# Patient Record
Sex: FEMALE | Race: WHITE | NOT HISPANIC OR LATINO | Employment: OTHER | URBAN - METROPOLITAN AREA
[De-identification: names, ages, dates, MRNs, and addresses within clinical notes are randomized per-mention and may not be internally consistent; named-entity substitution may affect disease eponyms.]

---

## 2015-09-15 LAB — HM PAP SMEAR: NEGATIVE

## 2017-03-13 ENCOUNTER — TRANSCRIBE ORDERS (OUTPATIENT)
Dept: ADMINISTRATIVE | Facility: HOSPITAL | Age: 77
End: 2017-03-13

## 2017-03-13 DIAGNOSIS — Z12.31 VISIT FOR SCREENING MAMMOGRAM: Primary | ICD-10-CM

## 2017-03-17 ENCOUNTER — OFFICE VISIT (OUTPATIENT)
Dept: FAMILY MEDICINE CLINIC | Facility: CLINIC | Age: 77
End: 2017-03-17

## 2017-03-17 VITALS
DIASTOLIC BLOOD PRESSURE: 72 MMHG | WEIGHT: 177 LBS | SYSTOLIC BLOOD PRESSURE: 136 MMHG | HEART RATE: 51 BPM | HEIGHT: 65 IN | OXYGEN SATURATION: 99 % | BODY MASS INDEX: 29.49 KG/M2 | TEMPERATURE: 97.9 F

## 2017-03-17 DIAGNOSIS — E11.9 TYPE 2 DIABETES MELLITUS WITHOUT COMPLICATION, WITHOUT LONG-TERM CURRENT USE OF INSULIN (HCC): ICD-10-CM

## 2017-03-17 DIAGNOSIS — E78.2 MIXED HYPERLIPIDEMIA: ICD-10-CM

## 2017-03-17 DIAGNOSIS — I10 ESSENTIAL HYPERTENSION: Primary | ICD-10-CM

## 2017-03-17 LAB
ALBUMIN SERPL-MCNC: 3.8 G/DL (ref 3.5–5.2)
ALBUMIN UR-MCNC: 0 MG/L (ref 0–20)
ALBUMIN/GLOB SERPL: 1.4 G/DL
ALP SERPL-CCNC: 66 U/L (ref 39–117)
ALT SERPL W P-5'-P-CCNC: 18 U/L (ref 1–33)
ANION GAP SERPL CALCULATED.3IONS-SCNC: 10.9 MMOL/L
AST SERPL-CCNC: 24 U/L (ref 1–32)
BILIRUB SERPL-MCNC: 0.6 MG/DL (ref 0.1–1.2)
BUN BLD-MCNC: 22 MG/DL (ref 8–23)
BUN/CREAT SERPL: 20.4 (ref 7–25)
CALCIUM SPEC-SCNC: 10.1 MG/DL (ref 8.6–10.5)
CHLORIDE SERPL-SCNC: 102 MMOL/L (ref 98–107)
CHOLEST SERPL-MCNC: 123 MG/DL (ref 0–200)
CO2 SERPL-SCNC: 29.1 MMOL/L (ref 22–29)
CREAT BLD-MCNC: 1.08 MG/DL (ref 0.57–1)
GFR SERPL CREATININE-BSD FRML MDRD: 49 ML/MIN/1.73
GLOBULIN UR ELPH-MCNC: 2.8 GM/DL
GLUCOSE BLD-MCNC: 103 MG/DL (ref 65–99)
HBA1C MFR BLD: 6.2 % (ref 4.8–5.6)
HDLC SERPL-MCNC: 62 MG/DL (ref 40–60)
LDLC SERPL CALC-MCNC: 39 MG/DL (ref 0–100)
LDLC/HDLC SERPL: 0.64 {RATIO}
POTASSIUM BLD-SCNC: 4.1 MMOL/L (ref 3.5–5.2)
PROT SERPL-MCNC: 6.6 G/DL (ref 6–8.5)
SODIUM BLD-SCNC: 142 MMOL/L (ref 136–145)
TRIGL SERPL-MCNC: 108 MG/DL (ref 0–150)
VLDLC SERPL-MCNC: 21.6 MG/DL (ref 5–40)

## 2017-03-17 PROCEDURE — 80053 COMPREHEN METABOLIC PANEL: CPT | Performed by: FAMILY MEDICINE

## 2017-03-17 PROCEDURE — 80061 LIPID PANEL: CPT | Performed by: FAMILY MEDICINE

## 2017-03-17 PROCEDURE — 82043 UR ALBUMIN QUANTITATIVE: CPT | Performed by: FAMILY MEDICINE

## 2017-03-17 PROCEDURE — 99213 OFFICE O/P EST LOW 20 MIN: CPT | Performed by: FAMILY MEDICINE

## 2017-03-17 PROCEDURE — 83036 HEMOGLOBIN GLYCOSYLATED A1C: CPT | Performed by: FAMILY MEDICINE

## 2017-03-17 RX ORDER — RAMIPRIL 10 MG/1
10 CAPSULE ORAL DAILY
Qty: 90 CAPSULE | Refills: 3 | Status: SHIPPED | OUTPATIENT
Start: 2017-03-17 | End: 2017-07-10 | Stop reason: SDUPTHER

## 2017-03-17 NOTE — PROGRESS NOTES
SUBJECTIVE:  The patient is an 87-year-old white female who comes in today for follow-up on her diabetes.  She's doing well.  She exercises at the gym.  Her blood sugars are well controlled.  She is due labs.  Her GYN exams are up-to-date.  Her eye exams are up-to-date.     PAST MEDICAL HISTORY:  Reviewed.    REVIEW OF SYSTEMS:  Please see above; 14 point ROS otherwise negative.      OBJECTIVE: Vitals signs are reviewed and are stable.    HEENT: PERRLA.    Neck:  Supple.    Lungs:  Clear.    Heart:  Regular rate and rhythm.    Abdomen:   Soft, nontender.    Extremities:  No cyanosis, clubbing or edema.      ASSESSMENT:     Type 2 diabetes  Hyperlipidemia  Hypertension    PLAN:   CMP fasting lipid hemoglobin A1c urine for microalbuminuria are ordered.  She will follow up on her labs.  She will continue her healthy lifestyle.  She does not want to have a colonoscopy.    Much of this encounter note is an electronic transcription/translation of spoken language to printed text.  The electronic translation of spoken language may permit erroneous, or at times, nonsensical words or phrases to be inadvertently transcribed.  Although I have reviewed the note for such errors, some may still exist.

## 2017-03-18 RX ORDER — RAMIPRIL 10 MG/1
CAPSULE ORAL
Qty: 45 CAPSULE | Refills: 3 | Status: SHIPPED | OUTPATIENT
Start: 2017-03-18 | End: 2017-04-06 | Stop reason: SDUPTHER

## 2017-04-04 ENCOUNTER — APPOINTMENT (OUTPATIENT)
Dept: GENERAL RADIOLOGY | Facility: HOSPITAL | Age: 77
End: 2017-04-04

## 2017-04-04 ENCOUNTER — HOSPITAL ENCOUNTER (EMERGENCY)
Facility: HOSPITAL | Age: 77
Discharge: HOME | End: 2017-04-04
Attending: EMERGENCY MEDICINE | Admitting: EMERGENCY MEDICINE

## 2017-04-04 VITALS
WEIGHT: 177 LBS | OXYGEN SATURATION: 98 % | HEART RATE: 64 BPM | SYSTOLIC BLOOD PRESSURE: 146 MMHG | BODY MASS INDEX: 29.49 KG/M2 | RESPIRATION RATE: 18 BRPM | TEMPERATURE: 99.7 F | DIASTOLIC BLOOD PRESSURE: 94 MMHG | HEIGHT: 65 IN

## 2017-04-04 DIAGNOSIS — I48.0 PAROXYSMAL ATRIAL FIBRILLATION (HCC): Primary | ICD-10-CM

## 2017-04-04 LAB
ALBUMIN SERPL-MCNC: 3.9 G/DL (ref 3.5–5.2)
ALBUMIN/GLOB SERPL: 1.4 G/DL
ALP SERPL-CCNC: 74 U/L (ref 39–117)
ALT SERPL W P-5'-P-CCNC: 15 U/L (ref 1–33)
ANION GAP SERPL CALCULATED.3IONS-SCNC: 12.3 MMOL/L
AST SERPL-CCNC: 23 U/L (ref 1–32)
BASOPHILS # BLD AUTO: 0.03 10*3/MM3 (ref 0–0.2)
BASOPHILS NFR BLD AUTO: 0.4 % (ref 0–1.5)
BILIRUB SERPL-MCNC: 0.3 MG/DL (ref 0.1–1.2)
BUN BLD-MCNC: 22 MG/DL (ref 8–23)
BUN/CREAT SERPL: 20.2 (ref 7–25)
CALCIUM SPEC-SCNC: 10.2 MG/DL (ref 8.6–10.5)
CHLORIDE SERPL-SCNC: 101 MMOL/L (ref 98–107)
CO2 SERPL-SCNC: 28.7 MMOL/L (ref 22–29)
CREAT BLD-MCNC: 1.09 MG/DL (ref 0.57–1)
DEPRECATED RDW RBC AUTO: 42.6 FL (ref 37–54)
EOSINOPHIL # BLD AUTO: 0.01 10*3/MM3 (ref 0–0.7)
EOSINOPHIL NFR BLD AUTO: 0.1 % (ref 0.3–6.2)
ERYTHROCYTE [DISTWIDTH] IN BLOOD BY AUTOMATED COUNT: 12.5 % (ref 11.7–13)
GFR SERPL CREATININE-BSD FRML MDRD: 49 ML/MIN/1.73
GLOBULIN UR ELPH-MCNC: 2.8 GM/DL
GLUCOSE BLD-MCNC: 137 MG/DL (ref 65–99)
HCT VFR BLD AUTO: 41.4 % (ref 35.6–45.5)
HGB BLD-MCNC: 14.1 G/DL (ref 11.9–15.5)
IMM GRANULOCYTES # BLD: 0.02 10*3/MM3 (ref 0–0.03)
IMM GRANULOCYTES NFR BLD: 0.3 % (ref 0–0.5)
INR PPP: 1 (ref 0.9–1.1)
LYMPHOCYTES # BLD AUTO: 1.25 10*3/MM3 (ref 0.9–4.8)
LYMPHOCYTES NFR BLD AUTO: 17.4 % (ref 19.6–45.3)
MCH RBC QN AUTO: 32.2 PG (ref 26.9–32)
MCHC RBC AUTO-ENTMCNC: 34.1 G/DL (ref 32.4–36.3)
MCV RBC AUTO: 94.5 FL (ref 80.5–98.2)
MONOCYTES # BLD AUTO: 0.45 10*3/MM3 (ref 0.2–1.2)
MONOCYTES NFR BLD AUTO: 6.3 % (ref 5–12)
NEUTROPHILS # BLD AUTO: 5.43 10*3/MM3 (ref 1.9–8.1)
NEUTROPHILS NFR BLD AUTO: 75.5 % (ref 42.7–76)
PLATELET # BLD AUTO: 216 10*3/MM3 (ref 140–500)
PMV BLD AUTO: 10.7 FL (ref 6–12)
POTASSIUM BLD-SCNC: 4.2 MMOL/L (ref 3.5–5.2)
PROT SERPL-MCNC: 6.7 G/DL (ref 6–8.5)
PROTHROMBIN TIME: 12.8 SECONDS (ref 11.7–14.2)
RBC # BLD AUTO: 4.38 10*6/MM3 (ref 3.9–5.2)
SODIUM BLD-SCNC: 142 MMOL/L (ref 136–145)
T4 FREE SERPL-MCNC: 0.87 NG/DL (ref 0.93–1.7)
TSH SERPL DL<=0.05 MIU/L-ACNC: 2.85 MIU/ML (ref 0.27–4.2)
WBC NRBC COR # BLD: 7.19 10*3/MM3 (ref 4.5–10.7)

## 2017-04-04 PROCEDURE — 96361 HYDRATE IV INFUSION ADD-ON: CPT

## 2017-04-04 PROCEDURE — 85610 PROTHROMBIN TIME: CPT | Performed by: EMERGENCY MEDICINE

## 2017-04-04 PROCEDURE — 71020 HC CHEST PA AND LATERAL: CPT

## 2017-04-04 PROCEDURE — 93010 ELECTROCARDIOGRAM REPORT: CPT | Performed by: INTERNAL MEDICINE

## 2017-04-04 PROCEDURE — 96360 HYDRATION IV INFUSION INIT: CPT

## 2017-04-04 PROCEDURE — 36415 COLL VENOUS BLD VENIPUNCTURE: CPT

## 2017-04-04 PROCEDURE — 84443 ASSAY THYROID STIM HORMONE: CPT | Performed by: EMERGENCY MEDICINE

## 2017-04-04 PROCEDURE — 99284 EMERGENCY DEPT VISIT MOD MDM: CPT

## 2017-04-04 PROCEDURE — 84439 ASSAY OF FREE THYROXINE: CPT | Performed by: EMERGENCY MEDICINE

## 2017-04-04 PROCEDURE — 85025 COMPLETE CBC W/AUTO DIFF WBC: CPT | Performed by: EMERGENCY MEDICINE

## 2017-04-04 PROCEDURE — 93005 ELECTROCARDIOGRAM TRACING: CPT | Performed by: EMERGENCY MEDICINE

## 2017-04-04 PROCEDURE — 80053 COMPREHEN METABOLIC PANEL: CPT | Performed by: EMERGENCY MEDICINE

## 2017-04-04 RX ORDER — SODIUM CHLORIDE 0.9 % (FLUSH) 0.9 %
10 SYRINGE (ML) INJECTION AS NEEDED
Status: DISCONTINUED | OUTPATIENT
Start: 2017-04-04 | End: 2017-04-04 | Stop reason: HOSPADM

## 2017-04-04 RX ADMIN — SODIUM CHLORIDE 500 ML: 9 INJECTION, SOLUTION INTRAVENOUS at 16:00

## 2017-04-04 NOTE — ED NOTES
Pt worked out this morning per usual, however, pt's HR remained in the 140's-150's 5-6 hours after her workout. Proceeded to urgent care who then called EMS. EMS reports that residential through their ride to the hospital, pt converted to NSR. EKGs of all rhythms were provided by EMS        Isadora Álvarez RN  04/04/17 5373

## 2017-04-04 NOTE — ED PROVIDER NOTES
EMERGENCY DEPARTMENT ENCOUNTER    CHIEF COMPLAINT  Chief Complaint: rapid heart rate  History given by: pt  History limited by: Pt is a poor historian.  Room Number: 17/17  PMD: Chandu Erickson MD      HPI:  Pt is a 77 y.o. female who presents complaining of a rapid heart rate after exercising this morning. Pt states her HR remained in the 140's-150's 5-6 hours after her workout and she went to [facility]. Pt was then sent from [facility] to the ED for further evaluation. Pt states her rapid heart rate has now resolved. Pt denies dizziness, lightheadedness, & CP. Pt has a hx of htn, hyperlipidemia, & diabetes.    Duration:  1 day  Onset: sudden  Timing: constant for 5-6 hours  Intensity/Severity: moderate  Progression: rresolved  Associated Symptoms: none reported  Aggravating Factors: none reported  Alleviating Factors: none reported  Previous Episodes: none reported  Treatment before arrival: Pt was then sent from [facility] to the ED for further evaluation.    PAST MEDICAL HISTORY  Active Ambulatory Problems     Diagnosis Date Noted   • IFG (impaired fasting glucose)    • Hypertension    • Hyperlipidemia    • Diabetes mellitus      Resolved Ambulatory Problems     Diagnosis Date Noted   • No Resolved Ambulatory Problems     Past Medical History:   Diagnosis Date   • Diabetes mellitus    • Hyperlipidemia    • Hypertension    • IFG (impaired fasting glucose)        PAST SURGICAL HISTORY  Past Surgical History:   Procedure Laterality Date   • ANAL FISTULA REPAIR     • BACK SURGERY      l spine ruptured disk   • CHOLECYSTECTOMY     • JOINT REPLACEMENT      bilateral knees       FAMILY HISTORY  Family History   Problem Relation Age of Onset   • Liver disease Brother        SOCIAL HISTORY  Social History     Social History   • Marital status:      Spouse name: N/A   • Number of children: N/A   • Years of education: N/A     Occupational History   • Not on file.     Social History Main Topics   • Smoking status: Never Smoker   •  Smokeless tobacco: Not on file   • Alcohol use No   • Drug use: No   • Sexual activity: No     Other Topics Concern   • Not on file     Social History Narrative       ALLERGIES  Penicillins; Percocet [oxycodone-acetaminophen]; and Sulfa antibiotics    REVIEW OF SYSTEMS  Review of Systems   Constitutional: Negative for chills and fever.   HENT: Negative for sore throat and trouble swallowing.    Eyes: Negative for visual disturbance.   Respiratory: Negative for cough and shortness of breath.    Cardiovascular: Negative for chest pain, palpitations and leg swelling.        Rapid heart rate   Gastrointestinal: Negative for abdominal pain, diarrhea and vomiting.   Endocrine: Negative.    Genitourinary: Negative for decreased urine volume, dysuria and frequency.   Musculoskeletal: Negative for neck pain.   Skin: Negative for rash.   Allergic/Immunologic: Negative.    Neurological: Negative for syncope, weakness, numbness and headaches.   Hematological: Negative.    Psychiatric/Behavioral: Negative.    All other systems reviewed and are negative.      PHYSICAL EXAM  ED Triage Vitals   Temp Heart Rate Resp BP SpO2   04/04/17 1449 04/04/17 1449 04/04/17 1449 04/04/17 1449 04/04/17 1449   99.7 °F (37.6 °C) 76 16 140/88 97 %      Temp src Heart Rate Source Patient Position BP Location FiO2 (%)   04/04/17 1449 04/04/17 1449 -- -- --   Tympanic Monitor          Physical Exam   Constitutional: She is oriented to person, place, and time and well-developed, well-nourished, and in no distress. No distress.   HENT:   Head: Normocephalic.   Eyes: EOM are normal. Pupils are equal, round, and reactive to light.   Neck: Normal range of motion.   Cardiovascular: Normal rate and regular rhythm.    No murmur heard.  Pulmonary/Chest: Effort normal and breath sounds normal. No respiratory distress.   Abdominal: Soft. There is no tenderness. There is no rebound and no guarding.   Musculoskeletal: Normal range of motion. She exhibits no edema.    Neurological: She is alert and oriented to person, place, and time.   Skin: Skin is warm and dry. No rash noted.   Psychiatric: Mood and affect normal.   Nursing note and vitals reviewed.      LAB RESULTS  Lab Results (last 24 hours)     Procedure Component Value Units Date/Time    CBC & Differential [[id number]] Collected:  04/04/17 1537    Specimen:  Blood Updated:  04/04/17 1557    Narrative:       The following orders were created for panel order CBC & Differential.  Procedure                               Abnormality         Status                     ---------                               -----------         ------                     CBC Auto Differential[[id number]]         Abnormal            Final result                 Please view results for these tests on the individual orders.    Comprehensive Metabolic Panel [[id number]]  (Abnormal) Collected:  04/04/17 1537    Specimen:  Blood Updated:  04/04/17 1623     Glucose 137 (H) mg/dL      BUN 22 mg/dL      Creatinine 1.09 (H) mg/dL      Sodium 142 mmol/L      Potassium 4.2 mmol/L      Chloride 101 mmol/L      CO2 28.7 mmol/L      Calcium 10.2 mg/dL      Total Protein 6.7 g/dL      Albumin 3.90 g/dL      ALT (SGPT) 15 U/L      AST (SGOT) 23 U/L      Alkaline Phosphatase 74 U/L      Total Bilirubin 0.3 mg/dL      eGFR Non African Amer 49 (L) mL/min/1.73      Globulin 2.8 gm/dL      A/G Ratio 1.4 g/dL      BUN/Creatinine Ratio 20.2     Anion Gap 12.3 mmol/L     Narrative:       The MDRD GFR formula is only valid for adults with stable renal function between ages 18 and 70.    Protime-INR [[id number]]  (Normal) Collected:  04/04/17 1537    Specimen:  Blood Updated:  04/04/17 1608     Protime 12.8 Seconds      INR 1.00    TSH [[id number]]  (Normal) Collected:  04/04/17 1537    Specimen:  Blood Updated:  04/04/17 1636     TSH 2.850 mIU/mL     T4, Free [[id number]]  (Abnormal) Collected:  04/04/17 1537    Specimen:  Blood Updated:  04/04/17 1636     Free T4 0.87 (L) ng/dL      CBC Auto Differential [[id number]]  (Abnormal) Collected:  04/04/17 1537    Specimen:  Blood Updated:  04/04/17 1557     WBC 7.19 10*3/mm3      RBC 4.38 10*6/mm3      Hemoglobin 14.1 g/dL      Hematocrit 41.4 %      MCV 94.5 fL      MCH 32.2 (H) pg      MCHC 34.1 g/dL      RDW 12.5 %      RDW-SD 42.6 fl      MPV 10.7 fL      Platelets 216 10*3/mm3      Neutrophil % 75.5 %      Lymphocyte % 17.4 (L) %      Monocyte % 6.3 %      Eosinophil % 0.1 (L) %      Basophil % 0.4 %      Immature Grans % 0.3 %      Neutrophils, Absolute 5.43 10*3/mm3      Lymphocytes, Absolute 1.25 10*3/mm3      Monocytes, Absolute 0.45 10*3/mm3      Eosinophils, Absolute 0.01 10*3/mm3      Basophils, Absolute 0.03 10*3/mm3      Immature Grans, Absolute 0.02 10*3/mm3           I ordered the above labs and reviewed the results    RADIOLOGY  XR Chest 2 View   Final Result   XR Chest 2 View (Final result) Result time: 04/04/17 15:53:34     Final result by Twin Mayfield MD (04/04/17 15:53:34)     Narrative:     EMERGENCY TWO-VIEW CHEST     HISTORY: 77-year-old female with tachycardia hypertension diabetes and  palpitations     COMPARISON: 08/27/2009     FINDINGS:  1. Stable negative acute chest.     This report was finalized on 4/4/2017 3:53 PM by Dr. Twin Mayfield MD.             I ordered the above noted radiological studies. Interpreted by radiologist. Reviewed by me in PACS.       PROCEDURES  Procedures    EKG           EKG time: 1348  Rhythm/Rate: a-fib  QRS, axis: normal axis, QT interval is 426 ms  ST and T waves: nonspecific ST changes    Interpreted Contemporaneously by me, independently viewed  Pt's subsequent EKG shows she converted back to sinus rhythm.    EKG           EKG time: 1537  Rhythm/Rate: SR, 67  P waves and SD: PACs  QRS, axis: LAD, QT interval is 397 ms   ST and T waves: nonspecific      Interpreted Contemporaneously by me, independently viewed  No sig change compared to prior 7/28/09.    PROGRESS AND CONSULTS  ED Course      1516 Ordered CBC, CMP, PT-INR, TSH, & T4 for further evaluation. Ordered CXR & EKG for further evaluation. Ordered IV fluids.    1540 Pt's CHADS score is a 3.     1653 Discussed pt's case w/ Dr. Martínez (cardio) who states pt can be discharged w/ Eliquis & follow-up in the office.    1738 Pt rechecked & is resting comfortably. Discussed that I consulted w/ Dr. Martínez & plan to discharge pt w/ a ZIO patch for monitoring. Advised pt to follow-up w/ [facility]. Pt agrees w/ plan & has no further questions at this time.    MEDICAL DECISION MAKING  Results were reviewed/discussed with the patient and they were also made aware of online access. Pt also made aware that some labs, such as cultures, will not be resulted during ER visit and follow up with PMD is necessary.     MDM  Number of Diagnoses or Management Options  Paroxysmal atrial fibrillation:      Amount and/or Complexity of Data Reviewed  Clinical lab tests: ordered and reviewed  Tests in the radiology section of CPT®: ordered and reviewed (CXR was negative acute.)  Tests in the medicine section of CPT®: ordered and reviewed (See EKG in procedure note.)  Decide to obtain previous medical records or to obtain history from someone other than the patient: yes  Review and summarize past medical records: yes  Discuss the patient with other providers: yes (Dr. Walls (cardio))  Independent visualization of images, tracings, or specimens: yes    Patient Progress  Patient progress: stable         DIAGNOSIS  Final diagnoses:   Paroxysmal atrial fibrillation       DISPOSITION    DISCHARGE    Patient discharged in stable condition.    Reviewed implications of results, diagnosis, meds, responsibility to follow up, warning signs and symptoms of possible worsening, potential complications and reasons to return to ER.    Patient/Family voiced understanding of above instructions.    Discussed plan for discharge, as there is no emergent indication for admission.  Pt/family is agreeable  and understands need for follow up and repeat testing.  Pt is aware that discharge does not mean that nothing is wrong but it indicates no emergency is present that requires admission and they must continue care with follow-up as given below or physician of their choice.     FOLLOW-UP  [facility] [place] [address]  [facility] [address]  [phone]  Schedule an appointment as soon as possible for a visit in 1 day           Medication List      New Prescriptions          apixaban 5 MG tablet tablet   Commonly known as:  ELIQUIS   Take 1 tablet by mouth 2 (Two) Times a Day.         Changed          ramipril 10 MG capsule   Commonly known as:  ALTACE   Take 1 capsule by mouth Daily.   What changed:  Another medication with the same name was removed. Continue   taking this medication, and follow the directions you see here.             Latest Documented Vital Signs:  As of 5:44 PM  BP- 146/94 HR- 64 Temp- 99.7 °F (37.6 °C) (Tympanic) O2 sat- 98%    --  Documentation assistance provided by karmen Hi for Dr. Sofia.  Information recorded by the karmen was done at my direction and has been verified and validated by me.     Clarisa Hi  04/04/17 1528       Clarisa Hi  04/04/17 1621       Clarisa Hi  04/04/17 1720       Clarisa Hi  04/04/17 1722       Clarisa Hi  04/04/17 1748       Zan Sofia MD  04/04/17 9108

## 2017-04-05 ENCOUNTER — TELEPHONE (OUTPATIENT)
Dept: SOCIAL WORK | Facility: HOSPITAL | Age: 77
End: 2017-04-05

## 2017-04-05 NOTE — TELEPHONE ENCOUNTER
ER F/U phone call:   Pt states that she is doing fine, she is to see her cardiologist on 4-6-17. Pt asked what should she do if her heart rate got real high again, informed pt that she should return to the ER. No other questions or concerns voiced at this time. Lyla Friedman RN

## 2017-04-06 ENCOUNTER — OFFICE VISIT (OUTPATIENT)
Dept: CARDIOLOGY | Facility: CLINIC | Age: 77
End: 2017-04-06

## 2017-04-06 VITALS
BODY MASS INDEX: 29.99 KG/M2 | DIASTOLIC BLOOD PRESSURE: 82 MMHG | WEIGHT: 180 LBS | HEIGHT: 65 IN | HEART RATE: 124 BPM | SYSTOLIC BLOOD PRESSURE: 160 MMHG

## 2017-04-06 DIAGNOSIS — I48.0 PAROXYSMAL ATRIAL FIBRILLATION (HCC): Primary | ICD-10-CM

## 2017-04-06 DIAGNOSIS — I10 ESSENTIAL HYPERTENSION: ICD-10-CM

## 2017-04-06 PROCEDURE — 99204 OFFICE O/P NEW MOD 45 MIN: CPT | Performed by: INTERNAL MEDICINE

## 2017-04-06 PROCEDURE — 93000 ELECTROCARDIOGRAM COMPLETE: CPT | Performed by: INTERNAL MEDICINE

## 2017-04-12 ENCOUNTER — HOSPITAL ENCOUNTER (OUTPATIENT)
Dept: CARDIOLOGY | Facility: HOSPITAL | Age: 77
Discharge: HOME | End: 2017-04-12
Attending: INTERNAL MEDICINE | Admitting: INTERNAL MEDICINE

## 2017-04-12 VITALS
HEART RATE: 108 BPM | WEIGHT: 180 LBS | OXYGEN SATURATION: 96 % | DIASTOLIC BLOOD PRESSURE: 26 MMHG | BODY MASS INDEX: 29.99 KG/M2 | HEIGHT: 65 IN | SYSTOLIC BLOOD PRESSURE: 130 MMHG

## 2017-04-12 DIAGNOSIS — I10 ESSENTIAL HYPERTENSION: ICD-10-CM

## 2017-04-12 DIAGNOSIS — I48.0 PAROXYSMAL ATRIAL FIBRILLATION (HCC): ICD-10-CM

## 2017-04-12 LAB
ASCENDING AORTA: 3.6 CM
BH CV ECHO MEAS - ACS: 1.9 CM
BH CV ECHO MEAS - AO MAX PG (FULL): 4.5 MMHG
BH CV ECHO MEAS - AO MAX PG: 8.1 MMHG
BH CV ECHO MEAS - AO MEAN PG (FULL): 2.6 MMHG
BH CV ECHO MEAS - AO MEAN PG: 4.6 MMHG
BH CV ECHO MEAS - AO ROOT AREA (BSA CORRECTED): 1.4
BH CV ECHO MEAS - AO ROOT AREA: 5.8 CM^2
BH CV ECHO MEAS - AO ROOT DIAM: 2.7 CM
BH CV ECHO MEAS - AO V2 MAX: 141.9 CM/SEC
BH CV ECHO MEAS - AO V2 MEAN: 101.3 CM/SEC
BH CV ECHO MEAS - AO V2 VTI: 28.9 CM
BH CV ECHO MEAS - AVA(I,A): 1.6 CM^2
BH CV ECHO MEAS - AVA(I,D): 1.6 CM^2
BH CV ECHO MEAS - AVA(V,A): 1.7 CM^2
BH CV ECHO MEAS - AVA(V,D): 1.7 CM^2
BH CV ECHO MEAS - BSA(HAYCOCK): 2 M^2
BH CV ECHO MEAS - BSA: 1.9 M^2
BH CV ECHO MEAS - BZI_BMI: 30 KILOGRAMS/M^2
BH CV ECHO MEAS - BZI_METRIC_HEIGHT: 165.1 CM
BH CV ECHO MEAS - BZI_METRIC_WEIGHT: 81.6 KG
BH CV ECHO MEAS - CONTRAST EF (2CH): 60.5 ML/M^2
BH CV ECHO MEAS - CONTRAST EF 4CH: 63.3 ML/M^2
BH CV ECHO MEAS - EDV(MOD-SP2): 86 ML
BH CV ECHO MEAS - EDV(MOD-SP4): 109 ML
BH CV ECHO MEAS - EDV(TEICH): 130.2 ML
BH CV ECHO MEAS - EF(CUBED): 64.5 %
BH CV ECHO MEAS - EF(MOD-SP2): 60.5 %
BH CV ECHO MEAS - EF(MOD-SP4): 63.3 %
BH CV ECHO MEAS - EF(TEICH): 55.6 %
BH CV ECHO MEAS - ESV(MOD-SP2): 34 ML
BH CV ECHO MEAS - ESV(MOD-SP4): 40 ML
BH CV ECHO MEAS - ESV(TEICH): 57.8 ML
BH CV ECHO MEAS - FS: 29.2 %
BH CV ECHO MEAS - IVS/LVPW: 0.96
BH CV ECHO MEAS - IVSD: 1.1 CM
BH CV ECHO MEAS - LAT PEAK E' VEL: 10 CM/SEC
BH CV ECHO MEAS - LV DIASTOLIC VOL/BSA (35-75): 57.6 ML/M^2
BH CV ECHO MEAS - LV MASS(C)D: 223.7 GRAMS
BH CV ECHO MEAS - LV MASS(C)DI: 118.3 GRAMS/M^2
BH CV ECHO MEAS - LV MAX PG: 3.6 MMHG
BH CV ECHO MEAS - LV MEAN PG: 1.9 MMHG
BH CV ECHO MEAS - LV SYSTOLIC VOL/BSA (12-30): 21.1 ML/M^2
BH CV ECHO MEAS - LV V1 MAX: 94.6 CM/SEC
BH CV ECHO MEAS - LV V1 MEAN: 66.1 CM/SEC
BH CV ECHO MEAS - LV V1 VTI: 18.8 CM
BH CV ECHO MEAS - LVIDD: 5.2 CM
BH CV ECHO MEAS - LVIDS: 3.7 CM
BH CV ECHO MEAS - LVLD AP2: 6.6 CM
BH CV ECHO MEAS - LVLD AP4: 6.9 CM
BH CV ECHO MEAS - LVLS AP2: 6.2 CM
BH CV ECHO MEAS - LVLS AP4: 6.2 CM
BH CV ECHO MEAS - LVOT AREA (M): 2.5 CM^2
BH CV ECHO MEAS - LVOT AREA: 2.5 CM^2
BH CV ECHO MEAS - LVOT DIAM: 1.8 CM
BH CV ECHO MEAS - LVPWD: 1.1 CM
BH CV ECHO MEAS - MED PEAK E' VEL: 7 CM/SEC
BH CV ECHO MEAS - MR MAX PG: 19.6 MMHG
BH CV ECHO MEAS - MR MAX VEL: 221.3 CM/SEC
BH CV ECHO MEAS - MV DEC SLOPE: 801.2 CM/SEC^2
BH CV ECHO MEAS - MV DEC TIME: 0.14 SEC
BH CV ECHO MEAS - MV E MAX VEL: 106.2 CM/SEC
BH CV ECHO MEAS - MV MAX PG: 0 MMHG
BH CV ECHO MEAS - MV MEAN PG: 1.4 MMHG
BH CV ECHO MEAS - MV P1/2T MAX VEL: 106.7 CM/SEC
BH CV ECHO MEAS - MV P1/2T: 39 MSEC
BH CV ECHO MEAS - MV V2 MAX: 106.3 CM/SEC
BH CV ECHO MEAS - MV V2 MEAN: 53.4 CM/SEC
BH CV ECHO MEAS - MV V2 VTI: 26.7 CM
BH CV ECHO MEAS - MVA P1/2T LCG: 2.1 CM^2
BH CV ECHO MEAS - MVA(P1/2T): 5.6 CM^2
BH CV ECHO MEAS - MVA(VTI): 1.8 CM^2
BH CV ECHO MEAS - PA ACC TIME: 0.07 SEC
BH CV ECHO MEAS - PA MAX PG (FULL): 2.2 MMHG
BH CV ECHO MEAS - PA MAX PG: 4.6 MMHG
BH CV ECHO MEAS - PA PR(ACCEL): 47.3 MMHG
BH CV ECHO MEAS - PA V2 MAX: 107.2 CM/SEC
BH CV ECHO MEAS - PVA(V,A): 2.8 CM^2
BH CV ECHO MEAS - PVA(V,D): 2.8 CM^2
BH CV ECHO MEAS - QP/QS: 1.1
BH CV ECHO MEAS - RAP SYSTOLE: 3 MMHG
BH CV ECHO MEAS - RV MAX PG: 2.4 MMHG
BH CV ECHO MEAS - RV MEAN PG: 1.3 MMHG
BH CV ECHO MEAS - RV V1 MAX: 78.1 CM/SEC
BH CV ECHO MEAS - RV V1 MEAN: 52.7 CM/SEC
BH CV ECHO MEAS - RV V1 VTI: 13.7 CM
BH CV ECHO MEAS - RVOT AREA: 3.8 CM^2
BH CV ECHO MEAS - RVOT DIAM: 2.2 CM
BH CV ECHO MEAS - RVSP: 19.8 MMHG
BH CV ECHO MEAS - SI(AO): 89 ML/M^2
BH CV ECHO MEAS - SI(CUBED): 48.3 ML/M^2
BH CV ECHO MEAS - SI(LVOT): 25 ML/M^2
BH CV ECHO MEAS - SI(MOD-SP2): 27.5 ML/M^2
BH CV ECHO MEAS - SI(MOD-SP4): 36.5 ML/M^2
BH CV ECHO MEAS - SI(TEICH): 38.3 ML/M^2
BH CV ECHO MEAS - SUP REN AO DIAM: 2 CM
BH CV ECHO MEAS - SV(AO): 168.3 ML
BH CV ECHO MEAS - SV(CUBED): 91.3 ML
BH CV ECHO MEAS - SV(LVOT): 47.3 ML
BH CV ECHO MEAS - SV(MOD-SP2): 52 ML
BH CV ECHO MEAS - SV(MOD-SP4): 69 ML
BH CV ECHO MEAS - SV(RVOT): 52.2 ML
BH CV ECHO MEAS - SV(TEICH): 72.4 ML
BH CV ECHO MEAS - TAPSE (>1.6): 1.4 CM2
BH CV ECHO MEAS - TR MAX VEL: 205 CM/SEC
BH CV XLRA - RV BASE: 3.1 CM
BH CV XLRA - TDI S': 12 CM/SEC
E/E' RATIO: 12.5
LEFT ATRIUM VOLUME INDEX: 33 ML/M2
LV EF 2D ECHO EST: 63 %
SINUS: 3.1 CM
STJ: 2.9 CM

## 2017-04-12 PROCEDURE — 93306 TTE W/DOPPLER COMPLETE: CPT | Performed by: INTERNAL MEDICINE

## 2017-04-12 PROCEDURE — 25010000002 SULFUR HEXAFLUORIDE MICROSPH 60.7-25 MG RECONSTITUTED SUSPENSION

## 2017-04-12 PROCEDURE — 25010000002 SULFUR HEXAFLUORIDE MICROSPH 60.7-25 MG RECONSTITUTED SUSPENSION: Performed by: INTERNAL MEDICINE

## 2017-04-12 PROCEDURE — C8929 TTE W OR WO FOL WCON,DOPPLER: HCPCS

## 2017-04-12 RX ADMIN — SULFUR HEXAFLUORIDE 2 ML: KIT at 13:11

## 2017-04-14 ENCOUNTER — CLINICAL SUPPORT (OUTPATIENT)
Dept: CARDIOLOGY | Facility: CLINIC | Age: 77
End: 2017-04-14

## 2017-04-14 DIAGNOSIS — I49.3 PVC (PREMATURE VENTRICULAR CONTRACTION): Primary | ICD-10-CM

## 2017-04-14 PROCEDURE — 93000 ELECTROCARDIOGRAM COMPLETE: CPT | Performed by: INTERNAL MEDICINE

## 2017-04-14 NOTE — PROGRESS NOTES
Procedure     ECG 12 Lead  Date/Time: 4/14/2017 11:45 AM  Performed by: MALISSA MARTIN  Authorized by: MALISSA MARTIN   Comparison: compared with previous ECG from 4/6/2017  Similar to previous ECG  Rhythm: sinus bradycardia  Rate: bradycardic  Conduction: conduction normal  ST Segments: ST segments normal  QRS axis: normal  Clinical impression: abnormal ECG              Pt present today for EKG check-up. Pt Bp 138/68 rt arm. Everything looks great, and keep taking meds per Dr. Martin. Thanks, Carmine

## 2017-04-20 ENCOUNTER — HOSPITAL ENCOUNTER (OUTPATIENT)
Dept: MAMMOGRAPHY | Facility: HOSPITAL | Age: 77
Discharge: HOME | End: 2017-04-20
Admitting: FAMILY MEDICINE

## 2017-04-20 DIAGNOSIS — Z12.31 VISIT FOR SCREENING MAMMOGRAM: ICD-10-CM

## 2017-04-20 PROCEDURE — G0202 SCR MAMMO BI INCL CAD: HCPCS

## 2017-04-20 PROCEDURE — 77063 BREAST TOMOSYNTHESIS BI: CPT

## 2017-05-02 ENCOUNTER — TELEPHONE (OUTPATIENT)
Dept: CARDIOLOGY | Facility: CLINIC | Age: 77
End: 2017-05-02

## 2017-05-05 RX ORDER — APIXABAN 5 MG/1
TABLET, FILM COATED ORAL
Qty: 60 TABLET | Refills: 5 | Status: SHIPPED | OUTPATIENT
Start: 2017-05-05 | End: 2017-06-01

## 2017-06-01 DIAGNOSIS — I48.0 PAROXYSMAL ATRIAL FIBRILLATION (HCC): Primary | ICD-10-CM

## 2017-06-01 RX ORDER — WARFARIN SODIUM 4 MG/1
4 TABLET ORAL
Qty: 30 TABLET | Refills: 0 | Status: SHIPPED | OUTPATIENT
Start: 2017-06-01 | End: 2017-06-26 | Stop reason: SDUPTHER

## 2017-06-26 RX ORDER — WARFARIN SODIUM 4 MG/1
TABLET ORAL
Qty: 30 TABLET | Refills: 2 | Status: SHIPPED | OUTPATIENT
Start: 2017-06-26 | End: 2017-09-30 | Stop reason: SDUPTHER

## 2017-07-10 ENCOUNTER — OFFICE VISIT (OUTPATIENT)
Dept: CARDIOLOGY | Facility: CLINIC | Age: 77
End: 2017-07-10

## 2017-07-10 VITALS
HEIGHT: 65 IN | WEIGHT: 174 LBS | BODY MASS INDEX: 28.99 KG/M2 | DIASTOLIC BLOOD PRESSURE: 82 MMHG | HEART RATE: 46 BPM | SYSTOLIC BLOOD PRESSURE: 170 MMHG

## 2017-07-10 DIAGNOSIS — I48.0 PAROXYSMAL ATRIAL FIBRILLATION (HCC): ICD-10-CM

## 2017-07-10 DIAGNOSIS — E78.2 MIXED HYPERLIPIDEMIA: ICD-10-CM

## 2017-07-10 DIAGNOSIS — I10 ESSENTIAL HYPERTENSION: Primary | ICD-10-CM

## 2017-07-10 PROCEDURE — 99214 OFFICE O/P EST MOD 30 MIN: CPT | Performed by: INTERNAL MEDICINE

## 2017-07-10 PROCEDURE — 93000 ELECTROCARDIOGRAM COMPLETE: CPT | Performed by: INTERNAL MEDICINE

## 2017-07-10 RX ORDER — RAMIPRIL 10 MG/1
20 CAPSULE ORAL DAILY
Qty: 180 CAPSULE | Refills: 3 | Status: SHIPPED | OUTPATIENT
Start: 2017-07-10 | End: 2017-07-10 | Stop reason: SDUPTHER

## 2017-07-10 RX ORDER — RAMIPRIL 10 MG/1
20 CAPSULE ORAL DAILY
Qty: 180 CAPSULE | Refills: 3 | Status: SHIPPED | OUTPATIENT
Start: 2017-07-10 | End: 2018-05-11

## 2017-07-10 NOTE — PROGRESS NOTES
Arelis Johnson  1940  Date of Office Visit: 7/10/17  Encounter Provider: Sly Saleh MD  Place of Service: [facility] CARDIOLOGY      CHIEF COMPLAINT:  Paroxysmal atrial fibrillation  Essential hypertension    HISTORY OF PRESENT ILLNESS:  Dr. Erickson,    I had the pleasure of seeing Ms. Johnson in follow-up today.  She is a very pleasant 77-year-old female with a medical history of paroxysmal atrial fibrillation, essential hypertension and hyperlipidemia along with diet controlled diabetes mellitus who presents back for follow-up.  On our last visit she was noted to be back in atrial fibrillation with a rapid ventricular rate.  Her beta blocker was increased slightly.  She converted back to sinus rhythm and is actually in sinus bradycardia.  She denies any sustained palpitations, tachycardia or chest discomfort.  She has had no recent lightheadedness or syncope.  Does not feel overly fatigued in her bradycardia.  She is currently on Coumadin therapy as the direct oral anticoagulant were too expensive for her.  No bleeding complications on that therapy.    Review of Systems   Constitution: Negative for fever, weakness and malaise/fatigue.   HENT: Negative for nosebleeds and sore throat.    Eyes: Negative for blurred vision and double vision.   Cardiovascular: Positive for leg swelling. Negative for chest pain, claudication, palpitations and syncope.   Respiratory: Negative for cough, shortness of breath and snoring.    Endocrine: Negative for cold intolerance, heat intolerance and polydipsia.   Skin: Negative for itching, poor wound healing and rash.   Musculoskeletal: Positive for joint pain. Negative for joint swelling, muscle weakness and myalgias.   Gastrointestinal: Negative for abdominal pain, melena, nausea and vomiting.   Neurological: Negative for light-headedness, loss of balance, seizures and vertigo.   Psychiatric/Behavioral: Negative for altered mental  "status and depression.        Past Medical History:   Diagnosis Date   • Diabetes mellitus    • Hyperlipidemia    • Hypertension    • IFG (impaired fasting glucose)    • SOB (shortness of breath)    paroxysmal afib      The following portions of the patient's history were reviewed and updated as appropriate: Social history , Family history and Surgical history     Current Outpatient Prescriptions on File Prior to Visit   Medication Sig Dispense Refill   • Calcium Carbonate (CALTRATE 600 PO) Take 1 tablet by mouth daily.     • furosemide (LASIX) 20 MG tablet 1/2 pill daily (Patient taking differently: Take 10 mg by mouth As Needed. 1/2 pill daily ) 45 tablet 3   • metoprolol tartrate (LOPRESSOR) 25 MG tablet Take 1 tablet by mouth 2 (Two) Times a Day. 60 tablet 11   • simvastatin (ZOCOR) 40 MG tablet Take 1 tablet by mouth daily. 120 tablet 3   • warfarin (COUMADIN) 4 MG tablet TAKE ONE tablet (by mouth) EACH DAY 30 tablet 2   • [DISCONTINUED] ramipril (ALTACE) 10 MG capsule Take 1 capsule by mouth Daily. 90 capsule 3   • [DISCONTINUED] aspirin 81 MG tablet Take 81 mg by mouth daily.       No current facility-administered medications on file prior to visit.        Allergies   Allergen Reactions   • Penicillins    • Percocet [Oxycodone-Acetaminophen] GI Intolerance   • Sulfa Antibiotics        Vitals:    07/10/17 1043   BP: 170/82   Pulse: (!) 46   Weight: 174 lb (78.9 kg)   Height: 65\" (165.1 cm)     Physical Exam   Constitutional: She is oriented to person, place, and time. She appears well-developed and well-nourished.   HENT:   Head: Normocephalic and atraumatic.   Eyes: Conjunctivae and EOM are normal. No scleral icterus.   Neck: Normal range of motion. Neck supple. Normal carotid pulses, no hepatojugular reflux and no JVD present. Carotid bruit is not present. No tracheal deviation present. No thyromegaly present.   Cardiovascular: Regular rhythm.  Bradycardia present.  Exam reveals no gallop and no friction " rub.    No murmur heard.  Pulses:       Carotid pulses are 2+ on the right side, and 2+ on the left side.       Radial pulses are 2+ on the right side, and 2+ on the left side.        Femoral pulses are 2+ on the right side, and 2+ on the left side.       Dorsalis pedis pulses are 2+ on the right side, and 2+ on the left side.        Posterior tibial pulses are 2+ on the right side, and 2+ on the left side.   Pulmonary/Chest: Breath sounds normal. No respiratory distress. She has no decreased breath sounds. She has no wheezes. She has no rhonchi. She has no rales. She exhibits no tenderness.   Abdominal: Soft. Bowel sounds are normal. She exhibits no distension. There is no tenderness. There is no rebound.   Musculoskeletal: She exhibits edema (trace bilateral LE edema). She exhibits no deformity.   Neurological: She is alert and oriented to person, place, and time. She has normal strength. No sensory deficit.   Skin: No rash noted. No erythema.   Psychiatric: She has a normal mood and affect. Her behavior is normal.       No components found for: CBC  No results found for: CMP  No components found for: LIPID  No results found for: BMP      ECG 12 Lead  Date/Time: 7/10/2017 11:07 AM  Performed by: MALISSA MARTIN  Authorized by: MALISSA MARTIN   Comparison: compared with previous ECG from 4/6/2017  Comparison to previous ECG: No longer in atrial fibrillation  Rhythm: sinus bradycardia  Rate: bradycardic  ST Segments: ST segments normal  T Waves: T waves normal  QRS axis: normal  Clinical impression: low voltage               4/12/17 TTE  · Left ventricular wall thickness is consistent with borderline concentric hypertrophy.  · Left ventricular function is normal. Estimated EF = 63%.  · Left ventricular diastolic function was unable to be assessed. Elevated left atrial pressure.      DISCUSSION/SUMMARY 77-year-old female with a medical history of essential hypertension, mild lower extremity edema and  hyperlipidemia who presented to me secondary to the acute onset of palpitations and tachycardia and was found to be in afib with rvr.  Since our last visit on the higher dose of metoprolol tartrate she is maintaining sinus rhythm and actually is slightly bradycardic.  She has no significant fatigue or other high risk features with her bradycardia.  Her blood pressure is still poorly controlled.  She denies any chest pain, sustained tachycardia or palpitations since our last visit.    1. Atrial fibrillation; paroxysmal.      -Currently on Coumadin along with metoprolol tartrate 25 mg by mouth twice a day.  She is in sinus bradycardia but has no significant fatigue, lightheadedness or presyncope.  No AV block.    -TTE as above. No valvular heart disease  2. Essential hypertension: This is poorly controlled.  I'm going to increase her Remicade to 20 mg daily.  If this is not doing she will have to have additional 3 metoprolol and metoprolol tartrate.      I will see the patient back in two to three months.      Atrial Fibrillation and Atrial Flutter  Assessment  • The patient has paroxysmal atrial fibrillation  • The patient's CHADS2-VASc score is 5  • A [id number] score of 2 or more is considered a high risk for a thromboembolic event  • Warfarin prescribed    Plan  • Attempt to maintain sinus rhythm  • Continue warfarin for antithrombotic therapy, bleeding issues discussed  • Add beta blocker for rate control

## 2017-09-08 RX ORDER — FUROSEMIDE 20 MG/1
TABLET ORAL
Qty: 45 TABLET | Refills: 3 | Status: SHIPPED | OUTPATIENT
Start: 2017-09-08 | End: 2018-02-14

## 2017-09-15 ENCOUNTER — TELEPHONE (OUTPATIENT)
Dept: FAMILY MEDICINE CLINIC | Facility: CLINIC | Age: 77
End: 2017-09-15

## 2017-09-15 RX ORDER — SIMVASTATIN 40 MG
40 TABLET ORAL NIGHTLY
Qty: 90 TABLET | Refills: 0 | Status: SHIPPED | OUTPATIENT
Start: 2017-09-15 | End: 2017-12-15 | Stop reason: SDUPTHER

## 2017-09-27 ENCOUNTER — OFFICE VISIT (OUTPATIENT)
Dept: FAMILY MEDICINE CLINIC | Facility: CLINIC | Age: 77
End: 2017-09-27

## 2017-09-27 VITALS
HEART RATE: 65 BPM | SYSTOLIC BLOOD PRESSURE: 130 MMHG | WEIGHT: 177.2 LBS | DIASTOLIC BLOOD PRESSURE: 68 MMHG | HEIGHT: 65 IN | OXYGEN SATURATION: 98 % | BODY MASS INDEX: 29.52 KG/M2 | TEMPERATURE: 98.1 F

## 2017-09-27 DIAGNOSIS — R73.9 HYPERGLYCEMIA: ICD-10-CM

## 2017-09-27 DIAGNOSIS — E78.2 MIXED HYPERLIPIDEMIA: Primary | ICD-10-CM

## 2017-09-27 DIAGNOSIS — I48.0 PAROXYSMAL ATRIAL FIBRILLATION (HCC): ICD-10-CM

## 2017-09-27 DIAGNOSIS — Z23 NEED FOR VACCINATION: ICD-10-CM

## 2017-09-27 DIAGNOSIS — I10 ESSENTIAL HYPERTENSION: ICD-10-CM

## 2017-09-27 LAB
ALBUMIN SERPL-MCNC: 4 G/DL (ref 3.5–5.2)
ALBUMIN/GLOB SERPL: 1.3 G/DL
ALP SERPL-CCNC: 57 U/L (ref 39–117)
ALT SERPL W P-5'-P-CCNC: 18 U/L (ref 1–33)
ANION GAP SERPL CALCULATED.3IONS-SCNC: 11.6 MMOL/L
AST SERPL-CCNC: 27 U/L (ref 1–32)
BILIRUB SERPL-MCNC: 0.7 MG/DL (ref 0.1–1.2)
BUN BLD-MCNC: 19 MG/DL (ref 8–23)
BUN/CREAT SERPL: 17.3 (ref 7–25)
CALCIUM SPEC-SCNC: 10.3 MG/DL (ref 8.6–10.5)
CHLORIDE SERPL-SCNC: 100 MMOL/L (ref 98–107)
CHOLEST SERPL-MCNC: 125 MG/DL (ref 0–200)
CO2 SERPL-SCNC: 30.4 MMOL/L (ref 22–29)
CREAT BLD-MCNC: 1.1 MG/DL (ref 0.57–1)
GFR SERPL CREATININE-BSD FRML MDRD: 48 ML/MIN/1.73
GLOBULIN UR ELPH-MCNC: 3.1 GM/DL
GLUCOSE BLD-MCNC: 113 MG/DL (ref 65–99)
HBA1C MFR BLD: 6.3 % (ref 4.8–5.6)
HDLC SERPL-MCNC: 72 MG/DL (ref 40–60)
LDLC SERPL CALC-MCNC: 34 MG/DL (ref 0–100)
LDLC/HDLC SERPL: 0.47 {RATIO}
POTASSIUM BLD-SCNC: 4 MMOL/L (ref 3.5–5.2)
PROT SERPL-MCNC: 7.1 G/DL (ref 6–8.5)
SODIUM BLD-SCNC: 142 MMOL/L (ref 136–145)
TRIGL SERPL-MCNC: 97 MG/DL (ref 0–150)
VLDLC SERPL-MCNC: 19.4 MG/DL (ref 5–40)

## 2017-09-27 PROCEDURE — 36415 COLL VENOUS BLD VENIPUNCTURE: CPT | Performed by: FAMILY MEDICINE

## 2017-09-27 PROCEDURE — 83036 HEMOGLOBIN GLYCOSYLATED A1C: CPT | Performed by: FAMILY MEDICINE

## 2017-09-27 PROCEDURE — G0008 ADMIN INFLUENZA VIRUS VAC: HCPCS | Performed by: FAMILY MEDICINE

## 2017-09-27 PROCEDURE — 80053 COMPREHEN METABOLIC PANEL: CPT | Performed by: FAMILY MEDICINE

## 2017-09-27 PROCEDURE — 90662 IIV NO PRSV INCREASED AG IM: CPT | Performed by: FAMILY MEDICINE

## 2017-09-27 PROCEDURE — 80061 LIPID PANEL: CPT | Performed by: FAMILY MEDICINE

## 2017-09-27 PROCEDURE — 99213 OFFICE O/P EST LOW 20 MIN: CPT | Performed by: FAMILY MEDICINE

## 2017-09-27 NOTE — PROGRESS NOTES
SUBJECTIVE:  The patient is [] a 77-year-old white female a 77-year-old white female is here for follow-up.  She has a history of hypertension hyperlipidemia and atrial fibrillation.  She's requesting a pneumonia shot as well as a flu shot.  She is doing well without any physical complaints.  She has had an impaired fasting blood glucose.    PAST MEDICAL HISTORY:  Reviewed.    REVIEW OF SYSTEMS:  Please see above; 14 point ROS otherwise negative.      OBJECTIVE: Vitals signs are reviewed and are stable.    HEENT: PERRLA.  []  Neck:  Supple.  []  Lungs:  Clear.    Heart:  Regular rate and rhythm.  []  Abdomen:   Soft, nontender.  []  Extremities:  No cyanosis, clubbing or edema.  []    ASSESSMENT:    []Hypertension  Hyperlipidemia  Hyperglycemia  Atrial fibrillation    PLAN:  []CMP fasting lipids and hemoglobin A1c are ordered.  Patient will follow up on labs.  Diet and exercise discussed.  She is advised she needs a colonoscopy.  She will call if any problems.    Much of this encounter note is an electronic transcription/translation of spoken language to printed text.  The electronic translation of spoken language may permit erroneous, or at times, nonsensical words or phrases to be inadvertently transcribed.  Although I have reviewed the note for such errors, some may still exist.

## 2017-10-02 RX ORDER — WARFARIN SODIUM 4 MG/1
TABLET ORAL
Qty: 30 TABLET | Refills: 2 | Status: SHIPPED | OUTPATIENT
Start: 2017-10-02 | End: 2017-12-28 | Stop reason: SDUPTHER

## 2017-10-04 PROCEDURE — G0009 ADMIN PNEUMOCOCCAL VACCINE: HCPCS | Performed by: FAMILY MEDICINE

## 2017-10-04 PROCEDURE — 90670 PCV13 VACCINE IM: CPT | Performed by: FAMILY MEDICINE

## 2017-11-07 ENCOUNTER — TELEPHONE (OUTPATIENT)
Dept: CARDIOLOGY | Facility: CLINIC | Age: 77
End: 2017-11-07

## 2017-11-07 ENCOUNTER — HOSPITAL ENCOUNTER (EMERGENCY)
Facility: HOSPITAL | Age: 77
Discharge: HOME | End: 2017-11-07
Attending: EMERGENCY MEDICINE | Admitting: EMERGENCY MEDICINE

## 2017-11-07 VITALS
OXYGEN SATURATION: 97 % | TEMPERATURE: 98.2 F | BODY MASS INDEX: 29.16 KG/M2 | HEART RATE: 50 BPM | WEIGHT: 175 LBS | RESPIRATION RATE: 16 BRPM | DIASTOLIC BLOOD PRESSURE: 69 MMHG | HEIGHT: 65 IN | SYSTOLIC BLOOD PRESSURE: 131 MMHG

## 2017-11-07 DIAGNOSIS — I48.0 PAROXYSMAL ATRIAL FIBRILLATION (HCC): Primary | ICD-10-CM

## 2017-11-07 LAB
ALBUMIN SERPL-MCNC: 3.9 G/DL (ref 3.5–5.2)
ALBUMIN/GLOB SERPL: 1.3 G/DL
ALP SERPL-CCNC: 81 U/L (ref 39–117)
ALT SERPL W P-5'-P-CCNC: 20 U/L (ref 1–33)
ANION GAP SERPL CALCULATED.3IONS-SCNC: 15.1 MMOL/L
AST SERPL-CCNC: 29 U/L (ref 1–32)
BASOPHILS # BLD AUTO: 0.03 10*3/MM3 (ref 0–0.2)
BASOPHILS NFR BLD AUTO: 0.6 % (ref 0–1.5)
BILIRUB SERPL-MCNC: 0.4 MG/DL (ref 0.1–1.2)
BUN BLD-MCNC: 21 MG/DL (ref 8–23)
BUN/CREAT SERPL: 23.9 (ref 7–25)
CALCIUM SPEC-SCNC: 9.7 MG/DL (ref 8.6–10.5)
CHLORIDE SERPL-SCNC: 103 MMOL/L (ref 98–107)
CO2 SERPL-SCNC: 26.9 MMOL/L (ref 22–29)
CREAT BLD-MCNC: 0.88 MG/DL (ref 0.57–1)
DEPRECATED RDW RBC AUTO: 44.5 FL (ref 37–54)
EOSINOPHIL # BLD AUTO: 0.07 10*3/MM3 (ref 0–0.7)
EOSINOPHIL NFR BLD AUTO: 1.4 % (ref 0.3–6.2)
ERYTHROCYTE [DISTWIDTH] IN BLOOD BY AUTOMATED COUNT: 12.2 % (ref 11.7–13)
GFR SERPL CREATININE-BSD FRML MDRD: 62 ML/MIN/1.73
GLOBULIN UR ELPH-MCNC: 3.1 GM/DL
GLUCOSE BLD-MCNC: 131 MG/DL (ref 65–99)
HCT VFR BLD AUTO: 44.8 % (ref 35.6–45.5)
HGB BLD-MCNC: 14.6 G/DL (ref 11.9–15.5)
HOLD SPECIMEN: NORMAL
HOLD SPECIMEN: NORMAL
IMM GRANULOCYTES # BLD: 0 10*3/MM3 (ref 0–0.03)
IMM GRANULOCYTES NFR BLD: 0 % (ref 0–0.5)
INR PPP: 2.87 (ref 0.9–1.1)
LYMPHOCYTES # BLD AUTO: 1.78 10*3/MM3 (ref 0.9–4.8)
LYMPHOCYTES NFR BLD AUTO: 35.6 % (ref 19.6–45.3)
MCH RBC QN AUTO: 32.3 PG (ref 26.9–32)
MCHC RBC AUTO-ENTMCNC: 32.6 G/DL (ref 32.4–36.3)
MCV RBC AUTO: 99.1 FL (ref 80.5–98.2)
MONOCYTES # BLD AUTO: 0.55 10*3/MM3 (ref 0.2–1.2)
MONOCYTES NFR BLD AUTO: 11 % (ref 5–12)
NEUTROPHILS # BLD AUTO: 2.57 10*3/MM3 (ref 1.9–8.1)
NEUTROPHILS NFR BLD AUTO: 51.4 % (ref 42.7–76)
NT-PROBNP SERPL-MCNC: 403.9 PG/ML (ref 0–1800)
PLATELET # BLD AUTO: 204 10*3/MM3 (ref 140–500)
PMV BLD AUTO: 10.8 FL (ref 6–12)
POTASSIUM BLD-SCNC: 4 MMOL/L (ref 3.5–5.2)
PROT SERPL-MCNC: 7 G/DL (ref 6–8.5)
PROTHROMBIN TIME: 29.2 SECONDS (ref 11.7–14.2)
RBC # BLD AUTO: 4.52 10*6/MM3 (ref 3.9–5.2)
SODIUM BLD-SCNC: 145 MMOL/L (ref 136–145)
T4 FREE SERPL-MCNC: 1.07 NG/DL (ref 0.93–1.7)
TROPONIN T SERPL-MCNC: <0.01 NG/ML (ref 0–0.03)
TSH SERPL DL<=0.05 MIU/L-ACNC: 8.27 MIU/ML (ref 0.27–4.2)
WBC NRBC COR # BLD: 5 10*3/MM3 (ref 4.5–10.7)
WHOLE BLOOD HOLD SPECIMEN: NORMAL
WHOLE BLOOD HOLD SPECIMEN: NORMAL

## 2017-11-07 PROCEDURE — 84484 ASSAY OF TROPONIN QUANT: CPT | Performed by: EMERGENCY MEDICINE

## 2017-11-07 PROCEDURE — 84439 ASSAY OF FREE THYROXINE: CPT | Performed by: EMERGENCY MEDICINE

## 2017-11-07 PROCEDURE — 80053 COMPREHEN METABOLIC PANEL: CPT | Performed by: EMERGENCY MEDICINE

## 2017-11-07 PROCEDURE — 84443 ASSAY THYROID STIM HORMONE: CPT | Performed by: EMERGENCY MEDICINE

## 2017-11-07 PROCEDURE — 96374 THER/PROPH/DIAG INJ IV PUSH: CPT

## 2017-11-07 PROCEDURE — 36415 COLL VENOUS BLD VENIPUNCTURE: CPT

## 2017-11-07 PROCEDURE — 83880 ASSAY OF NATRIURETIC PEPTIDE: CPT | Performed by: EMERGENCY MEDICINE

## 2017-11-07 PROCEDURE — 93010 ELECTROCARDIOGRAM REPORT: CPT | Performed by: INTERNAL MEDICINE

## 2017-11-07 PROCEDURE — 99284 EMERGENCY DEPT VISIT MOD MDM: CPT

## 2017-11-07 PROCEDURE — 85025 COMPLETE CBC W/AUTO DIFF WBC: CPT | Performed by: EMERGENCY MEDICINE

## 2017-11-07 PROCEDURE — 93005 ELECTROCARDIOGRAM TRACING: CPT | Performed by: EMERGENCY MEDICINE

## 2017-11-07 PROCEDURE — 96376 TX/PRO/DX INJ SAME DRUG ADON: CPT

## 2017-11-07 PROCEDURE — 85610 PROTHROMBIN TIME: CPT | Performed by: EMERGENCY MEDICINE

## 2017-11-07 RX ADMIN — METOROPROLOL TARTRATE 5 MG: 5 INJECTION, SOLUTION INTRAVENOUS at 02:33

## 2017-11-07 RX ADMIN — METOROPROLOL TARTRATE 5 MG: 5 INJECTION, SOLUTION INTRAVENOUS at 01:45

## 2017-11-07 NOTE — ED PROVIDER NOTES
EMERGENCY DEPARTMENT ENCOUNTER    CHIEF COMPLAINT  Chief Complpaint: palpitations   History given by: patient  History limited by: nothing  Room Number: 27/27  PMD: Chandu Erickosn MD      HPI:  Pt is a 77 y.o. female with a h/o A-fib who presents complaining of heart palpitations onset tonight. Pt states she has been compliant with all her medications. She denies abdominal pain, chest pain, nausea, or any other sx at this time. Pt was diagnosed with A-fib in June and believes that she feels similar.     Onset: tonight   Timing: constant   Location: heart   Radiation: does not radiate   Intensity/Severity: moderate   Progression: unchanged   Associated Symptoms: none   Aggravating Factors: none specified   Alleviating Factors: none specified   Previous Episodes: Pt has a h/o A-fib  Treatment before arrival: none specified     PAST MEDICAL HISTORY  Active Ambulatory Problems     Diagnosis Date Noted   • IFG (impaired fasting glucose)    • Hypertension    • Hyperlipidemia    • Diabetes mellitus    • Paroxysmal atrial fibrillation 04/06/2017     Resolved Ambulatory Problems     Diagnosis Date Noted   • No Resolved Ambulatory Problems     Past Medical History:   Diagnosis Date   • Diabetes mellitus    • Hyperlipidemia    • Hypertension    • IFG (impaired fasting glucose)    • SOB (shortness of breath)        PAST SURGICAL HISTORY  Past Surgical History:   Procedure Laterality Date   • ANAL FISTULA REPAIR     • BACK SURGERY      l spine ruptured disk   • CHOLECYSTECTOMY     • JOINT REPLACEMENT      bilateral knees       FAMILY HISTORY  Family History   Problem Relation Age of Onset   • Hypertension Mother    • Heart disease Father    • Hypertension Father    • Liver disease Brother        SOCIAL HISTORY  Social History     Social History   • Marital status:      Spouse name: N/A   • Number of children: N/A   • Years of education: N/A     Occupational History   • Not on file.     Social History Main Topics   •  Smoking status: Never Smoker   • Smokeless tobacco: Not on file   • Alcohol use No   • Drug use: No   • Sexual activity: No     Other Topics Concern   • Not on file     Social History Narrative       ALLERGIES  Penicillins; Percocet [oxycodone-acetaminophen]; and Sulfa antibiotics    REVIEW OF SYSTEMS  Review of Systems   Constitutional: Negative for fever.   Respiratory: Negative for shortness of breath.    Cardiovascular: Positive for palpitations. Negative for chest pain.   Gastrointestinal: Negative for abdominal pain and nausea.       PHYSICAL EXAM  ED Triage Vitals   Temp Heart Rate Resp BP SpO2   11/07/17 0057 11/07/17 0057 11/07/17 0057 11/07/17 0111 11/07/17 0057   98.2 °F (36.8 °C) 142 18 142/102 96 %      Temp src Heart Rate Source Patient Position BP Location FiO2 (%)   -- 11/07/17 0111 11/07/17 0111 11/07/17 0111 --    Monitor Sitting Left arm        Physical Exam   Constitutional: She is oriented to person, place, and time.   HENT:   Head: Normocephalic and atraumatic.   Eyes: EOM are normal. Pupils are equal, round, and reactive to light.   Neck: Normal range of motion. Neck supple.   Cardiovascular: Normal heart sounds.  An irregularly irregular rhythm present. Tachycardia present.    Pulmonary/Chest: Effort normal and breath sounds normal. No respiratory distress.   Abdominal: Soft. There is no tenderness. There is no rebound and no guarding.   Musculoskeletal: Normal range of motion. She exhibits no edema.   Neurological: She is alert and oriented to person, place, and time. She has normal sensation and normal strength.   Skin: Skin is warm and dry. No rash noted.   Psychiatric: Mood and affect normal.   Nursing note and vitals reviewed.      LAB RESULTS  Lab Results (last 24 hours)     Procedure Component Value Units Date/Time    CBC & Differential [[id number]] Collected:  11/07/17 0115    Specimen:  Blood Updated:  11/07/17 0148    Narrative:       The following orders were created for panel order  CBC & Differential.  Procedure                               Abnormality         Status                     ---------                               -----------         ------                     CBC Auto Differential[[id number]]        Abnormal            Final result                 Please view results for these tests on the individual orders.    Comprehensive Metabolic Panel [[id number]]  (Abnormal) Collected:  11/07/17 0115    Specimen:  Blood Updated:  11/07/17 0247     Glucose 131 (H) mg/dL      BUN 21 mg/dL      Creatinine 0.88 mg/dL      Sodium 145 mmol/L      Potassium 4.0 mmol/L      Chloride 103 mmol/L      CO2 26.9 mmol/L      Calcium 9.7 mg/dL      Total Protein 7.0 g/dL      Albumin 3.90 g/dL      ALT (SGPT) 20 U/L      AST (SGOT) 29 U/L      Alkaline Phosphatase 81 U/L      Total Bilirubin 0.4 mg/dL      eGFR Non African Amer 62 mL/min/1.73      Globulin 3.1 gm/dL      A/G Ratio 1.3 g/dL      BUN/Creatinine Ratio 23.9     Anion Gap 15.1 mmol/L     Narrative:       The MDRD GFR formula is only valid for adults with stable renal function between ages 18 and 70.    Protime-INR [[id number]]  (Abnormal) Collected:  11/07/17 0115    Specimen:  Blood Updated:  11/07/17 0153     Protime 29.2 (H) Seconds      INR 2.87 (H)    TSH [[id number]]  (Abnormal) Collected:  11/07/17 0115    Specimen:  Blood Updated:  11/07/17 0254     TSH 8.270 (H) mIU/mL     T4, Free [[id number]]  (Normal) Collected:  11/07/17 0115    Specimen:  Blood Updated:  11/07/17 0254     Free T4 1.07 ng/dL     Troponin [[id number]]  (Normal) Collected:  11/07/17 0115    Specimen:  Blood Updated:  11/07/17 0254     Troponin T <0.010 ng/mL     Narrative:       Troponin T Reference Ranges:  Less than 0.03 ng/mL:    Negative for AMI  0.03 to 0.09 ng/mL:      Indeterminant for AMI  Greater than 0.09 ng/mL: Positive for AMI    BNP [[id number]]  (Normal) Collected:  11/07/17 0115    Specimen:  Blood Updated:  11/07/17 0254     proBNP 403.9 pg/mL      Narrative:       Among patients with dyspnea, NT-proBNP is highly sensitive for the detection of acute congestive heart failure. In addition NT-proBNP of <300 pg/ml effectively rules out acute congestive heart failure with 99% negative predictive value.    CBC Auto Differential [[id number]]  (Abnormal) Collected:  11/07/17 0115    Specimen:  Blood Updated:  11/07/17 0148     WBC 5.00 10*3/mm3      RBC 4.52 10*6/mm3      Hemoglobin 14.6 g/dL      Hematocrit 44.8 %      MCV 99.1 (H) fL      MCH 32.3 (H) pg      MCHC 32.6 g/dL      RDW 12.2 %      RDW-SD 44.5 fl      MPV 10.8 fL      Platelets 204 10*3/mm3      Neutrophil % 51.4 %      Lymphocyte % 35.6 %      Monocyte % 11.0 %      Eosinophil % 1.4 %      Basophil % 0.6 %      Immature Grans % 0.0 %      Neutrophils, Absolute 2.57 10*3/mm3      Lymphocytes, Absolute 1.78 10*3/mm3      Monocytes, Absolute 0.55 10*3/mm3      Eosinophils, Absolute 0.07 10*3/mm3      Basophils, Absolute 0.03 10*3/mm3      Immature Grans, Absolute 0.00 10*3/mm3           I ordered the above labs and reviewed the results      PROCEDURES  Procedures  i  EKG           EKG time: 0117  Rhythm/Rate: Atrial flutter with rate of 124. 2:1 block.  QRS, axis: normal   ST and T waves: ST depression in V4-6; ST elevation in AVR     Interpreted Contemporaneously by me, independently viewed  changed compared to prior in April 2017 when SR was present     EKG         2P waves and NE: normal  QRS, axis: normal   ST and T waves: normal     Interpreted Contemporaneously by me, independently viewed  Improved compared to prior, which showed ST elevation and depression      PROGRESS AND CONSULTS  ED Course     0111: Ordered EKG for further evaluation.     0127: Ordered 5 mg Lopressor every 20 minutes to convert pt to SR.     0241: Pt has converted to SR and HR is in the 40's. I have requested another EKG.     0247: We will ensure pt can ambulate before we discharge her.     0255: Rechecked pt. Pt is resting  comfortably, in NSR. Discussed success of conversion and plan for discharge. Pt understands and agrees and all questions were addressed.     MEDICAL DECISION MAKING  Results were reviewed/discussed with the patient and they were also made aware of online access. Pt also made aware that some labs, such as cultures, will not be resulted during ER visit and follow up with PMD is necessary.     MDM  Number of Diagnoses or Management Options     Amount and/or Complexity of Data Reviewed  Clinical lab tests: reviewed and ordered  Tests in the medicine section of CPT®: ordered and reviewed    Patient Progress  Patient progress: stable         DIAGNOSIS  Final diagnoses:   Paroxysmal atrial fibrillation       DISPOSITION  DISCHARGE    Patient discharged in stable condition.    Reviewed implications of results, diagnosis, meds, responsibility to follow up, warning signs and symptoms of possible worsening, potential complications and reasons to return to ER.    Patient/Family voiced understanding of above instructions.    Discussed plan for discharge, as there is no emergent indication for admission.  Pt/family is agreeable and understands need for follow up and repeat testing.  Pt is aware that discharge does not mean that nothing is wrong but it indicates no emergency is present that requires admission and they must continue care with follow-up as given below or physician of their choice.     FOLLOW-UP  Chandu Erickson MD  [address] [place], [place] [id number]  [phone]    Schedule an appointment as soon as possible for a visit           Medication List      Notice     No changes were made to your prescriptions during this visit.        Latest Documented Vital Signs:  As of 2:56 AM  BP- 149/79 HR- 64 Temp- 98.2 °F (36.8 °C) O2 sat- 94%    --  Documentation assistance provided by karmen Pedraza for Zan Sofia.  Information recorded by the karmen was done at my direction and has been verified and validated by  me.       Anita Pedraza  11/07/17 0256       Zan Sofia MD  11/07/17 0309

## 2017-11-07 NOTE — TELEPHONE ENCOUNTER
Pt called. She was in the ER yesterday at [facility]. She had what she though was an episode of afib. She ended up having A Flutter  and they were able to convert her back to sinus after 2 doses of Lopresser 5 mg Q20min IV. I called her back and will put her on the schedule. But I wanted you aware.    Ann

## 2017-11-07 NOTE — ED NOTES
"Patient states \"I woke up in the middle of my sleep because I felt like my heart was going to beat out of my chest.\"     Ni Lindsey RN  11/07/17 0057    "

## 2017-11-07 NOTE — ED NOTES
Pt to room 27 via EMS stretcher for c/o heart racing.  Pt states she woke up tonight and felt like her heart was coming out of her chest.  Pt diagnosed in June 2017 with atrial fibrillation, has been on coumadin. Cardiologist is Connie. Pt denies fever, chills, n/v/d, blurred vision, chest pain, abdominal pain, loss in control of bowel/bladder.  Bed in low position, call light within reach, side rails up X2.      Maddi Haines RN  11/07/17 0119

## 2017-11-07 NOTE — ED NOTES
Pt attempted to vagal down like she was having a BM to attempt to lower heart rate.  Unsuccessful attempt.       Ni Cotton RN  11/07/17 012

## 2017-11-09 ENCOUNTER — TELEPHONE (OUTPATIENT)
Dept: CARDIOLOGY | Facility: CLINIC | Age: 77
End: 2017-11-09

## 2017-11-09 ENCOUNTER — TELEPHONE (OUTPATIENT)
Dept: SOCIAL WORK | Facility: HOSPITAL | Age: 77
End: 2017-11-09

## 2017-11-09 RX ORDER — NIFEDIPINE 30 MG/1
30 TABLET, EXTENDED RELEASE ORAL DAILY
Qty: 30 TABLET | Refills: 6 | Status: SHIPPED | OUTPATIENT
Start: 2017-11-09 | End: 2018-03-15

## 2017-11-09 NOTE — TELEPHONE ENCOUNTER
Called s/w patient told her to cont. Current medications along with Nifedipine  She how she does with this.

## 2017-11-09 NOTE — TELEPHONE ENCOUNTER
Patient calling said her BP last night was 192/90 then went up to 200/104 at 1:30  So she took her ramipril 10mg 2 q d  Got up this morning and her bp was 172/91  Took a little while later and it was 154/83  She states she has not taken her BP medication yet. Because she took it at 1:30 this morning    She has an appt. With you tomorrow    [phone]

## 2017-11-10 ENCOUNTER — OFFICE VISIT (OUTPATIENT)
Dept: CARDIOLOGY | Facility: CLINIC | Age: 77
End: 2017-11-10

## 2017-11-10 VITALS
HEIGHT: 65 IN | BODY MASS INDEX: 30.16 KG/M2 | SYSTOLIC BLOOD PRESSURE: 148 MMHG | HEART RATE: 59 BPM | WEIGHT: 181 LBS | DIASTOLIC BLOOD PRESSURE: 88 MMHG

## 2017-11-10 DIAGNOSIS — E11.9 TYPE 2 DIABETES MELLITUS WITHOUT COMPLICATION, WITHOUT LONG-TERM CURRENT USE OF INSULIN (HCC): ICD-10-CM

## 2017-11-10 DIAGNOSIS — E78.2 MIXED HYPERLIPIDEMIA: ICD-10-CM

## 2017-11-10 DIAGNOSIS — I48.3 TYPICAL ATRIAL FLUTTER (HCC): Primary | ICD-10-CM

## 2017-11-10 DIAGNOSIS — I48.0 PAROXYSMAL ATRIAL FIBRILLATION (HCC): ICD-10-CM

## 2017-11-10 DIAGNOSIS — I10 ESSENTIAL HYPERTENSION: ICD-10-CM

## 2017-11-10 PROCEDURE — 99214 OFFICE O/P EST MOD 30 MIN: CPT | Performed by: INTERNAL MEDICINE

## 2017-11-10 PROCEDURE — 93000 ELECTROCARDIOGRAM COMPLETE: CPT | Performed by: INTERNAL MEDICINE

## 2017-11-10 NOTE — PROGRESS NOTES
Arelis Johnson  1940  Date of Office Visit: 7/10/17  Encounter Provider: Sly Saleh MD  Place of Service: [facility] CARDIOLOGY      CHIEF COMPLAINT:  Paroxysmal atrial fibrillation/flutter  Essential hypertension      HISTORY OF PRESENT ILLNESS:  Dr. Erickson,  I had the pleasure of seeing Ms. Johnson in follow-up today.  She is a very pleasant 77-year-old female with a medical history of paroxysmal atrial fibrillation, essential hypertension and hyperlipidemia along with diet controlled diabetes mellitus who presents back for follow-up.  On our last visit she was noted to be back in atrial fibrillation with a rapid ventricular rate.  Her beta blocker was increased slightly.  She converted back to sinus rhythm and is actually in sinus bradycardia.  She denies any sustained palpitations, tachycardia or chest discomfort.  She has had no recent lightheadedness or syncope.  Does not feel overly fatigued in her bradycardia.  She is currently on Coumadin therapy as the direct oral anticoagulant were too expensive for her.  No bleeding complications on that therapy.    Since our last visit, she actually was started on nifedipine therapy secondary to a poorly controlled blood pressure. She is tolerating this well. She did actually go back in to the emergency room with palpitations that came on 11/07/2017. She was noted to be in atrial flutter with a rapid ventricular rate and 2:1 conduction at that time. She received IV Lopressor with a conversion to a sinus bradycardia. She was continued on her current medical regimen. She is maintaining sinus rhythm at this time. She states that overall she feels well.         Review of Systems   Constitution: Negative for fever, weakness and malaise/fatigue.   HENT: Negative for nosebleeds and sore throat.    Eyes: Negative for blurred vision and double vision.   Cardiovascular: Positive for leg swelling. Negative for chest pain,  claudication, palpitations and syncope.   Respiratory: Negative for cough, shortness of breath and snoring.    Endocrine: Negative for cold intolerance, heat intolerance and polydipsia.   Skin: Negative for itching, poor wound healing and rash.   Musculoskeletal: Positive for joint pain. Negative for joint swelling, muscle weakness and myalgias.   Gastrointestinal: Negative for abdominal pain, melena, nausea and vomiting.   Neurological: Negative for light-headedness, loss of balance, seizures and vertigo.   Psychiatric/Behavioral: Negative for altered mental status and depression.        Past Medical History:   Diagnosis Date   • Diabetes mellitus    • Hyperlipidemia    • Hypertension    • IFG (impaired fasting glucose)    • PAF (paroxysmal atrial fibrillation)    • Palpitations    • SOB (shortness of breath)        The following portions of the patient's history were reviewed and updated as appropriate: Social history , Family history and Surgical history     Current Outpatient Prescriptions on File Prior to Visit   Medication Sig Dispense Refill   • Calcium Carbonate (CALTRATE 600 PO) Take 1 tablet by mouth daily.     • furosemide (LASIX) 20 MG tablet TAKE 1/2 TABLET DAILY NEED TO MAKE AN APPOINTMENT     AS SOON AS POSSIBLE FOR    MORE REFILLS 45 tablet 3   • metoprolol tartrate (LOPRESSOR) 25 MG tablet Take 1 tablet by mouth 2 (Two) Times a Day. 60 tablet 11   • NIFEdipine XL (PROCARDIA XL) 30 MG 24 hr tablet Take 1 tablet by mouth Daily. 30 tablet 6   • ramipril (ALTACE) 10 MG capsule Take 2 capsules by mouth Daily. 180 capsule 3   • simvastatin (ZOCOR) 40 MG tablet Take 1 tablet by mouth Every Night. 90 tablet 0   • warfarin (COUMADIN) 4 MG tablet TAKE ONE tablet (by mouth) EACH DAY 30 tablet 2     No current facility-administered medications on file prior to visit.        Allergies   Allergen Reactions   • Penicillins    • Percocet [Oxycodone-Acetaminophen] GI Intolerance   • Sulfa Antibiotics        Vitals:  "   11/10/17 1547   BP: 148/88   Pulse: 59   Weight: 181 lb (82.1 kg)   Height: 65\" (165.1 cm)     Physical Exam   Constitutional: She is oriented to person, place, and time. She appears well-developed and well-nourished.   HENT:   Head: Normocephalic and atraumatic.   Eyes: Conjunctivae and EOM are normal. No scleral icterus.   Neck: Normal range of motion. Neck supple. Normal carotid pulses, no hepatojugular reflux and no JVD present. Carotid bruit is not present. No tracheal deviation present. No thyromegaly present.   Cardiovascular: Regular rhythm.  Bradycardia present.  Exam reveals no gallop and no friction rub.    No murmur heard.  Pulses:       Carotid pulses are 2+ on the right side, and 2+ on the left side.       Radial pulses are 2+ on the right side, and 2+ on the left side.        Femoral pulses are 2+ on the right side, and 2+ on the left side.       Dorsalis pedis pulses are 2+ on the right side, and 2+ on the left side.        Posterior tibial pulses are 2+ on the right side, and 2+ on the left side.   Pulmonary/Chest: Breath sounds normal. No respiratory distress. She has no decreased breath sounds. She has no wheezes. She has no rhonchi. She has no rales. She exhibits no tenderness.   Abdominal: Soft. Bowel sounds are normal. She exhibits no distension. There is no tenderness. There is no rebound.   Musculoskeletal: She exhibits edema (trace bilateral LE edema). She exhibits no deformity.   Neurological: She is alert and oriented to person, place, and time. She has normal strength. No sensory deficit.   Skin: No rash noted. No erythema.   Psychiatric: She has a normal mood and affect. Her behavior is normal.       No components found for: CBC  No results found for: CMP  No components found for: LIPID  No results found for: BMP      ECG 12 Lead  Date/Time: 11/10/2017 4:23 PM  Performed by: MALISSA MARTIN  Authorized by: MALISSA MARTIN   Comparison: compared with previous ECG from " 11/7/2017  Similar to previous ECG  Rhythm: sinus rhythm  Rate: normal  ST Segments: ST segments normal  T Waves: T waves normal  QRS axis: left  Clinical impression: low voltage               4/12/17 TTE  · Left ventricular wall thickness is consistent with borderline concentric hypertrophy.  · Left ventricular function is normal. Estimated EF = 63%.  · Left ventricular diastolic function was unable to be assessed. Elevated left atrial pressure.      DISCUSSION/SUMMARY 77-year-old female with a medical history of essential hypertension, mild lower extremity edema and hyperlipidemia who previously presented to me secondary to the acute onset of palpitations and tachycardia and was found to be in afib with rvr.  She was placed on Coumadin therapy along with metoprolol tartrate which she was tolerating with just mild sinus bradycardia.  She had issues with poorly controlled blood pressure since that time and I added nifedipine therapy to her regimen.  In addition to that she also presented with atrial flutter with a rapid ventricular rate.  She converted to sinus rhythm with IV metoprolol.  She is maintaining a sinus rhythm at this point in time.    1. Atrial fibrillation/flutter; paroxysmal.  Currently sinus rhythm    -Currently on Coumadin along with metoprolol tartrate 25 mg by mouth twice a day.  She will not tolerate higher doses of metoprolol therapy secondary to bradycardia.    -Unfortunately at this time I think that if she has a recurrence of an atrial arrhythmia that we will be forced to place her on antiarrhythmic therapy.  Prior to that I would recommend stress test.  She is not a good candidate for amiodarone secondary to her already  elevated TSH.  If she has recurrence, and her stress test is negative, I would start a class IC antiarrhythmic at that time.    -TTE as above. No valvular heart disease  2. Essential hypertension: That are controlled on nifedipine therapy.  I would hold steady where she is  at.  3.    Hyperlipidemia: Continue current simvastatin therapy.  Lipid panel looked fine in September.    I will see the patient back in two to three months.      Atrial Fibrillation and Atrial Flutter  Assessment  • The patient has paroxysmal atrial fibrillation  • The patient's CHADS2-VASc score is 5  • A [id number] score of 2 or more is considered a high risk for a thromboembolic event  • Warfarin prescribed    Plan  • Attempt to maintain sinus rhythm  • Continue warfarin for antithrombotic therapy, bleeding issues discussed  • Add beta blocker for rate control

## 2017-11-20 ENCOUNTER — TELEPHONE (OUTPATIENT)
Dept: CARDIOLOGY | Facility: CLINIC | Age: 77
End: 2017-11-20

## 2017-11-20 NOTE — TELEPHONE ENCOUNTER
Pt calling to request INR results. Informed pt lab information have not been received. Informed pt to call facility were INR was performed. Thanks, Carmine

## 2017-12-15 RX ORDER — SIMVASTATIN 40 MG
TABLET ORAL
Qty: 90 TABLET | Refills: 0 | Status: SHIPPED | OUTPATIENT
Start: 2017-12-15 | End: 2018-02-14

## 2017-12-28 RX ORDER — WARFARIN SODIUM 4 MG/1
TABLET ORAL
Qty: 30 TABLET | Refills: 0 | Status: SHIPPED | OUTPATIENT
Start: 2017-12-28 | End: 2018-01-29 | Stop reason: SDUPTHER

## 2018-01-29 RX ORDER — WARFARIN SODIUM 4 MG/1
TABLET ORAL
Qty: 30 TABLET | Refills: 2 | Status: SHIPPED | OUTPATIENT
Start: 2018-01-29 | End: 2018-02-14

## 2018-02-05 ENCOUNTER — OFFICE VISIT (OUTPATIENT)
Dept: CARDIOLOGY | Facility: CLINIC | Age: 78
End: 2018-02-05

## 2018-02-05 VITALS
BODY MASS INDEX: 29.66 KG/M2 | HEIGHT: 65 IN | WEIGHT: 178 LBS | SYSTOLIC BLOOD PRESSURE: 144 MMHG | HEART RATE: 57 BPM | DIASTOLIC BLOOD PRESSURE: 82 MMHG

## 2018-02-05 DIAGNOSIS — E78.2 MIXED HYPERLIPIDEMIA: ICD-10-CM

## 2018-02-05 DIAGNOSIS — I10 ESSENTIAL HYPERTENSION: ICD-10-CM

## 2018-02-05 DIAGNOSIS — I48.0 PAROXYSMAL ATRIAL FIBRILLATION (HCC): Primary | ICD-10-CM

## 2018-02-05 PROCEDURE — 93000 ELECTROCARDIOGRAM COMPLETE: CPT | Performed by: INTERNAL MEDICINE

## 2018-02-05 PROCEDURE — 99214 OFFICE O/P EST MOD 30 MIN: CPT | Performed by: INTERNAL MEDICINE

## 2018-02-05 NOTE — PROGRESS NOTES
Arelis Johnson  1940  Date of Office Visit: 2/5/18  Encounter Provider: Sly Saleh MD  Place of Service: [facility] CARDIOLOGY      CHIEF COMPLAINT:  Paroxysmal atrial fibrillation/flutter  Essential hypertension      HISTORY OF PRESENT ILLNESS:  Dr. Erickson,  I had the pleasure of seeing Ms. Johnson in follow-up today.  She is a very pleasant 78-year-old female with a medical history of paroxysmal atrial fibrillation, essential hypertension and hyperlipidemia along with diet controlled diabetes mellitus who presents back for follow-up.  With increases and beta-blockade she actually converted to sinus rhythm and has remained there.    Since her last visit, she has actually been doing very well.  She denies any sustained tachycardia or palpitations lasting more than 10-15 seconds.  She has no chest pain or dyspnea on exertion.  She does complain of just mild bilateral lower extremity edema that has been present for at least six months to a year.  It continues to improve with elevation.  She has no orthopnea or paroxysmal nocturnal dyspnea.        Review of Systems   Constitution: Negative for fever, weakness and malaise/fatigue.   HENT: Negative for nosebleeds and sore throat.    Eyes: Negative for blurred vision and double vision.   Cardiovascular: Positive for leg swelling. Negative for chest pain, claudication, palpitations and syncope.   Respiratory: Negative for cough, shortness of breath and snoring.    Endocrine: Negative for cold intolerance, heat intolerance and polydipsia.   Skin: Negative for itching, poor wound healing and rash.   Musculoskeletal: Positive for joint pain. Negative for joint swelling, muscle weakness and myalgias.   Gastrointestinal: Negative for abdominal pain, melena, nausea and vomiting.   Neurological: Negative for light-headedness, loss of balance, seizures and vertigo.   Psychiatric/Behavioral: Negative for altered mental status and  "depression.        Past Medical History:   Diagnosis Date   • Diabetes mellitus    • Hyperlipidemia    • Hypertension    • IFG (impaired fasting glucose)    • PAF (paroxysmal atrial fibrillation)    • Palpitations    • SOB (shortness of breath)        The following portions of the patient's history were reviewed and updated as appropriate: Social history , Family history and Surgical history     Current Outpatient Prescriptions on File Prior to Visit   Medication Sig Dispense Refill   • Calcium Carbonate (CALTRATE 600 PO) Take 1 tablet by mouth daily.     • furosemide (LASIX) 20 MG tablet TAKE 1/2 TABLET DAILY NEED TO MAKE AN APPOINTMENT     AS SOON AS POSSIBLE FOR    MORE REFILLS 45 tablet 3   • metoprolol tartrate (LOPRESSOR) 25 MG tablet Take 1 tablet by mouth 2 (Two) Times a Day. 60 tablet 11   • NIFEdipine XL (PROCARDIA XL) 30 MG 24 hr tablet Take 1 tablet by mouth Daily. 30 tablet 6   • ramipril (ALTACE) 10 MG capsule Take 2 capsules by mouth Daily. 180 capsule 3   • simvastatin (ZOCOR) 40 MG tablet TAKE 1 TABLET EVERY NIGHT  *PLEASE CALL AND SCHEDULE  AN APPOINTEMNT* 90 tablet 0   • warfarin (COUMADIN) 4 MG tablet TAKE ONE tablet (by mouth) EACH DAY 30 tablet 2     No current facility-administered medications on file prior to visit.        Allergies   Allergen Reactions   • Penicillins    • Percocet [Oxycodone-Acetaminophen] GI Intolerance   • Sulfa Antibiotics        Vitals:    02/05/18 1138   BP: 144/82   Pulse: 57   Weight: 80.7 kg (178 lb)   Height: 165.1 cm (65\")     Physical Exam   Constitutional: She is oriented to person, place, and time. She appears well-developed and well-nourished.   HENT:   Head: Normocephalic and atraumatic.   Eyes: Conjunctivae and EOM are normal. No scleral icterus.   Neck: Normal range of motion. Neck supple. Normal carotid pulses, no hepatojugular reflux and no JVD present. Carotid bruit is not present. No tracheal deviation present. No thyromegaly present.   Cardiovascular: " Regular rhythm.  Bradycardia present.  Exam reveals no gallop and no friction rub.    No murmur heard.  Pulses:       Carotid pulses are 2+ on the right side, and 2+ on the left side.       Radial pulses are 2+ on the right side, and 2+ on the left side.        Femoral pulses are 2+ on the right side, and 2+ on the left side.       Dorsalis pedis pulses are 2+ on the right side, and 2+ on the left side.        Posterior tibial pulses are 2+ on the right side, and 2+ on the left side.   Pulmonary/Chest: Breath sounds normal. No respiratory distress. She has no decreased breath sounds. She has no wheezes. She has no rhonchi. She has no rales. She exhibits no tenderness.   Abdominal: Soft. Bowel sounds are normal. She exhibits no distension. There is no tenderness. There is no rebound.   Musculoskeletal: She exhibits edema (trace bilateral LE edema). She exhibits no deformity.   Neurological: She is alert and oriented to person, place, and time. She has normal strength. No sensory deficit.   Skin: No rash noted. No erythema.   Psychiatric: She has a normal mood and affect. Her behavior is normal.       No components found for: CBC  No results found for: CMP  No components found for: LIPID  No results found for: BMP      ECG 12 Lead  Date/Time: 2/5/2018 11:58 AM  Performed by: MALISSA MARTIN  Authorized by: MALISSA MARTIN   Comparison: compared with previous ECG from 11/10/2017  Similar to previous ECG  Rhythm: sinus rhythm  Rate: normal  ST Segments: ST segments normal  T Waves: T waves normal  QRS axis: normal  Clinical impression: normal ECG               4/12/17 TTE  · Left ventricular wall thickness is consistent with borderline concentric hypertrophy.  · Left ventricular function is normal. Estimated EF = 63%.  · Left ventricular diastolic function was unable to be assessed. Elevated left atrial pressure.      DISCUSSION/SUMMARY 78-year-old female with a medical history of essential hypertension,  mild lower extremity edema and hyperlipidemia who previously presented to me secondary to the acute onset of palpitations and tachycardia and was found to be in afib with rvr.  She was placed on Coumadin therapy along with metoprolol tartrate which she was tolerating with just mild sinus bradycardia.  Her blood pressure is reasonably controlled at this time.    1. Atrial fibrillation/flutter; paroxysmal.  Currently sinus rhythm    -Currently on Coumadin along with metoprolol tartrate 25 mg by mouth twice a day.  She will not tolerate higher doses of metoprolol therapy secondary to bradycardia.    -Unfortunately at this time I think that if she has a recurrence of an atrial arrhythmia that we will be forced to place her on antiarrhythmic therapy.  Prior to that I would recommend stress test.  She is not a good candidate for amiodarone secondary to her already  elevated TSH.  If she has recurrence, and her stress test is negative, I would start a class IC antiarrhythmic at that time.     -TTE as above. No valvular heart disease  2.    Essential hypertension: Reasonably controlled.  No additional alterations at this time.  3.    Hyperlipidemia: Continue current simvastatin therapy.      I will see the patient back in 6 months or earlier with problems    Atrial Fibrillation and Atrial Flutter  Assessment  • The patient has paroxysmal atrial fibrillation  • The patient's CHADS2-VASc score is 5  • A [id number] score of 2 or more is considered a high risk for a thromboembolic event  • Warfarin prescribed    Plan  • Attempt to maintain sinus rhythm  • Continue warfarin for antithrombotic therapy, bleeding issues discussed  • Add beta blocker for rate control

## 2018-02-14 ENCOUNTER — HOSPITAL ENCOUNTER (OUTPATIENT)
Facility: HOSPITAL | Age: 78
Setting detail: OBSERVATION
LOS: 1 days | Discharge: HOME | End: 2018-02-16
Attending: EMERGENCY MEDICINE | Admitting: INTERNAL MEDICINE

## 2018-02-14 ENCOUNTER — APPOINTMENT (OUTPATIENT)
Dept: GENERAL RADIOLOGY | Facility: HOSPITAL | Age: 78
End: 2018-02-14

## 2018-02-14 DIAGNOSIS — I48.91 ATRIAL FIBRILLATION WITH RVR (HCC): Primary | ICD-10-CM

## 2018-02-14 LAB
ALBUMIN SERPL-MCNC: 3.8 G/DL (ref 3.5–5.2)
ALBUMIN/GLOB SERPL: 1.3 G/DL
ALP SERPL-CCNC: 68 U/L (ref 39–117)
ALT SERPL W P-5'-P-CCNC: 20 U/L (ref 1–33)
ANION GAP SERPL CALCULATED.3IONS-SCNC: 11.3 MMOL/L
AST SERPL-CCNC: 26 U/L (ref 1–32)
BASOPHILS # BLD AUTO: 0.02 10*3/MM3 (ref 0–0.2)
BASOPHILS NFR BLD AUTO: 0.3 % (ref 0–1.5)
BILIRUB SERPL-MCNC: 0.2 MG/DL (ref 0.1–1.2)
BUN BLD-MCNC: 25 MG/DL (ref 8–23)
BUN/CREAT SERPL: 24.3 (ref 7–25)
CALCIUM SPEC-SCNC: 9.9 MG/DL (ref 8.6–10.5)
CHLORIDE SERPL-SCNC: 100 MMOL/L (ref 98–107)
CO2 SERPL-SCNC: 28.7 MMOL/L (ref 22–29)
CREAT BLD-MCNC: 1.03 MG/DL (ref 0.57–1)
DEPRECATED RDW RBC AUTO: 42.8 FL (ref 37–54)
EOSINOPHIL # BLD AUTO: 0.01 10*3/MM3 (ref 0–0.7)
EOSINOPHIL NFR BLD AUTO: 0.1 % (ref 0.3–6.2)
ERYTHROCYTE [DISTWIDTH] IN BLOOD BY AUTOMATED COUNT: 12.7 % (ref 11.7–13)
GFR SERPL CREATININE-BSD FRML MDRD: 52 ML/MIN/1.73
GLOBULIN UR ELPH-MCNC: 3 GM/DL
GLUCOSE BLD-MCNC: 170 MG/DL (ref 65–99)
HCT VFR BLD AUTO: 39.5 % (ref 35.6–45.5)
HGB BLD-MCNC: 13.2 G/DL (ref 11.9–15.5)
HOLD SPECIMEN: NORMAL
HOLD SPECIMEN: NORMAL
IMM GRANULOCYTES # BLD: 0.02 10*3/MM3 (ref 0–0.03)
IMM GRANULOCYTES NFR BLD: 0.3 % (ref 0–0.5)
INR PPP: 2.36 (ref 0.9–1.1)
LYMPHOCYTES # BLD AUTO: 0.94 10*3/MM3 (ref 0.9–4.8)
LYMPHOCYTES NFR BLD AUTO: 12.7 % (ref 19.6–45.3)
MAGNESIUM SERPL-MCNC: 1.9 MG/DL (ref 1.6–2.4)
MCH RBC QN AUTO: 31.3 PG (ref 26.9–32)
MCHC RBC AUTO-ENTMCNC: 33.4 G/DL (ref 32.4–36.3)
MCV RBC AUTO: 93.6 FL (ref 80.5–98.2)
MONOCYTES # BLD AUTO: 0.56 10*3/MM3 (ref 0.2–1.2)
MONOCYTES NFR BLD AUTO: 7.5 % (ref 5–12)
NEUTROPHILS # BLD AUTO: 5.88 10*3/MM3 (ref 1.9–8.1)
NEUTROPHILS NFR BLD AUTO: 79.1 % (ref 42.7–76)
NT-PROBNP SERPL-MCNC: 425 PG/ML (ref 0–1800)
PLATELET # BLD AUTO: 277 10*3/MM3 (ref 140–500)
PMV BLD AUTO: 10.2 FL (ref 6–12)
POTASSIUM BLD-SCNC: 4.2 MMOL/L (ref 3.5–5.2)
PROT SERPL-MCNC: 6.8 G/DL (ref 6–8.5)
PROTHROMBIN TIME: 25.4 SECONDS (ref 11.7–14.2)
RBC # BLD AUTO: 4.22 10*6/MM3 (ref 3.9–5.2)
SODIUM BLD-SCNC: 140 MMOL/L (ref 136–145)
TROPONIN T SERPL-MCNC: <0.01 NG/ML (ref 0–0.03)
WBC NRBC COR # BLD: 7.43 10*3/MM3 (ref 4.5–10.7)
WHOLE BLOOD HOLD SPECIMEN: NORMAL
WHOLE BLOOD HOLD SPECIMEN: NORMAL

## 2018-02-14 PROCEDURE — 83735 ASSAY OF MAGNESIUM: CPT | Performed by: EMERGENCY MEDICINE

## 2018-02-14 PROCEDURE — 96366 THER/PROPH/DIAG IV INF ADDON: CPT

## 2018-02-14 PROCEDURE — 93010 ELECTROCARDIOGRAM REPORT: CPT | Performed by: INTERNAL MEDICINE

## 2018-02-14 PROCEDURE — 83880 ASSAY OF NATRIURETIC PEPTIDE: CPT | Performed by: EMERGENCY MEDICINE

## 2018-02-14 PROCEDURE — 96365 THER/PROPH/DIAG IV INF INIT: CPT

## 2018-02-14 PROCEDURE — 85610 PROTHROMBIN TIME: CPT | Performed by: EMERGENCY MEDICINE

## 2018-02-14 PROCEDURE — 99285 EMERGENCY DEPT VISIT HI MDM: CPT

## 2018-02-14 PROCEDURE — 71045 X-RAY EXAM CHEST 1 VIEW: CPT

## 2018-02-14 PROCEDURE — 80053 COMPREHEN METABOLIC PANEL: CPT | Performed by: EMERGENCY MEDICINE

## 2018-02-14 PROCEDURE — 93005 ELECTROCARDIOGRAM TRACING: CPT | Performed by: EMERGENCY MEDICINE

## 2018-02-14 PROCEDURE — 84484 ASSAY OF TROPONIN QUANT: CPT | Performed by: EMERGENCY MEDICINE

## 2018-02-14 PROCEDURE — 85025 COMPLETE CBC W/AUTO DIFF WBC: CPT | Performed by: EMERGENCY MEDICINE

## 2018-02-14 PROCEDURE — 96376 TX/PRO/DX INJ SAME DRUG ADON: CPT

## 2018-02-14 RX ORDER — WARFARIN SODIUM 2 MG/1
2 TABLET ORAL 2 TIMES WEEKLY
COMMUNITY
End: 2020-02-27 | Stop reason: SDUPTHER

## 2018-02-14 RX ORDER — FUROSEMIDE 20 MG/1
10 TABLET ORAL DAILY
COMMUNITY
End: 2018-05-11

## 2018-02-14 RX ORDER — SODIUM CHLORIDE 0.9 % (FLUSH) 0.9 %
10 SYRINGE (ML) INJECTION AS NEEDED
Status: DISCONTINUED | OUTPATIENT
Start: 2018-02-14 | End: 2018-02-16 | Stop reason: HOSPADM

## 2018-02-14 RX ORDER — SIMVASTATIN 40 MG
40 TABLET ORAL DAILY
COMMUNITY
End: 2018-06-19 | Stop reason: SDUPTHER

## 2018-02-14 RX ORDER — WARFARIN SODIUM 4 MG/1
4 TABLET ORAL SEE ADMIN INSTRUCTIONS
COMMUNITY
End: 2019-06-24 | Stop reason: SDUPTHER

## 2018-02-14 RX ORDER — METOPROLOL TARTRATE 50 MG/1
50 TABLET, FILM COATED ORAL ONCE
Status: COMPLETED | OUTPATIENT
Start: 2018-02-14 | End: 2018-02-14

## 2018-02-14 RX ADMIN — METOPROLOL TARTRATE 50 MG: 50 TABLET, FILM COATED ORAL at 23:18

## 2018-02-14 RX ADMIN — SODIUM CHLORIDE 10 MG/HR: 900 INJECTION, SOLUTION INTRAVENOUS at 23:19

## 2018-02-14 RX ADMIN — SODIUM CHLORIDE 5 MG/HR: 900 INJECTION, SOLUTION INTRAVENOUS at 17:10

## 2018-02-14 NOTE — ED NOTES
Patient called EMS for palpitations and was found to be in afib rvr when ems did there ecg, patient denies SOA, Chest pain, dizziness, and palpations at this time      Anne Guerra RN  02/14/18 2552

## 2018-02-14 NOTE — ED NOTES
"Attempt to call report unsuccessful. Unit secretary states \" We just got the page , I need to see what nurse is taking the pt first.\"      Farhana Andres RN  02/14/18 1820    "

## 2018-02-14 NOTE — ED PROVIDER NOTES
EMERGENCY DEPARTMENT ENCOUNTER    CHIEF COMPLAINT  Chief Complaint: palpitations  History given by: patient  History limited by: none  Room Number: 03/03  PMD: MD Dr. Therese Pineda, cardiology    HPI:  Pt is a 78 y.o. female who presents complaining of palpitations that began around noon today. She advised she could feel her heart beat faster than normal and a fluttering sensation in her chest. Pt recently improved from her cold and cough last week. Currently she denies CP, SOA, cough, and edema. She is complaint with her medications and denies any recent change in medications. She had her warfarin level drawn Monday, was within normal limits.     Duration:  3 hours  Onset: sudden  Timing: constant  Location: chest  Radiation: none  Quality: 'beating faster'  Intensity/Severity: moderate  Progression: unchanged  Associated Symptoms: denies  Aggravating Factors: none  Alleviating Factors: none  Previous Episodes: pt has hx of afib  Treatment before arrival: none    PAST MEDICAL HISTORY  Active Ambulatory Problems     Diagnosis Date Noted   • IFG (impaired fasting glucose)    • Hypertension    • Hyperlipidemia    • Diabetes mellitus    • Paroxysmal atrial fibrillation 04/06/2017   • Typical atrial flutter 11/10/2017     Resolved Ambulatory Problems     Diagnosis Date Noted   • No Resolved Ambulatory Problems     Past Medical History:   Diagnosis Date   • Diabetes mellitus    • Hyperlipidemia    • Hypertension    • IFG (impaired fasting glucose)    • PAF (paroxysmal atrial fibrillation)    • Palpitations    • SOB (shortness of breath)        PAST SURGICAL HISTORY  Past Surgical History:   Procedure Laterality Date   • ANAL FISTULA REPAIR     • BACK SURGERY      l spine ruptured disk   • CHOLECYSTECTOMY     • JOINT REPLACEMENT      bilateral knees       FAMILY HISTORY  Family History   Problem Relation Age of Onset   • Hypertension Mother    • Heart disease Father    • Hypertension Father    • Liver disease  Brother        SOCIAL HISTORY  Social History     Social History   • Marital status:      Spouse name: N/A   • Number of children: N/A   • Years of education: N/A     Occupational History   • Not on file.     Social History Main Topics   • Smoking status: Never Smoker   • Smokeless tobacco: Never Used   • Alcohol use No   • Drug use: No   • Sexual activity: No     Other Topics Concern   • Not on file     Social History Narrative       ALLERGIES  Penicillins; Percocet [oxycodone-acetaminophen]; and Sulfa antibiotics    REVIEW OF SYSTEMS  Review of Systems   Constitutional: Negative for fever.   HENT: Negative for sore throat.    Eyes: Negative.    Respiratory: Negative for cough and shortness of breath.    Cardiovascular: Positive for palpitations. Negative for chest pain.   Gastrointestinal: Negative for abdominal pain, diarrhea and vomiting.   Genitourinary: Negative for dysuria.   Musculoskeletal: Negative for neck pain.   Skin: Negative for rash.   Allergic/Immunologic: Negative.    Neurological: Negative for weakness, numbness and headaches.   Hematological: Negative.    Psychiatric/Behavioral: Negative.    All other systems reviewed and are negative.      PHYSICAL EXAM  ED Triage Vitals   Temp Heart Rate Resp BP SpO2   02/14/18 1432 02/14/18 1430 02/14/18 1432 02/14/18 1430 02/14/18 1430   98.5 °F (36.9 °C) 153 18 154/99 97 %      Temp src Heart Rate Source Patient Position BP Location FiO2 (%)   02/14/18 1432 02/14/18 1430 02/14/18 1430 02/14/18 1430 --   Oral Monitor Sitting Right arm        Physical Exam   Constitutional: She is oriented to person, place, and time. She appears distressed (mild).   HENT:   Head: Normocephalic and atraumatic.   Eyes: EOM are normal. Pupils are equal, round, and reactive to light.   Neck: Normal range of motion. Neck supple.   Cardiovascular: Intact distal pulses.  An irregularly irregular rhythm present. Tachycardia present.    Pulses:       Posterior tibial pulses are  2+ on the right side, and 2+ on the left side.   Pulmonary/Chest: Effort normal and breath sounds normal. No respiratory distress.   Abdominal: Soft. There is no tenderness. There is no rebound and no guarding.   Musculoskeletal: Normal range of motion. She exhibits no edema.   Neurological: She is alert and oriented to person, place, and time. She has normal sensation and normal strength.   Skin: Skin is warm and dry. No rash noted.   Psychiatric: Mood and affect normal.   Nursing note and vitals reviewed.      LAB RESULTS  Lab Results (last 24 hours)     Procedure Component Value Units Date/Time    Troponin [[id number]]  (Normal) Collected:  02/14/18 1533    Specimen:  Blood Updated:  02/14/18 1614     Troponin T <0.010 ng/mL     Narrative:       Troponin T Reference Ranges:  Less than 0.03 ng/mL:    Negative for AMI  0.03 to 0.09 ng/mL:      Indeterminant for AMI  Greater than 0.09 ng/mL: Positive for AMI    CBC & Differential [[id number]] Collected:  02/14/18 1533    Specimen:  Blood Updated:  02/14/18 1553    Narrative:       The following orders were created for panel order CBC & Differential.  Procedure                               Abnormality         Status                     ---------                               -----------         ------                     CBC Auto Differential[[id number]]        Abnormal            Final result                 Please view results for these tests on the individual orders.    Comprehensive Metabolic Panel [[id number]]  (Abnormal) Collected:  02/14/18 1533    Specimen:  Blood Updated:  02/14/18 1614     Glucose 170 (H) mg/dL      BUN 25 (H) mg/dL      Creatinine 1.03 (H) mg/dL      Sodium 140 mmol/L      Potassium 4.2 mmol/L      Chloride 100 mmol/L      CO2 28.7 mmol/L      Calcium 9.9 mg/dL      Total Protein 6.8 g/dL      Albumin 3.80 g/dL      ALT (SGPT) 20 U/L      AST (SGOT) 26 U/L      Alkaline Phosphatase 68 U/L      Total Bilirubin 0.2 mg/dL      eGFR Non   Amer 52 (L) mL/min/1.73      Globulin 3.0 gm/dL      A/G Ratio 1.3 g/dL      BUN/Creatinine Ratio 24.3     Anion Gap 11.3 mmol/L     Narrative:       The MDRD GFR formula is only valid for adults with stable renal function between ages 18 and 70.    Protime-INR [[id number]]  (Abnormal) Collected:  02/14/18 1533    Specimen:  Blood Updated:  02/14/18 1604     Protime 25.4 (H) Seconds      INR 2.36 (H)    Magnesium [[id number]]  (Normal) Collected:  02/14/18 1533    Specimen:  Blood Updated:  02/14/18 1614     Magnesium 1.9 mg/dL     BNP [[id number]]  (Normal) Collected:  02/14/18 1533    Specimen:  Blood Updated:  02/14/18 1612     proBNP 425.0 pg/mL     Narrative:       Among patients with dyspnea, NT-proBNP is highly sensitive for the detection of acute congestive heart failure. In addition NT-proBNP of <300 pg/ml effectively rules out acute congestive heart failure with 99% negative predictive value.    CBC Auto Differential [[id number]]  (Abnormal) Collected:  02/14/18 1533    Specimen:  Blood Updated:  02/14/18 1553     WBC 7.43 10*3/mm3      RBC 4.22 10*6/mm3      Hemoglobin 13.2 g/dL      Hematocrit 39.5 %      MCV 93.6 fL      MCH 31.3 pg      MCHC 33.4 g/dL      RDW 12.7 %      RDW-SD 42.8 fl      MPV 10.2 fL      Platelets 277 10*3/mm3      Neutrophil % 79.1 (H) %      Lymphocyte % 12.7 (L) %      Monocyte % 7.5 %      Eosinophil % 0.1 (L) %      Basophil % 0.3 %      Immature Grans % 0.3 %      Neutrophils, Absolute 5.88 10*3/mm3      Lymphocytes, Absolute 0.94 10*3/mm3      Monocytes, Absolute 0.56 10*3/mm3      Eosinophils, Absolute 0.01 10*3/mm3      Basophils, Absolute 0.02 10*3/mm3      Immature Grans, Absolute 0.02 10*3/mm3           I ordered the above labs and reviewed the results    RADIOLOGY  XR Chest 1 View   Final Result   Negative.       This report was finalized on 2/14/2018 3:59 PM by Dr. Isaías Damon MD.               I ordered the above noted radiological studies. Interpreted by  radiologist. Reviewed by me in PACS.       PROCEDURES  Critical Care  Performed by: RUFINO LUND  Authorized by: RUFINO LUND     Critical care provider statement:     Critical care time (minutes):  35    Critical care was necessary to treat or prevent imminent or life-threatening deterioration of the following conditions:  Cardiac failure    Critical care was time spent personally by me on the following activities:  Development of treatment plan with patient or surrogate, discussions with consultants, evaluation of patient's response to treatment, examination of patient, obtaining history from patient or surrogate, ordering and performing treatments and interventions, ordering and review of laboratory studies, ordering and review of radiographic studies, re-evaluation of patient's condition and review of old charts        EKG           EKG time: 1439  Rhythm/Rate: afib 140s  P waves and OK: unremarkable  QRS, axis: nml   ST and T waves: non specific diffuse changes     Interpreted Contemporaneously by me, independently viewed  Minimally changed compared to prior 11/7/17    PROGRESS AND CONSULTS  ED Course   1607  Cardizem ordered    1735  After administering Cardizem, pt's BP is 130/99 w/ heart rate in the 110s    1743  Call placed to [facility]    1803  BP- 117/95 HR- 80s-120s Temp- 98.5 °F (36.9 °C) (Oral) O2 sat- 97%  Rechecked the patient who is in NAD and is resting comfortably. Py reports no longer feeling a fluttering.    1805  Consulted with Dr. Walls, about the pt. She agreed to admit.    MEDICAL DECISION MAKING  Results were reviewed/discussed with the patient and they were also made aware of online access. Pt also made aware that some labs, such as cultures, will not be resulted during ER visit and follow up with PMD is necessary.     MDM  Number of Diagnoses or Management Options  Atrial fibrillation with RVR:      Amount and/or Complexity of Data Reviewed  Clinical lab tests: reviewed (INR 2.36)  Tests in the  radiology section of CPT®: reviewed (CXR-negative)  Tests in the medicine section of CPT®: reviewed (See EKG procedure note.)  Discuss the patient with other providers: yes (Dr. Walls, cardiology)           DIAGNOSIS  Final diagnoses:   Atrial fibrillation with RVR       DISPOSITION  ADMISSION    Discussed treatment plan and reason for admission with pt/family and admitting physician.  Pt/family voiced understanding of the plan for admission for further testing/treatment as needed.     Latest Documented Vital Signs:  As of 6:12 PM  BP- 130/99 HR- 110 Temp- 98.5 °F (36.9 °C) (Oral) O2 sat- 97%    --  Documentation assistance provided by karmen Dodson for Dr.  Information recorded by the karmne was done at my direction and has been verified and validated by me.     Lili Dodson  02/14/18 1930       Aury Marinelli MD  02/15/18 4516

## 2018-02-15 LAB
INR PPP: 2.34 (ref 0.9–1.1)
INR PPP: 2.38 (ref 0.9–1.1)
PROTHROMBIN TIME: 25.2 SECONDS (ref 11.7–14.2)
PROTHROMBIN TIME: 25.6 SECONDS (ref 11.7–14.2)
TSH SERPL DL<=0.05 MIU/L-ACNC: 5.3 MIU/ML (ref 0.27–4.2)

## 2018-02-15 PROCEDURE — 85610 PROTHROMBIN TIME: CPT | Performed by: INTERNAL MEDICINE

## 2018-02-15 PROCEDURE — G0378 HOSPITAL OBSERVATION PER HR: HCPCS

## 2018-02-15 PROCEDURE — 84443 ASSAY THYROID STIM HORMONE: CPT | Performed by: INTERNAL MEDICINE

## 2018-02-15 PROCEDURE — 99223 1ST HOSP IP/OBS HIGH 75: CPT | Performed by: INTERNAL MEDICINE

## 2018-02-15 RX ORDER — FUROSEMIDE 20 MG/1
10 TABLET ORAL DAILY
Status: DISCONTINUED | OUTPATIENT
Start: 2018-02-15 | End: 2018-02-16 | Stop reason: HOSPADM

## 2018-02-15 RX ORDER — ATORVASTATIN CALCIUM 20 MG/1
20 TABLET, FILM COATED ORAL DAILY
Status: DISCONTINUED | OUTPATIENT
Start: 2018-02-15 | End: 2018-02-16 | Stop reason: HOSPADM

## 2018-02-15 RX ORDER — WARFARIN SODIUM 2 MG/1
2 TABLET ORAL 2 TIMES WEEKLY
Status: DISCONTINUED | OUTPATIENT
Start: 2018-02-15 | End: 2018-02-15

## 2018-02-15 RX ORDER — WARFARIN SODIUM 4 MG/1
4 TABLET ORAL SEE ADMIN INSTRUCTIONS
Status: DISCONTINUED | OUTPATIENT
Start: 2018-02-15 | End: 2018-02-15

## 2018-02-15 RX ORDER — RAMIPRIL 10 MG/1
20 CAPSULE ORAL DAILY
Status: DISCONTINUED | OUTPATIENT
Start: 2018-02-15 | End: 2018-02-16 | Stop reason: HOSPADM

## 2018-02-15 RX ORDER — WARFARIN SODIUM 4 MG/1
4 TABLET ORAL
Status: DISCONTINUED | OUTPATIENT
Start: 2018-02-15 | End: 2018-02-15

## 2018-02-15 RX ORDER — METOPROLOL TARTRATE 50 MG/1
50 TABLET, FILM COATED ORAL EVERY 12 HOURS SCHEDULED
Status: DISCONTINUED | OUTPATIENT
Start: 2018-02-15 | End: 2018-02-16 | Stop reason: HOSPADM

## 2018-02-15 RX ORDER — WARFARIN SODIUM 2 MG/1
2 TABLET ORAL
Status: DISCONTINUED | OUTPATIENT
Start: 2018-02-17 | End: 2018-02-16 | Stop reason: HOSPADM

## 2018-02-15 RX ORDER — WARFARIN SODIUM 4 MG/1
4 TABLET ORAL
Status: DISCONTINUED | OUTPATIENT
Start: 2018-02-15 | End: 2018-02-16 | Stop reason: HOSPADM

## 2018-02-15 RX ORDER — NIFEDIPINE 30 MG/1
30 TABLET, EXTENDED RELEASE ORAL DAILY
Status: DISCONTINUED | OUTPATIENT
Start: 2018-02-15 | End: 2018-02-16 | Stop reason: HOSPADM

## 2018-02-15 RX ORDER — WARFARIN SODIUM 2 MG/1
2 TABLET ORAL TAKE AS DIRECTED
Status: DISCONTINUED | OUTPATIENT
Start: 2018-02-15 | End: 2018-02-15

## 2018-02-15 RX ADMIN — RAMIPRIL 20 MG: 10 CAPSULE ORAL at 14:04

## 2018-02-15 RX ADMIN — FUROSEMIDE 10 MG: 20 TABLET ORAL at 14:03

## 2018-02-15 RX ADMIN — ATORVASTATIN CALCIUM 20 MG: 20 TABLET, FILM COATED ORAL at 14:03

## 2018-02-15 RX ADMIN — WARFARIN SODIUM 4 MG: 4 TABLET ORAL at 14:10

## 2018-02-15 RX ADMIN — METOPROLOL TARTRATE 50 MG: 50 TABLET, FILM COATED ORAL at 12:19

## 2018-02-15 RX ADMIN — NIFEDIPINE 30 MG: 30 TABLET, FILM COATED, EXTENDED RELEASE ORAL at 14:04

## 2018-02-15 RX ADMIN — METOPROLOL TARTRATE 50 MG: 50 TABLET, FILM COATED ORAL at 21:33

## 2018-02-15 NOTE — H&P
Patient Name: Arelis Johnson  :1940  78 y.o.    Date of Admission: 2018  Date of Consultation:  02/15/18  Encounter Provider: Bro Otero MD  Place of Service: [facility] CARDIOLOGY  Referring Provider: Florentin Walls MD  Patient Care Team:  Chandu Erickson MD as PCP - General  Chandu Erickson MD as PCP - Family Medicine  Sly Saleh MD as PCP - Claims Attributed  Carly Escobar, DMITRIY as Care Coordinator ([facility])      Chief complaint: palpitations    CHADS2-VASc score is 5    History of Present Illness:Ms. Johnson is a 79 y/o patient who is followed by Dr. Saleh. She has a h/o A fib (on Coumadin, metoprolol and procardia), bradycardia, HTN and hyperlipidemia. She presented to the ED yesterday with c/o palpitations and was found to be in atrial fib with a rate of 140-150. Her INR was 2.36 and TSH 5.3, otherwise labs were normal. She was started on a cardizem gtt and was admitted for further treatment.     Dr. Saleh saw her in office on 18 for f/u PAF and HTN. At that time she had no cardiac complaints and her EKG showed her to be in NSR. He recommended she have a stress test if placed on an antiarrhythmic at any time (no amiodarone r/t hypothyroidism.) An echo was done at time of A fib diagnosis and showed an EF of 63% and elevated LA pressure.     Overnight her HR went as low as 40-60s and the cardizem was stopped. This morning her a fib rate is in the 90s.     Cardiac Testing:  Echo 2017  · Left ventricular wall thickness is consistent with borderline concentric hypertrophy.  · Left ventricular function is normal. Estimated EF = 63%.  · Left ventricular diastolic function was unable to be assessed. Elevated left atrial pressure.        Past Medical History:   Diagnosis Date   • Diabetes mellitus    • Hyperlipidemia    • Hypertension    • IFG (impaired fasting glucose)    • PAF (paroxysmal atrial fibrillation)    • Palpitations    • SOB  (shortness of breath)        Past Surgical History:   Procedure Laterality Date   • ANAL FISTULA REPAIR     • BACK SURGERY      l spine ruptured disk   • CHOLECYSTECTOMY     • JOINT REPLACEMENT      bilateral knees         Prior to Admission medications    Medication Sig Start Date End Date Taking? Authorizing Provider   Calcium Carb-Cholecalciferol (CALCIUM + D3) 600-200 MG-UNIT tablet Take 1 tablet by mouth Daily.   Yes Historical Provider, MD   furosemide (LASIX) 20 MG tablet Take 10 mg by mouth Daily.   Yes Historical Provider, MD   metoprolol tartrate (LOPRESSOR) 25 MG tablet Take 1 tablet by mouth 2 (Two) Times a Day. 4/6/17  Yes Sly Saleh MD   NIFEdipine XL (PROCARDIA XL) 30 MG 24 hr tablet Take 1 tablet by mouth Daily. 11/9/17  Yes Sly Saleh MD   ramipril (ALTACE) 10 MG capsule Take 2 capsules by mouth Daily. 7/10/17  Yes Sly Saleh MD   simvastatin (ZOCOR) 40 MG tablet Take 40 mg by mouth Daily.   Yes Historical Provider, MD   warfarin (COUMADIN) 2 MG tablet Take 2 mg by mouth 2 (Two) Times a Week. Tuesday, Saturday   Yes Historical Provider, MD   warfarin (COUMADIN) 4 MG tablet Take 4 mg by mouth See Admin Instructions. Taking 4 mg on Sun, Mon, Wed, Thurs, Fri and 2 mg on Tues, Sat   Yes Historical Provider, MD       Allergies   Allergen Reactions   • Penicillins    • Percocet [Oxycodone-Acetaminophen] GI Intolerance   • Sulfa Antibiotics        Social History     Social History   • Marital status:      Spouse name: N/A   • Number of children: N/A   • Years of education: N/A     Social History Main Topics   • Smoking status: Never Smoker   • Smokeless tobacco: Never Used   • Alcohol use No   • Drug use: No   • Sexual activity: No     Other Topics Concern   • None     Social History Narrative       Family History   Problem Relation Age of Onset   • Hypertension Mother    • Heart disease Father    • Hypertension Father    • Liver disease Brother        REVIEW OF  SYSTEMS:   All systems reviewed.  Pertinent positives identified in HPI.  All other systems are negative.      Objective:     Vitals:    02/14/18 1829 02/14/18 1832 02/14/18 2318 02/15/18 0734   BP:  124/97 138/86 109/90   BP Location:   Left arm Left arm   Patient Position:   Sitting Lying   Pulse: 91 92 113 82   Resp:   20 16   Temp:   98 °F (36.7 °C) 98.2 °F (36.8 °C)   TempSrc:    Oral   SpO2: 90% 96% 98% 96%   Weight:       Height:         Body mass index is 29.29 kg/(m^2).    General Appearance:    Alert, cooperative, in no acute distress   Head:    Normocephalic, without obvious abnormality, atraumatic   Eyes:            Lids and lashes normal, conjunctivae and sclerae normal, no   icterus, no pallor, corneas clear, PERRLA   Ears:    Ears appear intact with no abnormalities noted   Throat:   No oral lesions, no thrush, oral mucosa moist   Neck:   No adenopathy, supple, trachea midline, no thyromegaly, no   carotid bruit, no JVD   Back:     No kyphosis present, no scoliosis present, no skin lesions, erythema or scars, no tenderness to percussion or palpation, range of motion normal   Lungs:     Clear to auscultation,respirations regular, even and unlabored    Heart:   Irregular   Chest Wall:    No abnormalities observed   Abdomen:     Normal bowel sounds, no masses, no organomegaly, soft        non-tender, non-distended, no guarding, no rebound  tenderness   Extremities:   Moves all extremities well, no edema, no cyanosis, no redness   Pulses:   Pulses palpable and equal bilaterally. Normal radial, carotid, femoral, dorsalis pedis and posterior tibial pulses bilaterally. Normal abdominal aorta   Skin:  Psychiatric:   No bleeding, bruising or rash    Alert and oriented x 3, normal mood and affect   Lab Review:       Results from last 7 days  Lab Units 02/14/18  1533   SODIUM mmol/L 140   POTASSIUM mmol/L 4.2   CHLORIDE mmol/L 100   CO2 mmol/L 28.7   BUN mg/dL 25*   CREATININE mg/dL 1.03*   CALCIUM mg/dL 9.9    BILIRUBIN mg/dL 0.2   ALK PHOS U/L 68   ALT (SGPT) U/L 20   AST (SGOT) U/L 26   GLUCOSE mg/dL 170*       Results from last 7 days  Lab Units 02/14/18  1533   TROPONIN T ng/mL <0.010       Results from last 7 days  Lab Units 02/14/18  1533   WBC 10*3/mm3 7.43   HEMOGLOBIN g/dL 13.2   HEMATOCRIT % 39.5   PLATELETS 10*3/mm3 277       Results from last 7 days  Lab Units 02/15/18  0349 02/14/18  1533   INR  2.34* 2.36*       Results from last 7 days  Lab Units 02/14/18  1533   MAGNESIUM mg/dL 1.9                      I personally viewed and interpreted the patient's EKG/Telemetry data.        Assessment and Plan:       PAF on warfarin.  History of hypothyroidism.  Now with recurrent AF.  Rate well controlled.  I will increase her beta-blocker.  Suspect her AF will resolve on its own.    Bro Otero MD  02/15/18  11:06 AM

## 2018-02-15 NOTE — PROGRESS NOTES
Clinical Pharmacy Services: Medication History    Arelis Johnson is a 78 y.o. female presenting to [facility] for   Chief Complaint   Patient presents with   • Atrial Fibrillation   • Rapid Heart Rate       She  has a past medical history of Diabetes mellitus; Hyperlipidemia; Hypertension; IFG (impaired fasting glucose); PAF (paroxysmal atrial fibrillation); Palpitations; and SOB (shortness of breath).    Allergies as of 02/14/2018 - Alex as Reviewed 02/14/2018   Allergen Reaction Noted   • Penicillins  04/01/2016   • Percocet [oxycodone-acetaminophen] GI Intolerance 04/04/2017   • Sulfa antibiotics  04/01/2016       Medication information was obtained from: Patient, medication list  Pharmacy and Phone Number: [facility] [phone]    Prior to Admission Medications     Prescriptions Last Dose Informant Patient Reported? Taking?    Calcium Carb-Cholecalciferol (CALCIUM + D3) 600-200 MG-UNIT tablet 2/14/2018 Self Yes Yes    Take 1 tablet by mouth Daily.    furosemide (LASIX) 20 MG tablet 2/14/2018 Self Yes Yes    Take 10 mg by mouth Daily.    metoprolol tartrate (LOPRESSOR) 25 MG tablet 2/14/2018 Self No Yes    Take 1 tablet by mouth 2 (Two) Times a Day.    NIFEdipine XL (PROCARDIA XL) 30 MG 24 hr tablet 2/14/2018 Self No Yes    Take 1 tablet by mouth Daily.    ramipril (ALTACE) 10 MG capsule 2/14/2018 Self No Yes    Take 2 capsules by mouth Daily.    simvastatin (ZOCOR) 40 MG tablet 2/14/2018 Self Yes Yes    Take 40 mg by mouth Daily.    warfarin (COUMADIN) 2 MG tablet 2/13/2018 Self Yes Yes    Take 2 mg by mouth 2 (Two) Times a Week. Tuesday, Saturday    warfarin (COUMADIN) 4 MG tablet 2/14/2018 Self Yes Yes    Take 4 mg by mouth See Admin Instructions. Taking 4 mg on Sun, Mon, Wed, Thurs, Fri and 2 mg on Tues, Sat            Medication notes: None    This medication list is complete to the best of my knowledge as of 2/14/2018    Please call if questions.    Marycruz Atkins, Medication History  Technician  2/14/2018 7:38 PM

## 2018-02-15 NOTE — PLAN OF CARE
Problem: Patient Care Overview (Adult)  Goal: Plan of Care Review  Outcome: Ongoing (interventions implemented as appropriate)   02/15/18 1601   Coping/Psychosocial Response Interventions   Plan Of Care Reviewed With patient   Patient Care Overview   Progress progress toward functional goals as expected   Outcome Evaluation   Outcome Summary/Follow up Plan Pt. was advanced to a heart healthy diet this morning. She was increased to 50mg of metoprolol to help control her Afib. No interventions are being done. She has rested comfortably and has no complaints of pain. Will continue to monitor for the rest of this shift.      Goal: Adult Individualization and Mutuality  Outcome: Ongoing (interventions implemented as appropriate)    Goal: Discharge Needs Assessment  Outcome: Ongoing (interventions implemented as appropriate)      Problem: Arrhythmia/Dysrhythmia (Symptomatic) (Adult)  Goal: Signs and Symptoms of Listed Potential Problems Will be Absent or Manageable (Arrhythmia/Dysrhythmia)  Outcome: Ongoing (interventions implemented as appropriate)

## 2018-02-15 NOTE — PLAN OF CARE
Problem: Patient Care Overview (Adult)  Goal: Plan of Care Review  Outcome: Ongoing (interventions implemented as appropriate)   02/15/18 0521   Coping/Psychosocial Response Interventions   Plan Of Care Reviewed With patient   Patient Care Overview   Progress progress toward functional goals as expected   Outcome Evaluation   Outcome Summary/Follow up Plan Pt admitted with a-fib RVR on Cardizem gtt at 15mg/hr, 's - 150's, HR decreased approx 2300 to 80's-90's, decreased to 10mg/hr, pt then had a 2 sec pause, HR decresed to 70's-90's decresed to 5mg/hr then stopped at 0013, hr went to 40's-60's with 2-3 sec pauses, HR elevates to 110's with activity, pt remained asymptomatic, BP and O2 stable, HR currently 70's-80's, pt rested well through out night     Goal: Discharge Needs Assessment  Outcome: Ongoing (interventions implemented as appropriate)

## 2018-02-16 VITALS
OXYGEN SATURATION: 96 % | RESPIRATION RATE: 16 BRPM | SYSTOLIC BLOOD PRESSURE: 130 MMHG | BODY MASS INDEX: 29.32 KG/M2 | HEART RATE: 110 BPM | WEIGHT: 176 LBS | TEMPERATURE: 98.3 F | HEIGHT: 65 IN | DIASTOLIC BLOOD PRESSURE: 84 MMHG

## 2018-02-16 LAB
INR PPP: 2.44 (ref 0.9–1.1)
PROTHROMBIN TIME: 26.1 SECONDS (ref 11.7–14.2)

## 2018-02-16 PROCEDURE — G0378 HOSPITAL OBSERVATION PER HR: HCPCS

## 2018-02-16 PROCEDURE — 85610 PROTHROMBIN TIME: CPT | Performed by: INTERNAL MEDICINE

## 2018-02-16 PROCEDURE — 99217 PR OBSERVATION CARE DISCHARGE MANAGEMENT: CPT | Performed by: INTERNAL MEDICINE

## 2018-02-16 RX ORDER — DIGOXIN 125 MCG
125 TABLET ORAL DAILY
Qty: 90 TABLET | Refills: 3 | Status: SHIPPED | OUTPATIENT
Start: 2018-02-16 | End: 2018-03-15

## 2018-02-16 RX ORDER — METOPROLOL TARTRATE 50 MG/1
50 TABLET, FILM COATED ORAL EVERY 12 HOURS SCHEDULED
Qty: 60 TABLET | Refills: 11 | Status: SHIPPED | OUTPATIENT
Start: 2018-02-16 | End: 2018-03-15 | Stop reason: SDUPTHER

## 2018-02-16 RX ADMIN — FUROSEMIDE 10 MG: 20 TABLET ORAL at 08:36

## 2018-02-16 RX ADMIN — NIFEDIPINE 30 MG: 30 TABLET, FILM COATED, EXTENDED RELEASE ORAL at 08:36

## 2018-02-16 RX ADMIN — METOPROLOL TARTRATE 50 MG: 50 TABLET, FILM COATED ORAL at 08:36

## 2018-02-16 RX ADMIN — ATORVASTATIN CALCIUM 20 MG: 20 TABLET, FILM COATED ORAL at 08:36

## 2018-02-16 RX ADMIN — RAMIPRIL 20 MG: 10 CAPSULE ORAL at 08:36

## 2018-02-16 NOTE — DISCHARGE SUMMARY
Date of Discharge:  2/16/2018  Date of Admit: 2/14/2018    Discharge Diagnosis:Active Problems:    Atrial fibrillation with RVR      Hospital Course: The patient was admitted for atrial fibrillation with rapid response.  She was rate controlled.  She had her beta-blocker dose increased.  She remained in AF despite 2 days of observation.  Her HR was 110, but she was asymptomatic.  Digoxin was added.    She is now ready for discharge home.    Procedures Performed         Consults     Date and Time Order Name Status Description    2/14/2018 1743 [facility] (on-call MD unless specified) Completed             Discharge Physical Exam:    General Appearance No acute distress   Neck No adenopathy, supple, trachea midline, no thyromegaly, no carotid bruit, no JVD   Lungs Clear to auscultation,respirations regular, even and unlabored   Heart Irregular rhythm and normal rate, normal S1 and S2, no murmur, no gallop, no rub, no click   Chest wall No abnormalities observed   Abdomen Normal bowel sounds, no masses, no hepatomegaly, soft   Extremities Moves all extremities well, no edema, no cyanosis, no redness   Neurological Alert and oriented x 3     Discharge Medications   Arelis Johnson   Home Medication Instructions [id number]:[id number]    Printed on:02/16/18 1638   Medication Information                      Calcium Carb-Cholecalciferol (CALCIUM + D3) 600-200 MG-UNIT tablet  Take 1 tablet by mouth Daily.             digoxin (LANOXIN) 125 MCG tablet  Take 1 tablet by mouth Daily.             furosemide (LASIX) 20 MG tablet  Take 10 mg by mouth Daily.             metoprolol tartrate (LOPRESSOR) 50 MG tablet  Take 1 tablet by mouth Every 12 (Twelve) Hours.             NIFEdipine XL (PROCARDIA XL) 30 MG 24 hr tablet  Take 1 tablet by mouth Daily.             ramipril (ALTACE) 10 MG capsule  Take 2 capsules by mouth Daily.             simvastatin (ZOCOR) 40 MG tablet  Take 40 mg by mouth Daily.             warfarin (COUMADIN) 2 MG  tablet  Take 2 mg by mouth 2 (Two) Times a Week. Tuesday, Saturday             warfarin (COUMADIN) 4 MG tablet  Take 4 mg by mouth See Admin Instructions. Taking 4 mg on Sun, Mon, Wed, Thurs, Fri and 2 mg on Tues, Sat                 Discharge Diet: healthy heart    Activity at Discharge: ad jayleen    Discharge disposition: home    Condition on Discharge: stable    Follow-up Appointments  Future Appointments  Date Time Provider Department Center   8/16/2018 11:00 AM Sly Saleh MD [facility] None     Additional Instructions for the Follow-ups that You Need to Schedule     Discharge Follow-up with Specified Provider: Dr. Saleh in 2-4 weeks    As directed    To:  Dr. Saleh in 2-4 weeks                    Bro Otero MD  02/16/18  4:31 PM

## 2018-02-16 NOTE — PROGRESS NOTES
Discharge Planning Assessment  [facility]     Patient Name: Arelis Johnson  MRN: [id number]  Today's Date: 2/16/2018    Admit Date: 2/14/2018          Discharge Needs Assessment       02/16/18 1637    Living Environment    Lives With alone    Living Arrangements house    Transportation Available car;family or friend will provide   daughter to transport    Discharge Needs Assessment    Concerns To Be Addressed no discharge needs identified    Readmission Within The Last 30 Days no previous admission in last 30 days    Equipment Currently Used at Home shower chair    Equipment Needed After Discharge none            Discharge Plan       02/16/18 1638    Case Management/Social Work Plan    Plan Home, no needs. Daughter to transport     Additional Comments Moon notice given and copy placed in HIM scan basket. Face sheet,  pharmacy and PCP verified. Patient uses a shower chair. No other DME. No HH . No Rehab. Patient with Medicare , [employer] and uses silver scripts for her medication .She drives t the gym in [place] . She plans to go home today and anticipates no needs.. States her daughter Emily will transport..........  JOSE ALBERTO Leyva        Discharge Placement     No information found        Expected Discharge Date and Time     Expected Discharge Date Expected Discharge Time    Feb 16, 2018               Demographic Summary       02/16/18 1635    Referral Information    Admission Type observation    Referral Source admission list    Reason For Consult discharge planning    Contact Information    Comments Patient agreeable to talking with daughter present    Primary Care Physician Information    Name Chandu Erickson MD            Functional Status       02/16/18 1636    Functional Status Current    Ambulation 0-->independent    Transferring 0-->independent    Toileting 0-->independent    Bathing 0-->independent    Dressing 0-->independent    Eating 0-->independent    Communication  0-->understands/communicates without difficulty    Functional Status Prior    Ambulation 0-->independent    Transferring 0-->independent    Toileting 0-->independent    Bathing 0-->independent    Dressing 0-->independent    Eating 0-->independent    IADL    Medications independent    Meal Preparation independent    Housekeeping independent    Laundry independent    Shopping independent    Oral Care independent            Psychosocial     None            Abuse/Neglect     None            Legal     None            Substance Abuse     None            Patient Forms     None          JOSE ALBERTO Leyva

## 2018-02-16 NOTE — PLAN OF CARE
Problem: Patient Care Overview (Adult)  Goal: Plan of Care Review  Outcome: Ongoing (interventions implemented as appropriate)   02/16/18 0455   Coping/Psychosocial Response Interventions   Plan Of Care Reviewed With patient   Patient Care Overview   Progress progress toward functional goals as expected   Outcome Evaluation   Outcome Summary/Follow up Plan Pt was given the increase in metoprolol yesterday, remains in afib rate 70's-110's, pt denies chest pain, discomfort, palpations or soa, bp and o2 stable, pt rested well through out night     Goal: Discharge Needs Assessment  Outcome: Ongoing (interventions implemented as appropriate)      Problem: Arrhythmia/Dysrhythmia (Symptomatic) (Adult)  Goal: Signs and Symptoms of Listed Potential Problems Will be Absent or Manageable (Arrhythmia/Dysrhythmia)  Outcome: Ongoing (interventions implemented as appropriate)

## 2018-02-22 ENCOUNTER — OFFICE VISIT (OUTPATIENT)
Dept: FAMILY MEDICINE CLINIC | Facility: CLINIC | Age: 78
End: 2018-02-22

## 2018-02-22 VITALS
HEART RATE: 98 BPM | BODY MASS INDEX: 28.66 KG/M2 | HEIGHT: 65 IN | DIASTOLIC BLOOD PRESSURE: 70 MMHG | OXYGEN SATURATION: 99 % | TEMPERATURE: 98.1 F | WEIGHT: 172 LBS | SYSTOLIC BLOOD PRESSURE: 110 MMHG

## 2018-02-22 DIAGNOSIS — I48.0 PAROXYSMAL ATRIAL FIBRILLATION (HCC): ICD-10-CM

## 2018-02-22 DIAGNOSIS — E78.2 MIXED HYPERLIPIDEMIA: ICD-10-CM

## 2018-02-22 DIAGNOSIS — Z00.00 MEDICARE ANNUAL WELLNESS VISIT, INITIAL: Primary | ICD-10-CM

## 2018-02-22 DIAGNOSIS — E11.9 TYPE 2 DIABETES MELLITUS WITHOUT COMPLICATION, WITHOUT LONG-TERM CURRENT USE OF INSULIN (HCC): ICD-10-CM

## 2018-02-22 PROCEDURE — 99214 OFFICE O/P EST MOD 30 MIN: CPT | Performed by: FAMILY MEDICINE

## 2018-02-22 PROCEDURE — G0438 PPPS, INITIAL VISIT: HCPCS | Performed by: FAMILY MEDICINE

## 2018-02-22 NOTE — PROGRESS NOTES
QUICK REFERENCE INFORMATION:  The ABCs of the Annual Wellness Visit    Initial Medicare Wellness Visit    HEALTH RISK ASSESSMENT    1940    Recent Hospitalizations:  Recently treated at the following:  [facility].        Current Medical Providers:  Patient Care Team:  Chandu Erickson MD as PCP - General  Chandu Erickson MD as PCP - Family Medicine  Sly Saleh MD as PCP - Claims Attributed  Carly Escobar RN as Care Coordinator (Population Health)  Heriberto Singer MD as Consulting Physician (Orthopedic Surgery)        Smoking Status:  History   Smoking Status   • Never Smoker   Smokeless Tobacco   • Never Used       Alcohol Consumption:  History   Alcohol Use No       Depression Screen:   PHQ-2/PHQ-9 Depression Screening 2/22/2018   Little interest or pleasure in doing things 0   Feeling down, depressed, or hopeless 0   Trouble falling or staying asleep, or sleeping too much 0   Feeling tired or having little energy 0   Poor appetite or overeating 0   Feeling bad about yourself - or that you are a failure or have let yourself or your family down 0   Trouble concentrating on things, such as reading the newspaper or watching television 0   Moving or speaking so slowly that other people could have noticed. Or the opposite - being so fidgety or restless that you have been moving around a lot more than usual 0   Thoughts that you would be better off dead, or of hurting yourself in some way 0   Total Score 0   If you checked off any problems, how difficult have these problems made it for you to do your work, take care of things at home, or get along with other people? Not difficult at all       Health Habits and Functional and Cognitive Screening:  Functional & Cognitive Status 2/22/2018   Do you have difficulty preparing food and eating? No   Do you have difficulty bathing yourself, getting dressed or grooming yourself? No   Do you have difficulty using the toilet? No   Do you have difficulty  moving around from place to place? No   Do you have trouble with steps or getting out of a bed or a chair? No   In the past year have you fallen or experienced a near fall? No   Current Diet Diabetic Diet   Dental Exam Not up to date   Eye Exam Up to date   Exercise (times per week) 5 times per week   Current Exercise Activities Include Walking   Do you need help using the phone?  No   Are you deaf or do you have serious difficulty hearing?  No   Do you need help with transportation? No   Do you need help shopping? No   Do you need help preparing meals?  No   Do you need help with housework?  No   Do you need help with laundry? No   Do you need help taking your medications? No   Do you need help managing money? No   Have you felt unusual stress, anger or loneliness in the last month? No   Who do you live with? Alone   If you need help, do you have trouble finding someone available to you? No   Do you have difficulty concentrating, remembering or making decisions? Yes           Does the patient have evidence of cognitive impairment? No    Asiprin use counseling: Patient on Warfarin      Recent Lab Results:    Visual Acuity:  No exam data present    Age-appropriate Screening Schedule:  Refer to the list below for future screening recommendations based on patient's age, sex and/or medical conditions. Orders for these recommended tests are listed in the plan section. The patient has been provided with a written plan.    Health Maintenance   Topic Date Due   • DIABETIC FOOT EXAM  1940   • TDAP/TD VACCINES (1 - Tdap) 01/14/1958   • URINE MICROALBUMIN  03/17/2018   • HEMOGLOBIN A1C  03/27/2018   • DIABETIC EYE EXAM  09/13/2018   • LIPID PANEL  09/27/2018   • MAMMOGRAM  04/20/2019   • INFLUENZA VACCINE  Completed   • PNEUMOCOCCAL VACCINES (65+ LOW/MEDIUM RISK)  Addressed   • ZOSTER VACCINE  Completed        Subjective   History of Present Illness    Arelis Johnson is a 78 y.o. female who presents for an Annual  "Wellness Visit.    The following portions of the patient's history were reviewed and updated as appropriate: past social history.    Outpatient Medications Prior to Visit   Medication Sig Dispense Refill   • Calcium Carb-Cholecalciferol (CALCIUM + D3) 600-200 MG-UNIT tablet Take 1 tablet by mouth Daily.     • digoxin (LANOXIN) 125 MCG tablet Take 1 tablet by mouth Daily. 90 tablet 3   • furosemide (LASIX) 20 MG tablet Take 10 mg by mouth Daily.     • metoprolol tartrate (LOPRESSOR) 50 MG tablet Take 1 tablet by mouth Every 12 (Twelve) Hours. 60 tablet 11   • NIFEdipine XL (PROCARDIA XL) 30 MG 24 hr tablet Take 1 tablet by mouth Daily. 30 tablet 6   • ramipril (ALTACE) 10 MG capsule Take 2 capsules by mouth Daily. 180 capsule 3   • simvastatin (ZOCOR) 40 MG tablet Take 40 mg by mouth Daily.     • warfarin (COUMADIN) 2 MG tablet Take 2 mg by mouth 2 (Two) Times a Week. Tuesday, Saturday     • warfarin (COUMADIN) 4 MG tablet Take 4 mg by mouth See Admin Instructions. Taking 4 mg on Sun, Mon, Wed, Thurs, Fri and 2 mg on Tues, Sat       No facility-administered medications prior to visit.        Patient Active Problem List   Diagnosis   • IFG (impaired fasting glucose)   • Hypertension   • Hyperlipidemia   • Diabetes mellitus   • Paroxysmal atrial fibrillation   • Typical atrial flutter   • Atrial fibrillation with RVR       Advance Care Planning:  has NO advance directive - information provided to the patient today    Identification of Risk Factors:  Risk factors include: increased fall risk.    Review of Systems    Compared to one year ago, the patient feels her physical health is worse.  Compared to one year ago, the patient feels her mental health is the same.    Objective     Physical Exam    Vitals:    02/22/18 1445   BP: 110/70   BP Location: Left arm   Patient Position: Sitting   Cuff Size: Adult   Pulse: 98   Temp: 98.1 °F (36.7 °C)   TempSrc: Oral   SpO2: 99%   Weight: 78 kg (172 lb)   Height: 165.1 cm (65\") "   PainSc: 10-Worst pain ever       Body mass index is 28.62 kg/(m^2).  Discussed the patient's BMI with her. BMI is within normal parameters. No follow-up required.    Assessment/Plan   Patient Self-Management and Personalized Health Advice  The patient has been provided with information about: fall prevention and designing advance directives and preventive services including:   · Advance directive.    Visit Diagnoses:    ICD-10-CM ICD-9-CM   1. Medicare annual wellness visit, initial Z00.00 V70.0       No orders of the defined types were placed in this encounter.      Outpatient Encounter Prescriptions as of 2/22/2018   Medication Sig Dispense Refill   • Calcium Carb-Cholecalciferol (CALCIUM + D3) 600-200 MG-UNIT tablet Take 1 tablet by mouth Daily.     • digoxin (LANOXIN) 125 MCG tablet Take 1 tablet by mouth Daily. 90 tablet 3   • furosemide (LASIX) 20 MG tablet Take 10 mg by mouth Daily.     • metoprolol tartrate (LOPRESSOR) 50 MG tablet Take 1 tablet by mouth Every 12 (Twelve) Hours. 60 tablet 11   • NIFEdipine XL (PROCARDIA XL) 30 MG 24 hr tablet Take 1 tablet by mouth Daily. 30 tablet 6   • ramipril (ALTACE) 10 MG capsule Take 2 capsules by mouth Daily. 180 capsule 3   • simvastatin (ZOCOR) 40 MG tablet Take 40 mg by mouth Daily.     • warfarin (COUMADIN) 2 MG tablet Take 2 mg by mouth 2 (Two) Times a Week. Tuesday, Saturday     • warfarin (COUMADIN) 4 MG tablet Take 4 mg by mouth See Admin Instructions. Taking 4 mg on Sun, Mon, Wed, Thurs, Fri and 2 mg on Tues, Sat       No facility-administered encounter medications on file as of 2/22/2018.        Reviewed use of high risk medication in the elderly: yes  Reviewed for potential of harmful drug interactions in the elderly: yes    Follow Up:  No Follow-up on file.   Current outpatient and discharge medications have been reconciled for the patient.  Chandu Erickson MD  An After Visit Summary and PPPS with all of these plans were given to the patient.   "  SUBJECTIVE:  The patient is [] a 78-year-old white female is here for follow-up from hospital.  She presented in rapid atrial fibrillation.  Please see admission history physical and discharge summary for specific details.  She presents today feeling better.  She still feels \"occasional fluttering\" in her chest.  No chest pain shortness of breath.    PAST MEDICAL HISTORY:  Reviewed.    REVIEW OF SYSTEMS:  Please see above; 14 point ROS negative.      OBJECTIVE: Vitals signs are reviewed and are stable.    HEENT: MARLEN.  []  Neck:  Supple.  []  Lungs:  Clear.    Heart:  Irregularly irregular rate and rhythm.  []Rate in the 90s.  Abdomen:   Soft, nontender.  []  Extremities:  No cyanosis, clubbing or edema.  []    ASSESSMENT:    []Atrial fibrillation  Hypertension  Hyperlipidemia  Type 2 diabetes    PLAN:  []Patient will follow up as scheduled for routine labs.  She will keep her appointment with her cardiologist.  She will call me if problems.    Much of this encounter note is an electronic transcription/translation of spoken language to printed text.  The electronic translation of spoken language may permit erroneous, or at times, nonsensical words or phrases to be inadvertently transcribed.  Although I have reviewed the note for such errors, some may still exist.       "

## 2018-03-07 RX ORDER — SIMVASTATIN 40 MG
TABLET ORAL
Qty: 90 TABLET | Refills: 0 | Status: SHIPPED | OUTPATIENT
Start: 2018-03-07 | End: 2018-06-16 | Stop reason: SDUPTHER

## 2018-03-15 ENCOUNTER — OFFICE VISIT (OUTPATIENT)
Dept: CARDIOLOGY | Facility: CLINIC | Age: 78
End: 2018-03-15

## 2018-03-15 VITALS
HEART RATE: 74 BPM | HEIGHT: 65 IN | BODY MASS INDEX: 28.66 KG/M2 | WEIGHT: 172 LBS | DIASTOLIC BLOOD PRESSURE: 70 MMHG | SYSTOLIC BLOOD PRESSURE: 112 MMHG

## 2018-03-15 DIAGNOSIS — I10 ESSENTIAL HYPERTENSION: ICD-10-CM

## 2018-03-15 DIAGNOSIS — E78.2 MIXED HYPERLIPIDEMIA: ICD-10-CM

## 2018-03-15 DIAGNOSIS — E11.9 TYPE 2 DIABETES MELLITUS WITHOUT COMPLICATION, WITHOUT LONG-TERM CURRENT USE OF INSULIN (HCC): ICD-10-CM

## 2018-03-15 DIAGNOSIS — I48.0 PAROXYSMAL ATRIAL FIBRILLATION (HCC): Primary | ICD-10-CM

## 2018-03-15 PROCEDURE — 99214 OFFICE O/P EST MOD 30 MIN: CPT | Performed by: INTERNAL MEDICINE

## 2018-03-15 PROCEDURE — 93000 ELECTROCARDIOGRAM COMPLETE: CPT | Performed by: INTERNAL MEDICINE

## 2018-03-15 RX ORDER — METOPROLOL TARTRATE 75 MG/1
75 TABLET, FILM COATED ORAL EVERY 12 HOURS SCHEDULED
Qty: 60 TABLET | Refills: 3 | Status: SHIPPED | OUTPATIENT
Start: 2018-03-15 | End: 2018-04-16

## 2018-03-15 NOTE — PROGRESS NOTES
Arelis Johnson  1940  Date of Office Visit: 2/5/18  Encounter Provider: Sly Saleh MD  Place of Service: [facility]      CHIEF COMPLAINT:  Paroxysmal atrial fibrillation/flutter  Essential hypertension      HISTORY OF PRESENT ILLNESS:  Dr. Erickson,  I had the pleasure of seeing Ms. Johnson in follow-up today.  She is a very pleasant 78-year-old female with a medical history of paroxysmal atrial fibrillation, essential hypertension and hyperlipidemia along with diet controlled diabetes mellitus who presents back for follow-up.  With increases and beta-blockade she actually converted to sinus rhythm and has remained there.    Since her last visit, she has actually been doing very well.  She denies any sustained tachycardia or palpitations lasting more than 10-15 seconds.  She has no chest pain or dyspnea on exertion.  She does complain of just mild bilateral lower extremity edema that has been present for at least six months to a year.  It continues to improve with elevation.  She has no orthopnea or paroxysmal nocturnal dyspnea.        Review of Systems   Constitution: Negative for fever, weakness and malaise/fatigue.   HENT: Negative for nosebleeds and sore throat.    Eyes: Negative for blurred vision and double vision.   Cardiovascular: Positive for leg swelling. Negative for chest pain, claudication, palpitations and syncope.   Respiratory: Negative for cough, shortness of breath and snoring.    Endocrine: Negative for cold intolerance, heat intolerance and polydipsia.   Skin: Negative for itching, poor wound healing and rash.   Musculoskeletal: Positive for joint pain. Negative for joint swelling, muscle weakness and myalgias.   Gastrointestinal: Negative for abdominal pain, melena, nausea and vomiting.   Neurological: Negative for light-headedness, loss of balance, seizures and vertigo.   Psychiatric/Behavioral: Negative for altered mental status and  "depression.        Past Medical History:   Diagnosis Date   • Diabetes mellitus    • Hyperlipidemia    • Hypertension    • IFG (impaired fasting glucose)    • PAF (paroxysmal atrial fibrillation)    • Palpitations    • SOB (shortness of breath)        The following portions of the patient's history were reviewed and updated as appropriate: Social history , Family history and Surgical history     Current Outpatient Prescriptions on File Prior to Visit   Medication Sig Dispense Refill   • Calcium Carb-Cholecalciferol (CALCIUM + D3) 600-200 MG-UNIT tablet Take 1 tablet by mouth Daily.     • furosemide (LASIX) 20 MG tablet Take 10 mg by mouth Daily.     • metoprolol tartrate (LOPRESSOR) 50 MG tablet Take 1 tablet by mouth Every 12 (Twelve) Hours. 60 tablet 11   • ramipril (ALTACE) 10 MG capsule Take 2 capsules by mouth Daily. 180 capsule 3   • simvastatin (ZOCOR) 40 MG tablet Take 40 mg by mouth Daily.     • simvastatin (ZOCOR) 40 MG tablet TAKE 1 TABLET EVERY NIGHT  *PLEASE CALL AND SCHEDULE  AN APPOINTEMNT* 90 tablet 0   • warfarin (COUMADIN) 2 MG tablet Take 2 mg by mouth 2 (Two) Times a Week. Tuesday, Saturday     • warfarin (COUMADIN) 4 MG tablet Take 4 mg by mouth See Admin Instructions. Taking 4 mg on Sun, Mon, Wed, Thurs, Fri and 2 mg on Tues, Sat     • [DISCONTINUED] digoxin (LANOXIN) 125 MCG tablet Take 1 tablet by mouth Daily. 90 tablet 3   • [DISCONTINUED] NIFEdipine XL (PROCARDIA XL) 30 MG 24 hr tablet Take 1 tablet by mouth Daily. 30 tablet 6     No current facility-administered medications on file prior to visit.        Allergies   Allergen Reactions   • Penicillins    • Percocet [Oxycodone-Acetaminophen] GI Intolerance   • Sulfa Antibiotics        Vitals:    03/15/18 1235   BP: 112/70   Pulse: 74   Weight: 78 kg (172 lb)   Height: 165.1 cm (65\")     Physical Exam   Constitutional: She is oriented to person, place, and time. She appears well-developed and well-nourished.   HENT:   Head: Normocephalic and " atraumatic.   Eyes: Conjunctivae and EOM are normal. No scleral icterus.   Neck: Normal range of motion. Neck supple. Normal carotid pulses, no hepatojugular reflux and no JVD present. Carotid bruit is not present. No tracheal deviation present. No thyromegaly present.   Cardiovascular: Normal rate and regular rhythm.  Exam reveals no gallop and no friction rub.    No murmur heard.  Pulses:       Carotid pulses are 2+ on the right side, and 2+ on the left side.       Radial pulses are 2+ on the right side, and 2+ on the left side.        Femoral pulses are 2+ on the right side, and 2+ on the left side.       Dorsalis pedis pulses are 2+ on the right side, and 2+ on the left side.        Posterior tibial pulses are 2+ on the right side, and 2+ on the left side.   Pulmonary/Chest: Breath sounds normal. No respiratory distress. She has no decreased breath sounds. She has no wheezes. She has no rhonchi. She has no rales. She exhibits no tenderness.   Abdominal: Soft. Bowel sounds are normal. She exhibits no distension. There is no tenderness. There is no rebound.   Musculoskeletal: She exhibits edema (trace bilateral LE edema). She exhibits no deformity.   Neurological: She is alert and oriented to person, place, and time. She has normal strength. No sensory deficit.   Skin: No rash noted. No erythema.   Psychiatric: She has a normal mood and affect. Her behavior is normal.       No components found for: CBC  No results found for: CMP  No components found for: LIPID  No results found for: BMP      ECG 12 Lead  Date/Time: 3/15/2018 1:19 PM  Performed by: MALISSA MARTIN  Authorized by: MALISSA MARTIN   Comparison: compared with previous ECG from 2/14/2018  Comparison to previous ECG: Ventricular rate has decreased  Rhythm: atrial fibrillation  Rate: normal  QRS axis: normal  Other findings: PRWP  Clinical impression: abnormal ECG  Comments: Nonspecific ST-T wave abnormalities               4/12/17  TTE  · Left ventricular wall thickness is consistent with borderline concentric hypertrophy.  · Left ventricular function is normal. Estimated EF = 63%.  · Left ventricular diastolic function was unable to be assessed. Elevated left atrial pressure.      DISCUSSION/SUMMARY 78-year-old female with a medical history of essential hypertension, mild lower extremity edema and hyperlipidemia who previously presented to me secondary to the acute onset of palpitations and tachycardia and was found to be in afib with rvr.  She was placed on Coumadin therapy along with metoprolol tartrate which she was tolerating with just mild sinus bradycardia.  She presented back in February when I was out of town with rapid ventricular rate.  Digoxin was added to her medical regimen.  Her ventricular rate is well controlled currently.      1. Atrial fibrillation/flutter; paroxysmal.  Normal ventricular rate currently    -Currently on Coumadin     -Increase metoprolol tartrate to 75 mg twice daily    -Stop nifedipine and digoxin.  2.    Essential hypertension: Low normal.  Stop nifedipine.  Increase metoprolol tartrate as above  3.    Hyperlipidemia: Continue current simvastatin therapy.      I will see the patient back in 6 months or earlier with problems    Atrial Fibrillation and Atrial Flutter  Assessment  • The patient has paroxysmal atrial fibrillation  • The patient's CHADS2-VASc score is 5  • A TDE1SF1-SEYi score of 2 or more is considered a high risk for a thromboembolic event  • Warfarin prescribed    Plan  • Attempt to maintain sinus rhythm  • Continue warfarin for antithrombotic therapy, bleeding issues discussed  • Add beta blocker for rate control

## 2018-03-22 ENCOUNTER — TELEPHONE (OUTPATIENT)
Dept: CARDIOLOGY | Facility: CLINIC | Age: 78
End: 2018-03-22

## 2018-03-22 NOTE — TELEPHONE ENCOUNTER
Pt called to leave BP results:  3/16/18- 112/60 HR- 67  3/17/18- 115/78 HR- 84  3/18/18- 126/88 HR- 76  3/19/18- 127/84 HR- 60  3/20/18- 115/73 HR- 66  3/21/18- 118/73 HR- 74  3/22/18- 124/73 HR- 90    Carmine Baron

## 2018-04-02 ENCOUNTER — TRANSCRIBE ORDERS (OUTPATIENT)
Dept: ADMINISTRATIVE | Facility: HOSPITAL | Age: 78
End: 2018-04-02

## 2018-04-02 DIAGNOSIS — Z12.39 SCREENING BREAST EXAMINATION: Primary | ICD-10-CM

## 2018-04-11 ENCOUNTER — OFFICE VISIT (OUTPATIENT)
Dept: FAMILY MEDICINE CLINIC | Facility: CLINIC | Age: 78
End: 2018-04-11

## 2018-04-11 ENCOUNTER — TELEPHONE (OUTPATIENT)
Dept: FAMILY MEDICINE CLINIC | Facility: CLINIC | Age: 78
End: 2018-04-11

## 2018-04-11 VITALS
BODY MASS INDEX: 29.49 KG/M2 | OXYGEN SATURATION: 97 % | SYSTOLIC BLOOD PRESSURE: 126 MMHG | HEIGHT: 65 IN | DIASTOLIC BLOOD PRESSURE: 72 MMHG | HEART RATE: 95 BPM | TEMPERATURE: 97.6 F | WEIGHT: 177 LBS

## 2018-04-11 DIAGNOSIS — E78.2 MIXED HYPERLIPIDEMIA: ICD-10-CM

## 2018-04-11 DIAGNOSIS — R73.03 PREDIABETES: ICD-10-CM

## 2018-04-11 DIAGNOSIS — I48.0 PAROXYSMAL ATRIAL FIBRILLATION (HCC): Primary | ICD-10-CM

## 2018-04-11 DIAGNOSIS — R79.89 ABNORMAL THYROID BLOOD TEST: ICD-10-CM

## 2018-04-11 DIAGNOSIS — I10 ESSENTIAL HYPERTENSION: ICD-10-CM

## 2018-04-11 LAB
ALBUMIN SERPL-MCNC: 3.7 G/DL (ref 3.5–5.2)
ALBUMIN/GLOB SERPL: 1.2 G/DL
ALP SERPL-CCNC: 68 U/L (ref 39–117)
ALT SERPL W P-5'-P-CCNC: 36 U/L (ref 1–33)
ANION GAP SERPL CALCULATED.3IONS-SCNC: 12 MMOL/L
AST SERPL-CCNC: 30 U/L (ref 1–32)
BILIRUB SERPL-MCNC: 0.6 MG/DL (ref 0.1–1.2)
BUN BLD-MCNC: 23 MG/DL (ref 8–23)
BUN/CREAT SERPL: 24.2 (ref 7–25)
CALCIUM SPEC-SCNC: 9.6 MG/DL (ref 8.6–10.5)
CHLORIDE SERPL-SCNC: 99 MMOL/L (ref 98–107)
CHOLEST SERPL-MCNC: 118 MG/DL (ref 0–200)
CO2 SERPL-SCNC: 30 MMOL/L (ref 22–29)
CREAT BLD-MCNC: 0.95 MG/DL (ref 0.57–1)
ERYTHROCYTE [DISTWIDTH] IN BLOOD BY AUTOMATED COUNT: 14.6 % (ref 4.5–15)
GFR SERPL CREATININE-BSD FRML MDRD: 57 ML/MIN/1.73
GLOBULIN UR ELPH-MCNC: 3 GM/DL
GLUCOSE BLD-MCNC: 112 MG/DL (ref 65–99)
HBA1C MFR BLD: 6.9 % (ref 4.8–5.6)
HCT VFR BLD AUTO: 42.7 % (ref 31–42)
HDLC SERPL-MCNC: 60 MG/DL (ref 40–60)
HGB BLD-MCNC: 14 G/DL (ref 12–18)
LDLC SERPL CALC-MCNC: 41 MG/DL (ref 0–100)
LDLC/HDLC SERPL: 0.68 {RATIO}
LYMPHOCYTES # BLD AUTO: 1.6 10*3/MM3 (ref 1.2–3.4)
LYMPHOCYTES NFR BLD AUTO: 25 % (ref 21–51)
MCH RBC QN AUTO: 30.8 PG (ref 26.1–33.1)
MCHC RBC AUTO-ENTMCNC: 32.8 G/DL (ref 33–37)
MCV RBC AUTO: 94 FL (ref 80–99)
MONOCYTES # BLD AUTO: 0.3 10*3/MM3 (ref 0.1–0.6)
MONOCYTES NFR BLD AUTO: 4 % (ref 2–9)
NEUTROPHILS # BLD AUTO: 4.7 10*3/MM3 (ref 1.4–6.5)
NEUTROPHILS NFR BLD AUTO: 71 % (ref 42–75)
PLATELET # BLD AUTO: 213 10*3/MM3 (ref 150–450)
PMV BLD AUTO: 8.3 FL (ref 7.1–10.5)
POTASSIUM BLD-SCNC: 4.3 MMOL/L (ref 3.5–5.2)
PROT SERPL-MCNC: 6.7 G/DL (ref 6–8.5)
RBC # BLD AUTO: 4.54 10*6/MM3 (ref 4–6)
SODIUM BLD-SCNC: 141 MMOL/L (ref 136–145)
T-UPTAKE NFR SERPL: 1.09 TBI (ref 0.8–1.3)
T4 SERPL-MCNC: 7.47 MCG/DL (ref 4.5–11.7)
TRIGL SERPL-MCNC: 86 MG/DL (ref 0–150)
TSH SERPL DL<=0.05 MIU/L-ACNC: 3.72 MIU/ML (ref 0.27–4.2)
VLDLC SERPL-MCNC: 17.2 MG/DL (ref 5–40)
WBC NRBC COR # BLD: 6.6 10*3/MM3 (ref 4.5–10)

## 2018-04-11 PROCEDURE — 99214 OFFICE O/P EST MOD 30 MIN: CPT | Performed by: NURSE PRACTITIONER

## 2018-04-11 PROCEDURE — 83036 HEMOGLOBIN GLYCOSYLATED A1C: CPT | Performed by: NURSE PRACTITIONER

## 2018-04-11 PROCEDURE — 84436 ASSAY OF TOTAL THYROXINE: CPT | Performed by: NURSE PRACTITIONER

## 2018-04-11 PROCEDURE — 84479 ASSAY OF THYROID (T3 OR T4): CPT | Performed by: NURSE PRACTITIONER

## 2018-04-11 PROCEDURE — 80053 COMPREHEN METABOLIC PANEL: CPT | Performed by: NURSE PRACTITIONER

## 2018-04-11 PROCEDURE — 36415 COLL VENOUS BLD VENIPUNCTURE: CPT | Performed by: NURSE PRACTITIONER

## 2018-04-11 PROCEDURE — 84443 ASSAY THYROID STIM HORMONE: CPT | Performed by: NURSE PRACTITIONER

## 2018-04-11 PROCEDURE — 85025 COMPLETE CBC W/AUTO DIFF WBC: CPT | Performed by: NURSE PRACTITIONER

## 2018-04-11 PROCEDURE — 80061 LIPID PANEL: CPT | Performed by: NURSE PRACTITIONER

## 2018-04-11 NOTE — PROGRESS NOTES
Subjective   Arelis Johnson is a 78 y.o. female.     History of Present Illness   Here to FU on chol on simvastatin 40 mg daily fasting for labs last checked 09/17, with atrial fibrillation and HTN on ramipril 10 mg, lasix 20 mg and metoprolol 25 mg 3 PO BID, with some occ palpitations and notices HR elevated 140 when walks or exercises, no CP dizziness or HA but LE edema, on coumadin and gets checked monthly with cardiology Dr Saleh last seen 03/14/18, denies heavy salting, not smoking, with last labs showing prediabetes and working on healthy diet with some weight gain 5 lbs since last mo admits some sugar in diet, with last labs 02/18 TSH sl abnl no recheck, no hx of abnormal thyroid labs    The following portions of the patient's history were reviewed and updated as appropriate: allergies, current medications, past family history, past medical history, past social history, past surgical history and problem list.    Review of Systems   Constitutional: Negative for fever.   Respiratory: Negative for cough, shortness of breath and wheezing.    Cardiovascular: Positive for palpitations (occ) and leg swelling. Negative for chest pain.   Neurological: Negative for dizziness and headaches.   Hematological: Negative for adenopathy. Does not bruise/bleed easily.   All other systems reviewed and are negative.      Objective   Physical Exam   Constitutional: She is oriented to person, place, and time. She appears well-developed and well-nourished.   HENT:   Head: Normocephalic and atraumatic.   Eyes: Conjunctivae and EOM are normal. Pupils are equal, round, and reactive to light.   Cardiovascular: Normal rate and normal heart sounds.    No murmur heard.  Atrial fibrillation   Pulmonary/Chest: Effort normal and breath sounds normal. Rales: B LE pitting 1+   Musculoskeletal: Normal range of motion. She exhibits edema.   Neurological: She is alert and oriented to person, place, and time.   Skin: Skin is warm and dry.    Psychiatric: She has a normal mood and affect. Her behavior is normal. Judgment and thought content normal.   Vitals reviewed.      Assessment/Plan   Arelis was seen today for annual exam.    Diagnoses and all orders for this visit:    Paroxysmal atrial fibrillation  -     Lipid Panel  -     CBC & Differential  -     Comprehensive Metabolic Panel  -     Thyroid Panel With TSH  -     CBC Auto Differential    Essential hypertension  -     Lipid Panel  -     CBC & Differential  -     Comprehensive Metabolic Panel  -     Thyroid Panel With TSH  -     CBC Auto Differential    Abnormal thyroid blood test  -     Thyroid Panel With TSH    Prediabetes  -     Hemoglobin A1c    Mixed hyperlipidemia  -     Lipid Panel  -     CBC & Differential  -     Comprehensive Metabolic Panel  -     CBC Auto Differential    BMI 29.0-29.9,adult    check fasting labs and call with results, will call Dr Saleh to discuss possible increase metoprolol, check BP and HR at home, Enc healthy diet and regular exercise for wt loss

## 2018-04-11 NOTE — TELEPHONE ENCOUNTER
Thyroid, kidney normal, no anemias. Chol well controlled. BS sl elevated 112 and A1C elevated 6.9, need to work on healthy diet and regular exercise for BS control and if still elevated in 3 mo will consider start DM medication. Have you ever taken metformin prev? I will call Dr Saleh cardiology and speak with him regarding metoprolol

## 2018-04-11 NOTE — PATIENT INSTRUCTIONS
check fasting labs and call with results, will call Dr Saleh to discuss possible increase metoprolol, check BP and HR at home, Enc healthy diet and regular exercise for wt loss

## 2018-04-12 ENCOUNTER — TELEPHONE (OUTPATIENT)
Dept: FAMILY MEDICINE CLINIC | Facility: CLINIC | Age: 78
End: 2018-04-12

## 2018-04-16 ENCOUNTER — TELEPHONE (OUTPATIENT)
Dept: CARDIOLOGY | Facility: CLINIC | Age: 78
End: 2018-04-16

## 2018-04-16 RX ORDER — METOPROLOL TARTRATE 100 MG/1
100 TABLET ORAL 2 TIMES DAILY
Qty: 60 TABLET | Refills: 11 | Status: SHIPPED | OUTPATIENT
Start: 2018-04-16 | End: 2018-05-11

## 2018-04-16 NOTE — TELEPHONE ENCOUNTER
She doesn't have a ton of blood pressure to work with.  I would just increase it to 100 mg twice daily.

## 2018-04-16 NOTE — TELEPHONE ENCOUNTER
Roxana called stating pt has A-Fib, currently taking Metoprolol, Laxis, Ramipril, Simvastatin, and Coumadin. Pt reports palpitations,  while walking. Roxana states while in office pt in A-Fib with a HR of 95. Roxana is requesting an increase in Metoprolol pt currently taking 75 mg daily! Thanks, Carmine

## 2018-04-16 NOTE — TELEPHONE ENCOUNTER
Spoke with Dr Saleh's nurse, who also thought increase metoprolol 100 mg BID to follow up with him in a month, the nurse will call her to schedule FU apt in office

## 2018-04-16 NOTE — TELEPHONE ENCOUNTER
Called Dr Saleh 04/16/18 12:40 left message regarding a fib and metoprolol dose increased d/t palpitations and HR up to 140 when walking

## 2018-04-24 ENCOUNTER — HOSPITAL ENCOUNTER (OUTPATIENT)
Dept: MAMMOGRAPHY | Facility: HOSPITAL | Age: 78
Discharge: HOME | End: 2018-04-24
Admitting: FAMILY MEDICINE

## 2018-04-24 DIAGNOSIS — Z12.39 SCREENING BREAST EXAMINATION: ICD-10-CM

## 2018-04-24 PROCEDURE — 77067 SCR MAMMO BI INCL CAD: CPT

## 2018-05-09 ENCOUNTER — TELEPHONE (OUTPATIENT)
Dept: CARDIOLOGY | Facility: HOSPITAL | Age: 78
End: 2018-05-09

## 2018-05-09 RX ORDER — WARFARIN SODIUM 4 MG/1
TABLET ORAL
Qty: 30 TABLET | Refills: 0 | Status: SHIPPED | OUTPATIENT
Start: 2018-05-09 | End: 2018-06-14 | Stop reason: SDUPTHER

## 2018-05-11 ENCOUNTER — OFFICE VISIT (OUTPATIENT)
Dept: CARDIOLOGY | Facility: CLINIC | Age: 78
End: 2018-05-11

## 2018-05-11 VITALS
BODY MASS INDEX: 30.16 KG/M2 | DIASTOLIC BLOOD PRESSURE: 74 MMHG | WEIGHT: 181 LBS | HEIGHT: 65 IN | SYSTOLIC BLOOD PRESSURE: 112 MMHG | HEART RATE: 106 BPM

## 2018-05-11 DIAGNOSIS — I10 ESSENTIAL HYPERTENSION: ICD-10-CM

## 2018-05-11 DIAGNOSIS — I48.19 PERSISTENT ATRIAL FIBRILLATION (HCC): Primary | ICD-10-CM

## 2018-05-11 DIAGNOSIS — E11.9 TYPE 2 DIABETES MELLITUS WITHOUT COMPLICATION, WITHOUT LONG-TERM CURRENT USE OF INSULIN (HCC): ICD-10-CM

## 2018-05-11 PROCEDURE — 99214 OFFICE O/P EST MOD 30 MIN: CPT | Performed by: INTERNAL MEDICINE

## 2018-05-11 PROCEDURE — 93000 ELECTROCARDIOGRAM COMPLETE: CPT | Performed by: INTERNAL MEDICINE

## 2018-05-11 RX ORDER — DILTIAZEM HYDROCHLORIDE 180 MG/1
180 CAPSULE, EXTENDED RELEASE ORAL DAILY
Qty: 30 CAPSULE | Refills: 11 | Status: SHIPPED | OUTPATIENT
Start: 2018-05-11 | End: 2019-05-01 | Stop reason: SDUPTHER

## 2018-05-11 RX ORDER — METOPROLOL TARTRATE 75 MG/1
75 TABLET, FILM COATED ORAL 2 TIMES DAILY
Qty: 60 TABLET | Refills: 6 | Status: SHIPPED | OUTPATIENT
Start: 2018-05-11 | End: 2018-12-03 | Stop reason: SDUPTHER

## 2018-05-11 RX ORDER — FUROSEMIDE 20 MG/1
20 TABLET ORAL DAILY
Qty: 30 TABLET | Refills: 6 | COMMUNITY
Start: 2018-05-11 | End: 2018-06-01 | Stop reason: SDUPTHER

## 2018-05-11 RX ORDER — RAMIPRIL 10 MG/1
10 CAPSULE ORAL DAILY
Qty: 90 CAPSULE | Refills: 3 | Status: SHIPPED | OUTPATIENT
Start: 2018-05-11 | End: 2019-05-20 | Stop reason: SDUPTHER

## 2018-05-11 NOTE — PROGRESS NOTES
Arelis Johnson  1940  Date of Office Visit: 5/11/18  Encounter Provider: Sly Saleh MD  Place of Service: [facility] CARDIOLOGY      CHIEF COMPLAINT:  Paroxysmal atrial fibrillation/flutter  Essential hypertension      HISTORY OF PRESENT ILLNESS:  Dr. Erickson,  I had the pleasure of seeing Ms. Johnson in follow-up today.  She is a very pleasant 78-year-old female with a medical history of paroxysmal atrial fibrillation, essential hypertension and hyperlipidemia along with diet controlled diabetes mellitus who presents back for follow-up.  She has had some issues with rapid ventricular rate.  Her metoprolol tartrate was increased to 100 mg twice a day.  She states that she does not really feel the palpitations or increased shortness of air; however, she does note that when she gets on the treadmill she has a heart rate increase to 140 beats per minute with minimal levels of exertion.  She will get scared and stops exercising with that.  She also notices that her mild bilateral lower extremity edema is slightly worse than it has been prior.  She denies any orthopnea or PND.  No issues with chest pain.            Review of Systems   Constitution: Negative for fever, weakness and malaise/fatigue.   HENT: Negative for nosebleeds and sore throat.    Eyes: Negative for blurred vision and double vision.   Cardiovascular: Positive for leg swelling. Negative for chest pain, claudication, palpitations and syncope.   Respiratory: Negative for cough, shortness of breath and snoring.    Endocrine: Negative for cold intolerance, heat intolerance and polydipsia.   Skin: Negative for itching, poor wound healing and rash.   Musculoskeletal: Positive for joint pain. Negative for joint swelling, muscle weakness and myalgias.   Gastrointestinal: Negative for abdominal pain, melena, nausea and vomiting.   Neurological: Negative for light-headedness, loss of balance, seizures and vertigo.  "  Psychiatric/Behavioral: Negative for altered mental status and depression.        Past Medical History:   Diagnosis Date   • Diabetes mellitus    • Hyperlipidemia    • Hypertension    • IFG (impaired fasting glucose)    • PAF (paroxysmal atrial fibrillation)     with rvr   • Palpitations    • SOB (shortness of breath)        The following portions of the patient's history were reviewed and updated as appropriate: Social history , Family history and Surgical history     Current Outpatient Prescriptions on File Prior to Visit   Medication Sig Dispense Refill   • Calcium Carb-Cholecalciferol (CALCIUM + D3) 600-200 MG-UNIT tablet Take 1 tablet by mouth Daily.     • furosemide (LASIX) 20 MG tablet Take 10 mg by mouth Daily.     • metoprolol tartrate (LOPRESSOR) 100 MG tablet Take 1 tablet by mouth 2 (Two) Times a Day. 60 tablet 11   • ramipril (ALTACE) 10 MG capsule Take 2 capsules by mouth Daily. 180 capsule 3   • simvastatin (ZOCOR) 40 MG tablet Take 40 mg by mouth Daily.     • simvastatin (ZOCOR) 40 MG tablet TAKE 1 TABLET EVERY NIGHT  *PLEASE CALL AND SCHEDULE  AN APPOINTEMNT* 90 tablet 0   • warfarin (COUMADIN) 2 MG tablet Take 2 mg by mouth 2 (Two) Times a Week. Tuesday, Saturday     • warfarin (COUMADIN) 4 MG tablet Take 4 mg by mouth See Admin Instructions. Taking 4 mg on Sun, Mon, Wed, Thurs, Fri and 2 mg on Tues, Sat     • warfarin (COUMADIN) 4 MG tablet TAKE ONE tablet (by mouth) EACH DAY 30 tablet 0     No current facility-administered medications on file prior to visit.        Allergies   Allergen Reactions   • Percocet [Oxycodone-Acetaminophen] GI Intolerance   • Sulfa Antibiotics    • Penicillins Rash       Vitals:    05/11/18 1017   BP: 112/74   Pulse: 106   Weight: 82.1 kg (181 lb)   Height: 165.1 cm (65\")     Physical Exam   Constitutional: She is oriented to person, place, and time. She appears well-developed and well-nourished.   HENT:   Head: Normocephalic and atraumatic.   Eyes: Conjunctivae and " EOM are normal. No scleral icterus.   Neck: Normal range of motion. Neck supple. Normal carotid pulses, no hepatojugular reflux and no JVD present. Carotid bruit is not present. No tracheal deviation present. No thyromegaly present.   Cardiovascular: An irregularly irregular rhythm present. Tachycardia present.  Exam reveals no gallop and no friction rub.    No murmur heard.  Pulses:       Carotid pulses are 2+ on the right side, and 2+ on the left side.       Radial pulses are 2+ on the right side, and 2+ on the left side.        Femoral pulses are 2+ on the right side, and 2+ on the left side.       Dorsalis pedis pulses are 2+ on the right side, and 2+ on the left side.        Posterior tibial pulses are 2+ on the right side, and 2+ on the left side.   Pulmonary/Chest: Breath sounds normal. No respiratory distress. She has no decreased breath sounds. She has no wheezes. She has no rhonchi. She has no rales. She exhibits no tenderness.   Abdominal: Soft. Bowel sounds are normal. She exhibits no distension. There is no tenderness. There is no rebound.   Musculoskeletal: She exhibits edema (trace bilateral LE edema). She exhibits no deformity.   Neurological: She is alert and oriented to person, place, and time. She has normal strength. No sensory deficit.   Skin: No rash noted. No erythema.   Psychiatric: She has a normal mood and affect. Her behavior is normal.       No components found for: CBC  No results found for: CMP  No components found for: LIPID  No results found for: BMP      ECG 12 Lead  Date/Time: 5/11/2018 10:43 AM  Performed by: MALISSA MARTIN  Authorized by: MALISSA MARTIN   Comparison: compared with previous ECG from 3/15/2018  Comparison to previous ECG: Heart rate has increased 32 bpm  Rhythm: atrial fibrillation  Rate: tachycardic  Clinical impression: abnormal ECG  Comments: Nonspecific T-wave abnormalities diffuse leads               4/12/17 TTE  · Left ventricular wall thickness  is consistent with borderline concentric hypertrophy.  · Left ventricular function is normal. Estimated EF = 63%.  · Left ventricular diastolic function was unable to be assessed. Elevated left atrial pressure.      DISCUSSION/SUMMARY 78-year-old female with a medical history of essential hypertension, mild lower extremity edema and hyperlipidemia who previously presented to me secondary to the acute onset of palpitations and tachycardia and was found to be in afib with rvr.  She was placed on Coumadin therapy along with metoprolol tartrate which she was tolerating with just mild sinus bradycardia.  She presented back in February 2018 when I was out of town with rapid ventricular rate.  Digoxin was added to her medical regimen.  This subsequently was stopped and her metoprolol tartrate was uptitrated.  She has had additional issues with RVR and states that she doesn't feel great.  Her blood pressure is low normal.  She has bilateral lower extremity edema however no other symptoms that would be concerning for heart failure.  I think much of this is venous and agency    1. Atrial fibrillation/flutter..  Regular rate is been poorly controlled recently.  She is maintaining atrial fibrillation as well.  This is now more persistent    -Currently on Coumadin     -She states that she has more fatigue with 100 mg twice a day of metoprolol tartrate.  As such I will decrease her metoprolol tartrate to 75 mg twice a day that she was on before and tolerated better.    -Start long-acting diltiazem at 180 mg by mouth daily.    -Decrease Ramipril to 10 mg daily to allow for the addition of diltiazem therapy.    -She will come back in for repeat blood pressure and heart rate check with EKG in 1 week  2.    Essential hypertension: Decrease Ramipril to allow for addition of diltiazem therapy for rate control  3.    Hyperlipidemia: Continue current simvastatin therapy.    4.  Lower extremity edema: I think she would tolerate 20 mrem  Lasix daily instead of the 10 mg.  I've encouraged that along with compression stockings    I will see the patient back in 6 months or earlier with problems     Atrial Fibrillation and Atrial Flutter  Assessment  • The patient has paroxysmal atrial fibrillation  • The patient's CHADS2-VASc score is 5  • A [id number]-[id number] score of 2 or more is considered a high risk for a thromboembolic event  • Warfarin prescribed    Plan  • Attempt to maintain sinus rhythm  • Continue warfarin for antithrombotic therapy, bleeding issues discussed  • Add beta blocker for rate control

## 2018-05-18 ENCOUNTER — CLINICAL SUPPORT (OUTPATIENT)
Dept: CARDIOLOGY | Facility: CLINIC | Age: 78
End: 2018-05-18

## 2018-05-18 VITALS — SYSTOLIC BLOOD PRESSURE: 112 MMHG | HEART RATE: 69 BPM | DIASTOLIC BLOOD PRESSURE: 78 MMHG

## 2018-05-18 DIAGNOSIS — I48.19 PERSISTENT ATRIAL FIBRILLATION (HCC): Primary | ICD-10-CM

## 2018-05-18 PROCEDURE — 93000 ELECTROCARDIOGRAM COMPLETE: CPT | Performed by: INTERNAL MEDICINE

## 2018-05-18 NOTE — PROGRESS NOTES
Procedure     ECG 12 Lead  Date/Time: 5/18/2018 1:36 PM  Performed by: MALISSA MARTIN  Authorized by: MALISSA MARTIN   Comparison: compared with previous ECG from 5/11/2018  Comparison to previous ECG: HR has improved  Rhythm: atrial fibrillation  Rate: normal  QRS axis: normal  Clinical impression: abnormal ECG  Comments: Nonspecific t abnormalities diffuse leads        Pt came in for an EKG & blood pressure check after having her medications adjusted. Diltiazem  mg daily was added. Lasix increased from 10 mg to 20 mg/d, Toprol decreased from 100 mg bid to 75 mg bid & Ramipril decreased to 10 mg from 20 mg. Pt's bp today was 112/78 & heart rate was 69 down from 106 last week. I had Olga look at the EKG & she released the patient with no changes in medication. EKG in your in box for interpretation. dmk

## 2018-06-01 RX ORDER — FUROSEMIDE 20 MG/1
20 TABLET ORAL DAILY
Qty: 90 TABLET | Refills: 1 | Status: SHIPPED | OUTPATIENT
Start: 2018-06-01 | End: 2018-08-15 | Stop reason: DRUGHIGH

## 2018-06-14 RX ORDER — WARFARIN SODIUM 4 MG/1
TABLET ORAL
Qty: 90 TABLET | Refills: 0 | Status: SHIPPED | OUTPATIENT
Start: 2018-06-14 | End: 2018-10-10 | Stop reason: SDUPTHER

## 2018-06-17 RX ORDER — SIMVASTATIN 40 MG
TABLET ORAL
Qty: 90 TABLET | Refills: 0 | Status: SHIPPED | OUTPATIENT
Start: 2018-06-17 | End: 2018-09-17 | Stop reason: SDUPTHER

## 2018-06-19 ENCOUNTER — TELEPHONE (OUTPATIENT)
Dept: FAMILY MEDICINE CLINIC | Facility: CLINIC | Age: 78
End: 2018-06-19

## 2018-06-19 RX ORDER — SIMVASTATIN 40 MG
40 TABLET ORAL DAILY
Qty: 90 TABLET | Refills: 3 | Status: SHIPPED | OUTPATIENT
Start: 2018-06-19 | End: 2019-06-03 | Stop reason: SDUPTHER

## 2018-08-02 ENCOUNTER — OFFICE VISIT (OUTPATIENT)
Dept: FAMILY MEDICINE CLINIC | Facility: CLINIC | Age: 78
End: 2018-08-02

## 2018-08-02 VITALS
OXYGEN SATURATION: 97 % | TEMPERATURE: 98 F | HEIGHT: 65 IN | BODY MASS INDEX: 30.99 KG/M2 | DIASTOLIC BLOOD PRESSURE: 70 MMHG | HEART RATE: 96 BPM | WEIGHT: 186 LBS | SYSTOLIC BLOOD PRESSURE: 114 MMHG | RESPIRATION RATE: 18 BRPM

## 2018-08-02 DIAGNOSIS — E78.2 MIXED HYPERLIPIDEMIA: ICD-10-CM

## 2018-08-02 DIAGNOSIS — Z51.81 MEDICATION MONITORING ENCOUNTER: ICD-10-CM

## 2018-08-02 DIAGNOSIS — M79.89 LEG SWELLING: Primary | ICD-10-CM

## 2018-08-02 DIAGNOSIS — I10 ESSENTIAL HYPERTENSION: ICD-10-CM

## 2018-08-02 DIAGNOSIS — I48.91 ATRIAL FIBRILLATION WITH RVR (HCC): ICD-10-CM

## 2018-08-02 DIAGNOSIS — E10.9 DIABETES MELLITUS TYPE 1, UNCOMPLICATED (HCC): ICD-10-CM

## 2018-08-02 LAB
ALBUMIN SERPL-MCNC: 3.7 G/DL (ref 3.5–5.2)
ALBUMIN/GLOB SERPL: 1.4 G/DL
ALP SERPL-CCNC: 72 U/L (ref 39–117)
ALT SERPL W P-5'-P-CCNC: 31 U/L (ref 1–33)
ANION GAP SERPL CALCULATED.3IONS-SCNC: 13.5 MMOL/L
AST SERPL-CCNC: 33 U/L (ref 1–32)
BILIRUB SERPL-MCNC: 0.2 MG/DL (ref 0.1–1.2)
BUN BLD-MCNC: 22 MG/DL (ref 8–23)
BUN/CREAT SERPL: 19.3 (ref 7–25)
CALCIUM SPEC-SCNC: 9.6 MG/DL (ref 8.6–10.5)
CHLORIDE SERPL-SCNC: 100 MMOL/L (ref 98–107)
CO2 SERPL-SCNC: 27.5 MMOL/L (ref 22–29)
CREAT BLD-MCNC: 1.14 MG/DL (ref 0.57–1)
GFR SERPL CREATININE-BSD FRML MDRD: 46 ML/MIN/1.73
GLOBULIN UR ELPH-MCNC: 2.7 GM/DL
GLUCOSE BLD-MCNC: 172 MG/DL (ref 65–99)
HBA1C MFR BLD: 6.94 % (ref 4.8–5.6)
NT-PROBNP SERPL-MCNC: 1677 PG/ML (ref 0–1800)
POTASSIUM BLD-SCNC: 4.2 MMOL/L (ref 3.5–5.2)
PROT SERPL-MCNC: 6.4 G/DL (ref 6–8.5)
SODIUM BLD-SCNC: 141 MMOL/L (ref 136–145)

## 2018-08-02 PROCEDURE — 83036 HEMOGLOBIN GLYCOSYLATED A1C: CPT | Performed by: FAMILY MEDICINE

## 2018-08-02 PROCEDURE — 36415 COLL VENOUS BLD VENIPUNCTURE: CPT | Performed by: FAMILY MEDICINE

## 2018-08-02 PROCEDURE — 99213 OFFICE O/P EST LOW 20 MIN: CPT | Performed by: FAMILY MEDICINE

## 2018-08-02 PROCEDURE — 83880 ASSAY OF NATRIURETIC PEPTIDE: CPT | Performed by: FAMILY MEDICINE

## 2018-08-02 PROCEDURE — 80053 COMPREHEN METABOLIC PANEL: CPT | Performed by: FAMILY MEDICINE

## 2018-08-02 RX ORDER — FUROSEMIDE 20 MG/1
TABLET ORAL
Qty: 135 TABLET | Refills: 3 | Status: SHIPPED | OUTPATIENT
Start: 2018-08-02 | End: 2018-09-18 | Stop reason: SDUPTHER

## 2018-08-02 NOTE — PROGRESS NOTES
SUBJECTIVE:  The patient is a 78-year-old white female comes in because her legs have been swelling more as of late.  No chest pain or shortness of breath.  She does have atrial fibrillation.  She is a diabetic but is not taking medicine currently.  She states her blood sugar was 105 this morning however her last A1c in April was 6.9.  She has hypertension and hyperlipidemia.  She takes Coumadin.    PAST MEDICAL HISTORY:  Reviewed.    REVIEW OF SYSTEMS:  Please see above; 14 point ROS negative.      OBJECTIVE: Vitals signs are reviewed and are stable.    HEENT: PERRLA.   Neck:  Supple.   Lungs:  Clear.    Heart:  Regular rate and rhythm.   Abdomen:   Soft, nontender.   Extremities:  No cyanosis, 1-2+ edema tib-fib.      ASSESSMENT:      Leg edema  Diabetes  Atrial fibrillation        PLAN: Increase Lasix to 30 g daily.  Elevate legs and feet.  Avoid salt.  Low salt diet given.  Follow-up in a week.  CMP A1c ordered.  BNP ordered.  She will call if problems.    Dictated utilizing Dragon dictation.

## 2018-08-09 ENCOUNTER — OFFICE VISIT (OUTPATIENT)
Dept: FAMILY MEDICINE CLINIC | Facility: CLINIC | Age: 78
End: 2018-08-09

## 2018-08-09 VITALS
DIASTOLIC BLOOD PRESSURE: 68 MMHG | HEIGHT: 65 IN | SYSTOLIC BLOOD PRESSURE: 100 MMHG | WEIGHT: 184 LBS | HEART RATE: 98 BPM | TEMPERATURE: 98.9 F | OXYGEN SATURATION: 98 % | BODY MASS INDEX: 30.66 KG/M2

## 2018-08-09 DIAGNOSIS — R60.0 LEG EDEMA: Primary | ICD-10-CM

## 2018-08-09 LAB
ANION GAP SERPL CALCULATED.3IONS-SCNC: 11.1 MMOL/L
BUN BLD-MCNC: 26 MG/DL (ref 8–23)
BUN/CREAT SERPL: 21 (ref 7–25)
CALCIUM SPEC-SCNC: 9.8 MG/DL (ref 8.6–10.5)
CHLORIDE SERPL-SCNC: 99 MMOL/L (ref 98–107)
CO2 SERPL-SCNC: 30.9 MMOL/L (ref 22–29)
CREAT BLD-MCNC: 1.24 MG/DL (ref 0.57–1)
GFR SERPL CREATININE-BSD FRML MDRD: 42 ML/MIN/1.73
GLUCOSE BLD-MCNC: 202 MG/DL (ref 65–99)
POTASSIUM BLD-SCNC: 4.1 MMOL/L (ref 3.5–5.2)
SODIUM BLD-SCNC: 141 MMOL/L (ref 136–145)

## 2018-08-09 PROCEDURE — 80048 BASIC METABOLIC PNL TOTAL CA: CPT | Performed by: FAMILY MEDICINE

## 2018-08-09 PROCEDURE — 99213 OFFICE O/P EST LOW 20 MIN: CPT | Performed by: FAMILY MEDICINE

## 2018-08-09 PROCEDURE — 36415 COLL VENOUS BLD VENIPUNCTURE: CPT | Performed by: FAMILY MEDICINE

## 2018-08-09 NOTE — PROGRESS NOTES
SUBJECTIVE:  The patient is a 78-year-old white female comes in for follow-up.  She was seen last week for swelling in her legs.  Her Lasix was increased from 20-30 mg.  She is only minimally improved.  No new symptoms.    PAST MEDICAL HISTORY:  Reviewed.    REVIEW OF SYSTEMS:  Please see above; 14 point ROS negative.      OBJECTIVE: Vitals signs are reviewed and are stable.    HEENT: PERRLA.   Neck:  Supple.   Lungs:  Clear.    Heart:  Regular rate and rhythm.   Abdomen:   Soft, nontender.   Extremities:  Minimal improvement noted in the lower tib-fib region.  Negative Homans sign.    ASSESSMENT:   Edema of the legs     PLAN: Increase Lasix to 40 mg daily.  BMP ordered today and will be reordered for next week.  She will call on results.  She'll notify me if any problems.    Dictated utilizing Dragon dictation.

## 2018-08-15 ENCOUNTER — OFFICE VISIT (OUTPATIENT)
Dept: FAMILY MEDICINE CLINIC | Facility: CLINIC | Age: 78
End: 2018-08-15

## 2018-08-15 VITALS
SYSTOLIC BLOOD PRESSURE: 106 MMHG | BODY MASS INDEX: 30.82 KG/M2 | WEIGHT: 185 LBS | HEART RATE: 71 BPM | TEMPERATURE: 98 F | HEIGHT: 65 IN | RESPIRATION RATE: 18 BRPM | OXYGEN SATURATION: 97 % | DIASTOLIC BLOOD PRESSURE: 70 MMHG

## 2018-08-15 DIAGNOSIS — H61.21 HEARING LOSS OF RIGHT EAR DUE TO CERUMEN IMPACTION: ICD-10-CM

## 2018-08-15 DIAGNOSIS — R60.0 LEG EDEMA: Primary | ICD-10-CM

## 2018-08-15 LAB
ANION GAP SERPL CALCULATED.3IONS-SCNC: 11.1 MMOL/L
BUN BLD-MCNC: 25 MG/DL (ref 8–23)
BUN/CREAT SERPL: 21.4 (ref 7–25)
CALCIUM SPEC-SCNC: 9.5 MG/DL (ref 8.6–10.5)
CHLORIDE SERPL-SCNC: 97 MMOL/L (ref 98–107)
CO2 SERPL-SCNC: 31.9 MMOL/L (ref 22–29)
CREAT BLD-MCNC: 1.17 MG/DL (ref 0.57–1)
GFR SERPL CREATININE-BSD FRML MDRD: 45 ML/MIN/1.73
GLUCOSE BLD-MCNC: 212 MG/DL (ref 65–99)
POTASSIUM BLD-SCNC: 4.2 MMOL/L (ref 3.5–5.2)
SODIUM BLD-SCNC: 140 MMOL/L (ref 136–145)

## 2018-08-15 PROCEDURE — 99213 OFFICE O/P EST LOW 20 MIN: CPT | Performed by: FAMILY MEDICINE

## 2018-08-15 PROCEDURE — 80048 BASIC METABOLIC PNL TOTAL CA: CPT | Performed by: FAMILY MEDICINE

## 2018-08-15 PROCEDURE — 36415 COLL VENOUS BLD VENIPUNCTURE: CPT | Performed by: FAMILY MEDICINE

## 2018-08-15 PROCEDURE — 69209 REMOVE IMPACTED EAR WAX UNI: CPT | Performed by: FAMILY MEDICINE

## 2018-08-23 ENCOUNTER — OFFICE VISIT (OUTPATIENT)
Dept: FAMILY MEDICINE CLINIC | Facility: CLINIC | Age: 78
End: 2018-08-23

## 2018-08-23 VITALS
TEMPERATURE: 98.3 F | SYSTOLIC BLOOD PRESSURE: 94 MMHG | OXYGEN SATURATION: 98 % | WEIGHT: 185 LBS | DIASTOLIC BLOOD PRESSURE: 54 MMHG | HEIGHT: 65 IN | HEART RATE: 98 BPM | BODY MASS INDEX: 30.82 KG/M2

## 2018-08-23 DIAGNOSIS — R60.0 LOCALIZED EDEMA: Primary | ICD-10-CM

## 2018-08-23 DIAGNOSIS — I48.91 ATRIAL FIBRILLATION, UNSPECIFIED TYPE (HCC): ICD-10-CM

## 2018-08-23 DIAGNOSIS — R60.0 EDEMA OF BOTH FEET: Primary | ICD-10-CM

## 2018-08-23 DIAGNOSIS — R60.0 LOCALIZED EDEMA: ICD-10-CM

## 2018-08-23 LAB
ANION GAP SERPL CALCULATED.3IONS-SCNC: 10.2 MMOL/L
BUN BLD-MCNC: 29 MG/DL (ref 8–23)
BUN/CREAT SERPL: 18.7 (ref 7–25)
CALCIUM SPEC-SCNC: 9.2 MG/DL (ref 8.6–10.5)
CHLORIDE SERPL-SCNC: 100 MMOL/L (ref 98–107)
CO2 SERPL-SCNC: 30.8 MMOL/L (ref 22–29)
CREAT BLD-MCNC: 1.55 MG/DL (ref 0.57–1)
GFR SERPL CREATININE-BSD FRML MDRD: 32 ML/MIN/1.73
GLUCOSE BLD-MCNC: 152 MG/DL (ref 65–99)
POTASSIUM BLD-SCNC: 4.4 MMOL/L (ref 3.5–5.2)
SODIUM BLD-SCNC: 141 MMOL/L (ref 136–145)

## 2018-08-23 PROCEDURE — 99213 OFFICE O/P EST LOW 20 MIN: CPT | Performed by: FAMILY MEDICINE

## 2018-08-23 PROCEDURE — 36415 COLL VENOUS BLD VENIPUNCTURE: CPT | Performed by: FAMILY MEDICINE

## 2018-08-23 PROCEDURE — 80048 BASIC METABOLIC PNL TOTAL CA: CPT | Performed by: FAMILY MEDICINE

## 2018-08-23 NOTE — PROGRESS NOTES
SUBJECTIVE:  The patient is a 78-year-old white female is here for follow-up.  She was seen last week for edema of her ankles and feet.  She has increased her Lasix to 50 mg over the past week.  She has had some but not total improvement    PAST MEDICAL HISTORY:  Reviewed.    REVIEW OF SYSTEMS:  Please see above; 14 point ROS negative.      OBJECTIVE: Vitals signs are reviewed and are stable.    HEENT: PERRLA.   Neck:  Supple.   Lungs:  Clear.    Heart:  Regularly irregular rate and rhythm.   Abdomen:   Soft, nontender.   Extremities: The left extremity is slightly less edematous.  No tenderness.  No calf pain.    ASSESSMENT:    Atrial fibrillation  Edema of the feet and ankles    PLAN: A BMP.  Increase Lasix 60 mg daily.  Recheck in 1 week.  Keep feet elevated as much as possible.  Call if problems.    Dictated utilizing Dragon dictation.

## 2018-08-30 ENCOUNTER — OFFICE VISIT (OUTPATIENT)
Dept: FAMILY MEDICINE CLINIC | Facility: CLINIC | Age: 78
End: 2018-08-30

## 2018-08-30 VITALS
HEART RATE: 66 BPM | HEIGHT: 65 IN | WEIGHT: 185 LBS | DIASTOLIC BLOOD PRESSURE: 56 MMHG | SYSTOLIC BLOOD PRESSURE: 94 MMHG | TEMPERATURE: 98.8 F | OXYGEN SATURATION: 97 % | BODY MASS INDEX: 30.82 KG/M2

## 2018-08-30 DIAGNOSIS — R60.0 EDEMA OF BOTH LEGS: ICD-10-CM

## 2018-08-30 DIAGNOSIS — M79.89 LEG SWELLING: Primary | ICD-10-CM

## 2018-08-30 LAB
ANION GAP SERPL CALCULATED.3IONS-SCNC: 12 MMOL/L
BUN BLD-MCNC: 25 MG/DL (ref 8–23)
BUN/CREAT SERPL: 17.5 (ref 7–25)
CALCIUM SPEC-SCNC: 9.6 MG/DL (ref 8.6–10.5)
CHLORIDE SERPL-SCNC: 97 MMOL/L (ref 98–107)
CO2 SERPL-SCNC: 31 MMOL/L (ref 22–29)
CREAT BLD-MCNC: 1.43 MG/DL (ref 0.57–1)
GFR SERPL CREATININE-BSD FRML MDRD: 35 ML/MIN/1.73
GLUCOSE BLD-MCNC: 204 MG/DL (ref 65–99)
POTASSIUM BLD-SCNC: 4 MMOL/L (ref 3.5–5.2)
SODIUM BLD-SCNC: 140 MMOL/L (ref 136–145)

## 2018-08-30 PROCEDURE — 99212 OFFICE O/P EST SF 10 MIN: CPT | Performed by: FAMILY MEDICINE

## 2018-08-30 PROCEDURE — 36415 COLL VENOUS BLD VENIPUNCTURE: CPT | Performed by: FAMILY MEDICINE

## 2018-08-30 PROCEDURE — 80048 BASIC METABOLIC PNL TOTAL CA: CPT | Performed by: FAMILY MEDICINE

## 2018-08-30 NOTE — PROGRESS NOTES
SUBJECTIVE:  The patient is a 78-year-old white female who returns or swelling in her legs.  She was increased to Lasix 60 mg last week and is here for follow-up.  Her legs are slightly better she states.  She didn't do much yesterday they were even better than today.    PAST MEDICAL HISTORY:  Reviewed.    REVIEW OF SYSTEMS:  Please see above; 14 point ROS negative.      OBJECTIVE: Vitals signs are reviewed and are stable.    HEENT: PERRLA.   Neck:  Supple.   Lungs:  Clear.    Heart:  Regular rate and rhythm.   Abdomen:   Soft, nontender.   Extremities:      ASSESSMENT:    Edema of the lower extremities      PLAN: Compression stockings are prescribed.  Legs this is increased to 80 mg.  She'll let us know how she is doing next week.  BMP is ordered today.  She'll call if problems.    Dictated utilizing Dragon dictation.

## 2018-09-07 ENCOUNTER — TELEPHONE (OUTPATIENT)
Dept: FAMILY MEDICINE CLINIC | Facility: CLINIC | Age: 78
End: 2018-09-07

## 2018-09-07 NOTE — TELEPHONE ENCOUNTER
CALLING TO LET DR GALVEZ KNOW THAT THE COMPRESSION SOCKS ARE HELPING BUT THEY ARE HOT AND HARD TO GET ON. DOES SHE NEED TO WEAR THEM ALL THE TIME? PLEASE CALL AND LET HER KNOW, SHE SAID: THANKS.

## 2018-09-17 ENCOUNTER — OFFICE VISIT (OUTPATIENT)
Dept: CARDIOLOGY | Facility: CLINIC | Age: 78
End: 2018-09-17

## 2018-09-17 VITALS
DIASTOLIC BLOOD PRESSURE: 80 MMHG | SYSTOLIC BLOOD PRESSURE: 110 MMHG | HEIGHT: 65 IN | BODY MASS INDEX: 30.49 KG/M2 | WEIGHT: 183 LBS | HEART RATE: 96 BPM

## 2018-09-17 DIAGNOSIS — E78.2 MIXED HYPERLIPIDEMIA: ICD-10-CM

## 2018-09-17 DIAGNOSIS — I48.19 PERSISTENT ATRIAL FIBRILLATION (HCC): Primary | ICD-10-CM

## 2018-09-17 DIAGNOSIS — I10 ESSENTIAL HYPERTENSION: ICD-10-CM

## 2018-09-17 PROCEDURE — 99214 OFFICE O/P EST MOD 30 MIN: CPT | Performed by: INTERNAL MEDICINE

## 2018-09-17 PROCEDURE — 93000 ELECTROCARDIOGRAM COMPLETE: CPT | Performed by: INTERNAL MEDICINE

## 2018-09-17 NOTE — PROGRESS NOTES
Arelis Johnson  1940  Date of Office Visit: 9/17/18  Encounter Provider: Sly Saleh MD  Place of Service: [facility] CARDIOLOGY      CHIEF COMPLAINT:  Paroxysmal atrial fibrillation/flutter  Essential hypertension      HISTORY OF PRESENT ILLNESS:  Dr. Erickson,  I had the pleasure of seeing Ms. Johnson in follow-up today.  She is a very pleasant 78-year-old female with a medical history of paroxysmal atrial fibrillation, essential hypertension and hyperlipidemia along with diet controlled diabetes mellitus who presents back for follow-up.  She has had some issues with rapid ventricular rate.  Her metoprolol tartrate was increased to 100 mg twice a day.  Unfortunately she had significant fatigue with the increase in her metoprolol dosing and this was moved back down to 75 mg by mouth twice a day.  As such, diltiazem was added to aid in rate control.  Her ventricular rate has improved.  She has noted bilateral lower extremity edema that worsens throughout the day and improves with elevation.  She had a mildly elevated proBNP.  She subsequently had her Lasix increased.  Unfortunately her creatinine has increased to around normal at around 1-1.4.  She also has evidence of alkalosis and elevated BUN.  This has occurred pretty quickly.  She also has noticed improvement in her bilateral lower extremity edema with compression stockings.    She denies any orthopnea, PND, or lower extremity edema.    Review of Systems   Constitution: Negative for fever, weakness and malaise/fatigue.   HENT: Negative for nosebleeds and sore throat.    Eyes: Negative for blurred vision and double vision.   Cardiovascular: Positive for leg swelling. Negative for chest pain, claudication, palpitations and syncope.   Respiratory: Negative for cough, shortness of breath and snoring.    Endocrine: Negative for cold intolerance, heat intolerance and polydipsia.   Skin: Negative for itching, poor wound  healing and rash.   Musculoskeletal: Positive for joint pain. Negative for joint swelling, muscle weakness and myalgias.   Gastrointestinal: Negative for abdominal pain, melena, nausea and vomiting.   Neurological: Negative for light-headedness, loss of balance, seizures and vertigo.   Psychiatric/Behavioral: Negative for altered mental status and depression.        Past Medical History:   Diagnosis Date   • Diabetes mellitus (CMS/HCC)    • Hyperlipidemia    • Hypertension    • IFG (impaired fasting glucose)    • PAF (paroxysmal atrial fibrillation) (CMS/HCC)     with rvr   • Palpitations    • SOB (shortness of breath)        The following portions of the patient's history were reviewed and updated as appropriate: Social history , Family history and Surgical history     Current Outpatient Prescriptions on File Prior to Visit   Medication Sig Dispense Refill   • Calcium Carb-Cholecalciferol (CALCIUM + D3) 600-200 MG-UNIT tablet Take 1 tablet by mouth Daily.     • diltiazem XR (DILACOR XR) 180 MG 24 hr capsule Take 1 capsule by mouth Daily. 30 capsule 11   • furosemide (LASIX) 20 MG tablet Take 1.5 po daily for total 30mg daily (Patient taking differently: Take 2 po daily for 40mg) 135 tablet 3   • metoprolol tartrate 75 MG tablet Take 75 mg by mouth 2 (Two) Times a Day. 60 tablet 6   • ramipril (ALTACE) 10 MG capsule Take 1 capsule by mouth Daily. 90 capsule 3   • simvastatin (ZOCOR) 40 MG tablet Take 1 tablet by mouth Daily. 90 tablet 3   • warfarin (COUMADIN) 2 MG tablet Take 2 mg by mouth 2 (Two) Times a Week. Tuesday, Saturday     • warfarin (COUMADIN) 4 MG tablet Take 4 mg by mouth See Admin Instructions. Taking 4 mg on Sun, Mon, Wed, Thurs, Fri and 2 mg on Tues, Sat     • warfarin (COUMADIN) 4 MG tablet TAKE ONE tablet (by mouth) EACH DAY 90 tablet 0   • [DISCONTINUED] simvastatin (ZOCOR) 40 MG tablet TAKE 1 TABLET EVERY NIGHT  *PLEASE CALL AND SCHEDULE  AN APPOINTEMNT* (Patient taking differently: No sig  "reported) 90 tablet 0     No current facility-administered medications on file prior to visit.        Allergies   Allergen Reactions   • Percocet [Oxycodone-Acetaminophen] GI Intolerance   • Sulfa Antibiotics Nausea And Vomiting   • Penicillins Rash       Vitals:    09/17/18 1206   BP: 110/80   BP Location: Left arm   Patient Position: Sitting   Pulse: 96   Weight: 83 kg (183 lb)   Height: 165.1 cm (65\")     Physical Exam   Constitutional: She is oriented to person, place, and time. She appears well-developed and well-nourished.   HENT:   Head: Normocephalic and atraumatic.   Eyes: Conjunctivae and EOM are normal. No scleral icterus.   Neck: Normal range of motion. Neck supple. Normal carotid pulses, no hepatojugular reflux and no JVD present. Carotid bruit is not present. No tracheal deviation present. No thyromegaly present.   Cardiovascular: An irregularly irregular rhythm present. Tachycardia present.  Exam reveals no gallop and no friction rub.    No murmur heard.  Pulses:       Carotid pulses are 2+ on the right side, and 2+ on the left side.       Radial pulses are 2+ on the right side, and 2+ on the left side.        Femoral pulses are 2+ on the right side, and 2+ on the left side.       Dorsalis pedis pulses are 2+ on the right side, and 2+ on the left side.        Posterior tibial pulses are 2+ on the right side, and 2+ on the left side.   Pulmonary/Chest: Breath sounds normal. No respiratory distress. She has no decreased breath sounds. She has no wheezes. She has no rhonchi. She has no rales. She exhibits no tenderness.   Abdominal: Soft. Bowel sounds are normal. She exhibits no distension. There is no tenderness. There is no rebound.   Musculoskeletal: She exhibits edema (trace bilateral LE edema). She exhibits no deformity.   Neurological: She is alert and oriented to person, place, and time. She has normal strength. No sensory deficit.   Skin: No rash noted. No erythema.   Psychiatric: She has a " normal mood and affect. Her behavior is normal.       No components found for: CBC  No results found for: CMP  No components found for: LIPID  No results found for: BMP      ECG 12 Lead  Date/Time: 9/17/2018 12:40 PM  Performed by: MALISSA MARTIN  Authorized by: MALISSA MARTIN   Comparison: compared with previous ECG from 5/11/2018  Similar to previous ECG  Rhythm: atrial fibrillation  Rate: normal  QRS axis: normal  Other findings: prolonged QTc interval  Clinical impression: abnormal ECG  Comments: Nonspecific T-wave abnormalities diffuse leads               4/12/17 TTE  · Left ventricular wall thickness is consistent with borderline concentric hypertrophy.  · Left ventricular function is normal. Estimated EF = 63%.  · Left ventricular diastolic function was unable to be assessed. Elevated left atrial pressure.      DISCUSSION/SUMMARY 78-year-old female with a medical history of essential hypertension, mild lower extremity edema and hyperlipidemia who previously presented to me secondary to the acute onset of palpitations and tachycardia and was found to be in afib with rvr.  She was placed on Coumadin therapy along with metoprolol tartrate which she was tolerating with just mild sinus bradycardia.  She presented back in February 2018 with rapid ventricular rate.  Since that time she has maintained atrial fibrillation and we have worked with rate control.  Her rate is improved on a slightly lower dose of Toprol tartrate which was decreased secondary to fatigue.  She also continues on diltiazem therapy.  She did have lower extremity edema and a mildly elevated proBNP.  Her diuretic was increased.  Unfortunately this is put her into acute kidney injury      1. Atrial fibrillation: persistent.  Ventricular rate has improved on current therapy.  Continue.    -Currently on Coumadin     -She states that she has more fatigue with 75 mg twice a day of metoprolol tartrate.      -Continue long-acting diltiazem  at 180 mg by mouth daily.    -Continue ramipril at current dose  2.    Essential hypertension: Reasonably controlled.  Continue current therapy  3.    Hyperlipidemia: Continue current simvastatin therapy.    4.    Lower extremity edema: I think much of this is venous insufficiency.  With the increased dose of Lasix 80 mg she has acute kidney injury.  Her baseline creatinine prior to this was 1.17 and has been even lower than that.  Currently her creatinine is 1.43 and this has changed over a period of one month.   -The patient states she does not notice a significant change in her bilateral lower extremity edema on the higher dose of diuretic therapy.  She did notice a change with compression  stockings.   -I have recommended that she decrease her Lasix back to 40 mg by mouth daily and continue compression stockings.  I will repeat lab work in 2 weeks    I will see the patient back in 6 months or earlier with problems     Atrial Fibrillation and Atrial Flutter  Assessment  • The patient has paroxysmal atrial fibrillation  • The patient's CHADS2-VASc score is 5  • A [id number] score of 2 or more is considered a high risk for a thromboembolic event  • Warfarin prescribed    Plan  • Attempt to maintain sinus rhythm  • Continue warfarin for antithrombotic therapy, bleeding issues discussed  • Add beta blocker for rate control

## 2018-09-18 RX ORDER — FUROSEMIDE 20 MG/1
TABLET ORAL
Qty: 180 TABLET | Refills: 3 | Status: SHIPPED | OUTPATIENT
Start: 2018-09-18 | End: 2019-02-18 | Stop reason: SDUPTHER

## 2018-09-19 ENCOUNTER — TELEPHONE (OUTPATIENT)
Dept: FAMILY MEDICINE CLINIC | Facility: CLINIC | Age: 78
End: 2018-09-19

## 2018-09-19 NOTE — TELEPHONE ENCOUNTER
Needs new rx for furosemide with new directions of 20 mg 2 tabs daily, [facility] cardi[facility]

## 2018-10-01 ENCOUNTER — LAB (OUTPATIENT)
Dept: LAB | Facility: HOSPITAL | Age: 78
End: 2018-10-01

## 2018-10-01 DIAGNOSIS — I10 ESSENTIAL HYPERTENSION: ICD-10-CM

## 2018-10-01 DIAGNOSIS — E78.2 MIXED HYPERLIPIDEMIA: ICD-10-CM

## 2018-10-01 DIAGNOSIS — I48.19 PERSISTENT ATRIAL FIBRILLATION (HCC): ICD-10-CM

## 2018-10-01 LAB
ANION GAP SERPL CALCULATED.3IONS-SCNC: 10.4 MMOL/L
BUN BLD-MCNC: 19 MG/DL (ref 8–23)
BUN/CREAT SERPL: 18.8 (ref 7–25)
CALCIUM SPEC-SCNC: 9.8 MG/DL (ref 8.6–10.5)
CHLORIDE SERPL-SCNC: 102 MMOL/L (ref 98–107)
CO2 SERPL-SCNC: 31.6 MMOL/L (ref 22–29)
CREAT BLD-MCNC: 1.01 MG/DL (ref 0.57–1)
GFR SERPL CREATININE-BSD FRML MDRD: 53 ML/MIN/1.73
GLUCOSE BLD-MCNC: 124 MG/DL (ref 65–99)
POTASSIUM BLD-SCNC: 4.3 MMOL/L (ref 3.5–5.2)
SODIUM BLD-SCNC: 144 MMOL/L (ref 136–145)

## 2018-10-01 PROCEDURE — 80048 BASIC METABOLIC PNL TOTAL CA: CPT

## 2018-10-01 PROCEDURE — 36415 COLL VENOUS BLD VENIPUNCTURE: CPT

## 2018-10-02 ENCOUNTER — TELEPHONE (OUTPATIENT)
Dept: CARDIOLOGY | Facility: CLINIC | Age: 78
End: 2018-10-02

## 2018-10-02 NOTE — TELEPHONE ENCOUNTER
This is a CS pt and he is out this week, would you mind advising or calling the pt with results of her BMP?   If you can't let me know and I will pass it on. It is in [facility] and she can be reached at #[phone].    Thanks,  Ann      On 9/17/18 w/CS:    4.    Lower extremity edema: I think much of this is venous insufficiency.  With the increased dose of Lasix 80 mg she has acute kidney injury.  Her baseline creatinine prior to this was 1.17 and has been even lower than that.  Currently her creatinine is 1.43 and this has changed over a period of one month.              -The patient states she does not notice a significant change in her bilateral lower extremity edema on the higher dose of diuretic therapy.  She did notice a change with compression       stockings.              -I have recommended that she decrease her Lasix back to 40 mg by mouth daily and continue compression stockings.  I will repeat lab work in 2 weeks

## 2018-10-10 RX ORDER — WARFARIN SODIUM 4 MG/1
TABLET ORAL
Qty: 90 TABLET | Refills: 1 | Status: SHIPPED | OUTPATIENT
Start: 2018-10-10 | End: 2019-06-22 | Stop reason: SDUPTHER

## 2018-11-09 ENCOUNTER — TELEPHONE (OUTPATIENT)
Dept: FAMILY MEDICINE CLINIC | Facility: CLINIC | Age: 78
End: 2018-11-09

## 2018-11-09 NOTE — TELEPHONE ENCOUNTER
PATIENT TAKES CHOLESTEROL MEDICATION AND WANTS TO COME IN AND HAVE LABS DONE. CAN LABS BE PUT IN HER CHART.  CALL PATIENT AND LET HER KNOW SO SHE CAN MAKE AN APPOINTMENT

## 2018-11-13 DIAGNOSIS — E78.2 MIXED HYPERLIPIDEMIA: Primary | ICD-10-CM

## 2018-11-14 DIAGNOSIS — R73.9 HYPERGLYCEMIA: Primary | ICD-10-CM

## 2018-11-14 LAB
ALBUMIN SERPL-MCNC: 3.8 G/DL (ref 3.5–5.2)
ALBUMIN/GLOB SERPL: 1 G/DL
ALP SERPL-CCNC: 68 U/L (ref 39–117)
ALT SERPL W P-5'-P-CCNC: 20 U/L (ref 1–33)
ANION GAP SERPL CALCULATED.3IONS-SCNC: 9.8 MMOL/L
AST SERPL-CCNC: 21 U/L (ref 1–32)
BILIRUB SERPL-MCNC: 0.4 MG/DL (ref 0.1–1.2)
BUN BLD-MCNC: 24 MG/DL (ref 8–23)
BUN/CREAT SERPL: 20.7 (ref 7–25)
CALCIUM SPEC-SCNC: 10.4 MG/DL (ref 8.6–10.5)
CHLORIDE SERPL-SCNC: 96 MMOL/L (ref 98–107)
CHOLEST SERPL-MCNC: 115 MG/DL (ref 0–200)
CO2 SERPL-SCNC: 33.2 MMOL/L (ref 22–29)
CREAT BLD-MCNC: 1.16 MG/DL (ref 0.57–1)
GFR SERPL CREATININE-BSD FRML MDRD: 45 ML/MIN/1.73
GLOBULIN UR ELPH-MCNC: 3.7 GM/DL
GLUCOSE BLD-MCNC: 137 MG/DL (ref 65–99)
HDLC SERPL-MCNC: 46 MG/DL (ref 40–60)
LDLC SERPL CALC-MCNC: 41 MG/DL (ref 0–100)
LDLC/HDLC SERPL: 0.88 {RATIO}
POTASSIUM BLD-SCNC: 4.5 MMOL/L (ref 3.5–5.2)
PROT SERPL-MCNC: 7.5 G/DL (ref 6–8.5)
SODIUM BLD-SCNC: 139 MMOL/L (ref 136–145)
TRIGL SERPL-MCNC: 142 MG/DL (ref 0–150)
VLDLC SERPL-MCNC: 28.4 MG/DL (ref 5–40)

## 2018-11-14 PROCEDURE — 80061 LIPID PANEL: CPT | Performed by: FAMILY MEDICINE

## 2018-11-14 PROCEDURE — 80053 COMPREHEN METABOLIC PANEL: CPT | Performed by: FAMILY MEDICINE

## 2018-11-14 PROCEDURE — 36415 COLL VENOUS BLD VENIPUNCTURE: CPT | Performed by: FAMILY MEDICINE

## 2018-11-15 LAB — HBA1C MFR BLD: 6.79 % (ref 4.8–5.6)

## 2018-11-15 PROCEDURE — 83036 HEMOGLOBIN GLYCOSYLATED A1C: CPT | Performed by: FAMILY MEDICINE

## 2018-11-15 PROCEDURE — 36415 COLL VENOUS BLD VENIPUNCTURE: CPT | Performed by: FAMILY MEDICINE

## 2018-11-20 ENCOUNTER — OFFICE VISIT (OUTPATIENT)
Dept: FAMILY MEDICINE CLINIC | Facility: CLINIC | Age: 78
End: 2018-11-20

## 2018-11-20 VITALS
HEART RATE: 100 BPM | WEIGHT: 185 LBS | DIASTOLIC BLOOD PRESSURE: 70 MMHG | OXYGEN SATURATION: 99 % | RESPIRATION RATE: 18 BRPM | BODY MASS INDEX: 30.82 KG/M2 | SYSTOLIC BLOOD PRESSURE: 120 MMHG | HEIGHT: 65 IN | TEMPERATURE: 98 F

## 2018-11-20 DIAGNOSIS — E11.9 TYPE 2 DIABETES MELLITUS WITHOUT COMPLICATION, WITHOUT LONG-TERM CURRENT USE OF INSULIN (HCC): Primary | ICD-10-CM

## 2018-11-20 LAB — INR PPP: 2.94

## 2018-11-20 PROCEDURE — 99213 OFFICE O/P EST LOW 20 MIN: CPT | Performed by: FAMILY MEDICINE

## 2018-11-20 RX ORDER — METFORMIN HYDROCHLORIDE 500 MG/1
500 TABLET, EXTENDED RELEASE ORAL
Qty: 90 TABLET | Refills: 3 | Status: SHIPPED | OUTPATIENT
Start: 2018-11-20 | End: 2019-11-09 | Stop reason: SDUPTHER

## 2018-11-20 NOTE — PROGRESS NOTES
SUBJECTIVE:  The patient is a 78-year-old white female is here for follow-up.  We've been watching her blood sugar for quite some time.  Her A1c had risen to 6.7 last time.    PAST MEDICAL HISTORY:  Reviewed.    REVIEW OF SYSTEMS:  Please see above; 14 point ROS negative.      OBJECTIVE: Vitals signs are reviewed and are stable.    HEENT: PERRLA.   Neck:  Supple.   Lungs:  Clear.    Heart:  Irregularly irregular rate and rhythm.   Abdomen:   Soft, nontender.   Extremities:  No cyanosis, clubbing or edema.     ASSESSMENT  Type 2 diabetes:      PLAN: Discussed starting treatment for diabetes.  She's agreeable to start metformin  mg once daily.  She will closely monitor blood sugars.  She'll have something sweet nearby just in case her blood sugars would drop.  She will monitor them and make a follow-up appointment in about a week.  Should she have any problems before then she will let me know.  She is up-to-date on her eye exam.  Healthy lifestyle discussed.    Dictated utilizing Dragon dictation.

## 2018-11-21 ENCOUNTER — ANTICOAGULATION VISIT (OUTPATIENT)
Dept: PHARMACY | Facility: HOSPITAL | Age: 78
End: 2018-11-21

## 2018-11-21 DIAGNOSIS — I48.91 ATRIAL FIBRILLATION WITH RVR (HCC): ICD-10-CM

## 2018-11-21 NOTE — PROGRESS NOTES
Anticoagulation Clinic Progress Note    Anticoagulation Summary  As of 2018    INR goal:   2.0-3.0   TTR:   --   INR used for dosin.94 (2018)   Warfarin maintenance plan:   4 mg on Sun, Tue, Thu; 2 mg all other days   Weekly warfarin total:   20 mg   Plan last modified:   Judie Barragan RPH (2018)   Next INR check:   2018   Priority:   Maintenance   Target end date:       Indications    Atrial fibrillation with RVR (CMS/HCC) [I48.91]             Anticoagulation Episode Summary     INR check location:       Preferred lab:       Send INR reminders to:    [facility]  POOL    Comments:   [facility]      Anticoagulation Care Providers     Provider Role Specialty Phone number    Sly Saleh MD Referring Cardiology [phone]          Drug interactions: has remained unchanged.  Diet: has remained unchanged.    Clinic Interview:  No pertinent clinical findings have been reported.    INR History:  Anticoagulation Monitoring 2018   INR 2.94   INR Date 2018   INR Goal 2.0-3.0   Last Week Total 0 mg   Next Week Total 20 mg   Sun 4 mg   Mon 2 mg   Tue 4 mg   Wed 2 mg   Thu 4 mg   Fri 2 mg   Sat 2 mg   Visit Report -       Plan:  1. INR is therapeutic today- see above in Anticoagulation Summary.    Arelis SANDRA Johnson to continue their warfarin regimen- see above in Anticoagulation Summary.  2. Follow up in 4 weeks  3. They have been instructed to call if any changes in medications, doses, concerns, etc. Patient expresses understanding and has no further questions at this time.    Judie Barragan RPH

## 2018-11-29 ENCOUNTER — OFFICE VISIT (OUTPATIENT)
Dept: FAMILY MEDICINE CLINIC | Facility: CLINIC | Age: 78
End: 2018-11-29

## 2018-11-29 VITALS
RESPIRATION RATE: 18 BRPM | SYSTOLIC BLOOD PRESSURE: 100 MMHG | TEMPERATURE: 98 F | OXYGEN SATURATION: 97 % | HEIGHT: 65 IN | BODY MASS INDEX: 29.82 KG/M2 | DIASTOLIC BLOOD PRESSURE: 60 MMHG | HEART RATE: 77 BPM | WEIGHT: 179 LBS

## 2018-11-29 DIAGNOSIS — E11.9 TYPE 2 DIABETES MELLITUS WITHOUT COMPLICATION, UNSPECIFIED WHETHER LONG TERM INSULIN USE (HCC): Primary | ICD-10-CM

## 2018-11-29 LAB
ALBUMIN SERPL-MCNC: 4 G/DL (ref 3.5–5.2)
ALBUMIN/GLOB SERPL: 1.3 G/DL
ALP SERPL-CCNC: 61 U/L (ref 39–117)
ALT SERPL W P-5'-P-CCNC: 16 U/L (ref 1–33)
ANION GAP SERPL CALCULATED.3IONS-SCNC: 11.7 MMOL/L
AST SERPL-CCNC: 27 U/L (ref 1–32)
BILIRUB SERPL-MCNC: 0.7 MG/DL (ref 0.1–1.2)
BUN BLD-MCNC: 24 MG/DL (ref 8–23)
BUN/CREAT SERPL: 20.2 (ref 7–25)
CALCIUM SPEC-SCNC: 10 MG/DL (ref 8.6–10.5)
CHLORIDE SERPL-SCNC: 94 MMOL/L (ref 98–107)
CO2 SERPL-SCNC: 31.3 MMOL/L (ref 22–29)
CREAT BLD-MCNC: 1.19 MG/DL (ref 0.57–1)
GFR SERPL CREATININE-BSD FRML MDRD: 44 ML/MIN/1.73
GLOBULIN UR ELPH-MCNC: 3.2 GM/DL
GLUCOSE BLD-MCNC: 108 MG/DL (ref 65–99)
POTASSIUM BLD-SCNC: 4 MMOL/L (ref 3.5–5.2)
PROT SERPL-MCNC: 7.2 G/DL (ref 6–8.5)
SODIUM BLD-SCNC: 137 MMOL/L (ref 136–145)

## 2018-11-29 PROCEDURE — 80053 COMPREHEN METABOLIC PANEL: CPT | Performed by: FAMILY MEDICINE

## 2018-11-29 PROCEDURE — 36415 COLL VENOUS BLD VENIPUNCTURE: CPT | Performed by: FAMILY MEDICINE

## 2018-11-29 PROCEDURE — 99213 OFFICE O/P EST LOW 20 MIN: CPT | Performed by: FAMILY MEDICINE

## 2018-11-29 NOTE — PROGRESS NOTES
SUBJECTIVE:  The patient is a 78-year-old white female who began treatment for her diabetes last week.  She's taken metformin 500 mg daily.  She is here for follow-up.  She is doing well.  Her blood sugars are improved.    PAST MEDICAL HISTORY:  Reviewed.    REVIEW OF SYSTEMS:  Please see above; 14 point ROS negative.      OBJECTIVE: Vitals signs are reviewed and are stable.    HEENT: PERRLA.   Neck:  Supple.   Lungs:  Clear.    Heart:  Regular rate and rhythm.   Abdomen:   Soft, nontender.   Extremities:  No cyanosis, clubbing or edema.     ASSESSMENT:    Type 2 diabetes  PLAN: Continue same medication regimen.  Continue healthy lifestyle.  CMP ordered.  Follow-up in 3 months unless problems notify sooner.    Dictated utilizing Dragon dictation.

## 2018-12-03 RX ORDER — METOPROLOL TARTRATE 50 MG/1
TABLET, FILM COATED ORAL
Qty: 90 TABLET | Refills: 2 | Status: SHIPPED | OUTPATIENT
Start: 2018-12-03 | End: 2019-03-04 | Stop reason: SDUPTHER

## 2018-12-04 ENCOUNTER — TELEPHONE (OUTPATIENT)
Dept: FAMILY MEDICINE CLINIC | Facility: CLINIC | Age: 78
End: 2018-12-04

## 2018-12-20 LAB — INR PPP: 2.51

## 2018-12-21 ENCOUNTER — ANTICOAGULATION VISIT (OUTPATIENT)
Dept: PHARMACY | Facility: HOSPITAL | Age: 78
End: 2018-12-21

## 2018-12-21 DIAGNOSIS — I48.91 ATRIAL FIBRILLATION WITH RVR (HCC): ICD-10-CM

## 2018-12-21 NOTE — PROGRESS NOTES
Anticoagulation Clinic Progress Note    Anticoagulation Summary  As of 12/21/2018    INR goal:   2.0-3.0   TTR:   --   INR used for dosing:      Plan last modified:   Judie Barragan [facility] (11/21/2018)   Next INR check:      Target end date:       Indications    Atrial fibrillation with RVR (CMS/HCC) [I48.91]             Anticoagulation Episode Summary     INR check location:       Preferred lab:       Send INR reminders to:   [facility]  POOL    Comments:   [facility]      Anticoagulation Care Providers     Provider Role Specialty Phone number    Sly Saleh MD Referring Cardiology [phone]            INR History:  Anticoagulation Monitoring 11/21/2018 12/21/2018   INR 2.94 -   INR Date 11/20/2018 -   INR Goal 2.0-3.0 2.0-3.0   Last Week Total 0 mg -   Next Week Total 20 mg -   Sun 4 mg -   Mon 2 mg -   Tue 4 mg -   Wed 2 mg -   Thu 4 mg -   Fri 2 mg -   Sat 2 mg -   Visit Report - -       Plan:  1. INR is therapeutic from the [facility] draw on 12/20- see above in Anticoagulation Summary.   Left message for Ms. Johnson to continue their warfarin regimen, recheck in 4 weeks and to call if she has any questions of concerns. .  2. Follow up in 4 weeks  3. They have been instructed to call if any changes in medications, doses, concerns, etc.     Krysta Quan [facility]

## 2019-01-17 LAB — INR PPP: 2.88

## 2019-01-18 ENCOUNTER — ANTICOAGULATION VISIT (OUTPATIENT)
Dept: PHARMACY | Facility: HOSPITAL | Age: 79
End: 2019-01-18

## 2019-01-18 DIAGNOSIS — I48.91 ATRIAL FIBRILLATION WITH RVR (HCC): ICD-10-CM

## 2019-01-18 NOTE — PROGRESS NOTES
Anticoagulation Clinic Progress Note    Anticoagulation Summary  As of 2019    INR goal:   2.0-3.0   TTR:   100.0 % (1.6 mo)   INR used for dosin.88 (2019)   Warfarin maintenance plan:   4 mg on Sun, Tue, Thu; 2 mg all other days   Weekly warfarin total:   20 mg   No change documented:   Olu Encinas RPH   Plan last modified:   Judie Barragan RPH (2018)   Next INR check:   2/15/2019   Priority:   Maintenance   Target end date:       Indications    Atrial fibrillation with RVR (CMS/HCC) [I48.91]             Anticoagulation Episode Summary     INR check location:       Preferred lab:       [facility]:    [facility]  [facility]    Comments:   [facility]      Anticoagulation Care Providers     Provider Role Specialty Phone number    Sly Saleh MD Referring Cardiology [phone]          Drug interactions: has remained unchanged.  Diet: has remained unchanged.    Clinic Interview:  No pertinent clinical findings have been reported.    INR History:  Anticoagulation Monitoring 2018   INR 2.94 2.51 2.88   INR Date 2018   INR Goal 2.0-3.0 2.0-3.0 2.0-3.0   Trend - Same Same   Last Week Total 0 mg 20 mg 20 mg   Next Week Total 20 mg 20 mg 20 mg   Sun 4 mg 4 mg 4 mg   Mon 2 mg 2 mg 2 mg   Tue 4 mg 4 mg 4 mg   Wed 2 mg 2 mg 2 mg   Thu 4 mg 4 mg 4 mg   Fri 2 mg 2 mg 2 mg   Sat 2 mg 2 mg 2 mg   Visit Report - - -   Some recent data might be hidden       Plan:  1. INR is Therapeutic today- see above in Anticoagulation Summary.   Will instruct Arelis Johnson to Continue their warfarin regimen- see above in Anticoagulation Summary.  2. Follow up in 4 weeks  3. Pt will be called with each INR result    Olu Encinas RPH

## 2019-02-18 LAB — INR PPP: 2.2

## 2019-02-18 RX ORDER — FUROSEMIDE 20 MG/1
TABLET ORAL
Qty: 90 TABLET | Refills: 1 | OUTPATIENT
Start: 2019-02-18

## 2019-02-18 RX ORDER — FUROSEMIDE 40 MG/1
40 TABLET ORAL DAILY
Qty: 90 TABLET | Refills: 1 | Status: SHIPPED | OUTPATIENT
Start: 2019-02-18 | End: 2019-02-22 | Stop reason: SDUPTHER

## 2019-02-19 ENCOUNTER — ANTICOAGULATION VISIT (OUTPATIENT)
Dept: PHARMACY | Facility: HOSPITAL | Age: 79
End: 2019-02-19

## 2019-02-19 DIAGNOSIS — I48.91 ATRIAL FIBRILLATION WITH RVR (HCC): ICD-10-CM

## 2019-02-19 NOTE — PROGRESS NOTES
Anticoagulation Clinic Progress Note    Anticoagulation Summary  As of 2019    INR goal:   2.0-3.0   TTR:   100.0 % (2.6 mo)   INR used for dosin.20 (2019)   Warfarin maintenance plan:   4 mg on Sun, Tue, Thu; 2 mg all other days   Weekly warfarin total:   20 mg   No change documented:   Krysta Quan RP   Plan last modified:   Judie Barragan RPH (2018)   Next INR check:      Priority:   Maintenance   Target end date:       Indications    Atrial fibrillation with RVR (CMS/HCC) [I48.91]             Anticoagulation Episode Summary     INR check location:       Preferred lab:       Send INR reminders to:   [facility]  POOL    Comments:   [facility]      Anticoagulation Care Providers     Provider Role Specialty Phone number    Sly Saleh MD Referring Cardiology [phone]          Clinic Interview:  No pertinent clinical findings have been reported.    INR History:  Anticoagulation Monitoring 2018   INR 2.51 2.88 2.20   INR Date 2018   INR Goal 2.0-3.0 2.0-3.0 2.0-3.0   Trend Same Same Same   Last Week Total 20 mg 20 mg 20 mg   Next Week Total 20 mg 20 mg 20 mg   Sun 4 mg 4 mg 4 mg   Mon 2 mg 2 mg 2 mg   Tue 4 mg 4 mg 4 mg   Wed 2 mg 2 mg 2 mg   Thu 4 mg 4 mg 4 mg   Fri 2 mg 2 mg 2 mg   Sat 2 mg 2 mg 2 mg   Visit Report - - -   Some recent data might be hidden       Plan:  1. INR is therapeutic today- see above in Anticoagulation Summary. Left message for Arelis Johnson that the INR was 2.2 and to continue their current warfarin regimen of 20mg/week.   2. Follow up in 4 weeks  3. Pt has agreed to only be called if INR out of range. They have been instructed to call if any changes in medications, doses, concerns, etc.     Krysta Quan [facility]

## 2019-02-22 RX ORDER — FUROSEMIDE 40 MG/1
40 TABLET ORAL DAILY
Qty: 90 TABLET | Refills: 1 | Status: SHIPPED | OUTPATIENT
Start: 2019-02-22 | End: 2019-03-11 | Stop reason: SDUPTHER

## 2019-02-22 RX ORDER — FUROSEMIDE 20 MG/1
TABLET ORAL
Qty: 90 TABLET | Refills: 1 | OUTPATIENT
Start: 2019-02-22

## 2019-02-25 ENCOUNTER — OFFICE VISIT (OUTPATIENT)
Dept: FAMILY MEDICINE CLINIC | Facility: CLINIC | Age: 79
End: 2019-02-25

## 2019-02-25 VITALS
HEIGHT: 65 IN | TEMPERATURE: 98 F | RESPIRATION RATE: 18 BRPM | DIASTOLIC BLOOD PRESSURE: 60 MMHG | WEIGHT: 179 LBS | OXYGEN SATURATION: 98 % | BODY MASS INDEX: 29.82 KG/M2 | SYSTOLIC BLOOD PRESSURE: 100 MMHG | HEART RATE: 92 BPM

## 2019-02-25 DIAGNOSIS — Z00.00 MEDICARE ANNUAL WELLNESS VISIT, SUBSEQUENT: Primary | ICD-10-CM

## 2019-02-25 DIAGNOSIS — E11.9 TYPE 2 DIABETES MELLITUS WITHOUT COMPLICATION, WITHOUT LONG-TERM CURRENT USE OF INSULIN (HCC): ICD-10-CM

## 2019-02-25 DIAGNOSIS — I10 ESSENTIAL HYPERTENSION: ICD-10-CM

## 2019-02-25 DIAGNOSIS — E78.2 MIXED HYPERLIPIDEMIA: ICD-10-CM

## 2019-02-25 DIAGNOSIS — I48.19 PERSISTENT ATRIAL FIBRILLATION (HCC): ICD-10-CM

## 2019-02-25 LAB
ALBUMIN SERPL-MCNC: 3.9 G/DL (ref 3.5–5.2)
ALBUMIN UR-MCNC: 0 MG/L (ref 0–20)
ALBUMIN/GLOB SERPL: 1.3 G/DL
ALP SERPL-CCNC: 64 U/L (ref 39–117)
ALT SERPL W P-5'-P-CCNC: 17 U/L (ref 1–33)
ANION GAP SERPL CALCULATED.3IONS-SCNC: 14.3 MMOL/L
AST SERPL-CCNC: 20 U/L (ref 1–32)
BACTERIA UR QL AUTO: NORMAL /HPF
BILIRUB SERPL-MCNC: 0.5 MG/DL (ref 0.1–1.2)
BILIRUB UR QL STRIP: NEGATIVE
BUN BLD-MCNC: 20 MG/DL (ref 8–23)
BUN/CREAT SERPL: 17.9 (ref 7–25)
CALCIUM SPEC-SCNC: 10 MG/DL (ref 8.6–10.5)
CHLORIDE SERPL-SCNC: 100 MMOL/L (ref 98–107)
CHOLEST SERPL-MCNC: 103 MG/DL (ref 0–200)
CLARITY UR: CLEAR
CO2 SERPL-SCNC: 31.7 MMOL/L (ref 22–29)
COLOR UR: YELLOW
CREAT BLD-MCNC: 1.12 MG/DL (ref 0.57–1)
ERYTHROCYTE [DISTWIDTH] IN BLOOD BY AUTOMATED COUNT: 12.8 % (ref 12.3–15.4)
GFR SERPL CREATININE-BSD FRML MDRD: 47 ML/MIN/1.73
GLOBULIN UR ELPH-MCNC: 2.9 GM/DL
GLUCOSE BLD-MCNC: 125 MG/DL (ref 65–99)
GLUCOSE UR STRIP-MCNC: NEGATIVE MG/DL
HBA1C MFR BLD: 6.7 % (ref 4.8–5.6)
HCT VFR BLD AUTO: 44.4 % (ref 34–46.6)
HDLC SERPL-MCNC: 58 MG/DL (ref 40–60)
HGB BLD-MCNC: 14.7 G/DL (ref 12–15.9)
HGB UR QL STRIP.AUTO: ABNORMAL
KETONES UR QL STRIP: NEGATIVE
LDLC SERPL CALC-MCNC: 30 MG/DL (ref 0–100)
LDLC/HDLC SERPL: 0.51 {RATIO}
LEUKOCYTE ESTERASE UR QL STRIP.AUTO: NEGATIVE
LYMPHOCYTES # BLD AUTO: 1.5 10*3/MM3 (ref 0.7–3.1)
LYMPHOCYTES NFR BLD AUTO: 27.1 % (ref 19.6–45.3)
MCH RBC QN AUTO: 31.9 PG (ref 26.6–33)
MCHC RBC AUTO-ENTMCNC: 33 G/DL (ref 31.5–35.7)
MCV RBC AUTO: 96.7 FL (ref 79–97)
MONOCYTES # BLD AUTO: 0.4 10*3/MM3 (ref 0.1–0.9)
MONOCYTES NFR BLD AUTO: 6.7 % (ref 5–12)
NEUTROPHILS # BLD AUTO: 3.6 10*3/MM3 (ref 1.4–7)
NEUTROPHILS NFR BLD AUTO: 66.2 % (ref 42.7–76)
NITRITE UR QL STRIP: NEGATIVE
PH UR STRIP.AUTO: 7 [PH] (ref 4.6–8)
PLATELET # BLD AUTO: 233 10*3/MM3 (ref 140–450)
PMV BLD AUTO: 8.7 FL (ref 6–12)
POTASSIUM BLD-SCNC: 4.2 MMOL/L (ref 3.5–5.2)
PROT SERPL-MCNC: 6.8 G/DL (ref 6–8.5)
PROT UR QL STRIP: NEGATIVE
RBC # BLD AUTO: 4.6 10*6/MM3 (ref 3.77–5.28)
RBC # UR: NORMAL /HPF
REF LAB TEST METHOD: NORMAL
SODIUM BLD-SCNC: 146 MMOL/L (ref 136–145)
SP GR UR STRIP: 1.02 (ref 1–1.03)
SQUAMOUS #/AREA URNS HPF: NORMAL /HPF
TRIGL SERPL-MCNC: 76 MG/DL (ref 0–150)
UROBILINOGEN UR QL STRIP: ABNORMAL
VLDLC SERPL-MCNC: 15.2 MG/DL (ref 5–40)
WBC NRBC COR # BLD: 5.4 10*3/MM3 (ref 3.4–10.8)
WBC UR QL AUTO: NORMAL /HPF

## 2019-02-25 PROCEDURE — 81001 URINALYSIS AUTO W/SCOPE: CPT | Performed by: FAMILY MEDICINE

## 2019-02-25 PROCEDURE — 85025 COMPLETE CBC W/AUTO DIFF WBC: CPT | Performed by: FAMILY MEDICINE

## 2019-02-25 PROCEDURE — 82043 UR ALBUMIN QUANTITATIVE: CPT | Performed by: FAMILY MEDICINE

## 2019-02-25 PROCEDURE — G0439 PPPS, SUBSEQ VISIT: HCPCS | Performed by: FAMILY MEDICINE

## 2019-02-25 PROCEDURE — 80053 COMPREHEN METABOLIC PANEL: CPT | Performed by: FAMILY MEDICINE

## 2019-02-25 PROCEDURE — 36415 COLL VENOUS BLD VENIPUNCTURE: CPT | Performed by: FAMILY MEDICINE

## 2019-02-25 PROCEDURE — 80061 LIPID PANEL: CPT | Performed by: FAMILY MEDICINE

## 2019-02-25 PROCEDURE — 83036 HEMOGLOBIN GLYCOSYLATED A1C: CPT | Performed by: FAMILY MEDICINE

## 2019-02-25 PROCEDURE — 99214 OFFICE O/P EST MOD 30 MIN: CPT | Performed by: FAMILY MEDICINE

## 2019-02-25 NOTE — PROGRESS NOTES
QUICK REFERENCE INFORMATION:  The ABCs of the Annual Wellness Visit    Subsequent Medicare Wellness Visit    HEALTH RISK ASSESSMENT    1940    Recent Hospitalizations:  No hospitalization(s) within the last year..        Current Medical Providers:  Patient Care Team:  Chandu Erickson MD as PCP - General  Chandu Erickson MD as PCP - Family Medicine  Chandu Erickson MD as PCP - Claims Attributed  Heriberto Singer MD as Consulting Physician (Orthopedic Surgery)  Sly Saleh MD as Consulting Physician (Cardiology)        Smoking Status:  Social History     Tobacco Use   Smoking Status Never Smoker   Smokeless Tobacco Never Used       Alcohol Consumption:  Social History     Substance and Sexual Activity   Alcohol Use No       Depression Screen:   PHQ-2/PHQ-9 Depression Screening 2/25/2019   Little interest or pleasure in doing things 0   Feeling down, depressed, or hopeless 0   Trouble falling or staying asleep, or sleeping too much 0   Feeling tired or having little energy 0   Poor appetite or overeating 0   Feeling bad about yourself - or that you are a failure or have let yourself or your family down 0   Trouble concentrating on things, such as reading the newspaper or watching television 0   Moving or speaking so slowly that other people could have noticed. Or the opposite - being so fidgety or restless that you have been moving around a lot more than usual 0   Thoughts that you would be better off dead, or of hurting yourself in some way 0   Total Score 0   If you checked off any problems, how difficult have these problems made it for you to do your work, take care of things at home, or get along with other people? Not difficult at all       Health Habits and Functional and Cognitive Screening:          Does the patient have evidence of cognitive impairment? No    Aspirin use counseling: Contraindicated from taking ASA      Recent Lab Results:  CMP:  Lab Results   Component Value Date    BUN 24 (H)  11/29/2018    CREATININE 1.19 (H) 11/29/2018    EGFRIFNONA 44 (L) 11/29/2018    BCR 20.2 11/29/2018     11/29/2018    K 4.0 11/29/2018    CO2 31.3 (H) 11/29/2018    CALCIUM 10.0 11/29/2018    ALBUMIN 4.00 11/29/2018    BILITOT 0.7 11/29/2018    ALKPHOS 61 11/29/2018    AST 27 11/29/2018    ALT 16 11/29/2018     Lipid Panel:  Lab Results   Component Value Date    CHOL 115 11/14/2018    TRIG 142 11/14/2018    HDL 46 11/14/2018    VLDL 28.4 11/14/2018    LDLHDL 0.88 11/14/2018     HbA1c:  Lab Results   Component Value Date    HGBA1C 6.79 (H) 11/15/2018       Visual Acuity:  No exam data present    Age-appropriate Screening Schedule:  Refer to the list below for future screening recommendations based on patient's age, sex and/or medical conditions. Orders for these recommended tests are listed in the plan section. The patient has been provided with a written plan.    Health Maintenance   Topic Date Due   • TDAP/TD VACCINES (1 - Tdap) 01/14/1959   • ZOSTER VACCINE (2 of 3) 02/26/2011   • URINE MICROALBUMIN  03/17/2018   • HEMOGLOBIN A1C  05/15/2019   • LIPID PANEL  11/14/2019   • MAMMOGRAM  04/24/2020   • INFLUENZA VACCINE  Completed   • PNEUMOCOCCAL VACCINES (65+ LOW/MEDIUM RISK)  Addressed        Subjective   History of Present Illness    Arelis Johnson is a 79 y.o. female who presents for an Subsequent Wellness Visit.    The following portions of the patient's history were reviewed and updated as appropriate: past medical history.    Outpatient Medications Prior to Visit   Medication Sig Dispense Refill   • Calcium Carb-Cholecalciferol (CALCIUM + D3) 600-200 MG-UNIT tablet Take 1 tablet by mouth Daily.     • diltiazem XR (DILACOR XR) 180 MG 24 hr capsule Take 1 capsule by mouth Daily. 30 capsule 11   • furosemide (LASIX) 40 MG tablet Take 1 tablet by mouth Daily. 90 tablet 1   • metFORMIN ER (GLUCOPHAGE-XR) 500 MG 24 hr tablet Take 1 tablet by mouth Daily With Breakfast. 90 tablet 3   • metoprolol tartrate  "(LOPRESSOR) 50 MG tablet TAKE ONE AND ONE-HALF (1 & 1/2) tablets (by mouth) TWICE DAILY 90 tablet 2   • ramipril (ALTACE) 10 MG capsule Take 1 capsule by mouth Daily. 90 capsule 3   • simvastatin (ZOCOR) 40 MG tablet Take 1 tablet by mouth Daily. 90 tablet 3   • warfarin (COUMADIN) 2 MG tablet Take 2 mg by mouth 2 (Two) Times a Week. Tuesday, Saturday     • warfarin (COUMADIN) 4 MG tablet Take 4 mg by mouth See Admin Instructions. Taking 4 mg on Sun, Mon, Wed, Thurs, Fri and 2 mg on Tues, Sat     • warfarin (COUMADIN) 4 MG tablet TAKE ONE tablet (by mouth) EACH DAY 90 tablet 1     No facility-administered medications prior to visit.        Patient Active Problem List   Diagnosis   • IFG (impaired fasting glucose)   • Hypertension   • Hyperlipidemia   • Diabetes mellitus (CMS/HCC)   • Persistent atrial fibrillation (CMS/HCC)   • Typical atrial flutter (CMS/HCC)   • Atrial fibrillation with RVR (CMS/HCC)   • Hearing loss of right ear due to cerumen impaction       Advance Care Planning:  has NO advance directive - information provided to the patient today    Identification of Risk Factors:  Risk factors include: increased fall risk.    Review of Systems    Compared to one year ago, the patient feels her physical health is the same.  Compared to one year ago, the patient feels her mental health is the same.    Objective     Physical Exam    Vitals:    02/25/19 0807   BP: 100/60   BP Location: Left arm   Patient Position: Sitting   Pulse: 92   Resp: 18   Temp: 98 °F (36.7 °C)   TempSrc: Oral   SpO2: 98%   Weight: 81.2 kg (179 lb)   Height: 165.1 cm (65\")   PainSc: 0-No pain       Patient's Body mass index is 29.79 kg/m². BMI is within normal parameters. No follow-up required..      Assessment/Plan   Patient Self-Management and Personalized Health Advice  The patient has been provided with information about: fall prevention and designing advance directives and preventive services including:   · Advance directive, Fall " Risk assessment done.    Visit Diagnoses:    ICD-10-CM ICD-9-CM   1. Medicare annual wellness visit, subsequent Z00.00 V70.0   2. Essential hypertension I10 401.9   3. Mixed hyperlipidemia E78.2 272.2   4. Persistent atrial fibrillation (CMS/[facility]) I48.1 427.31   5. Type 2 diabetes mellitus without complication, without long-term current use of insulin (CMS/HCC) E11.9 250.00       Orders Placed This Encounter   Procedures   • Comprehensive Metabolic Panel   • Lipid Panel   • MicroAlbumin, Urine, Random - Urine, Clean Catch   • Hemoglobin A1c   • CBC Auto Differential   • Urinalysis without microscopic (no culture) - Urine, Clean Catch   • Urinalysis, Microscopic Only - Urine, Clean Catch   • CBC & Differential     Order Specific Question:   Manual Differential     Answer:   No   • Urinalysis With Microscopic - Urine, Clean Catch       Outpatient Encounter Medications as of 2/25/2019   Medication Sig Dispense Refill   • Calcium Carb-Cholecalciferol (CALCIUM + D3) 600-200 MG-UNIT tablet Take 1 tablet by mouth Daily.     • diltiazem XR (DILACOR XR) 180 MG 24 hr capsule Take 1 capsule by mouth Daily. 30 capsule 11   • furosemide (LASIX) 40 MG tablet Take 1 tablet by mouth Daily. 90 tablet 1   • metFORMIN ER (GLUCOPHAGE-XR) 500 MG 24 hr tablet Take 1 tablet by mouth Daily With Breakfast. 90 tablet 3   • metoprolol tartrate (LOPRESSOR) 50 MG tablet TAKE ONE AND ONE-HALF (1 & 1/2) tablets (by mouth) TWICE DAILY 90 tablet 2   • ramipril (ALTACE) 10 MG capsule Take 1 capsule by mouth Daily. 90 capsule 3   • simvastatin (ZOCOR) 40 MG tablet Take 1 tablet by mouth Daily. 90 tablet 3   • warfarin (COUMADIN) 2 MG tablet Take 2 mg by mouth 2 (Two) Times a Week. Tuesday, Saturday     • warfarin (COUMADIN) 4 MG tablet Take 4 mg by mouth See Admin Instructions. Taking 4 mg on Sun, Mon, Wed, Thurs, Fri and 2 mg on Tues, Sat     • warfarin (COUMADIN) 4 MG tablet TAKE ONE tablet (by mouth) EACH DAY 90 tablet 1   • [DISCONTINUED]  furosemide (LASIX) 40 MG tablet Take 1 tablet by mouth Daily. 90 tablet 1     No facility-administered encounter medications on file as of 2/25/2019.        Reviewed use of high risk medication in the elderly: yes  Reviewed for potential of harmful drug interactions in the elderly: yes    Follow Up:  No Follow-up on file.     An After Visit Summary and PPPS with all of these plans were given to the patient.

## 2019-02-25 NOTE — PATIENT INSTRUCTIONS
Medicare Wellness  Personal Prevention Plan of Service     Date of Office Visit:  2019  Encounter Provider:  Chandu Erickson MD  Place of Service:  [facility]  Patient Name: Arelis Johnson  :  1940    As part of the Medicare Wellness portion of your visit today, we are providing you with this personalized preventive plan of services (PPPS). This plan is based upon recommendations of the [place] Preventive Services Task Force (USPSTF) and the Advisory Committee on Immunization Practices (ACIP).    This lists the preventive care services that should be considered, and provides dates of when you are due. Items listed as completed are up-to-date and do not require any further intervention.    Health Maintenance   Topic Date Due   • TDAP/TD VACCINES (1 - Tdap) 1958   • ZOSTER VACCINE (2 of 3) 2011   • URINE MICROALBUMIN  2018   • MEDICARE ANNUAL WELLNESS  2019   • HEMOGLOBIN A1C  05/15/2019   • LIPID PANEL  2019   • MAMMOGRAM  2020   • INFLUENZA VACCINE  Completed   • PNEUMOCOCCAL VACCINES (65+ LOW/MEDIUM RISK)  Addressed       Orders Placed This Encounter   Procedures   • Comprehensive Metabolic Panel   • Lipid Panel   • MicroAlbumin, Urine, Random - Urine, Clean Catch   • Hemoglobin A1c   • CBC Auto Differential   • Urinalysis without microscopic (no culture) - Urine, Clean Catch   • Urinalysis, Microscopic Only - Urine, Clean Catch   • CBC & Differential     Order Specific Question:   Manual Differential     Answer:   No   • Urinalysis With Microscopic - Urine, Clean Catch       No Follow-up on file.

## 2019-02-25 NOTE — PROGRESS NOTES
SUBJECTIVE:  The patient is an 79-year-old white female comes in for follow-up.  She is doing well.  She has a history of diabetes atrial fibrillation hypertension and hyperlipidemia.  She exercises daily.  She feels good.  She has no complaints currently.    PAST MEDICAL HISTORY:  Reviewed.    REVIEW OF SYSTEMS:  Please see above; 14 point ROS negative.      OBJECTIVE:   Vitals signs are reviewed and are stable.    General:  Well-nourished.  Alert and oriented x3 in no acute distress.  HEENT: PERRLA.   Neck:  Supple.   Lungs:  Clear.    Heart: Irregularly irregular rate and rhythm.   Abdomen:   Soft, nontender.   Extremities:  No cyanosis, clubbing or edema.   Neurological:  Grossly intact without motor or sensory deficits.     ASSESSMENT:    2 diabetes  Hypertension  Hyperlipidemia  Chronic atrial fibrillation    PLAN: CMP fasting lipids hemoglobin A1c urine for microalbuminuria ordered.  Follow-up on labs.  Continue healthy lifestyle.  Call if problems.    Dictated utilizing Dragon dictation.

## 2019-03-04 RX ORDER — METOPROLOL TARTRATE 50 MG/1
TABLET, FILM COATED ORAL
Qty: 90 TABLET | Refills: 5 | Status: SHIPPED | OUTPATIENT
Start: 2019-03-04 | End: 2019-08-30 | Stop reason: SDUPTHER

## 2019-03-11 RX ORDER — FUROSEMIDE 20 MG/1
TABLET ORAL
Qty: 90 TABLET | Refills: 1 | OUTPATIENT
Start: 2019-03-11

## 2019-03-11 RX ORDER — FUROSEMIDE 40 MG/1
40 TABLET ORAL DAILY
Qty: 90 TABLET | Refills: 3 | Status: SHIPPED | OUTPATIENT
Start: 2019-03-11 | End: 2019-08-13 | Stop reason: SDUPTHER

## 2019-03-14 ENCOUNTER — TELEPHONE (OUTPATIENT)
Dept: CARDIOLOGY | Facility: CLINIC | Age: 79
End: 2019-03-14

## 2019-03-14 NOTE — TELEPHONE ENCOUNTER
[facility] calling to request new order for PT- INR. Order can be faxed to [facility] at [phone]. Thanks, Carmine

## 2019-03-19 ENCOUNTER — ANTICOAGULATION VISIT (OUTPATIENT)
Dept: PHARMACY | Facility: HOSPITAL | Age: 79
End: 2019-03-19

## 2019-03-19 DIAGNOSIS — I48.91 ATRIAL FIBRILLATION WITH RVR (HCC): ICD-10-CM

## 2019-03-19 LAB — INR PPP: 2.2

## 2019-03-19 NOTE — PROGRESS NOTES
Anticoagulation Clinic Progress Note    Anticoagulation Summary  As of 3/19/2019    INR goal:   2.0-3.0   TTR:   100.0 % (3.6 mo)   INR used for dosin.20 (3/18/2019)   Warfarin maintenance plan:   4 mg on Sun, Tue, Thu; 2 mg all other days   Weekly warfarin total:   20 mg   Plan last modified:   Judie Barragan [facility] (2018)   Next INR check:      Priority:   Maintenance   Target end date:       Indications    Atrial fibrillation with RVR (CMS/HCC) [I48.91]             Anticoagulation Episode Summary     INR check location:       Preferred lab:       [facility]    Comments:   [facility]      Anticoagulation Care Providers     Provider Role Specialty Phone number    Sly Saleh MD Referring Cardiology [phone]          Clinic Interview:  No pertinent clinical findings have been reported.    INR History:  Anticoagulation Monitoring 2019 2019 3/19/2019   INR 2.88 2.20 2.20   INR Date 2019 2019 3/18/2019   INR Goal 2.0-3.0 2.0-3.0 2.0-3.0   Trend Same Same Same   Last Week Total 20 mg 20 mg 20 mg   Next Week Total 20 mg 20 mg 20 mg   Sun 4 mg 4 mg 4 mg   Mon 2 mg 2 mg 2 mg   Tue 4 mg 4 mg 4 mg   Wed 2 mg 2 mg 2 mg   Thu 4 mg 4 mg 4 mg   Fri 2 mg 2 mg 2 mg   Sat 2 mg 2 mg 2 mg   Visit Report - - -   Some recent data might be hidden       Plan:  1. INR is therapeutic today- see above in Anticoagulation Summary.    Arelis Johnson to continue their warfarin regimen- see above in Anticoagulation Summary.  2. Follow up in 4 weeks  3. Pt has agreed to only be called if INR out of range. They have been instructed to call if any changes in medications, doses, concerns, etc.     Krysta Quan [facility]

## 2019-03-28 ENCOUNTER — OFFICE VISIT (OUTPATIENT)
Dept: CARDIOLOGY | Facility: CLINIC | Age: 79
End: 2019-03-28

## 2019-03-28 VITALS
DIASTOLIC BLOOD PRESSURE: 80 MMHG | SYSTOLIC BLOOD PRESSURE: 112 MMHG | BODY MASS INDEX: 29.82 KG/M2 | HEART RATE: 119 BPM | HEIGHT: 65 IN | WEIGHT: 179 LBS

## 2019-03-28 DIAGNOSIS — E11.9 TYPE 2 DIABETES MELLITUS WITHOUT COMPLICATION, WITHOUT LONG-TERM CURRENT USE OF INSULIN (HCC): ICD-10-CM

## 2019-03-28 DIAGNOSIS — I10 ESSENTIAL HYPERTENSION: ICD-10-CM

## 2019-03-28 DIAGNOSIS — E78.2 MIXED HYPERLIPIDEMIA: ICD-10-CM

## 2019-03-28 DIAGNOSIS — I48.19 PERSISTENT ATRIAL FIBRILLATION (HCC): Primary | ICD-10-CM

## 2019-03-28 PROCEDURE — 93000 ELECTROCARDIOGRAM COMPLETE: CPT | Performed by: INTERNAL MEDICINE

## 2019-03-28 PROCEDURE — 99214 OFFICE O/P EST MOD 30 MIN: CPT | Performed by: INTERNAL MEDICINE

## 2019-03-28 NOTE — PROGRESS NOTES
Arelis Johnson  1940  Date of Office Visit: 3/28/19  Encounter Provider: Sly Saleh MD  Place of Service: [facility] CARDIOLOGY      CHIEF COMPLAINT:  Paroxysmal atrial fibrillation/flutter  Essential hypertension      HISTORY OF PRESENT ILLNESS:  Dr. Erickson,  I had the pleasure of seeing Ms. Johnson in follow-up today.  She is a very pleasant 79-year-old female with a medical history of paroxysmal atrial fibrillation, essential hypertension and hyperlipidemia along with diet controlled diabetes mellitus who presents back for follow-up.  She has had some issues with rapid ventricular rate.  Her metoprolol tartrate was increased to 100 mg twice a day.  Unfortunately she had significant fatigue with the increase in her metoprolol dosing and this was moved back down to 75 mg by mouth twice a day.  As such, diltiazem was added to aid in rate control.  Her ventricular rate improved with that.    Since our last visit she states that she has actually been well.  Her ventricular rate was controlled in your office.  Today, unfortunately she is a little quick at about 115 beats per minute.  She states that occasionally, typically a few times a week, she will notice that her heart is fluttering.  It is not common for her to notice this.  However when she is working out, she states that sometimes when she puts her hands on the treadmill sensor, that her heart rate is 120 beats per minute just when she is getting started.  She also reports that occasionally it will document her heart rate to be in the 50s; however, this is just on the monitor as well.          Review of Systems   Constitution: Negative for fever, weakness and malaise/fatigue.   HENT: Negative for nosebleeds and sore throat.    Eyes: Negative for blurred vision and double vision.   Cardiovascular: Positive for leg swelling. Negative for chest pain, claudication, palpitations and syncope.   Respiratory: Negative  for cough, shortness of breath and snoring.    Endocrine: Negative for cold intolerance, heat intolerance and polydipsia.   Skin: Negative for itching, poor wound healing and rash.   Musculoskeletal: Positive for joint pain. Negative for joint swelling, muscle weakness and myalgias.   Gastrointestinal: Negative for abdominal pain, melena, nausea and vomiting.   Neurological: Negative for light-headedness, loss of balance, seizures and vertigo.   Psychiatric/Behavioral: Negative for altered mental status and depression.        Past Medical History:   Diagnosis Date   • Diabetes mellitus (CMS/[facility])    • Hyperlipidemia    • Hypertension    • IFG (impaired fasting glucose)    • PAF (paroxysmal atrial fibrillation) (CMS/[facility])     with rvr   • Palpitations    • SOB (shortness of breath)        The following portions of the patient's history were reviewed and updated as appropriate: Social history , Family history and Surgical history     Current Outpatient Medications on File Prior to Visit   Medication Sig Dispense Refill   • Calcium Carb-Cholecalciferol (CALCIUM + D3) 600-200 MG-UNIT tablet Take 1 tablet by mouth Daily.     • diltiazem XR (DILACOR XR) 180 MG 24 hr capsule Take 1 capsule by mouth Daily. 30 capsule 11   • furosemide (LASIX) 40 MG tablet Take 1 tablet by mouth Daily. 90 tablet 3   • metFORMIN ER (GLUCOPHAGE-XR) 500 MG 24 hr tablet Take 1 tablet by mouth Daily With Breakfast. 90 tablet 3   • metoprolol tartrate (LOPRESSOR) 50 MG tablet TAKE ONE AND ONE-HALF (1 & 1/2) tablets (by mouth) TWICE DAILY 90 tablet 5   • ramipril (ALTACE) 10 MG capsule Take 1 capsule by mouth Daily. 90 capsule 3   • simvastatin (ZOCOR) 40 MG tablet Take 1 tablet by mouth Daily. 90 tablet 3   • warfarin (COUMADIN) 2 MG tablet Take 2 mg by mouth 2 (Two) Times a Week. Tuesday, Saturday     • warfarin (COUMADIN) 4 MG tablet Take 4 mg by mouth See Admin Instructions. Taking 4 mg on Sun, Mon, Wed, Thurs, Fri and 2 mg on Tues, Sat     •  "warfarin (COUMADIN) 4 MG tablet TAKE ONE tablet (by mouth) EACH DAY 90 tablet 1     No current facility-administered medications on file prior to visit.        Allergies   Allergen Reactions   • Percocet [Oxycodone-Acetaminophen] GI Intolerance   • Sulfa Antibiotics Nausea And Vomiting   • Penicillins Rash       Vitals:    03/28/19 1550   BP: 112/80   Pulse: 119   Weight: 81.2 kg (179 lb)   Height: 165.1 cm (65\")     Physical Exam   Constitutional: She is oriented to person, place, and time. She appears well-developed and well-nourished.   HENT:   Head: Normocephalic and atraumatic.   Eyes: Conjunctivae and EOM are normal. No scleral icterus.   Neck: Normal range of motion. Neck supple. Normal carotid pulses, no hepatojugular reflux and no JVD present. Carotid bruit is not present. No tracheal deviation present. No thyromegaly present.   Cardiovascular: An irregularly irregular rhythm present. Tachycardia present. Exam reveals no gallop and no friction rub.   No murmur heard.  Pulses:       Carotid pulses are 2+ on the right side, and 2+ on the left side.       Radial pulses are 2+ on the right side, and 2+ on the left side.        Femoral pulses are 2+ on the right side, and 2+ on the left side.       Dorsalis pedis pulses are 2+ on the right side, and 2+ on the left side.        Posterior tibial pulses are 2+ on the right side, and 2+ on the left side.   Pulmonary/Chest: Breath sounds normal. No respiratory distress. She has no decreased breath sounds. She has no wheezes. She has no rhonchi. She has no rales. She exhibits no tenderness.   Abdominal: Soft. Bowel sounds are normal. She exhibits no distension. There is no tenderness. There is no rebound.   Musculoskeletal: She exhibits edema (trace bilateral LE edema). She exhibits no deformity.   Neurological: She is alert and oriented to person, place, and time. She has normal strength. No sensory deficit.   Skin: No rash noted. No erythema.   Psychiatric: She has " a normal mood and affect. Her behavior is normal.       No components found for: CBC  No results found for: CMP  No components found for: LIPID  No results found for: BMP      ECG 12 Lead  Date/Time: 3/28/2019 4:25 PM  Performed by: Sly Saleh MD  Authorized by: Sly Saleh MD   Comparison: compared with previous ECG from 9/17/2018  Comparison to previous ECG: Heart rate has increased  Rhythm: atrial fibrillation  Rate: tachycardic    Clinical impression: abnormal EKG               4/12/17 TTE  · Left ventricular wall thickness is consistent with borderline concentric hypertrophy.  · Left ventricular function is normal. Estimated EF = 63%.  · Left ventricular diastolic function was unable to be assessed. Elevated left atrial pressure.      DISCUSSION/SUMMARY 79-year-old female with a medical history of essential hypertension, mild lower extremity edema and hyperlipidemia who previously presented to me secondary to the acute onset of palpitations and tachycardia and was found to be in afib with rvr.  She was placed on Coumadin therapy along with metoprolol tartrate which she was tolerating with just mild sinus bradycardia.  She presented back in February 2018 with rapid ventricular rate.  Since that time she has maintained atrial fibrillation and we have worked with rate control.  Her rate is improved on a slightly lower dose of Toprol tartrate which was decreased secondary to fatigue.  She also continues on diltiazem therapy.  Her heart rate was better controlled in your office.  She is mildly tachycardic today.  She states that she notices this intermittently when she is working out on the monitor.    1. Atrial fibrillation: persistent.  Patient with mild rapid ventricular rate today.    -Currently on Coumadin     -She states that she has more fatigue with 75 mg twice a day of metoprolol tartrate.      -Continue long-acting diltiazem at 180 mg by mouth daily.    -Continue ramipril at current  dose.     -I have asked her to wear a 48-hour monitor for us.  If she does have a significant period of time with a rapid ventricular rate I will decrease her ramipril and increase her diltiazem for   additional rate control.  2.    Essential hypertension: Reasonably controlled.  Continue current therapy  3.    Hyperlipidemia: Continue current simvastatin therapy.    4.    Lower extremity edema: Resolved today.  No evidence of significant edema.    Atrial Fibrillation and Atrial Flutter  Assessment  • The patient has paroxysmal atrial fibrillation  • The patient's CHADS2-VASc score is 5  • A [id number] score of 2 or more is considered a high risk for a thromboembolic event  • Warfarin prescribed    Plan  • Attempt to maintain sinus rhythm  • Continue warfarin for antithrombotic therapy, bleeding issues discussed  • Continue beta blocker and diltiazem for rate control

## 2019-03-29 ENCOUNTER — TRANSCRIBE ORDERS (OUTPATIENT)
Dept: ADMINISTRATIVE | Facility: HOSPITAL | Age: 79
End: 2019-03-29

## 2019-03-29 DIAGNOSIS — Z12.39 SCREENING BREAST EXAMINATION: Primary | ICD-10-CM

## 2019-04-09 DIAGNOSIS — I49.5 TACHY-BRADY SYNDROME (HCC): Primary | ICD-10-CM

## 2019-04-15 LAB
INR PPP: 1.99 (ref 0.9–1.1)
PROTHROMBIN TIME: 22.3 SECONDS (ref 11.7–14.2)

## 2019-04-16 ENCOUNTER — ANTICOAGULATION VISIT (OUTPATIENT)
Dept: PHARMACY | Facility: HOSPITAL | Age: 79
End: 2019-04-16

## 2019-04-16 DIAGNOSIS — I48.91 ATRIAL FIBRILLATION WITH RVR (HCC): ICD-10-CM

## 2019-04-16 NOTE — PROGRESS NOTES
Anticoagulation Clinic Progress Note    Anticoagulation Summary  As of 2019    INR goal:   2.0-3.0   TTR:   99.0 % (4.5 mo)   INR used for dosin.99! (4/15/2019)   Warfarin maintenance plan:   4 mg every Sun, Tue, Thu; 2 mg all other days   Weekly warfarin total:   20 mg   No change documented:   Ira Jean RPH   Plan last modified:   Judie Barragan RPH (2018)   Next INR check:   2019   Priority:   Maintenance   Target end date:       Indications    Atrial fibrillation with RVR (CMS/HCC) [I48.91]             Anticoagulation Episode Summary     INR check location:       Preferred lab:       Send INR reminders to:   [facility]  POOL    Comments:   [facility]      Anticoagulation Care Providers     Provider Role Specialty Phone number    Sly Saleh MD Referring Cardiology [phone]          Clinic Interview:      INR History:  Anticoagulation Monitoring 2019 3/19/2019 2019   INR 2.20 2.20 1.99   INR Date 2019 3/18/2019 4/15/2019   INR Goal 2.0-3.0 2.0-3.0 2.0-3.0   Trend Same Same Same   Last Week Total 20 mg 20 mg 20 mg   Next Week Total 20 mg 20 mg 20 mg   Sun 4 mg 4 mg 4 mg   Mon 2 mg 2 mg 2 mg   Tue 4 mg 4 mg 4 mg   Wed 2 mg 2 mg 2 mg   Thu 4 mg 4 mg 4 mg   Fri 2 mg 2 mg 2 mg   Sat 2 mg 2 mg 2 mg   Visit Report - - -   Some recent data might be hidden       Plan:  1. INR is Subtherapeutic today- see above in Anticoagulation Summary.   Unable to contact Arelis Johnson directly, and left instructions on secure voicemail to Continue their warfarin regimen- see above in Anticoagulation Summary.  2. Follow up in 4 weeks  3. They have been instructed to call if any changes in medications, doses, concerns, etc. Patient expresses understanding and has no further questions at this time.    Ira Jean RPH

## 2019-04-23 ENCOUNTER — OFFICE VISIT (OUTPATIENT)
Dept: CARDIOLOGY | Facility: CLINIC | Age: 79
End: 2019-04-23

## 2019-04-23 VITALS
SYSTOLIC BLOOD PRESSURE: 132 MMHG | HEART RATE: 87 BPM | BODY MASS INDEX: 29.49 KG/M2 | DIASTOLIC BLOOD PRESSURE: 74 MMHG | WEIGHT: 177 LBS | HEIGHT: 65 IN

## 2019-04-23 DIAGNOSIS — I10 ESSENTIAL HYPERTENSION: Primary | ICD-10-CM

## 2019-04-23 DIAGNOSIS — I48.19 PERSISTENT ATRIAL FIBRILLATION (HCC): ICD-10-CM

## 2019-04-23 PROCEDURE — 93000 ELECTROCARDIOGRAM COMPLETE: CPT | Performed by: INTERNAL MEDICINE

## 2019-04-23 PROCEDURE — 99204 OFFICE O/P NEW MOD 45 MIN: CPT | Performed by: INTERNAL MEDICINE

## 2019-04-23 NOTE — PROGRESS NOTES
Date of Office Visit: 2019  Encounter Provider: Shashank Mckeon MD  Place of Service: [facility] CARDIOLOGY  Patient Name: Arelis Johnson  :1940    Chief Complaint   Patient presents with   • Irregular Heart Beat     New patient Consult / Tachy Eric syndrome - CS ref   :     HPI: Arelis Johnson is a 79 y.o. female who presents today for tachybradycardia syndrome.  Patient has a history of chronic atrial fibrillation.  She describes intermittent moderate tachycardia with activity.  She is very active and reports going to exercise most week days.  She occasionally noticed that her heart rate increases to the 130s-140s while exercising which is above the maximum rate suggested on the machine so she will stop.  However, she feels that she could continue if necessary.  She very occasionally notices palpitations.  She is very functional continues to drive and take care of herself.  She has never had any episodes of syncope.          Past Medical History:   Diagnosis Date   • Diabetes mellitus (CMS/[facility])    • Hyperlipidemia    • Hypertension    • IFG (impaired fasting glucose)    • PAF (paroxysmal atrial fibrillation) (CMS/[facility])     with rvr   • Palpitations    • SOB (shortness of breath)        Past Surgical History:   Procedure Laterality Date   • ANAL FISTULA REPAIR     • BACK SURGERY      l spine ruptured disk   • CHOLECYSTECTOMY     • JOINT REPLACEMENT      bilateral knees       Social History     Socioeconomic History   • Marital status:      Spouse name: Not on file   • Number of children: Not on file   • Years of education: Not on file   • Highest education level: Not on file   Tobacco Use   • Smoking status: Never Smoker   • Smokeless tobacco: Never Used   Substance and Sexual Activity   • Alcohol use: No   • Drug use: No   • Sexual activity: No       Family History   Problem Relation Age of Onset   • Hypertension Mother    • Heart disease Father    •  Hypertension Father    • Liver disease Brother        Review of Systems   Constitution: Negative for weakness and malaise/fatigue.   HENT: Negative.    Eyes: Negative.    Cardiovascular: Positive for palpitations. Negative for chest pain, dyspnea on exertion, leg swelling and near-syncope.   Respiratory: Negative for cough and shortness of breath.    Endocrine: Negative.    Hematologic/Lymphatic: Negative.    Skin: Negative.    Musculoskeletal: Negative.    Gastrointestinal: Negative.    Genitourinary: Negative.    Neurological: Negative.    Psychiatric/Behavioral: Negative.    Allergic/Immunologic: Negative.        Allergies   Allergen Reactions   • Percocet [Oxycodone-Acetaminophen] GI Intolerance   • Sulfa Antibiotics Nausea And Vomiting   • Penicillins Rash         Current Outpatient Medications:   •  Calcium Carb-Cholecalciferol (CALCIUM + D3) 600-200 MG-UNIT tablet, Take 1 tablet by mouth Daily., Disp: , Rfl:   •  diltiazem XR (DILACOR XR) 180 MG 24 hr capsule, Take 1 capsule by mouth Daily., Disp: 30 capsule, Rfl: 11  •  furosemide (LASIX) 40 MG tablet, Take 1 tablet by mouth Daily., Disp: 90 tablet, Rfl: 3  •  metFORMIN ER (GLUCOPHAGE-XR) 500 MG 24 hr tablet, Take 1 tablet by mouth Daily With Breakfast., Disp: 90 tablet, Rfl: 3  •  metoprolol tartrate (LOPRESSOR) 50 MG tablet, TAKE ONE AND ONE-HALF (1 & 1/2) tablets (by mouth) TWICE DAILY, Disp: 90 tablet, Rfl: 5  •  ramipril (ALTACE) 10 MG capsule, Take 1 capsule by mouth Daily., Disp: 90 capsule, Rfl: 3  •  simvastatin (ZOCOR) 40 MG tablet, Take 1 tablet by mouth Daily., Disp: 90 tablet, Rfl: 3  •  warfarin (COUMADIN) 2 MG tablet, Take 2 mg by mouth 2 (Two) Times a Week. Tuesday, Saturday, Disp: , Rfl:   •  warfarin (COUMADIN) 4 MG tablet, Take 4 mg by mouth See Admin Instructions. Taking 4 mg on Sun, Mon, Wed, Thurs, Fri and 2 mg on Tues, Sat, Disp: , Rfl:   •  warfarin (COUMADIN) 4 MG tablet, TAKE ONE tablet (by mouth) EACH DAY, Disp: 90 tablet, Rfl: 1    "   Objective:     Vitals:    04/23/19 1042   BP: 132/74   BP Location: Right arm   Patient Position: Sitting   Cuff Size: Adult   Pulse: 87   Weight: 80.3 kg (177 lb)   Height: 165.1 cm (65\")     Body mass index is 29.45 kg/m².    PHYSICAL EXAM:    Physical Exam   Constitutional: She is oriented to person, place, and time. She appears well-developed and well-nourished. No distress.   HENT:   Right Ear: External ear normal.   Left Ear: External ear normal.   Eyes: Right eye exhibits no discharge. Left eye exhibits no discharge.   Neck: No JVD present. No thyromegaly present.   Pulmonary/Chest: Effort normal and breath sounds normal.   Abdominal: Soft. Bowel sounds are normal.   Musculoskeletal: She exhibits no edema or deformity.   Neurological: She is alert and oriented to person, place, and time.   Skin: Skin is warm and dry. She is not diaphoretic.   Psychiatric: She has a normal mood and affect. Her behavior is normal. Judgment and thought content normal.   Nursing note and vitals reviewed.          ECG 12 Lead  Date/Time: 4/23/2019 6:38 PM  Performed by: Shashank Mckeon MD  Authorized by: Shashank Mckeon MD   Comparison: compared with previous ECG from 3/28/2019  Comparison to previous ECG: Rate is improved today.  Rhythm: atrial fibrillation        Holter monitor reviewed.  Shows an average heart rate of 92 bpm with variation from a high of 158-45 bpm there were pauses up to approximately 3 seconds.    The patient completed a 6-minute walk test with good effort heart rate increased from 90 bpm to 130 bpm.  She denied any symptoms.      Assessment:       Diagnosis Plan   1. Essential hypertension     2. Persistent atrial fibrillation (CMS/HCC)            Plan:       Persistent atrial fibrillation currently rate controlled.  We discussed options for AV node ablation and pacemaker placement, however I feel that the best strategy for this highly functional patient is a lenient rate control strategy.  " She was advised to not be concerned by heart rate monitoring during exercise and to exercise to her maximum effort.  I will see her back in 6 months to see if she has had any progressive symptoms or development of syncope or other concerning signs.  In the meantime,I think we should continue her medications as currently ordered.    As always, it has been a pleasure to participate in your patient's care.      Sincerely,         Shashank Mckeon MD

## 2019-04-29 ENCOUNTER — APPOINTMENT (OUTPATIENT)
Dept: MAMMOGRAPHY | Facility: HOSPITAL | Age: 79
End: 2019-04-29

## 2019-05-01 RX ORDER — DILTIAZEM HYDROCHLORIDE 180 MG/1
CAPSULE, COATED, EXTENDED RELEASE ORAL
Qty: 30 CAPSULE | Refills: 5 | Status: SHIPPED | OUTPATIENT
Start: 2019-05-01 | End: 2019-10-30 | Stop reason: SDUPTHER

## 2019-05-10 ENCOUNTER — ANTICOAGULATION VISIT (OUTPATIENT)
Dept: PHARMACY | Facility: HOSPITAL | Age: 79
End: 2019-05-10

## 2019-05-10 DIAGNOSIS — I48.91 ATRIAL FIBRILLATION WITH RVR (HCC): ICD-10-CM

## 2019-05-10 LAB
INR PPP: 2.3 (ref 0.8–1.2)
PROTHROMBIN TIME: 23.6 SEC (ref 9.1–12)

## 2019-05-10 NOTE — PROGRESS NOTES
Anticoagulation Clinic Progress Note    Anticoagulation Summary  As of 5/10/2019    INR goal:   2.0-3.0   TTR:   98.7 % (5.3 mo)   INR used for dosin.3 (2019)   Warfarin maintenance plan:   4 mg every Sun, Tue, Thu; 2 mg all other days   Weekly warfarin total:   20 mg   Plan last modified:   Judie Barragan [facility] (2018)   Next INR check:   2019   Priority:   Maintenance   Target end date:       Indications    Atrial fibrillation with RVR (CMS/HCC) [I48.91]             Anticoagulation Episode Summary     INR check location:       Preferred lab:       Send INR reminders to:    [facility]  POOL    Comments:   [facility]      Anticoagulation Care Providers     Provider Role Specialty Phone number    Sly Saleh MD Referring Cardiology [phone]          Drug interactions: has remained unchanged.  Diet: has remained unchanged.    Clinic Interview:  No pertinent clinical findings have been reported.    INR History:  Anticoagulation Monitoring 3/19/2019 2019 5/10/2019   INR 2.20 1.99 2.3   INR Date 3/18/2019 4/15/2019 2019   INR Goal 2.0-3.0 2.0-3.0 2.0-3.0   Trend Same Same Same   Last Week Total 20 mg 20 mg 20 mg   Next Week Total 20 mg 20 mg 20 mg   Sun 4 mg 4 mg 4 mg   Mon 2 mg 2 mg 2 mg   Tue 4 mg 4 mg 4 mg   Wed 2 mg 2 mg 2 mg   Thu 4 mg 4 mg 4 mg   Fri 2 mg 2 mg 2 mg   Sat 2 mg 2 mg 2 mg   Visit Report - - -   Some recent data might be hidden       Plan:  1. INR is Therapeutic today- see above in Anticoagulation Summary.   Spoke with patient and instructed Arelis Johnson to Continue their warfarin regimen- see above in Anticoagulation Summary.  2. Follow up in 4 weeks  3. Pt has agreed to only be called if INR out of range. They have been instructed to call if any changes in medications, doses, concerns, etc. Patient expresses understanding and has no further questions at this time.    Jamila Guerra [facility]

## 2019-05-20 RX ORDER — RAMIPRIL 10 MG/1
CAPSULE ORAL
Qty: 90 CAPSULE | Refills: 3 | Status: SHIPPED | OUTPATIENT
Start: 2019-05-20 | End: 2020-04-23 | Stop reason: SDUPTHER

## 2019-05-29 ENCOUNTER — HOSPITAL ENCOUNTER (OUTPATIENT)
Dept: MAMMOGRAPHY | Facility: HOSPITAL | Age: 79
Discharge: HOME | End: 2019-05-29
Admitting: FAMILY MEDICINE

## 2019-05-29 DIAGNOSIS — Z12.39 SCREENING BREAST EXAMINATION: ICD-10-CM

## 2019-05-29 PROCEDURE — 77063 BREAST TOMOSYNTHESIS BI: CPT

## 2019-05-29 PROCEDURE — 77067 SCR MAMMO BI INCL CAD: CPT

## 2019-06-03 RX ORDER — SIMVASTATIN 40 MG
TABLET ORAL
Qty: 90 TABLET | Refills: 3 | Status: SHIPPED | OUTPATIENT
Start: 2019-06-03 | End: 2020-05-17

## 2019-06-06 LAB
INR PPP: 2.04 (ref 0.9–1.1)
PROTHROMBIN TIME: 22.7 SECONDS (ref 11.7–14.2)

## 2019-06-07 ENCOUNTER — ANTICOAGULATION VISIT (OUTPATIENT)
Dept: PHARMACY | Facility: HOSPITAL | Age: 79
End: 2019-06-07

## 2019-06-07 DIAGNOSIS — I48.91 ATRIAL FIBRILLATION WITH RVR (HCC): ICD-10-CM

## 2019-06-07 NOTE — PROGRESS NOTES
Anticoagulation Clinic Progress Note    Anticoagulation Summary  As of 2019    INR goal:   2.0-3.0   TTR:   98.9 % (6.2 mo)   INR used for dosin.04 (2019)   Warfarin maintenance plan:   4 mg every Sun, Tue, Thu; 2 mg all other days   Weekly warfarin total:   20 mg   No change documented:   Savage Wasserman RPH   Plan last modified:   Judie Barragan RPH (2018)   Next INR check:   2019   Priority:   Maintenance   Target end date:       Indications    Atrial fibrillation with RVR (CMS/[facility]) [I48.91]             Anticoagulation Episode Summary     INR check location:       Preferred lab:       Send INR reminders to:   [facility]  POOL    Comments:   [facility]      Anticoagulation Care Providers     Provider Role Specialty Phone number    Sly Saleh MD Referring Cardiology [phone]        Clinic Interview:  No pertinent clinical findings have been reported.      INR History:  Anticoagulation Monitoring 2019 5/10/2019 2019   INR 1.99 2.3 2.04   INR Date 4/15/2019 2019 2019   INR Goal 2.0-3.0 2.0-3.0 2.0-3.0   Trend Same Same Same   Last Week Total 20 mg 20 mg 20 mg   Next Week Total 20 mg 20 mg 20 mg   Sun 4 mg 4 mg 4 mg   Mon 2 mg 2 mg 2 mg   Tue 4 mg 4 mg 4 mg   Wed 2 mg 2 mg 2 mg   Thu 4 mg 4 mg 4 mg   Fri 2 mg 2 mg 2 mg   Sat 2 mg 2 mg 2 mg   Visit Report - - -   Some recent data might be hidden       Plan:  1. INR is Therapeutic today- see above in Anticoagulation Summary.   Will instruct Arelis Johnson to Continue their warfarin regimen- see above in Anticoagulation Summary.  2. Follow up in 1 month  3. They have been instructed to call if any changes in medications, doses, concerns, etc. Patient expresses understanding and has no further questions at this time.    Savage Wasserman RPH

## 2019-06-24 RX ORDER — WARFARIN SODIUM 4 MG/1
TABLET ORAL
Qty: 90 TABLET | Refills: 0 | Status: SHIPPED | OUTPATIENT
Start: 2019-06-24 | End: 2020-02-27

## 2019-07-08 LAB
INR PPP: 2.05 (ref 0.9–1.1)
PROTHROMBIN TIME: 22.8 SECONDS (ref 11.7–14.2)

## 2019-07-09 ENCOUNTER — ANTICOAGULATION VISIT (OUTPATIENT)
Dept: PHARMACY | Facility: HOSPITAL | Age: 79
End: 2019-07-09

## 2019-07-09 DIAGNOSIS — I48.91 ATRIAL FIBRILLATION WITH RVR (HCC): ICD-10-CM

## 2019-07-09 NOTE — PROGRESS NOTES
Anticoagulation Clinic Progress Note    Anticoagulation Summary  As of 2019    INR goal:   2.0-3.0   TTR:   99.0 % (7.3 mo)   INR used for dosin.05 (2019)   Warfarin maintenance plan:   4 mg every Sun, Tue, Thu; 2 mg all other days   Weekly warfarin total:   20 mg   Plan last modified:   Judie Barragan [facility] (2018)   Next INR check:   2019   Priority:   Maintenance   Target end date:       Indications    Atrial fibrillation with RVR (CMS/HCC) [I48.91]             Anticoagulation Episode Summary     INR check location:       Preferred lab:       [facility]    Comments:   [facility]      Anticoagulation Care Providers     Provider Role Specialty Phone number    Sly Saleh MD Referring Cardiology [phone]          Clinic Interview:      INR History:  Anticoagulation Monitoring 5/10/2019 2019 2019   INR 2.3 2.04 2.05   INR Date 2019   INR Goal 2.0-3.0 2.0-3.0 2.0-3.0   Trend Same Same Same   Last Week Total 20 mg 20 mg 20 mg   Next Week Total 20 mg 20 mg 20 mg   Sun 4 mg 4 mg 4 mg   Mon 2 mg 2 mg 2 mg   Tue 4 mg 4 mg 4 mg   Wed 2 mg 2 mg 2 mg   Thu 4 mg 4 mg 4 mg   Fri 2 mg 2 mg 2 mg   Sat 2 mg 2 mg 2 mg   Visit Report - - -   Some recent data might be hidden       Plan:  1. INR is Therapeutic today- see above in Anticoagulation Summary.   Will instruct Arelis Johnson to Continue their warfarin regimen- see above in Anticoagulation Summary.  2. Follow up in 4 weeks  3. Left VM today. They have been instructed to call if any changes in medications, doses, concerns, etc. Patient expresses understanding and has no further questions at this time.    Annita Mccormack [facility]

## 2019-08-05 LAB
INR PPP: 2 (ref 0.9–1.1)
PROTHROMBIN TIME: 22.3 SECONDS (ref 11.7–14.2)

## 2019-08-06 ENCOUNTER — ANTICOAGULATION VISIT (OUTPATIENT)
Dept: PHARMACY | Facility: HOSPITAL | Age: 79
End: 2019-08-06

## 2019-08-06 DIAGNOSIS — I48.91 ATRIAL FIBRILLATION WITH RVR (HCC): ICD-10-CM

## 2019-08-06 NOTE — PROGRESS NOTES
Anticoagulation Clinic Progress Note    Anticoagulation Summary  As of 2019    INR goal:   2.0-3.0   TTR:   99.1 % (8.2 mo)   INR used for dosin.00 (2019)   Warfarin maintenance plan:   4 mg every Sun, Tue, Thu; 2 mg all other days   Weekly warfarin total:   20 mg   Plan last modified:   Judie Barragan [facility] (2018)   Next INR check:   9/3/2019   Priority:   Maintenance   Target end date:       Indications    Atrial fibrillation with RVR (CMS/HCC) [I48.91]             Anticoagulation Episode Summary     INR check location:       Preferred lab:       [facility]    Comments:   [facility]      Anticoagulation Care Providers     Provider Role Specialty Phone number    Sly Saleh MD Referring Cardiology [phone]          Clinic Interview:      INR History:  Anticoagulation Monitoring 2019   INR 2.04 2.05 2.00   INR Date 2019   INR Goal 2.0-3.0 2.0-3.0 2.0-3.0   Trend Same Same Same   Last Week Total 20 mg 20 mg 20 mg   Next Week Total 20 mg 20 mg 20 mg   Sun 4 mg 4 mg 4 mg   Mon 2 mg 2 mg 2 mg   Tue 4 mg 4 mg 4 mg   Wed 2 mg 2 mg 2 mg   Thu 4 mg 4 mg 4 mg   Fri 2 mg 2 mg 2 mg   Sat 2 mg 2 mg 2 mg   Visit Report - - -   Some recent data might be hidden       Plan:  1. INR is Therapeutic today- see above in Anticoagulation Summary.   Will instruct Arelis Johnson to Continue their warfarin regimen- see above in Anticoagulation Summary.  2. Follow up in 4 weeks  3. They have been instructed to call if any changes in medications, doses, concerns, etc. Patient expresses understanding and has no further questions at this time.    Krysta Quan [facility]

## 2019-08-14 RX ORDER — FUROSEMIDE 40 MG/1
40 TABLET ORAL DAILY
Qty: 90 TABLET | Refills: 1 | Status: SHIPPED | OUTPATIENT
Start: 2019-08-14 | End: 2020-02-07

## 2019-08-30 RX ORDER — METOPROLOL TARTRATE 50 MG/1
TABLET, FILM COATED ORAL
Qty: 90 TABLET | Refills: 5 | Status: SHIPPED | OUTPATIENT
Start: 2019-08-30 | End: 2020-03-02

## 2019-09-05 LAB
INR PPP: 2.5 (ref 0.8–1.2)
Lab: NORMAL
PROTHROMBIN TIME: 24.7 SEC (ref 9.1–12)

## 2019-09-06 ENCOUNTER — ANTICOAGULATION VISIT (OUTPATIENT)
Dept: PHARMACY | Facility: HOSPITAL | Age: 79
End: 2019-09-06

## 2019-09-06 DIAGNOSIS — I48.91 ATRIAL FIBRILLATION WITH RVR (HCC): ICD-10-CM

## 2019-09-06 NOTE — PROGRESS NOTES
Anticoagulation Clinic Progress Note    Anticoagulation Summary  As of 2019    INR goal:   2.0-3.0   TTR:   99.2 % (9.2 mo)   INR used for dosin.5 (2019)   Warfarin maintenance plan:   4 mg every Sun, Tue, Thu; 2 mg all other days   Weekly warfarin total:   20 mg   No change documented:   Savage Wasserman RPH   Plan last modified:   Judie Barragan RPH (2018)   Next INR check:   10/2/2019   Priority:   Maintenance   Target end date:       Indications    Atrial fibrillation with RVR (CMS/HCC) [I48.91]             Anticoagulation Episode Summary     INR check location:       Preferred lab:       Send INR reminders to:   [facility]  POOL    Comments:   [facility]      Anticoagulation Care Providers     Provider Role Specialty Phone number    Sly Saleh MD Referring Cardiology [phone]          Clinic Interview:  No pertinent clinical findings have been reported.    INR History:  Anticoagulation Monitoring 2019   INR 2.05 2.00 2.5   INR Date 2019   INR Goal 2.0-3.0 2.0-3.0 2.0-3.0   Trend Same Same Same   Last Week Total 20 mg 20 mg 20 mg   Next Week Total 20 mg 20 mg 20 mg   Sun 4 mg 4 mg 4 mg   Mon 2 mg 2 mg 2 mg   Tue 4 mg 4 mg 4 mg   Wed 2 mg 2 mg 2 mg   Thu 4 mg 4 mg 4 mg   Fri 2 mg 2 mg 2 mg   Sat 2 mg 2 mg 2 mg   Visit Report - - -   Some recent data might be hidden       Plan:  1. INR is therapeutic today- see above in Anticoagulation Summary.    Arelis Johnson to continue their warfarin regimen- see above in Anticoagulation Summary.  2. Follow up in 4 weeks  3. Unable to reach. Left HIPAA-friendly VM with instructions to continue same dose and recheck in 4 wks. Invited to contact clinic with any questions/concerns.     Savage Wasserman RPH

## 2019-09-09 ENCOUNTER — OFFICE VISIT (OUTPATIENT)
Dept: FAMILY MEDICINE CLINIC | Facility: CLINIC | Age: 79
End: 2019-09-09

## 2019-09-09 VITALS
OXYGEN SATURATION: 97 % | BODY MASS INDEX: 29.66 KG/M2 | WEIGHT: 178 LBS | SYSTOLIC BLOOD PRESSURE: 100 MMHG | DIASTOLIC BLOOD PRESSURE: 64 MMHG | RESPIRATION RATE: 18 BRPM | TEMPERATURE: 97.9 F | HEART RATE: 105 BPM | HEIGHT: 65 IN

## 2019-09-09 DIAGNOSIS — I48.0 PAROXYSMAL ATRIAL FIBRILLATION (HCC): ICD-10-CM

## 2019-09-09 DIAGNOSIS — I10 ESSENTIAL HYPERTENSION: ICD-10-CM

## 2019-09-09 DIAGNOSIS — E11.9 TYPE 2 DIABETES MELLITUS WITHOUT COMPLICATION, WITHOUT LONG-TERM CURRENT USE OF INSULIN (HCC): Primary | ICD-10-CM

## 2019-09-09 DIAGNOSIS — E78.2 MIXED HYPERLIPIDEMIA: ICD-10-CM

## 2019-09-09 LAB
ALBUMIN SERPL-MCNC: 4.1 G/DL (ref 3.5–5.2)
ALBUMIN/GLOB SERPL: 1.4 G/DL
ALP SERPL-CCNC: 58 U/L (ref 39–117)
ALT SERPL W P-5'-P-CCNC: 14 U/L (ref 1–33)
ANION GAP SERPL CALCULATED.3IONS-SCNC: 10.8 MMOL/L (ref 5–15)
AST SERPL-CCNC: 22 U/L (ref 1–32)
BACTERIA UR QL AUTO: NORMAL /HPF
BILIRUB SERPL-MCNC: 0.6 MG/DL (ref 0.2–1.2)
BILIRUB UR QL STRIP: NEGATIVE
BUN BLD-MCNC: 21 MG/DL (ref 8–23)
BUN/CREAT SERPL: 19.8 (ref 7–25)
CALCIUM SPEC-SCNC: 9.7 MG/DL (ref 8.6–10.5)
CHLORIDE SERPL-SCNC: 98 MMOL/L (ref 98–107)
CHOLEST SERPL-MCNC: 111 MG/DL (ref 0–200)
CLARITY UR: CLEAR
CO2 SERPL-SCNC: 32.2 MMOL/L (ref 22–29)
COLOR UR: YELLOW
CREAT BLD-MCNC: 1.06 MG/DL (ref 0.57–1)
ERYTHROCYTE [DISTWIDTH] IN BLOOD BY AUTOMATED COUNT: 13 % (ref 12.3–15.4)
GFR SERPL CREATININE-BSD FRML MDRD: 50 ML/MIN/1.73
GLOBULIN UR ELPH-MCNC: 3 GM/DL
GLUCOSE BLD-MCNC: 120 MG/DL (ref 65–99)
GLUCOSE UR STRIP-MCNC: NEGATIVE MG/DL
HBA1C MFR BLD: 6.62 % (ref 4.8–5.6)
HCT VFR BLD AUTO: 44.4 % (ref 34–46.6)
HDLC SERPL-MCNC: 54 MG/DL (ref 40–60)
HGB BLD-MCNC: 14.5 G/DL (ref 12–15.9)
HGB UR QL STRIP.AUTO: ABNORMAL
KETONES UR QL STRIP: NEGATIVE
LDLC SERPL CALC-MCNC: 38 MG/DL (ref 0–100)
LDLC/HDLC SERPL: 0.7 {RATIO}
LEUKOCYTE ESTERASE UR QL STRIP.AUTO: NEGATIVE
LYMPHOCYTES # BLD AUTO: 1.6 10*3/MM3 (ref 0.7–3.1)
LYMPHOCYTES NFR BLD AUTO: 26.3 % (ref 19.6–45.3)
MCH RBC QN AUTO: 30.9 PG (ref 26.6–33)
MCHC RBC AUTO-ENTMCNC: 32.7 G/DL (ref 31.5–35.7)
MCV RBC AUTO: 94.4 FL (ref 79–97)
MONOCYTES # BLD AUTO: 0.5 10*3/MM3 (ref 0.1–0.9)
MONOCYTES NFR BLD AUTO: 9.2 % (ref 5–12)
NEUTROPHILS # BLD AUTO: 3.8 10*3/MM3 (ref 1.7–7)
NEUTROPHILS NFR BLD AUTO: 64.5 % (ref 42.7–76)
NITRITE UR QL STRIP: NEGATIVE
PH UR STRIP.AUTO: 7 [PH] (ref 4.6–8)
PLATELET # BLD AUTO: 209 10*3/MM3 (ref 140–450)
PMV BLD AUTO: 8.7 FL (ref 6–12)
POTASSIUM BLD-SCNC: 4.1 MMOL/L (ref 3.5–5.2)
PROT SERPL-MCNC: 7.1 G/DL (ref 6–8.5)
PROT UR QL STRIP: NEGATIVE
RBC # BLD AUTO: 4.7 10*6/MM3 (ref 3.77–5.28)
RBC # UR: NORMAL /HPF
REF LAB TEST METHOD: NORMAL
SODIUM BLD-SCNC: 141 MMOL/L (ref 136–145)
SP GR UR STRIP: 1.01 (ref 1–1.03)
SQUAMOUS #/AREA URNS HPF: NORMAL /HPF
TRIGL SERPL-MCNC: 97 MG/DL (ref 0–150)
UROBILINOGEN UR QL STRIP: ABNORMAL
VLDLC SERPL-MCNC: 19.4 MG/DL (ref 5–40)
WBC NRBC COR # BLD: 5.9 10*3/MM3 (ref 3.4–10.8)
WBC UR QL AUTO: NORMAL /HPF

## 2019-09-09 PROCEDURE — 83036 HEMOGLOBIN GLYCOSYLATED A1C: CPT | Performed by: FAMILY MEDICINE

## 2019-09-09 PROCEDURE — 80061 LIPID PANEL: CPT | Performed by: FAMILY MEDICINE

## 2019-09-09 PROCEDURE — 80053 COMPREHEN METABOLIC PANEL: CPT | Performed by: FAMILY MEDICINE

## 2019-09-09 PROCEDURE — 85025 COMPLETE CBC W/AUTO DIFF WBC: CPT | Performed by: FAMILY MEDICINE

## 2019-09-09 PROCEDURE — 99213 OFFICE O/P EST LOW 20 MIN: CPT | Performed by: FAMILY MEDICINE

## 2019-09-09 PROCEDURE — 81001 URINALYSIS AUTO W/SCOPE: CPT | Performed by: FAMILY MEDICINE

## 2019-09-09 PROCEDURE — 36415 COLL VENOUS BLD VENIPUNCTURE: CPT | Performed by: FAMILY MEDICINE

## 2019-09-09 NOTE — PROGRESS NOTES
SUBJECTIVE:  The patient is an 79-year-old white female who is here for follow-up.  She has a history of hypertension diabetes and atrial fibrillation.  She is doing well.  Her blood sugars have been controlled.  No complaints.    PAST MEDICAL HISTORY:  Reviewed.    REVIEW OF SYSTEMS:  Please see above; all others reviewed and are negative.      OBJECTIVE:   Vitals signs are reviewed and are stable.    General:  Well-nourished.  Alert and oriented x3 in no acute distress.  HEENT: PERRLA.   Neck:  Supple.   Lungs:  Clear.    Heart: Irregularly irregular rate and rhythm.   Abdomen:   Soft, nontender.   Extremities:  No cyanosis, clubbing or edema.   Neurological:  Grossly intact without motor or sensory deficits.     ASSESSMENT:    Hypertension  Hyperlipidemia  Type 2 diabetes  Atrial fibrillation  PLAN: She is going to check and see when her last Cologuard was ordered.  If it has been 3 years she will do it again.  CMP fasting lipid hemoglobin A1c ordered.  Continue healthy lifestyle.  Follow-up on labs.  Call if problems.    Dictated utilizing Dragon dictation.

## 2019-10-02 LAB
INR PPP: 2.17 (ref 0.9–1.1)
PROTHROMBIN TIME: 23.8 SECONDS (ref 11.7–14.2)

## 2019-10-03 ENCOUNTER — ANTICOAGULATION VISIT (OUTPATIENT)
Dept: PHARMACY | Facility: HOSPITAL | Age: 79
End: 2019-10-03

## 2019-10-03 DIAGNOSIS — I48.91 ATRIAL FIBRILLATION WITH RVR (HCC): ICD-10-CM

## 2019-10-03 NOTE — PROGRESS NOTES
Anticoagulation Clinic Progress Note    Anticoagulation Summary  As of 10/3/2019    INR goal:   2.0-3.0   TTR:   99.3 % (10.2 mo)   INR used for dosin.17 (10/2/2019)   Warfarin maintenance plan:   4 mg every Sun, Tue, Thu; 2 mg all other days   Weekly warfarin total:   20 mg   Plan last modified:   Judie Barragan [facility] (2018)   Next INR check:   10/31/2019   Priority:   Maintenance   Target end date:       Indications    Atrial fibrillation with RVR (CMS/HCC) [I48.91]             Anticoagulation Episode Summary     INR check location:       Preferred lab:       Send INR reminders to:    [facility]  POOL    Comments:   [facility]      Anticoagulation Care Providers     Provider Role Specialty Phone number    Sly Saleh MD Referring Cardiology [phone]          Clinic Interview:  Patient Findings     Negatives:   Signs/symptoms of thrombosis, Signs/symptoms of bleeding,   Laboratory test error suspected, Change in health, Change in alcohol use,   Change in activity, Upcoming invasive procedure, Emergency department   visit, Upcoming dental procedure, Missed doses, Extra doses, Change in   medications, Change in diet/appetite, Hospital admission, Bruising, Other   complaints      Clinical Outcomes     Negatives:   Major bleeding event, Thromboembolic event,   Anticoagulation-related hospital admission, Anticoagulation-related ED   visit, Anticoagulation-related fatality        INR History:  Anticoagulation Monitoring 2019 2019 10/3/2019   INR 2.00 2.5 2.17   INR Date 2019 2019 10/2/2019   INR Goal 2.0-3.0 2.0-3.0 2.0-3.0   Trend Same Same Same   Last Week Total 20 mg 20 mg 20 mg   Next Week Total 20 mg 20 mg 20 mg   Sun 4 mg 4 mg 4 mg   Mon 2 mg 2 mg 2 mg   Tue 4 mg 4 mg 4 mg   Wed 2 mg 2 mg 2 mg   Thu 4 mg 4 mg 4 mg   Fri 2 mg 2 mg 2 mg   Sat 2 mg 2 mg 2 mg   Visit Report - - -   Some recent data might be hidden       Plan:  1. INR is Therapeutic today- see above  in Anticoagulation Summary.  Will instruct Arelis Johnson to Continue their warfarin regimen- see above in Anticoagulation Summary.  2. Follow up in 4 weeks  3. Patient declines warfarin refills.  4. Verbal and written information provided. Patient expresses understanding and has no further questions at this time.    George Vela, PharmD

## 2019-10-29 ENCOUNTER — OFFICE VISIT (OUTPATIENT)
Dept: CARDIOLOGY | Facility: CLINIC | Age: 79
End: 2019-10-29

## 2019-10-29 VITALS
DIASTOLIC BLOOD PRESSURE: 62 MMHG | WEIGHT: 178 LBS | BODY MASS INDEX: 29.66 KG/M2 | HEART RATE: 75 BPM | SYSTOLIC BLOOD PRESSURE: 104 MMHG | HEIGHT: 65 IN

## 2019-10-29 DIAGNOSIS — Z79.01 LONG TERM (CURRENT) USE OF ANTICOAGULANTS: ICD-10-CM

## 2019-10-29 DIAGNOSIS — I10 ESSENTIAL HYPERTENSION: Primary | ICD-10-CM

## 2019-10-29 DIAGNOSIS — I48.21 PERMANENT ATRIAL FIBRILLATION (HCC): ICD-10-CM

## 2019-10-29 PROCEDURE — 93000 ELECTROCARDIOGRAM COMPLETE: CPT | Performed by: INTERNAL MEDICINE

## 2019-10-29 PROCEDURE — 99213 OFFICE O/P EST LOW 20 MIN: CPT | Performed by: INTERNAL MEDICINE

## 2019-10-30 RX ORDER — DILTIAZEM HYDROCHLORIDE 180 MG/1
CAPSULE, COATED, EXTENDED RELEASE ORAL
Qty: 30 CAPSULE | Refills: 5 | Status: SHIPPED | OUTPATIENT
Start: 2019-10-30 | End: 2020-04-27

## 2019-10-31 LAB
INR PPP: 2.07 (ref 0.9–1.1)
PROTHROMBIN TIME: 23 SECONDS (ref 11.7–14.2)

## 2019-11-01 ENCOUNTER — ANTICOAGULATION VISIT (OUTPATIENT)
Dept: PHARMACY | Facility: HOSPITAL | Age: 79
End: 2019-11-01

## 2019-11-01 DIAGNOSIS — I48.91 ATRIAL FIBRILLATION WITH RVR (HCC): ICD-10-CM

## 2019-11-01 PROBLEM — I48.19 PERSISTENT ATRIAL FIBRILLATION: Status: RESOLVED | Noted: 2017-04-06 | Resolved: 2019-11-01

## 2019-11-01 PROBLEM — Z79.01 LONG TERM (CURRENT) USE OF ANTICOAGULANTS: Status: ACTIVE | Noted: 2019-11-01

## 2019-11-01 PROBLEM — I48.21 PERMANENT ATRIAL FIBRILLATION: Status: ACTIVE | Noted: 2019-11-01

## 2019-11-01 NOTE — PROGRESS NOTES
Anticoagulation Clinic Progress Note    Anticoagulation Summary  As of 2019    INR goal:   2.0-3.0   TTR:   99.4 % (11.1 mo)   INR used for dosin.07 (10/31/2019)   Warfarin maintenance plan:   4 mg every Sun, Tue, Thu; 2 mg all other days   Weekly warfarin total:   20 mg   No change documented:   Savage Wasserman RPH   Plan last modified:   Judie Barragan RPH (2018)   Next INR check:   2019   Priority:   Maintenance   Target end date:       Indications    Atrial fibrillation with RVR (CMS/[facility]) [I48.91]             Anticoagulation Episode Summary     INR check location:       Preferred lab:       Send INR reminders to:   [facility]  POOL    Comments:   [facility]      Anticoagulation Care Providers     Provider Role Specialty Phone number    Sly Saleh MD Referring Cardiology [phone]            Clinic Interview:  Patient Findings     Negatives:   Signs/symptoms of thrombosis, Signs/symptoms of bleeding,   Laboratory test error suspected, Change in health, Change in alcohol use,   Change in activity, Upcoming invasive procedure, Emergency department   visit, Upcoming dental procedure, Missed doses, Extra doses, Change in   medications, Change in diet/appetite, Hospital admission, Bruising, Other   complaints      Clinical Outcomes     Negatives:   Major bleeding event, Thromboembolic event,   Anticoagulation-related hospital admission, Anticoagulation-related ED   visit, Anticoagulation-related fatality        INR History:  Anticoagulation Monitoring 2019 10/3/2019 2019   INR 2.5 2.17 2.07   INR Date 2019 10/2/2019 10/31/2019   INR Goal 2.0-3.0 2.0-3.0 2.0-3.0   Trend Same Same Same   Last Week Total 20 mg 20 mg 20 mg   Next Week Total 20 mg 20 mg 20 mg   Sun 4 mg 4 mg 4 mg   Mon 2 mg 2 mg 2 mg   Tue 4 mg 4 mg 4 mg   Wed 2 mg 2 mg 2 mg   Thu 4 mg 4 mg 4 mg   Fri 2 mg 2 mg 2 mg   Sat 2 mg 2 mg 2 mg   Visit Report - - -   Some recent data might be hidden        Plan:  1. INR is Therapeutic today- see above in Anticoagulation Summary.   Will instruct Arelis Johnson to Continue their warfarin regimen- see above in Anticoagulation Summary.  2. Follow up in 5 weeks due to Thanksgiving holiday.  3. They have been instructed to call if any changes in medications, doses, concerns, etc. Patient expresses understanding and has no further questions at this time.    Savage Wasserman [facility]

## 2019-11-01 NOTE — PROGRESS NOTES
Date of Office Visit: 10/29/2019  Encounter Provider: Shashank Mckeon MD  Place of Service: [facility] CARDIOLOGY  Patient Name: Arelis Johnson  :1940    Chief complaint  Follow-up of permanent atrial fibrillation    HPI: Arelis Johnson is a 79 y.o. female who presents today for follow-up of permanent atrial fibrillation.  The patient reports that she is done well, and is engaging in the light exercise.  She denies any awareness of palpitations or tachycardia.  She says her heart rate increases as expected when she is engaging in exercise, but does not get too fast.  She has not had any syncope.  Her INR levels have been appropriate.  Her rate is controlled on metoprolol and Cardizem.  She has hypertension, also controlled, on ramipril.          Past Medical History:   Diagnosis Date   • Diabetes mellitus (CMS/HCC)    • Hyperlipidemia    • Hypertension    • IFG (impaired fasting glucose)    • Palpitations    • SOB (shortness of breath)        Past Surgical History:   Procedure Laterality Date   • ANAL FISTULA REPAIR     • BACK SURGERY      l spine ruptured disk   • CHOLECYSTECTOMY     • JOINT REPLACEMENT      bilateral knees       Social History     Socioeconomic History   • Marital status:      Spouse name: Not on file   • Number of children: Not on file   • Years of education: Not on file   • Highest education level: Not on file   Tobacco Use   • Smoking status: Never Smoker   • Smokeless tobacco: Never Used   Substance and Sexual Activity   • Alcohol use: No   • Drug use: No   • Sexual activity: No       Family History   Problem Relation Age of Onset   • Hypertension Mother    • Heart disease Father    • Hypertension Father    • Liver disease Brother        Review of Systems   Constitution: Positive for malaise/fatigue.   Cardiovascular: Negative.    Respiratory: Negative.    Musculoskeletal: Positive for back pain and joint pain.   Gastrointestinal: Negative.   "      Allergies   Allergen Reactions   • Percocet [Oxycodone-Acetaminophen] GI Intolerance   • Sulfa Antibiotics Nausea And Vomiting   • Penicillins Rash         Current Outpatient Medications:   •  Calcium Carb-Cholecalciferol (CALCIUM + D3) 600-200 MG-UNIT tablet, Take 1 tablet by mouth Daily., Disp: , Rfl:   •  furosemide (LASIX) 40 MG tablet, TAKE 1 TABLET BY MOUTH DAILY., Disp: 90 tablet, Rfl: 1  •  metFORMIN ER (GLUCOPHAGE-XR) 500 MG 24 hr tablet, Take 1 tablet by mouth Daily With Breakfast., Disp: 90 tablet, Rfl: 3  •  metoprolol tartrate (LOPRESSOR) 50 MG tablet, TAKE ONE AND ONE-HALF (1 & 1/2) TABLETS (BY MOUTH) TWICE DAILY, Disp: 90 tablet, Rfl: 5  •  ramipril (ALTACE) 10 MG capsule, TAKE 1 CAPSULE DAILY, Disp: 90 capsule, Rfl: 3  •  simvastatin (ZOCOR) 40 MG tablet, TAKE 1 TABLET DAILY, Disp: 90 tablet, Rfl: 3  •  warfarin (COUMADIN) 2 MG tablet, Take 2 mg by mouth 2 (Two) Times a Week. Tuesday, Saturday, Disp: , Rfl:   •  warfarin (COUMADIN) 4 MG tablet, TAKE ONE tablet (by mouth) EACH DAY, Disp: 90 tablet, Rfl: 0  •  dilTIAZem CD (CARDIZEM CD) 180 MG 24 hr capsule, TAKE ONE CAPSULE (BY MOUTH) EACH DAY, Disp: 30 capsule, Rfl: 5      Objective:     Vitals:    10/29/19 1311   BP: 104/62   BP Location: Right arm   Patient Position: Sitting   Cuff Size: Large Adult   Pulse: 75   Weight: 80.7 kg (178 lb)   Height: 163.8 cm (64.5\")     Body mass index is 30.08 kg/m².    PHYSICAL EXAM:    Physical Exam   Constitutional: She is oriented to person, place, and time. No distress.   Cardiovascular: Normal rate, regular rhythm and normal heart sounds.   Pulmonary/Chest: Effort normal. No respiratory distress.   Neurological: She is alert and oriented to person, place, and time.   Skin: Skin is warm and dry.   Psychiatric: She has a normal mood and affect. Her behavior is normal. Judgment and thought content normal.   Nursing note and vitals reviewed.          ECG 12 Lead  Date/Time: 10/29/2019 11:36 AM  Performed by: " Shashank Mckeon MD  Authorized by: Shashank Mckeon MD   Comparison: compared with previous ECG from 4/23/2019  Rhythm: atrial fibrillation    Clinical impression: abnormal EKG              Assessment:       Diagnosis Plan   1. Essential hypertension     2. Permanent atrial fibrillation     3. Long term (current) use of anticoagulants            Plan:       1.  Permanent atrial fibrillation, stable    Rate well controlled.  She is engaging in light activity.  No symptoms.  Continue current medications and anticoagulation.    As always, it has been a pleasure to participate in your patient's care.    2.  Hypertension, stable    Continue current medications.    3.  Long-term use of anticoagulant    INRs have been in goal range of 2-3.      Sincerely,         Shashank Mckeon MD

## 2019-11-09 RX ORDER — METFORMIN HYDROCHLORIDE 500 MG/1
500 TABLET, EXTENDED RELEASE ORAL
Qty: 90 TABLET | Refills: 3 | Status: SHIPPED | OUTPATIENT
Start: 2019-11-09 | End: 2020-11-04

## 2019-12-05 LAB
INR PPP: 2.14 (ref 0.9–1.1)
PROTHROMBIN TIME: 23.6 SECONDS (ref 11.7–14.2)

## 2019-12-06 ENCOUNTER — ANTICOAGULATION VISIT (OUTPATIENT)
Dept: PHARMACY | Facility: HOSPITAL | Age: 79
End: 2019-12-06

## 2019-12-06 NOTE — PROGRESS NOTES
Anticoagulation Clinic Progress Note    Anticoagulation Summary  As of 2019    INR goal:   2.0-3.0   TTR:   99.4 % (1 y)   INR used for dosin.14 (2019)   Warfarin maintenance plan:   4 mg every Sun, Tue, Thu; 2 mg all other days   Weekly warfarin total:   20 mg   No change documented:   Savage Wasserman RPH   Plan last modified:   Judie Barragan RPH (2018)   Next INR check:   2020   Priority:   Maintenance   Target end date:       Indications    Atrial fibrillation with RVR (CMS/HCC) (Resolved) [I48.91]             Anticoagulation Episode Summary     INR check location:       Preferred lab:       Send INR reminders to:   [facility]  POOL    Comments:   [facility]      Anticoagulation Care Providers     Provider Role Specialty Phone number    Sly Saleh MD Referring Cardiology [phone]              INR History:  Anticoagulation Monitoring 10/3/2019 2019 2019   INR 2.17 2.07 2.14   INR Date 10/2/2019 10/31/2019 2019   INR Goal 2.0-3.0 2.0-3.0 2.0-3.0   Trend Same Same Same   Last Week Total 20 mg 20 mg 20 mg   Next Week Total 20 mg 20 mg 20 mg   Sun 4 mg 4 mg 4 mg   Mon 2 mg 2 mg 2 mg   Tue 4 mg 4 mg 4 mg   Wed 2 mg 2 mg 2 mg   Thu 4 mg 4 mg 4 mg   Fri 2 mg 2 mg 2 mg   Sat 2 mg 2 mg 2 mg   Visit Report - - -   Some recent data might be hidden       Plan:  1. INR is Therapeutic today- see above in Anticoagulation Summary.   Will instruct Arelis Johnson to Continue their warfarin regimen- see above in Anticoagulation Summary.  2. Follow up in 1 month  3. Attempted to contact without success. Left HIPAA-friendly VM with instructions to continue the same dose and recheck in 1 month.     Savage Wasserman RPH

## 2019-12-06 NOTE — PROGRESS NOTES
Anticoagulation Clinic Progress Note    Anticoagulation Summary  As of 2019    INR goal:   2.0-3.0   TTR:   99.4 % (1 y)   INR used for dosin.14 (2019)   Warfarin maintenance plan:   4 mg every Sun, Tue, Thu; 2 mg all other days   Weekly warfarin total:   20 mg   No change documented:   Savage Wasserman RPH   Plan last modified:   Judie Barragan RPH (2018)   Next INR check:   2020   Priority:   Maintenance   Target end date:       Indications    Atrial fibrillation with RVR (CMS/HCC) (Resolved) [I48.91]             Anticoagulation Episode Summary     INR check location:       Preferred lab:       Send INR reminders to:   [facility] [employer]  POOL    Comments:   [facility]      Anticoagulation Care Providers     Provider Role Specialty Phone number    Sly Saleh MD Referring Cardiology [phone]            Clinic Interview:  Patient Findings     Negatives:   Signs/symptoms of thrombosis, Signs/symptoms of bleeding,   Laboratory test error suspected, Change in health, Change in alcohol use,   Change in activity, Upcoming invasive procedure, Emergency department   visit, Upcoming dental procedure, Missed doses, Extra doses, Change in   medications, Change in diet/appetite, Hospital admission, Bruising, Other   complaints      Clinical Outcomes     Negatives:   Major bleeding event, Thromboembolic event,   Anticoagulation-related hospital admission, Anticoagulation-related ED   visit, Anticoagulation-related fatality        INR History:  Anticoagulation Monitoring 10/3/2019 2019 2019   INR 2.17 2.07 2.14   INR Date 10/2/2019 10/31/2019 2019   INR Goal 2.0-3.0 2.0-3.0 2.0-3.0   Trend Same Same Same   Last Week Total 20 mg 20 mg 20 mg   Next Week Total 20 mg 20 mg 20 mg   Sun 4 mg 4 mg 4 mg   Mon 2 mg 2 mg 2 mg   Tue 4 mg 4 mg 4 mg   Wed 2 mg 2 mg 2 mg   Thu 4 mg 4 mg 4 mg   Fri 2 mg 2 mg 2 mg   Sat 2 mg 2 mg 2 mg   Visit Report - - -   Some recent data might be  hidden       Plan:  1. INR is Therapeutic today- see above in Anticoagulation Summary.   Will instruct Arelis Johnson to Continue their warfarin regimen- see above in Anticoagulation Summary.  2. Follow up in 5 weeks  3. They have been instructed to call if any changes in medications, doses, concerns, etc. Patient expresses understanding and has no further questions at this time.    Savage Wasserman RP

## 2020-01-06 NOTE — PATIENT INSTRUCTIONS
Medicare Wellness  Personal Prevention Plan of Service     Date of Office Visit:  2018  Encounter Provider:  Chandu Erickson MD  Place of Service:  [facility]  Patient Name: Arelis Johnson  :  1940    As part of the Medicare Wellness portion of your visit today, we are providing you with this personalized preventive plan of services (PPPS). This plan is based upon recommendations of the [place] Preventive Services Task Force (USPSTF) and the Advisory Committee on Immunization Practices (ACIP).    This lists the preventive care services that should be considered, and provides dates of when you are due. Items listed as completed are up-to-date and do not require any further intervention.    Health Maintenance   Topic Date Due   • DIABETIC FOOT EXAM  1940   • TDAP/TD VACCINES (1 - Tdap) 1958   • MEDICARE ANNUAL WELLNESS  2016   • URINE MICROALBUMIN  2018   • HEMOGLOBIN A1C  2018   • DIABETIC EYE EXAM  2018   • LIPID PANEL  2018   • MAMMOGRAM  2019   • INFLUENZA VACCINE  Completed   • PNEUMOCOCCAL VACCINES (65+ LOW/MEDIUM RISK)  Addressed   • ZOSTER VACCINE  Completed       No orders of the defined types were placed in this encounter.      No Follow-up on file.         (3) adequate

## 2020-01-10 ENCOUNTER — ANTICOAGULATION VISIT (OUTPATIENT)
Dept: PHARMACY | Facility: HOSPITAL | Age: 80
End: 2020-01-10

## 2020-01-10 LAB
INR PPP: 2.2 (ref 0.8–1.2)
PROTHROMBIN TIME: 21.9 SEC (ref 9.1–12)

## 2020-01-10 NOTE — PROGRESS NOTES
Anticoagulation Clinic Progress Note    Anticoagulation Summary  As of 1/10/2020    INR goal:   2.0-3.0   TTR:   99.5 % (1.1 y)   INR used for dosin.2 (2020)   Warfarin maintenance plan:   4 mg every Sun, Tue, Thu; 2 mg all other days   Weekly warfarin total:   20 mg   Plan last modified:   Judie Barragan [facility] (2018)   Next INR check:   2020   Priority:   Maintenance   Target end date:       Indications    Atrial fibrillation with RVR (CMS/HCC) (Resolved) [I48.91]             Anticoagulation Episode Summary     INR check location:       Preferred lab:       Send INR reminders to:   [facility] [employer]  POOL    Comments:   [facility]      Anticoagulation Care Providers     Provider Role Specialty Phone number    Sly Saleh MD Referring Cardiology [phone]          Clinic Interview:      INR History:  Anticoagulation Monitoring 2019 2019 1/10/2020   INR 2.07 2.14 2.2   INR Date 10/31/2019 2019 2020   INR Goal 2.0-3.0 2.0-3.0 2.0-3.0   Trend Same Same Same   Last Week Total 20 mg 20 mg 20 mg   Next Week Total 20 mg 20 mg 20 mg   Sun 4 mg 4 mg 4 mg   Mon 2 mg 2 mg 2 mg   Tue 4 mg 4 mg 4 mg   Wed 2 mg 2 mg 2 mg   Thu 4 mg 4 mg 4 mg   Fri 2 mg 2 mg 2 mg   Sat 2 mg 2 mg 2 mg   Visit Report - - -   Some recent data might be hidden       Plan:  1. INR is Therapeutic today- see above in Anticoagulation Summary.   Will instruct Arelis Johnson to Continue their warfarin regimen- see above in Anticoagulation Summary.  2. Follow up in 4 weeks  3. Spoke with pt today.They have been instructed to call if any changes in medications, doses, concerns, etc. Patient expresses understanding and has no further questions at this time.    Annita Mccormack [facility]

## 2020-02-06 LAB
INR PPP: 2.06 (ref 0.9–1.1)
PROTHROMBIN TIME: 22.9 SECONDS (ref 11.7–14.2)

## 2020-02-07 ENCOUNTER — ANTICOAGULATION VISIT (OUTPATIENT)
Dept: PHARMACY | Facility: HOSPITAL | Age: 80
End: 2020-02-07

## 2020-02-07 RX ORDER — FUROSEMIDE 40 MG/1
40 TABLET ORAL DAILY
Qty: 90 TABLET | Refills: 0 | Status: SHIPPED | OUTPATIENT
Start: 2020-02-07 | End: 2020-05-11

## 2020-02-07 NOTE — PROGRESS NOTES
Anticoagulation Clinic Progress Note    Anticoagulation Summary  As of 2020    INR goal:   2.0-3.0   TTR:   99.5 % (1.2 y)   INR used for dosin.06 (2020)   Warfarin maintenance plan:   4 mg every Sun, Tue, Thu; 2 mg all other days   Weekly warfarin total:   20 mg   Plan last modified:   Judie Barragan [facility] (2018)   Next INR check:   3/5/2020   Priority:   Maintenance   Target end date:       Indications    Atrial fibrillation with RVR (CMS/HCC) (Resolved) [I48.91]             Anticoagulation Episode Summary     INR check location:       Preferred lab:       Send INR reminders to:   [facility] [employer]  POOL    Comments:   [facility]      Anticoagulation Care Providers     Provider Role Specialty Phone number    Sly Saleh MD Referring Cardiology [phone]          Clinic Interview:      INR History:  Anticoagulation Monitoring 2019 1/10/2020 2020   INR 2.14 2.2 2.06   INR Date 2019   INR Goal 2.0-3.0 2.0-3.0 2.0-3.0   Trend Same Same Same   Last Week Total 20 mg 20 mg 20 mg   Next Week Total 20 mg 20 mg 20 mg   Sun 4 mg 4 mg 4 mg   Mon 2 mg 2 mg 2 mg   Tue 4 mg 4 mg 4 mg   Wed 2 mg 2 mg 2 mg   Thu 4 mg 4 mg 4 mg   Fri 2 mg 2 mg 2 mg   Sat 2 mg 2 mg 2 mg   Visit Report - - -   Some recent data might be hidden       Plan:  1. INR is Therapeutic today- see above in Anticoagulation Summary.   Will instruct Arelis Johnson to Continue their warfarin regimen- see above in Anticoagulation Summary.  2. Follow up in 4 weeks  3. Left VM to call if any questions/issues. They have been instructed to call if any changes in medications, doses, concerns, etc.  Annita Mccormack [facility]

## 2020-02-27 RX ORDER — WARFARIN SODIUM 4 MG/1
TABLET ORAL
Qty: 65 TABLET | Refills: 1 | Status: SHIPPED | OUTPATIENT
Start: 2020-02-27 | End: 2020-11-30

## 2020-03-02 RX ORDER — METOPROLOL TARTRATE 50 MG/1
TABLET, FILM COATED ORAL
Qty: 90 TABLET | Refills: 4 | Status: SHIPPED | OUTPATIENT
Start: 2020-03-02 | End: 2020-08-28

## 2020-03-05 LAB
INR PPP: 2.01 (ref 0.9–1.1)
PROTHROMBIN TIME: 22.5 SECONDS (ref 11.7–14.2)

## 2020-03-06 ENCOUNTER — ANTICOAGULATION VISIT (OUTPATIENT)
Dept: PHARMACY | Facility: HOSPITAL | Age: 80
End: 2020-03-06

## 2020-03-06 NOTE — PROGRESS NOTES
Anticoagulation Clinic Progress Note    Anticoagulation Summary  As of 3/6/2020    INR goal:   2.0-3.0   TTR:   99.5 % (1.3 y)   INR used for dosin.01 (3/5/2020)   Warfarin maintenance plan:   4 mg every Sun, Tue, Thu; 2 mg all other days   Weekly warfarin total:   20 mg   Plan last modified:   Judie Barragan [facility] (2018)   Next INR check:   4/3/2020   Priority:   Maintenance   Target end date:       Indications    Atrial fibrillation with RVR (CMS/HCC) (Resolved) [I48.91]             Anticoagulation Episode Summary     INR check location:       Preferred lab:       Send INR reminders to:   [facility] [employer]  POOL    Comments:   [facility]      Anticoagulation Care Providers     Provider Role Specialty Phone number    Sly Saleh MD Referring Cardiology [phone]          Clinic Interview:      INR History:  Anticoagulation Monitoring 1/10/2020 2020 3/6/2020   INR 2.2 2.06 2.01   INR Date 2020 2020 3/5/2020   INR Goal 2.0-3.0 2.0-3.0 2.0-3.0   Trend Same Same Same   Last Week Total 20 mg 20 mg 20 mg   Next Week Total 20 mg 20 mg 20 mg   Sun 4 mg 4 mg 4 mg   Mon 2 mg 2 mg 2 mg   Tue 4 mg 4 mg 4 mg   Wed 2 mg 2 mg 2 mg   Thu 4 mg 4 mg 4 mg   Fri 2 mg 2 mg 2 mg   Sat 2 mg 2 mg 2 mg   Visit Report - - -   Some recent data might be hidden       Plan:  1. INR is Therapeutic today- see above in Anticoagulation Summary.   Will instruct Arelis Johnson to Continue their warfarin regimen- see above in Anticoagulation Summary.  2. Follow up in 4 weeks  3. Left VM today--to call us if any issues/updates. They have been instructed to call if any changes in medications, doses, concerns, etc.   Annita Mccormack [facility]

## 2020-03-31 ENCOUNTER — TELEPHONE (OUTPATIENT)
Dept: CARDIOLOGY | Facility: CLINIC | Age: 80
End: 2020-03-31

## 2020-04-01 NOTE — TELEPHONE ENCOUNTER
Spoke with patient this am and based on history of very stable INRs/warfarin dosing we can wait another 4 weeks to check an INR.      Currently we are following with her due to urgent care prescribing indomethacin and cephalexin yesterday.  We will instruct her to consult her PCP tomorrow if needed to avoid continuing the indomethacin due to risk of bleeding.

## 2020-04-02 ENCOUNTER — TELEPHONE (OUTPATIENT)
Dept: PHARMACY | Facility: HOSPITAL | Age: 80
End: 2020-04-02

## 2020-04-02 NOTE — TELEPHONE ENCOUNTER
Followed up with patient unable to get INR test at usual location.  INR has been stable so she will plan to get check in 4 weeks for a total of 8 week interval in stable patient.  She will continue cephalexin prescribed for ankle inflammation by urgent care but will not take the indomethacin since she her pain has improved. She will use acetaminophen for pain control (only as needed) and seek care if her ankle worsens.  She knows to watch for signs of bleeding while on anticoagulant and seek emergency care for any issues. She will notify us if any new medications started or if diet changes or any diarrhea.

## 2020-04-22 ENCOUNTER — TELEPHONE (OUTPATIENT)
Dept: CARDIOLOGY | Facility: CLINIC | Age: 80
End: 2020-04-22

## 2020-04-22 ENCOUNTER — TELEPHONE (OUTPATIENT)
Dept: FAMILY MEDICINE CLINIC | Facility: CLINIC | Age: 80
End: 2020-04-22

## 2020-04-22 NOTE — TELEPHONE ENCOUNTER
Patient is requesting a call back at her home number. States that her blood pressure has been getting low the past few days and she is wanting to know if she needs to have any of her medication dosages changed. Please advise.

## 2020-04-22 NOTE — TELEPHONE ENCOUNTER
Pt called with cc of lightheaded and dizziness. Her BP was 85/59. She wanted to know if she needed to have a medication adjustment...Mary

## 2020-04-22 NOTE — TELEPHONE ENCOUNTER
That is too low, she needs to call her cardiology Dr Mckeon and see which medicine they want her to hold

## 2020-04-22 NOTE — TELEPHONE ENCOUNTER
B/P 85/59 P 62. She is taking Ramipril 10mg daily, Furosemide 40mg daily and Diltiazem 180mg daily from cardiology

## 2020-04-23 RX ORDER — RAMIPRIL 5 MG/1
5 CAPSULE ORAL DAILY
Qty: 30 CAPSULE | Refills: 1 | Status: SHIPPED | OUTPATIENT
Start: 2020-04-23 | End: 2020-06-17

## 2020-04-27 ENCOUNTER — TELEPHONE (OUTPATIENT)
Dept: PHARMACY | Facility: HOSPITAL | Age: 80
End: 2020-04-27

## 2020-04-27 DIAGNOSIS — I48.21 PERMANENT ATRIAL FIBRILLATION (HCC): Primary | ICD-10-CM

## 2020-04-27 RX ORDER — DILTIAZEM HYDROCHLORIDE 180 MG/1
CAPSULE, COATED, EXTENDED RELEASE ORAL
Qty: 90 CAPSULE | Refills: 1 | Status: SHIPPED | OUTPATIENT
Start: 2020-04-27 | End: 2020-10-23

## 2020-04-27 NOTE — TELEPHONE ENCOUNTER
Patient usually has INR at [facility] in [place] but they cancelled her lab appointment for next week.  She will try to go to [facility] in [place], [place].  Lab order placed and she will call us if this order needs to be faxed.

## 2020-04-29 LAB
INR PPP: 2.69 (ref 0.9–1.1)
PROTHROMBIN TIME: 28.3 SECONDS (ref 11.7–14.2)

## 2020-04-30 ENCOUNTER — ANTICOAGULATION VISIT (OUTPATIENT)
Dept: PHARMACY | Facility: HOSPITAL | Age: 80
End: 2020-04-30

## 2020-04-30 NOTE — PROGRESS NOTES
Anticoagulation Clinic Progress Note    Anticoagulation Summary  As of 2020    INR goal:   2.0-3.0   TTR:   99.6 % (1.4 y)   INR used for dosin.69 (2020)   Warfarin maintenance plan:   4 mg every Sun, Tue, Thu; 2 mg all other days   Weekly warfarin total:   20 mg   Plan last modified:   Judie Barragan [facility] (2018)   Next INR check:   6/10/2020   Priority:   Maintenance   Target end date:       Indications    Atrial fibrillation with RVR (CMS/HCC) (Resolved) [I48.91]             Anticoagulation Episode Summary     INR check location:       Preferred lab:       Send INR reminders to:   [facility] [place]  POOL    Comments:   [facility]      Anticoagulation Care Providers     Provider Role Specialty Phone number    Sly Saleh MD Referring Cardiology [phone]          Clinic Interview:      INR History:  Anticoagulation Monitoring 2020 3/6/2020 2020   INR 2.06 2.01 2.69   INR Date 2020 3/5/2020 2020   INR Goal 2.0-3.0 2.0-3.0 2.0-3.0   Trend Same Same Same   Last Week Total 20 mg 20 mg 20 mg   Next Week Total 20 mg 20 mg 20 mg   Sun 4 mg 4 mg 4 mg   Mon 2 mg 2 mg 2 mg   Tue 4 mg 4 mg 4 mg   Wed 2 mg 2 mg 2 mg   Thu 4 mg 4 mg 4 mg   Fri 2 mg 2 mg 2 mg   Sat 2 mg 2 mg 2 mg   Visit Report - - -   Some recent data might be hidden       Plan:  1. INR is Therapeutic today- see above in Anticoagulation Summary.   Will instruct Arelis Johnson to Continue their warfarin regimen- see above in Anticoagulation Summary.  2. Follow up in 6 weeks  3.Spoke with pt today. They have been instructed to call if any changes in medications, doses, concerns, etc. Patient expresses understanding and has no further questions at this time.    Annita Mccormack [facility]

## 2020-05-11 ENCOUNTER — TRANSCRIBE ORDERS (OUTPATIENT)
Dept: ADMINISTRATIVE | Facility: HOSPITAL | Age: 80
End: 2020-05-11

## 2020-05-11 DIAGNOSIS — Z12.31 VISIT FOR SCREENING MAMMOGRAM: Primary | ICD-10-CM

## 2020-05-11 RX ORDER — FUROSEMIDE 40 MG/1
40 TABLET ORAL DAILY
Qty: 90 TABLET | Refills: 0 | Status: SHIPPED | OUTPATIENT
Start: 2020-05-11 | End: 2020-08-06

## 2020-05-17 RX ORDER — SIMVASTATIN 40 MG
TABLET ORAL
Qty: 90 TABLET | Refills: 3 | Status: SHIPPED | OUTPATIENT
Start: 2020-05-17 | End: 2021-04-26 | Stop reason: SDUPTHER

## 2020-05-27 ENCOUNTER — OFFICE VISIT (OUTPATIENT)
Dept: FAMILY MEDICINE CLINIC | Facility: CLINIC | Age: 80
End: 2020-05-27

## 2020-05-27 VITALS
HEIGHT: 65 IN | HEART RATE: 104 BPM | BODY MASS INDEX: 28.82 KG/M2 | TEMPERATURE: 97 F | RESPIRATION RATE: 18 BRPM | WEIGHT: 173 LBS | DIASTOLIC BLOOD PRESSURE: 70 MMHG | OXYGEN SATURATION: 95 % | SYSTOLIC BLOOD PRESSURE: 122 MMHG

## 2020-05-27 DIAGNOSIS — I10 ESSENTIAL HYPERTENSION: ICD-10-CM

## 2020-05-27 DIAGNOSIS — I48.21 PERMANENT ATRIAL FIBRILLATION (HCC): ICD-10-CM

## 2020-05-27 DIAGNOSIS — Z00.00 ENCOUNTER FOR MEDICARE ANNUAL WELLNESS EXAM: Primary | ICD-10-CM

## 2020-05-27 DIAGNOSIS — E11.9 TYPE 2 DIABETES MELLITUS WITHOUT COMPLICATION, WITHOUT LONG-TERM CURRENT USE OF INSULIN (HCC): ICD-10-CM

## 2020-05-27 DIAGNOSIS — E78.2 MIXED HYPERLIPIDEMIA: ICD-10-CM

## 2020-05-27 LAB
ALBUMIN SERPL-MCNC: 4.1 G/DL (ref 3.5–5.2)
ALBUMIN/GLOB SERPL: 1.6 G/DL
ALP SERPL-CCNC: 52 U/L (ref 39–117)
ALT SERPL W P-5'-P-CCNC: 17 U/L (ref 1–33)
ANION GAP SERPL CALCULATED.3IONS-SCNC: 8.8 MMOL/L (ref 5–15)
AST SERPL-CCNC: 25 U/L (ref 1–32)
BILIRUB SERPL-MCNC: 0.5 MG/DL (ref 0.2–1.2)
BUN BLD-MCNC: 22 MG/DL (ref 8–23)
BUN/CREAT SERPL: 21 (ref 7–25)
CALCIUM SPEC-SCNC: 9.8 MG/DL (ref 8.6–10.5)
CHLORIDE SERPL-SCNC: 100 MMOL/L (ref 98–107)
CHOLEST SERPL-MCNC: 94 MG/DL (ref 0–200)
CO2 SERPL-SCNC: 30.2 MMOL/L (ref 22–29)
CREAT BLD-MCNC: 1.05 MG/DL (ref 0.57–1)
ERYTHROCYTE [DISTWIDTH] IN BLOOD BY AUTOMATED COUNT: 13.2 % (ref 12.3–15.4)
GFR SERPL CREATININE-BSD FRML MDRD: 50 ML/MIN/1.73
GLOBULIN UR ELPH-MCNC: 2.5 GM/DL
GLUCOSE BLD-MCNC: 105 MG/DL (ref 65–99)
HBA1C MFR BLD: 6.45 % (ref 4.8–5.6)
HCT VFR BLD AUTO: 41.2 % (ref 34–46.6)
HDLC SERPL-MCNC: 53 MG/DL (ref 40–60)
HGB BLD-MCNC: 13.7 G/DL (ref 12–15.9)
LDLC SERPL CALC-MCNC: 23 MG/DL (ref 0–100)
LDLC/HDLC SERPL: 0.44 {RATIO}
LYMPHOCYTES # BLD AUTO: 1.3 10*3/MM3 (ref 0.7–3.1)
LYMPHOCYTES NFR BLD AUTO: 27.8 % (ref 19.6–45.3)
MCH RBC QN AUTO: 31.1 PG (ref 26.6–33)
MCHC RBC AUTO-ENTMCNC: 33.2 G/DL (ref 31.5–35.7)
MCV RBC AUTO: 93.7 FL (ref 79–97)
MONOCYTES # BLD AUTO: 0.3 10*3/MM3 (ref 0.1–0.9)
MONOCYTES NFR BLD AUTO: 6.4 % (ref 5–12)
NEUTROPHILS # BLD AUTO: 3.2 10*3/MM3 (ref 1.7–7)
NEUTROPHILS NFR BLD AUTO: 65.8 % (ref 42.7–76)
PLATELET # BLD AUTO: 193 10*3/MM3 (ref 140–450)
PMV BLD AUTO: 8.8 FL (ref 6–12)
POTASSIUM BLD-SCNC: 3.8 MMOL/L (ref 3.5–5.2)
PROT SERPL-MCNC: 6.6 G/DL (ref 6–8.5)
RBC # BLD AUTO: 4.4 10*6/MM3 (ref 3.77–5.28)
SODIUM BLD-SCNC: 139 MMOL/L (ref 136–145)
TRIGL SERPL-MCNC: 88 MG/DL (ref 0–150)
VLDLC SERPL-MCNC: 17.6 MG/DL (ref 5–40)
WBC NRBC COR # BLD: 4.8 10*3/MM3 (ref 3.4–10.8)

## 2020-05-27 PROCEDURE — 85025 COMPLETE CBC W/AUTO DIFF WBC: CPT | Performed by: FAMILY MEDICINE

## 2020-05-27 PROCEDURE — 80053 COMPREHEN METABOLIC PANEL: CPT | Performed by: FAMILY MEDICINE

## 2020-05-27 PROCEDURE — 36415 COLL VENOUS BLD VENIPUNCTURE: CPT | Performed by: FAMILY MEDICINE

## 2020-05-27 PROCEDURE — 80061 LIPID PANEL: CPT | Performed by: FAMILY MEDICINE

## 2020-05-27 PROCEDURE — G0439 PPPS, SUBSEQ VISIT: HCPCS | Performed by: FAMILY MEDICINE

## 2020-05-27 PROCEDURE — 83036 HEMOGLOBIN GLYCOSYLATED A1C: CPT | Performed by: FAMILY MEDICINE

## 2020-05-27 NOTE — PROGRESS NOTES
The ABCs of the Annual Wellness Visit  Subsequent Medicare Wellness Visit    Chief Complaint   Patient presents with   • Diabetes   • Hyperlipidemia       Subjective   History of Present Illness:  Arelis Johnson is a 80 y.o. female who presents for a Subsequent Medicare Wellness Visit.    HEALTH RISK ASSESSMENT    Recent Hospitalizations:  No hospitalization(s) within the last year.    Current Medical Providers:  Patient Care Team:  Chandu Erickson MD as PCP - General  Chandu Erickson MD as PCP - Family Medicine  Chandu Erickson MD as PCP - Claims Attributed  Heriberto Singer MD as Consulting Physician (Orthopedic Surgery)  Sly Saleh MD as Consulting Physician (Cardiology)  Shashank Mckeon MD as Consulting Physician (Cardiology)    Smoking Status:  Social History     Tobacco Use   Smoking Status Never Smoker   Smokeless Tobacco Never Used       Alcohol Consumption:  Social History     Substance and Sexual Activity   Alcohol Use No       Depression Screen:   PHQ-2/PHQ-9 Depression Screening 5/27/2020   Little interest or pleasure in doing things 0   Feeling down, depressed, or hopeless 0   Trouble falling or staying asleep, or sleeping too much 0   Feeling tired or having little energy 0   Poor appetite or overeating 0   Feeling bad about yourself - or that you are a failure or have let yourself or your family down 0   Trouble concentrating on things, such as reading the newspaper or watching television 0   Moving or speaking so slowly that other people could have noticed. Or the opposite - being so fidgety or restless that you have been moving around a lot more than usual 0   Thoughts that you would be better off dead, or of hurting yourself in some way 0   Total Score 0   If you checked off any problems, how difficult have these problems made it for you to do your work, take care of things at home, or get along with other people? Not difficult at all       Fall Risk Screen:  STEADI Fall Risk  Assessment has not been completed.    Health Habits and Functional and Cognitive Screening:  Functional & Cognitive Status 5/27/2020   Do you have difficulty preparing food and eating? No   Do you have difficulty bathing yourself, getting dressed or grooming yourself? No   Do you have difficulty using the toilet? No   Do you have difficulty moving around from place to place? No   Do you have trouble with steps or getting out of a bed or a chair? No   Current Diet Diabetic Diet   Dental Exam Up to date   Eye Exam Up to date   Exercise (times per week) 3 times per week   Current Exercise Activities Include Cardiovasular Workout on Exercise Equipment   Do you need help using the phone?  No   Are you deaf or do you have serious difficulty hearing?  No   Do you need help with transportation? No   Do you need help shopping? No   Do you need help preparing meals?  No   Do you need help with housework?  No   Do you need help with laundry? No   Do you need help taking your medications? No   Do you need help managing money? No   Do you ever drive or ride in a car without wearing a seat belt? No   Have you felt unusual stress, anger or loneliness in the last month? No   Who do you live with? Alone   If you need help, do you have trouble finding someone available to you? No   Have you been bothered in the last four weeks by sexual problems? No   Do you have difficulty concentrating, remembering or making decisions? No         Does the patient have evidence of cognitive impairment? No    Asprin use counseling:Contraindicated from taking ASA    Age-appropriate Screening Schedule:  Refer to the list below for future screening recommendations based on patient's age, sex and/or medical conditions. Orders for these recommended tests are listed in the plan section. The patient has been provided with a written plan.    Health Maintenance   Topic Date Due   • TDAP/TD VACCINES (1 - Tdap) 01/14/1951   • ZOSTER VACCINE (2 of 3) 02/26/2011    • URINE MICROALBUMIN  02/25/2020   • HEMOGLOBIN A1C  03/09/2020   • INFLUENZA VACCINE  08/01/2020   • LIPID PANEL  09/09/2020   • DIABETIC EYE EXAM  09/18/2020   • MAMMOGRAM  05/29/2021          The following portions of the patient's history were reviewed and updated as appropriate: past family history and past medical history.    Outpatient Medications Prior to Visit   Medication Sig Dispense Refill   • Calcium Carb-Cholecalciferol (CALCIUM + D3) 600-200 MG-UNIT tablet Take 1 tablet by mouth Daily.     • dilTIAZem CD (CARDIZEM CD) 180 MG 24 hr capsule TAKE ONE CAPSULE (BY MOUTH) EACH DAY 90 capsule 1   • furosemide (LASIX) 40 MG tablet TAKE 1 TABLET BY MOUTH DAILY. 90 tablet 0   • metFORMIN ER (GLUCOPHAGE-XR) 500 MG 24 hr tablet TAKE 1 TABLET BY MOUTH DAILY WITH BREAKFAST. 90 tablet 3   • metoprolol tartrate (LOPRESSOR) 50 MG tablet TAKE ONE AND ONE-HALF (1 & 1/2) TABLETS (BY MOUTH) TWICE DAILY 90 tablet 4   • ramipril (ALTACE) 5 MG capsule Take 1 capsule by mouth Daily. 30 capsule 1   • simvastatin (ZOCOR) 40 MG tablet TAKE 1 TABLET DAILY 90 tablet 3   • warfarin (COUMADIN) 4 MG tablet Take one tablet (4 mg) by mouth Sun, Tues, and Thurs, and take one-half tablet (2 mg) by mouth all other days or as directed. 65 tablet 1   • indomethacin (INDOCIN) 25 MG capsule Take 1 tablet 3 times a day 20 capsule 0     No facility-administered medications prior to visit.        Patient Active Problem List   Diagnosis   • IFG (impaired fasting glucose)   • Hypertension   • Hyperlipidemia   • Diabetes mellitus (CMS/HCC)   • Typical atrial flutter (CMS/HCC)   • Hearing loss of right ear due to cerumen impaction   • Permanent atrial fibrillation   • Long term (current) use of anticoagulants       Advanced Care Planning:  ACP discussion was declined by the patient. Patient does not have an advance directive, information provided.    Review of Systems    Compared to one year ago, the patient feels her physical health is the  "same.  Compared to one year ago, the patient feels her mental health is the same.    Reviewed chart for potential of high risk medication in the elderly: yes  Reviewed chart for potential of harmful drug interactions in the elderly:yes    Objective         Vitals:    05/27/20 0756   BP: 122/70   BP Location: Left arm   Patient Position: Sitting   Pulse: 104   Resp: 18   Temp: 97 °F (36.1 °C)   TempSrc: Infrared   SpO2: 95%   Weight: 78.5 kg (173 lb)   Height: 165.1 cm (65\")   PainSc: 0-No pain       Body mass index is 28.79 kg/m².  Discussed the patient's BMI with her. The BMI is in the acceptable range.    Physical Exam          Assessment/Plan   Medicare Risks and Personalized Health Plan  CMS Preventative Services Quick Reference  Advance Directive Discussion  Diabetic Lab Screening   Fall Risk  Immunizations Discussed/Encouraged (specific immunizations; Shingrix )    The above risks/problems have been discussed with the patient.  Pertinent information has been shared with the patient in the After Visit Summary.  Follow up plans and orders are seen below in the Assessment/Plan Section.    Diagnoses and all orders for this visit:    1. Type 2 diabetes mellitus without complication, without long-term current use of insulin (CMS/[facility]) (Primary)  -     CBC & Differential  -     Comprehensive Metabolic Panel  -     Hemoglobin A1c  -     Lipid Panel    2. Mixed hyperlipidemia  -     CBC & Differential  -     Comprehensive Metabolic Panel  -     Hemoglobin A1c  -     Lipid Panel    3. Essential hypertension  -     CBC & Differential  -     Comprehensive Metabolic Panel  -     Hemoglobin A1c  -     Lipid Panel    4. Permanent atrial fibrillation  -     CBC & Differential  -     Comprehensive Metabolic Panel  -     Hemoglobin A1c  -     Lipid Panel      Follow Up:  No follow-ups on file.     An After Visit Summary and PPPS were given to the patient.             Answers for HPI/ROS submitted by the patient on 5/20/2020 "   What is the primary reason for your visit?: Physical  SUBJECTIVE:  The patient is an 80-year-old white female.  She has diabetes hypertension hyperlipidemia.  She will receive a Medicare wellness exam today.  She is due lab work.  She is doing well.  She states her blood sugars are well controlled.  She was having some hypotension and called her cardiologist.  He reduced 1 of her medications and this has improved.    PAST MEDICAL HISTORY:  Reviewed.    REVIEW OF SYSTEMS:  Please see above; all others reviewed and are negative.      OBJECTIVE:   Vitals signs are reviewed and are stable.    General:  Well-nourished.  Alert and oriented x3 in no acute distress.  HEENT: PERRLA.   Neck:  Supple.   Lungs:  Clear.    Heart: Irregularly irregular rate and rhythm.   Abdomen:   Soft, nontender.   Extremities:  No cyanosis, clubbing or edema.   Neurological:  Grossly intact without motor or sensory deficits.     ASSESSMENT:    Type 2 diabetes  Chronic atrial fibrillation  Hypertension  Hyperlipidemia  PLAN: CBC CMP fasting lipids hemoglobin A1c.  Continue healthy lifestyle.  Monitor blood pressure.  Call if problems.    Dictated utilizing Dragon dictation.

## 2020-06-11 ENCOUNTER — ANTICOAGULATION VISIT (OUTPATIENT)
Dept: PHARMACY | Facility: HOSPITAL | Age: 80
End: 2020-06-11

## 2020-06-11 LAB
INR PPP: 2.55 (ref 0.9–1.1)
PROTHROMBIN TIME: 27.1 SECONDS (ref 11.7–14.2)

## 2020-06-17 RX ORDER — RAMIPRIL 5 MG/1
5 CAPSULE ORAL DAILY
Qty: 90 CAPSULE | Refills: 1 | Status: SHIPPED | OUTPATIENT
Start: 2020-06-17 | End: 2020-12-17

## 2020-06-26 ENCOUNTER — HOSPITAL ENCOUNTER (OUTPATIENT)
Dept: MAMMOGRAPHY | Facility: HOSPITAL | Age: 80
Discharge: HOME | End: 2020-06-26
Admitting: FAMILY MEDICINE

## 2020-06-26 DIAGNOSIS — Z12.31 VISIT FOR SCREENING MAMMOGRAM: ICD-10-CM

## 2020-06-26 PROCEDURE — 77067 SCR MAMMO BI INCL CAD: CPT

## 2020-06-26 PROCEDURE — 77063 BREAST TOMOSYNTHESIS BI: CPT

## 2020-07-22 LAB
INR PPP: 3.14 (ref 0.9–1.1)
PROTHROMBIN TIME: 31.1 SECONDS (ref 11.7–14.2)

## 2020-07-23 ENCOUNTER — ANTICOAGULATION VISIT (OUTPATIENT)
Dept: PHARMACY | Facility: HOSPITAL | Age: 80
End: 2020-07-23

## 2020-07-23 NOTE — PROGRESS NOTES
Anticoagulation Clinic Progress Note    Anticoagulation Summary  As of 7/23/2020    INR goal:   2.0-3.0   TTR:   98.0 % (1.6 y)   INR used for dosing:   3.14! (7/22/2020)   Warfarin maintenance plan:   4 mg every Sun, Tue, Thu; 2 mg all other days   Weekly warfarin total:   20 mg   Plan last modified:   Judie Barragan [facility] (11/21/2018)   Next INR check:   8/12/2020   Priority:   Maintenance   Target end date:       Indications    Atrial fibrillation with RVR (CMS/HCC) (Resolved) [I48.91]             Anticoagulation Episode Summary     INR check location:       Preferred lab:       Send INR reminders to:   [facility]  POOL    Comments:   [facility]      Anticoagulation Care Providers     Provider Role Specialty Phone number    Sly Saleh MD Referring Cardiology [phone]          Clinic Interview:    Did have 1-2 days of diarrhea in past 2 wks      INR History:  Anticoagulation Monitoring 4/30/2020 6/11/2020 7/23/2020   INR 2.69 2.55 3.14   INR Date 4/29/2020 6/10/2020 7/22/2020   INR Goal 2.0-3.0 2.0-3.0 2.0-3.0   Trend Same Same Same   Last Week Total 20 mg 20 mg 20 mg   Next Week Total 20 mg 20 mg 18 mg   Sun 4 mg 4 mg 2 mg (7/26); Otherwise 4 mg   Mon 2 mg 2 mg 2 mg   Tue 4 mg 4 mg 4 mg   Wed 2 mg 2 mg 2 mg   Thu 4 mg 4 mg 4 mg   Fri 2 mg 2 mg 2 mg   Sat 2 mg 2 mg 2 mg   Visit Report - - -   Some recent data might be hidden       Plan:  1. INR is Supratherapeutic today- see above in Anticoagulation Summary.   Will instruct Arelis SANDRA Johnson to reduce next 4mg dose to 2mg once only, then resume  their warfarin regimen- see above in Anticoagulation Summary.  2. Follow up in 3 weeks  3. Spoke with pt today. They have been instructed to call if any changes in medications, doses, concerns, etc. Patient expresses understanding and has no further questions at this time.    Annita Mccormack [facility]

## 2020-08-06 RX ORDER — FUROSEMIDE 40 MG/1
40 TABLET ORAL DAILY
Qty: 90 TABLET | Refills: 1 | Status: SHIPPED | OUTPATIENT
Start: 2020-08-06 | End: 2021-02-04

## 2020-08-12 LAB
INR PPP: 2.4 (ref 0.9–1.1)
PROTHROMBIN TIME: 25.4 SECONDS (ref 11.7–14.2)

## 2020-08-13 ENCOUNTER — ANTICOAGULATION VISIT (OUTPATIENT)
Dept: PHARMACY | Facility: HOSPITAL | Age: 80
End: 2020-08-13

## 2020-08-13 NOTE — PROGRESS NOTES
Anticoagulation Clinic Progress Note    Anticoagulation Summary  As of 2020    INR goal:   2.0-3.0   TTR:   97.4 % (1.7 y)   INR used for dosin.40 (2020)   Warfarin maintenance plan:   4 mg every Sun, Tue, Thu; 2 mg all other days   Weekly warfarin total:   20 mg   No change documented:   Annita Mccormack RPH   Plan last modified:   Judie Barragan RPH (2018)   Next INR check:   2020   Priority:   Maintenance   Target end date:       Indications    Atrial fibrillation with RVR (CMS/HCC) (Resolved) [I48.91]             Anticoagulation Episode Summary     INR check location:       Preferred lab:       Send INR reminders to:   [facility]  POOL    Comments:   [facility]      Anticoagulation Care Providers     Provider Role Specialty Phone number    Sly Saleh MD Referring Cardiology [phone]          Clinic Interview:      INR History:  Anticoagulation Monitoring 2020   INR 2.55 3.14 2.40   INR Date 6/10/2020 2020 2020   INR Goal 2.0-3.0 2.0-3.0 2.0-3.0   Trend Same Same Same   Last Week Total 20 mg 20 mg 20 mg   Next Week Total 20 mg 18 mg 20 mg   Sun 4 mg 2 mg (); Otherwise 4 mg 4 mg   Mon 2 mg 2 mg 2 mg   Tue 4 mg 4 mg 4 mg   Wed 2 mg 2 mg 2 mg   Thu 4 mg 4 mg 4 mg   Fri 2 mg 2 mg 2 mg   Sat 2 mg 2 mg 2 mg   Visit Report - - -   Some recent data might be hidden       Plan:  1. INR is Therapeutic today- see above in Anticoagulation Summary.   Will instruct Arelis Johnson to Continue their warfarin regimen- see above in Anticoagulation Summary.  2. Follow up in 6 weeks  3. Spoke with pt today. They have been instructed to call if any changes in medications, doses, concerns, etc. Patient expresses understanding and has no further questions at this time.    Annita Mccormack RPH

## 2020-08-28 RX ORDER — METOPROLOL TARTRATE 50 MG/1
TABLET, FILM COATED ORAL
Qty: 90 TABLET | Refills: 4 | Status: SHIPPED | OUTPATIENT
Start: 2020-08-28 | End: 2021-03-08

## 2020-09-23 LAB
INR PPP: 2.68 (ref 0.9–1.1)
PROTHROMBIN TIME: 27.6 SECONDS (ref 11.7–14.2)

## 2020-09-24 ENCOUNTER — ANTICOAGULATION VISIT (OUTPATIENT)
Dept: PHARMACY | Facility: HOSPITAL | Age: 80
End: 2020-09-24

## 2020-09-24 NOTE — PROGRESS NOTES
Anticoagulation Clinic Progress Note    Anticoagulation Summary  As of 2020    INR goal:  2.0-3.0   TTR:  97.6 % (1.8 y)   INR used for dosin.68 (2020)   Warfarin maintenance plan:  4 mg every Sun, Tue, Thu; 2 mg all other days   Weekly warfarin total:  20 mg   No change documented:  Savage Wasserman RPH   Plan last modified:  Judie Barragan RPH (2018)   Next INR check:  2020   Priority:  Maintenance   Target end date:      Indications    Atrial fibrillation with RVR (CMS/HCC) (Resolved) [I48.91]             Anticoagulation Episode Summary     INR check location:      Preferred lab:      Send INR reminders to:  [facility]  POOL    Comments:  [facility]   ([facility])      Anticoagulation Care Providers     Provider Role Specialty Phone number    Sly Saleh MD Referring Cardiology [phone]          Clinic Interview:  Patient Findings     Negatives:  Signs/symptoms of thrombosis, Signs/symptoms of bleeding,   Laboratory test error suspected, Change in health, Change in alcohol use,   Change in activity, Upcoming invasive procedure, Emergency department   visit, Upcoming dental procedure, Missed doses, Extra doses, Change in   medications, Change in diet/appetite, Hospital admission, Bruising, Other   complaints      Clinical Outcomes     Negatives:  Major bleeding event, Thromboembolic event,   Anticoagulation-related hospital admission, Anticoagulation-related ED   visit, Anticoagulation-related fatality        INR History:  Anticoagulation Monitoring 2020   INR 3.14 2.40 2.68   INR Date 2020   INR Goal 2.0-3.0 2.0-3.0 2.0-3.0   Trend Same Same Same   Last Week Total 20 mg 20 mg 20 mg   Next Week Total 18 mg 20 mg 20 mg   Sun 2 mg (); Otherwise 4 mg 4 mg 4 mg   Mon 2 mg 2 mg 2 mg   Tue 4 mg 4 mg 4 mg   Wed 2 mg 2 mg 2 mg   Thu 4 mg 4 mg 4 mg   Fri 2 mg 2 mg 2 mg   Sat 2 mg 2 mg 2 mg   Visit Report  - - -   Some recent data might be hidden       Plan:  1. INR is Therapeutic today- see above in Anticoagulation Summary.   Will instruct Arelis Johnson to Continue their warfarin regimen- see above in Anticoagulation Summary.  2. Follow up in 6 weeks  3. They have been instructed to call if any changes in medications, doses, concerns, etc. Patient expresses understanding and has no further questions at this time.    Savage Wasserman [facility]

## 2020-10-23 RX ORDER — DILTIAZEM HYDROCHLORIDE 180 MG/1
CAPSULE, COATED, EXTENDED RELEASE ORAL
Qty: 90 CAPSULE | Refills: 3 | Status: SHIPPED | OUTPATIENT
Start: 2020-10-23 | End: 2021-10-19

## 2020-11-03 ENCOUNTER — OFFICE VISIT (OUTPATIENT)
Dept: CARDIOLOGY | Facility: CLINIC | Age: 80
End: 2020-11-03

## 2020-11-03 VITALS
BODY MASS INDEX: 28.82 KG/M2 | HEART RATE: 62 BPM | SYSTOLIC BLOOD PRESSURE: 136 MMHG | WEIGHT: 173 LBS | DIASTOLIC BLOOD PRESSURE: 78 MMHG | HEIGHT: 65 IN

## 2020-11-03 DIAGNOSIS — I48.21 PERMANENT ATRIAL FIBRILLATION (HCC): Primary | ICD-10-CM

## 2020-11-03 DIAGNOSIS — Z79.01 CHRONIC ANTICOAGULATION: ICD-10-CM

## 2020-11-03 DIAGNOSIS — I10 ESSENTIAL HYPERTENSION: ICD-10-CM

## 2020-11-03 PROCEDURE — 93000 ELECTROCARDIOGRAM COMPLETE: CPT | Performed by: INTERNAL MEDICINE

## 2020-11-03 PROCEDURE — 99214 OFFICE O/P EST MOD 30 MIN: CPT | Performed by: INTERNAL MEDICINE

## 2020-11-03 NOTE — PROGRESS NOTES
Date of Office Visit: 2020  Encounter Provider: Shashank Mckeon MD  Place of Service: [facility] CARDIOLOGY  Patient Name: Arelis Johnson  :1940    Chief Complaint   Patient presents with   • Atrial Fibrillation     1 year follow up   :     HPI: Arelis Johnson is a 80 y.o. female who presents today for permanent atrial fibrillation and hypertension.  Hypertension, managed on ramipril.  She continues to have some episodes of mild dizziness, particularly in the afternoons.  She associates these periods with hypotension.  She said she called about the symptoms recently and her ramipril was reduced from 10 mg, to 5 mg a few months ago.  She has noted some improvement with the symptoms.  She has permanent atrial fibrillation.  She was noticed to have generally well controlled rhythms, with rare RVR on last Holter.  She is on Cardizem and metoprolol.  She is anticoagulated on warfarin.  She is chronically anticoagulated with warfarin.  She reports stable INRs.          Past Medical History:   Diagnosis Date   • Diabetes mellitus (CMS/HCC)    • Hyperlipidemia    • Hypertension    • IFG (impaired fasting glucose)    • Palpitations    • SOB (shortness of breath)        Past Surgical History:   Procedure Laterality Date   • ANAL FISTULA REPAIR     • BACK SURGERY      l spine ruptured disk   • CHOLECYSTECTOMY     • JOINT REPLACEMENT      bilateral knees       Social History     Socioeconomic History   • Marital status:      Spouse name: Not on file   • Number of children: Not on file   • Years of education: Not on file   • Highest education level: Not on file   Tobacco Use   • Smoking status: Never Smoker   • Smokeless tobacco: Never Used   Substance and Sexual Activity   • Alcohol use: No   • Drug use: No   • Sexual activity: Never       Family History   Problem Relation Age of Onset   • Hypertension Mother    • Heart disease Father    • Hypertension Father    • Liver  "disease Brother        Review of Systems   Constitution: Negative for malaise/fatigue.   HENT: Negative.    Eyes: Negative.    Cardiovascular: Negative for chest pain, dyspnea on exertion, leg swelling and near-syncope.   Respiratory: Negative for cough and shortness of breath.    Endocrine: Negative.    Hematologic/Lymphatic: Negative.    Skin: Negative.    Musculoskeletal: Negative.    Gastrointestinal: Negative.    Genitourinary: Negative.    Neurological: Positive for dizziness. Negative for weakness.   Psychiatric/Behavioral: Negative.    Allergic/Immunologic: Negative.        Allergies   Allergen Reactions   • Percocet [Oxycodone-Acetaminophen] GI Intolerance   • Sulfa Antibiotics Nausea And Vomiting   • Penicillins Rash         Current Outpatient Medications:   •  Calcium Carb-Cholecalciferol (CALCIUM + D3) 600-200 MG-UNIT tablet, Take 1 tablet by mouth Daily., Disp: , Rfl:   •  dilTIAZem CD (CARDIZEM CD) 180 MG 24 hr capsule, TAKE ONE CAPSULE (BY MOUTH) EACH DAY, Disp: 90 capsule, Rfl: 3  •  furosemide (LASIX) 40 MG tablet, TAKE 1 TABLET BY MOUTH DAILY., Disp: 90 tablet, Rfl: 1  •  metFORMIN ER (GLUCOPHAGE-XR) 500 MG 24 hr tablet, TAKE 1 TABLET BY MOUTH DAILY WITH BREAKFAST., Disp: 90 tablet, Rfl: 3  •  metoprolol tartrate (LOPRESSOR) 50 MG tablet, TAKE ONE AND ONE-HALF (1 & 1/2) TABLETS (BY MOUTH) TWICE DAILY, Disp: 90 tablet, Rfl: 4  •  ramipril (ALTACE) 5 MG capsule, TAKE 1 CAPSULE BY MOUTH DAILY., Disp: 90 capsule, Rfl: 1  •  simvastatin (ZOCOR) 40 MG tablet, TAKE 1 TABLET DAILY, Disp: 90 tablet, Rfl: 3  •  warfarin (COUMADIN) 4 MG tablet, Take one tablet (4 mg) by mouth Sun, Tues, and Thurs, and take one-half tablet (2 mg) by mouth all other days or as directed., Disp: 65 tablet, Rfl: 1      Objective:     Vitals:    11/03/20 1331   BP: 136/78   BP Location: Right arm   Patient Position: Sitting   Cuff Size: Large Adult   Pulse: 62   Weight: 78.5 kg (173 lb)   Height: 165.1 cm (65\")     Body mass index " is 28.79 kg/m².    PHYSICAL EXAM:    Vitals signs and nursing note reviewed.   Constitutional:       Appearance: Normal and healthy appearance.   Cardiovascular:      Normal rate. Irregularly irregular rhythm.      Murmurs: There is no murmur.   Skin:     General: Skin is warm.   Neurological:      General: No focal deficit present.      Mental Status: Alert and oriented to person, place and time.             ECG 12 Lead    Date/Time: 11/3/2020 3:00 PM  Performed by: Shashank Mckeon MD  Authorized by: Shashank Mckeon MD   Comparison: compared with previous ECG from 10/25/2019  Similar to previous ECG  Rhythm: atrial fibrillation    Clinical impression: abnormal EKG        Last INR 2.6.  On 9/23/2020.    Assessment:       Diagnosis Plan   1. Permanent atrial fibrillation (CMS/HCC)     2. Essential hypertension     3. Chronic anticoagulation            Plan:       1.  Permanent atrial fibrillation  Denies any symptoms of palpitations or tachycardia.  Were going to reduce her metoprolol to 1 tablet 25 mg twice a day to hopefully help with dizziness.    2.  Hypertension  Reduce metoprolol as above.  Continue Cardizem, continue ramipril.  If further issues, will try to reduce Cardizem.    3.  Chronic anticoagulation  Remains on warfarin.  Well-controlled INRs.    As always, it has been a pleasure to participate in your patient's care.      Sincerely,         Shashank Mckeon MD

## 2020-11-04 LAB
INR PPP: 2.45 (ref 0.9–1.1)
PROTHROMBIN TIME: 25.8 SECONDS (ref 11.7–14.2)

## 2020-11-04 RX ORDER — METFORMIN HYDROCHLORIDE 500 MG/1
500 TABLET, EXTENDED RELEASE ORAL
Qty: 90 TABLET | Refills: 0 | Status: SHIPPED | OUTPATIENT
Start: 2020-11-04 | End: 2021-02-04

## 2020-11-05 ENCOUNTER — ANTICOAGULATION VISIT (OUTPATIENT)
Dept: PHARMACY | Facility: HOSPITAL | Age: 80
End: 2020-11-05

## 2020-11-05 NOTE — PROGRESS NOTES
Anticoagulation Clinic Progress Note    Anticoagulation Summary  As of 2020    INR goal:  2.0-3.0   TTR:  97.7 % (1.9 y)   INR used for dosin.45 (2020)   Warfarin maintenance plan:  4 mg every Sun, Tue, Thu; 2 mg all other days   Weekly warfarin total:  20 mg   Plan last modified:  Judie Barragan RP (2018)   Next INR check:  2020   Priority:  Maintenance   Target end date:      Indications    Atrial fibrillation with RVR (CMS/HCC) (Resolved) [I48.91]             Anticoagulation Episode Summary     INR check location:      Preferred lab:      Send INR reminders to:  [facility]  POOL    Comments:  [facility]   ([facility] clinic)      Anticoagulation Care Providers     Provider Role Specialty Phone number    Sly Saleh MD Referring Cardiology [phone]          Clinic Interview:      INR History:  Anticoagulation Monitoring 2020   INR 2.40 2.68 2.45   INR Date 2020   INR Goal 2.0-3.0 2.0-3.0 2.0-3.0   Trend Same Same Same   Last Week Total 20 mg 20 mg 20 mg   Next Week Total 20 mg 20 mg 20 mg   Sun 4 mg 4 mg 4 mg   Mon 2 mg 2 mg 2 mg   Tue 4 mg 4 mg 4 mg   Wed 2 mg 2 mg 2 mg   Thu 4 mg 4 mg 4 mg   Fri 2 mg 2 mg 2 mg   Sat 2 mg 2 mg 2 mg   Visit Report - - -   Some recent data might be hidden       Plan:  1. INR is Therapeutic today- see above in Anticoagulation Summary.   Will instruct Arelis Johnson to Continue their warfarin regimen- see above in Anticoagulation Summary.  2. Follow up in 6 weeks  3. Left VM with instructions today. They have been instructed to call if any changes in medications, doses, concerns, etc.   Annita Mccormack [facility]

## 2020-11-30 RX ORDER — WARFARIN SODIUM 4 MG/1
TABLET ORAL
Qty: 65 TABLET | Refills: 0 | Status: SHIPPED | OUTPATIENT
Start: 2020-11-30 | End: 2021-03-01

## 2020-12-16 LAB
INR PPP: 2.68 (ref 0.9–1.1)
PROTHROMBIN TIME: 28.2 SECONDS (ref 11.7–14.2)

## 2020-12-17 ENCOUNTER — ANTICOAGULATION VISIT (OUTPATIENT)
Dept: PHARMACY | Facility: HOSPITAL | Age: 80
End: 2020-12-17

## 2020-12-17 RX ORDER — RAMIPRIL 5 MG/1
5 CAPSULE ORAL DAILY
Qty: 90 CAPSULE | Refills: 3 | Status: SHIPPED | OUTPATIENT
Start: 2020-12-17 | End: 2021-12-08

## 2020-12-17 NOTE — PROGRESS NOTES
Anticoagulation Clinic Progress Note    Anticoagulation Summary  As of 2020    INR goal:  2.0-3.0   TTR:  97.9 % (2 y)   INR used for dosin.68 (2020)   Warfarin maintenance plan:  4 mg every Sun, Tue, Thu; 2 mg all other days   Weekly warfarin total:  20 mg   No change documented:  Savage Wasserman RPH   Plan last modified:  Judie Barragan RPH (2018)   Next INR check:  2021   Priority:  Maintenance   Target end date:      Indications    Atrial fibrillation with RVR (CMS/HCC) (Resolved) [I48.91]             Anticoagulation Episode Summary     INR check location:      Preferred lab:      Send INR reminders to:  [facility]  POOL    Comments:  [facility]   ([facility])      Anticoagulation Care Providers     Provider Role Specialty Phone number    Sly Saleh MD Referring Cardiology [phone]          Clinic Interview:  Patient Findings     Negatives:  Signs/symptoms of thrombosis, Signs/symptoms of bleeding,   Laboratory test error suspected, Change in health, Change in alcohol use,   Change in activity, Upcoming invasive procedure, Emergency department   visit, Upcoming dental procedure, Missed doses, Extra doses, Change in   medications, Change in diet/appetite, Hospital admission, Bruising, Other   complaints      Clinical Outcomes     Negatives:  Major bleeding event, Thromboembolic event,   Anticoagulation-related hospital admission, Anticoagulation-related ED   visit, Anticoagulation-related fatality        INR History:  Anticoagulation Monitoring 2020   INR 2.68 2.45 2.68   INR Date 2020   INR Goal 2.0-3.0 2.0-3.0 2.0-3.0   Trend Same Same Same   Last Week Total 20 mg 20 mg 20 mg   Next Week Total 20 mg 20 mg 20 mg   Sun 4 mg 4 mg 4 mg   Mon 2 mg 2 mg 2 mg   Tue 4 mg 4 mg 4 mg   Wed 2 mg 2 mg 2 mg   Thu 4 mg 4 mg 4 mg   Fri 2 mg 2 mg 2 mg   Sat 2 mg 2 mg 2 mg   Visit Report - - -   Some recent  data might be hidden       Plan:  1. INR is Therapeutic today- see above in Anticoagulation Summary.   Will instruct Arelis Johnson to Continue their warfarin regimen- see above in Anticoagulation Summary.  2. Follow up in 6 weeks  3. They have been instructed to call if any changes in medications, doses, concerns, etc. Patient expresses understanding and has no further questions at this time.    Savage Wasserman [facility]

## 2021-01-28 LAB
INR PPP: 2.84 (ref 0.9–1.1)
PROTHROMBIN TIME: 29.6 SECONDS (ref 11.7–14.2)

## 2021-01-29 ENCOUNTER — ANTICOAGULATION VISIT (OUTPATIENT)
Dept: PHARMACY | Facility: HOSPITAL | Age: 81
End: 2021-01-29

## 2021-01-29 NOTE — PROGRESS NOTES
Anticoagulation Clinic Progress Note    Anticoagulation Summary  As of 2021    INR goal:  2.0-3.0   TTR:  98.0 % (2.2 y)   INR used for dosin.84 (2021)   Warfarin maintenance plan:  4 mg every Sun, Tue, Thu; 2 mg all other days   Weekly warfarin total:  20 mg   Plan last modified:  Judie Barragan [facility] (2018)   Next INR check:  3/11/2021   Priority:  Maintenance   Target end date:      Indications    Atrial fibrillation with RVR (CMS/HCC) (Resolved) [I48.91]             Anticoagulation Episode Summary     INR check location:      Preferred lab:      Send INR reminders to:  [facility] [employer]  POOL    Comments:  [facility]   ([facility])      Anticoagulation Care Providers     Provider Role Specialty Phone number    Sly Saleh MD Referring Cardiology [phone]          Clinic Interview:  Patient Findings     Negatives:  Signs/symptoms of thrombosis, Signs/symptoms of bleeding,   Laboratory test error suspected, Change in health, Change in alcohol use,   Change in activity, Upcoming invasive procedure, Emergency department   visit, Upcoming dental procedure, Missed doses, Extra doses, Change in   medications, Change in diet/appetite, Hospital admission, Bruising, Other   complaints      Clinical Outcomes     Negatives:  Major bleeding event, Thromboembolic event,   Anticoagulation-related hospital admission, Anticoagulation-related ED   visit, Anticoagulation-related fatality        INR History:  Anticoagulation Monitoring 2020   INR 2.45 2.68 2.84   INR Date 2020   INR Goal 2.0-3.0 2.0-3.0 2.0-3.0   Trend Same Same Same   Last Week Total 20 mg 20 mg 20 mg   Next Week Total 20 mg 20 mg 20 mg   Sun 4 mg 4 mg 4 mg   Mon 2 mg 2 mg 2 mg   Tue 4 mg 4 mg 4 mg   Wed 2 mg 2 mg 2 mg   Thu 4 mg 4 mg 4 mg   Fri 2 mg 2 mg 2 mg   Sat 2 mg 2 mg 2 mg   Visit Report - - -   Some recent data might be hidden       Plan:  1. INR is  Therapeutic today- see above in Anticoagulation Summary.   Will instruct Arelis Johnson to Continue their warfarin regimen- see above in Anticoagulation Summary.  2. Follow up in 6 weeks  3. Spoke with pt today. They have been instructed to call if any changes in medications, doses, concerns, etc. Patient expresses understanding and has no further questions at this time.    Annita Mccormack [facility]

## 2021-02-04 RX ORDER — FUROSEMIDE 40 MG/1
40 TABLET ORAL DAILY
Qty: 90 TABLET | Refills: 4 | Status: SHIPPED | OUTPATIENT
Start: 2021-02-04 | End: 2022-04-26

## 2021-02-04 RX ORDER — METFORMIN HYDROCHLORIDE 500 MG/1
500 TABLET, EXTENDED RELEASE ORAL
Qty: 90 TABLET | Refills: 0 | Status: SHIPPED | OUTPATIENT
Start: 2021-02-04 | End: 2021-05-05

## 2021-03-01 RX ORDER — WARFARIN SODIUM 4 MG/1
TABLET ORAL
Qty: 65 TABLET | Refills: 0 | Status: SHIPPED | OUTPATIENT
Start: 2021-03-01 | End: 2021-06-02

## 2021-03-08 RX ORDER — METOPROLOL TARTRATE 50 MG/1
TABLET, FILM COATED ORAL
Qty: 90 TABLET | Refills: 0 | Status: SHIPPED | OUTPATIENT
Start: 2021-03-08 | End: 2021-04-20 | Stop reason: SDUPTHER

## 2021-03-08 NOTE — TELEPHONE ENCOUNTER
Metoprolol Tartratre 50 mg   Last office visit 11/3/2020  Next office visit 11/4/2021 with kp  Last ekg 11/3/2020  Labs 5/27/2020

## 2021-03-11 ENCOUNTER — ANTICOAGULATION VISIT (OUTPATIENT)
Dept: PHARMACY | Facility: HOSPITAL | Age: 81
End: 2021-03-11

## 2021-03-11 DIAGNOSIS — I48.21 PERMANENT ATRIAL FIBRILLATION (HCC): Primary | ICD-10-CM

## 2021-03-11 LAB
INR PPP: 2.69 (ref 0.9–1.1)
PROTHROMBIN TIME: 28.3 SECONDS (ref 11.7–14.2)

## 2021-03-11 NOTE — PROGRESS NOTES
Anticoagulation Clinic Progress Note    Anticoagulation Summary  As of 3/11/2021    INR goal:  2.0-3.0   TTR:  98.1 % (2.3 y)   INR used for dosin.69 (3/10/2021)   Warfarin maintenance plan:  4 mg every Sun, Tue, Thu; 2 mg all other days   Weekly warfarin total:  20 mg   No change documented:  Annita Mccormack RPH   Plan last modified:  Judie Barragan RPH (2018)   Next INR check:  2021   Priority:  Maintenance   Target end date:      Indications    Atrial fibrillation with RVR (CMS/HCC) (Resolved) [I48.91]             Anticoagulation Episode Summary     INR check location:      Preferred lab:      Send INR reminders to:  [facility]  POOL    Comments:  [facility]   ([facility] clinic)      Anticoagulation Care Providers     Provider Role Specialty Phone number    Sly Saleh MD Referring Cardiology [phone]            INR History:  Anticoagulation Monitoring 2020 2021 3/11/2021   INR 2.68 2.84 2.69   INR Date 2020 2021 3/10/2021   INR Goal 2.0-3.0 2.0-3.0 2.0-3.0   Trend Same Same Same   Last Week Total 20 mg 20 mg 20 mg   Next Week Total 20 mg 20 mg 20 mg   Sun 4 mg 4 mg 4 mg   Mon 2 mg 2 mg 2 mg   Tue 4 mg 4 mg 4 mg   Wed 2 mg 2 mg 2 mg   Thu 4 mg 4 mg 4 mg   Fri 2 mg 2 mg 2 mg   Sat 2 mg 2 mg 2 mg   Visit Report - - -   Some recent data might be hidden       Plan:  1. INR is Therapeutic today- see above in Anticoagulation Summary.   Will instruct Arelis Johnson to Continue their warfarin regimen- see above in Anticoagulation Summary.  2. Follow up in 6 weeks [phone]. Faxed updated order to [facility] at   3. Spoke with pt today. They have been instructed to call if any changes in medications, doses, concerns, etc. Patient expresses understanding and has no further questions at this time.    Annita Mccormack RPH

## 2021-03-19 ENCOUNTER — OFFICE VISIT (OUTPATIENT)
Dept: FAMILY MEDICINE CLINIC | Facility: CLINIC | Age: 81
End: 2021-03-19

## 2021-03-19 VITALS
BODY MASS INDEX: 29.66 KG/M2 | TEMPERATURE: 98.4 F | SYSTOLIC BLOOD PRESSURE: 132 MMHG | WEIGHT: 178 LBS | HEART RATE: 62 BPM | HEIGHT: 65 IN | DIASTOLIC BLOOD PRESSURE: 78 MMHG | OXYGEN SATURATION: 97 %

## 2021-03-19 DIAGNOSIS — Z13.0 SCREENING FOR DEFICIENCY ANEMIA: ICD-10-CM

## 2021-03-19 DIAGNOSIS — M85.80 OSTEOPENIA, UNSPECIFIED LOCATION: ICD-10-CM

## 2021-03-19 DIAGNOSIS — E11.9 TYPE 2 DIABETES MELLITUS WITHOUT COMPLICATION, WITHOUT LONG-TERM CURRENT USE OF INSULIN (HCC): ICD-10-CM

## 2021-03-19 DIAGNOSIS — Z79.01 CHRONIC ANTICOAGULATION: ICD-10-CM

## 2021-03-19 DIAGNOSIS — I48.21 PERMANENT ATRIAL FIBRILLATION (HCC): ICD-10-CM

## 2021-03-19 DIAGNOSIS — E78.2 MIXED HYPERLIPIDEMIA: ICD-10-CM

## 2021-03-19 DIAGNOSIS — I10 ESSENTIAL HYPERTENSION: Primary | ICD-10-CM

## 2021-03-19 PROCEDURE — 99214 OFFICE O/P EST MOD 30 MIN: CPT | Performed by: NURSE PRACTITIONER

## 2021-03-19 NOTE — PROGRESS NOTES
"Chief Complaint  Diabetes (new PT visit)    Subjective          Arelis Johnson presents to [facility]  History of Present Illness   This is a 80 yo female patient here today to establish care. She was following DR Erickson in the past. She has a history of afib and is on coumadin and cardizem daily. Last INR was checked on 3/11/21 and was 2.69. She has a dx of HTN that is controlled with metoprolol and altace. She is seen by cardiology regularly. BP is stable today.  She is a diabetic and os on metformin daily. She states BS run 's.      Objective   Vital Signs:   /78   Pulse 62   Temp 98.4 °F (36.9 °C)   Ht 165.1 cm (65\")   Wt 80.7 kg (178 lb)   SpO2 97%   BMI 29.62 kg/m²     Physical Exam  Vitals and nursing note reviewed.   HENT:      Head: Normocephalic.      Nose: Nose normal.   Eyes:      Pupils: Pupils are equal, round, and reactive to light.   Cardiovascular:      Rate and Rhythm: Normal rate and regular rhythm.      Pulses: Normal pulses.      Heart sounds: Normal heart sounds.   Pulmonary:      Effort: Pulmonary effort is normal. No respiratory distress.      Breath sounds: Normal breath sounds. No wheezing or rales.   Abdominal:      General: Bowel sounds are normal. There is no distension.      Tenderness: There is no abdominal tenderness.   Musculoskeletal:         General: No swelling.      Cervical back: Neck supple.      Right lower leg: No edema.      Left lower leg: No edema.   Skin:     General: Skin is warm and dry.   Neurological:      Mental Status: She is alert and oriented to person, place, and time.   Psychiatric:         Mood and Affect: Mood normal.        Result Review :                 Assessment and Plan    Diagnoses and all orders for this visit:    1. Essential hypertension (Primary)  -     Comprehensive metabolic panel    2. Chronic anticoagulation    3. Type 2 diabetes mellitus without complication, without long-term current use of " insulin (CMS/[facility])  -     Hemoglobin A1c  -     MicroAlbumin, Urine, Random - Urine, Clean Catch    4. Mixed hyperlipidemia  -     Lipid panel    5. Permanent atrial fibrillation (CMS/[facility])    6. Screening for deficiency anemia  -     CBC w AUTO Differential    7. Osteopenia, unspecified location    I will continue all medications as ordered.   I will obtain fasting labs today.      Follow Up   Return in about 6 months (around 9/19/2021).  Patient was given instructions and counseling regarding her condition or for health maintenance advice. Please see specific information pulled into the AVS if appropriate.

## 2021-03-20 LAB
ALBUMIN SERPL-MCNC: 4.2 G/DL (ref 3.5–5.2)
ALBUMIN/GLOB SERPL: 1.6 G/DL
ALP SERPL-CCNC: 74 U/L (ref 39–117)
ALT SERPL-CCNC: 18 U/L (ref 1–33)
AST SERPL-CCNC: 28 U/L (ref 1–32)
BASOPHILS # BLD AUTO: 0.05 10*3/MM3 (ref 0–0.2)
BASOPHILS NFR BLD AUTO: 0.9 % (ref 0–1.5)
BILIRUB SERPL-MCNC: 0.7 MG/DL (ref 0–1.2)
BUN SERPL-MCNC: 26 MG/DL (ref 8–23)
BUN/CREAT SERPL: 24.5 (ref 7–25)
CALCIUM SERPL-MCNC: 10.2 MG/DL (ref 8.6–10.5)
CHLORIDE SERPL-SCNC: 97 MMOL/L (ref 98–107)
CHOLEST SERPL-MCNC: 107 MG/DL (ref 0–200)
CO2 SERPL-SCNC: 31.8 MMOL/L (ref 22–29)
CREAT SERPL-MCNC: 1.06 MG/DL (ref 0.57–1)
EOSINOPHIL # BLD AUTO: 0.11 10*3/MM3 (ref 0–0.4)
EOSINOPHIL NFR BLD AUTO: 2 % (ref 0.3–6.2)
ERYTHROCYTE [DISTWIDTH] IN BLOOD BY AUTOMATED COUNT: 12 % (ref 12.3–15.4)
GLOBULIN SER CALC-MCNC: 2.6 GM/DL
GLUCOSE SERPL-MCNC: 114 MG/DL (ref 65–99)
HBA1C MFR BLD: 6.5 % (ref 4.8–5.6)
HCT VFR BLD AUTO: 40.8 % (ref 34–46.6)
HDLC SERPL-MCNC: 57 MG/DL (ref 40–60)
HGB BLD-MCNC: 14.3 G/DL (ref 12–15.9)
IMM GRANULOCYTES # BLD AUTO: 0.02 10*3/MM3 (ref 0–0.05)
IMM GRANULOCYTES NFR BLD AUTO: 0.4 % (ref 0–0.5)
LDLC SERPL CALC-MCNC: 33 MG/DL (ref 0–100)
LYMPHOCYTES # BLD AUTO: 1.68 10*3/MM3 (ref 0.7–3.1)
LYMPHOCYTES NFR BLD AUTO: 30.2 % (ref 19.6–45.3)
MCH RBC QN AUTO: 32.3 PG (ref 26.6–33)
MCHC RBC AUTO-ENTMCNC: 35 G/DL (ref 31.5–35.7)
MCV RBC AUTO: 92.1 FL (ref 79–97)
MICROALBUMIN UR-MCNC: <3 UG/ML
MONOCYTES # BLD AUTO: 0.47 10*3/MM3 (ref 0.1–0.9)
MONOCYTES NFR BLD AUTO: 8.5 % (ref 5–12)
NEUTROPHILS # BLD AUTO: 3.23 10*3/MM3 (ref 1.7–7)
NEUTROPHILS NFR BLD AUTO: 58 % (ref 42.7–76)
NRBC BLD AUTO-RTO: 0 /100 WBC (ref 0–0.2)
PLATELET # BLD AUTO: 234 10*3/MM3 (ref 140–450)
POTASSIUM SERPL-SCNC: 3.7 MMOL/L (ref 3.5–5.2)
PROT SERPL-MCNC: 6.8 G/DL (ref 6–8.5)
RBC # BLD AUTO: 4.43 10*6/MM3 (ref 3.77–5.28)
SODIUM SERPL-SCNC: 139 MMOL/L (ref 136–145)
TRIGL SERPL-MCNC: 88 MG/DL (ref 0–150)
VLDLC SERPL CALC-MCNC: 17 MG/DL (ref 5–40)
WBC # BLD AUTO: 5.56 10*3/MM3 (ref 3.4–10.8)

## 2021-04-20 RX ORDER — METOPROLOL TARTRATE 50 MG/1
50 TABLET, FILM COATED ORAL 2 TIMES DAILY
Qty: 180 TABLET | Refills: 3 | Status: SHIPPED | OUTPATIENT
Start: 2021-04-20 | End: 2022-04-18

## 2021-04-21 LAB
INR PPP: 2.68
INR PPP: 2.68 (ref 0.9–1.1)
PROTHROMBIN TIME: 28.2 SECONDS (ref 11.7–14.2)

## 2021-04-22 ENCOUNTER — ANTICOAGULATION VISIT (OUTPATIENT)
Dept: PHARMACY | Facility: HOSPITAL | Age: 81
End: 2021-04-22

## 2021-04-22 NOTE — PROGRESS NOTES
Anticoagulation Clinic Progress Note    Anticoagulation Summary  As of 2021    INR goal:  2.0-3.0   TTR:  98.2 % (2.4 y)   INR used for dosin.68 (2021)   Warfarin maintenance plan:  4 mg every Sun, Tue, Thu; 2 mg all other days   Weekly warfarin total:  20 mg   Plan last modified:  Judie Barragan [facility] (2018)   Next INR check:  2021   Priority:  Maintenance   Target end date:      Indications    Atrial fibrillation with RVR (CMS/HCC) (Resolved) [I48.91]             Anticoagulation Episode Summary     INR check location:      Preferred lab:      Send INR reminders to:  [email or web address]  POOL    Comments:  [facility]   ([facility])      Anticoagulation Care Providers     Provider Role Specialty Phone number    Sly Saleh MD Referring Cardiology [phone]        Clinic Interview:  Patient Findings     Negatives:  Signs/symptoms of thrombosis, Signs/symptoms of bleeding,   Laboratory test error suspected, Change in health, Change in alcohol use,   Change in activity, Upcoming invasive procedure, Emergency department   visit, Upcoming dental procedure, Missed doses, Extra doses, Change in   medications, Change in diet/appetite, Hospital admission, Bruising, Other   complaints      Clinical Outcomes     Negatives:  Major bleeding event, Thromboembolic event,   Anticoagulation-related hospital admission, Anticoagulation-related ED   visit, Anticoagulation-related fatality      INR History:  Anticoagulation Monitoring 2021 3/11/2021 2021   INR 2.84 2.69 2.68   INR Date 2021 3/10/2021 2021   INR Goal 2.0-3.0 2.0-3.0 2.0-3.0   Trend Same Same Same   Last Week Total 20 mg 20 mg 20 mg   Next Week Total 20 mg 20 mg 20 mg   Sun 4 mg 4 mg 4 mg   Mon 2 mg 2 mg 2 mg   Tue 4 mg 4 mg 4 mg   Wed 2 mg 2 mg 2 mg   Thu 4 mg 4 mg 4 mg   Fri 2 mg 2 mg 2 mg   Sat 2 mg 2 mg 2 mg   Visit Report - - -   Some recent data might be hidden     Plan:  1. INR is  Therapeutic today- see above in Anticoagulation Summary.   Will instruct Arelis Johnson to Continue their warfarin regimen- see above in Anticoagulation Summary.  2. Follow up in 6 weeks  3. They have been instructed to call if any changes in medications, doses, concerns, etc. Patient expresses understanding and has no further questions at this time.    Isaiah Nuno, RogerD

## 2021-04-22 NOTE — PROGRESS NOTES
Anticoagulation Clinic Progress Note    Anticoagulation Summary  As of 2021    INR goal:  2.0-3.0   TTR:  98.2 % (2.4 y)   INR used for dosin.68 (2021)   Warfarin maintenance plan:  4 mg every Sun, Tue, Thu; 2 mg all other days   Weekly warfarin total:  20 mg   No change documented:  Loc Lock, PharmD   Plan last modified:  Judie Barragan [facility] (2018)   Next INR check:  6/3/2021   Priority:  Maintenance   Target end date:      Indications    Atrial fibrillation with RVR (CMS/HCC) (Resolved) [I48.91]             Anticoagulation Episode Summary     INR check location:      Preferred lab:      Send INR reminders to:  [facility]  POOL    Comments:  [facility]   ([facility] clinic)      Anticoagulation Care Providers     Provider Role Specialty Phone number    Sly Saleh MD Referring Cardiology [phone]          Clinic Interview:  Patient Findings     Negatives:  Signs/symptoms of thrombosis, Signs/symptoms of bleeding,   Laboratory test error suspected, Change in health, Change in alcohol use,   Change in activity, Upcoming invasive procedure, Emergency department   visit, Upcoming dental procedure, Missed doses, Extra doses, Change in   medications, Change in diet/appetite, Hospital admission, Bruising, Other   complaints      Clinical Outcomes     Negatives:  Major bleeding event, Thromboembolic event,   Anticoagulation-related hospital admission, Anticoagulation-related ED   visit, Anticoagulation-related fatality        INR History:  Anticoagulation Monitoring 3/11/2021 2021 2021   INR 2.69 2.68 2.68   INR Date 3/10/2021 2021 2021   INR Goal 2.0-3.0 2.0-3.0 2.0-3.0   Trend Same Same Same   Last Week Total 20 mg 20 mg 20 mg   Next Week Total 20 mg 20 mg 20 mg   Sun 4 mg 4 mg 4 mg   Mon 2 mg 2 mg 2 mg   Tue 4 mg 4 mg 4 mg   Wed 2 mg 2 mg 2 mg   Thu 4 mg 4 mg 4 mg   Fri 2 mg 2 mg 2 mg   Sat 2 mg 2 mg 2 mg   Visit Report - - -   Some recent  data might be hidden       Plan:  1. INR is Therapeutic today- see above in Anticoagulation Summary.   Will instruct Arelis Johnson to Continue their warfarin regimen- see above in Anticoagulation Summary.  2. Follow up in 6 weeks  3. Pt has agreed to only be called if INR out of range. They have been instructed to call if any changes in medications, doses, concerns, etc. Patient expresses understanding and has no further questions at this time.    Loc Lock, RogerD

## 2021-04-26 DIAGNOSIS — Z78.0 POST-MENOPAUSAL: Primary | ICD-10-CM

## 2021-04-26 NOTE — TELEPHONE ENCOUNTER
----- Message from Lashay Lieberman sent at 4/26/2021  9:53 AM EDT -----  Contact: [phone]  Patient called she needs Amanda to send in her Simvastatin 40 mg to [facility], they are requesting a new prescription from Amanda. [email or web address] phone is [phone] the fax is [phone]  She would like a 90 day supply, Ms Johnson number is [phone]

## 2021-04-27 RX ORDER — SIMVASTATIN 40 MG
40 TABLET ORAL DAILY
Qty: 90 TABLET | Refills: 3 | Status: SHIPPED | OUTPATIENT
Start: 2021-04-27 | End: 2022-03-22 | Stop reason: SDUPTHER

## 2021-05-03 ENCOUNTER — TRANSCRIBE ORDERS (OUTPATIENT)
Dept: ADMINISTRATIVE | Facility: HOSPITAL | Age: 81
End: 2021-05-03

## 2021-05-03 DIAGNOSIS — Z12.31 SCREENING MAMMOGRAM, ENCOUNTER FOR: Primary | ICD-10-CM

## 2021-05-05 RX ORDER — METFORMIN HYDROCHLORIDE 500 MG/1
500 TABLET, EXTENDED RELEASE ORAL
Qty: 90 TABLET | Refills: 0 | Status: SHIPPED | OUTPATIENT
Start: 2021-05-05 | End: 2021-08-09

## 2021-06-02 LAB
INR PPP: 3.94 (ref 0.9–1.1)
PROTHROMBIN TIME: 38.3 SECONDS (ref 11.7–14.2)

## 2021-06-02 RX ORDER — WARFARIN SODIUM 4 MG/1
TABLET ORAL
Qty: 65 TABLET | Refills: 1 | Status: SHIPPED | OUTPATIENT
Start: 2021-06-02 | End: 2021-12-02

## 2021-06-03 ENCOUNTER — ANTICOAGULATION VISIT (OUTPATIENT)
Dept: PHARMACY | Facility: HOSPITAL | Age: 81
End: 2021-06-03

## 2021-06-03 NOTE — PROGRESS NOTES
Anticoagulation Clinic Progress Note    Anticoagulation Summary  As of 6/3/2021    INR goal:  2.0-3.0   TTR:  94.8 % (2.5 y)   INR used for dosing:  3.94 (6/2/2021)   Warfarin maintenance plan:  4 mg every Sun, Tue, Thu; 2 mg all other days   Weekly warfarin total:  20 mg   Plan last modified:  Judie Barragan [facility] (11/21/2018)   Next INR check:  6/9/2021   Priority:  Maintenance   Target end date:      Indications    Atrial fibrillation with RVR (CMS/HCC) (Resolved) [I48.91]             Anticoagulation Episode Summary     INR check location:      Preferred lab:      Send INR reminders to:  [facility] [employer]  POOL    Comments:  [facility]   ([facility] clinic)      Anticoagulation Care Providers     Provider Role Specialty Phone number    Sly Saleh MD Referring Cardiology [phone]          Clinic Interview:  Patient Findings     Positives:  Change in diet/appetite, Other complaints    Negatives:  Signs/symptoms of thrombosis, Signs/symptoms of bleeding,   Laboratory test error suspected, Change in health, Change in alcohol use,   Change in activity, Upcoming invasive procedure, Emergency department   visit, Upcoming dental procedure, Missed doses, Extra doses, Change in   medications, Hospital admission, Bruising    Comments:  Possibly less vit k than usual. Already took today's dose of   warfarin.       Clinical Outcomes     Negatives:  Major bleeding event, Thromboembolic event,   Anticoagulation-related hospital admission, Anticoagulation-related ED   visit, Anticoagulation-related fatality    Comments:  Possibly less vit k than usual. Already took today's dose of   warfarin.         INR History:  Anticoagulation Monitoring 4/22/2021 4/22/2021 6/3/2021   INR 2.68 2.68 3.94   INR Date 4/21/2021 4/21/2021 6/2/2021   INR Goal 2.0-3.0 2.0-3.0 2.0-3.0   Trend Same Same Same   Last Week Total 20 mg 20 mg 20 mg   Next Week Total 20 mg 20 mg 18 mg   Sun 4 mg 4 mg 4 mg   Mon 2 mg 2 mg 2 mg      Tue 4 mg 4 mg 4 mg   Wed 2 mg 2 mg -   Thu 4 mg 4 mg 4 mg   Fri 2 mg 2 mg Hold (6/4)   Sat 2 mg 2 mg 2 mg   Visit Report - - -   Some recent data might be hidden       Plan:  1. INR was Supratherapeutic yesterday- see above in Anticoagulation Summary.   Will instruct Arelis Johnson to Change their warfarin regimen- see above in Anticoagulation Summary.  2. Follow up in 1 week  3. They have been instructed to call if any changes in medications, doses, concerns, etc. Patient expresses understanding and has no further questions at this time.    Savage Wasserman [facility]

## 2021-06-09 LAB
INR PPP: 2.92 (ref 0.9–1.1)
PROTHROMBIN TIME: 30.2 SECONDS (ref 11.7–14.2)

## 2021-06-10 ENCOUNTER — ANTICOAGULATION VISIT (OUTPATIENT)
Dept: PHARMACY | Facility: HOSPITAL | Age: 81
End: 2021-06-10

## 2021-06-10 NOTE — PROGRESS NOTES
Anticoagulation Clinic Progress Note    Anticoagulation Summary  As of 6/10/2021    INR goal:  2.0-3.0   TTR:  94.1 % (2.5 y)   INR used for dosin.92 (2021)   Warfarin maintenance plan:  4 mg every Sun, Tue, Thu; 2 mg all other days   Weekly warfarin total:  20 mg   No change documented:  Lashae Estevez RPH   Plan last modified:  Judie Barragan RP (2018)   Next INR check:  2021   Priority:  Maintenance   Target end date:      Indications    Atrial fibrillation with RVR (CMS/HCC) (Resolved) [I48.91]             Anticoagulation Episode Summary     INR check location:      Preferred lab:      Send INR reminders to:  [facility]  POOL    Comments:  [facility]   ([facility] clinic)      Anticoagulation Care Providers     Provider Role Specialty Phone number    Sly Saleh MD Referring Cardiology [phone]          Clinic Interview:  Patient Findings     Negatives:  Signs/symptoms of thrombosis, Signs/symptoms of bleeding,   Laboratory test error suspected, Change in health, Change in alcohol use,   Change in activity, Upcoming invasive procedure, Emergency department   visit, Upcoming dental procedure, Missed doses, Extra doses, Change in   medications, Change in diet/appetite, Hospital admission, Bruising, Other   complaints      Clinical Outcomes     Negatives:  Major bleeding event, Thromboembolic event,   Anticoagulation-related hospital admission, Anticoagulation-related ED   visit, Anticoagulation-related fatality        INR History:  Anticoagulation Monitoring 2021 6/3/2021 6/10/2021   INR 2.68 3.94 2.92   INR Date 2021   INR Goal 2.0-3.0 2.0-3.0 2.0-3.0   Trend Same Same Same   Last Week Total 20 mg 20 mg 18 mg   Next Week Total 20 mg 18 mg 20 mg   Sun 4 mg 4 mg 4 mg   Mon 2 mg 2 mg 2 mg   Tue 4 mg 4 mg 4 mg   Wed 2 mg - -   Thu 4 mg 4 mg 4 mg   Fri 2 mg Hold () 2 mg   Sat 2 mg 2 mg 2 mg   Visit Report - - -   Some recent data  might be hidden       Plan:  1. INR is Therapeutic today- see above in Anticoagulation Summary.   Will instruct Arelis Johnson to Continue their warfarin regimen- see above in Anticoagulation Summary.  2. Follow up in 1 week.  3. They have been instructed to call if any changes in medications, doses, concerns, etc. Patient expresses understanding and has no further questions at this time.    Lashae Estevez, [facility]

## 2021-06-16 LAB
INR PPP: 2.8
INR PPP: 2.8

## 2021-06-17 ENCOUNTER — ANTICOAGULATION VISIT (OUTPATIENT)
Dept: PHARMACY | Facility: HOSPITAL | Age: 81
End: 2021-06-17

## 2021-06-17 NOTE — PROGRESS NOTES
Anticoagulation Clinic Progress Note    Anticoagulation Summary  As of 2021    INR goal:  2.0-3.0   TTR:  94.2 % (2.5 y)   INR used for dosin.80 (2021)   Warfarin maintenance plan:  4 mg every Sun, Tue, Thu; 2 mg all other days   Weekly warfarin total:  20 mg   Plan last modified:  Judie Barragan [facility] (2018)   Next INR check:  2021   Priority:  Maintenance   Target end date:      Indications    Atrial fibrillation with RVR (CMS/HCC) (Resolved) [I48.91]             Anticoagulation Episode Summary     INR check location:      Preferred lab:      Send INR reminders to:  [facility]  POOL    Comments:  [facility]   ([facility])      Anticoagulation Care Providers     Provider Role Specialty Phone number    Sly Saleh MD Referring Cardiology [phone]          Clinic Interview:  Patient Findings     Negatives:  Signs/symptoms of thrombosis, Signs/symptoms of bleeding,   Laboratory test error suspected, Change in health, Change in alcohol use,   Change in activity, Upcoming invasive procedure, Emergency department   visit, Upcoming dental procedure, Missed doses, Extra doses, Change in   medications, Change in diet/appetite, Hospital admission, Bruising, Other   complaints    Comments:  Pt reported blood in urine 2 weeks ago but has improved      Clinical Outcomes     Negatives:  Major bleeding event, Thromboembolic event,   Anticoagulation-related hospital admission, Anticoagulation-related ED   visit, Anticoagulation-related fatality    Comments:  Pt reported blood in urine 2 weeks ago but has improved        INR History:  Anticoagulation Monitoring 6/3/2021 6/10/2021 2021   INR 3.94 2.92 2.80   INR Date 2021   INR Goal 2.0-3.0 2.0-3.0 2.0-3.0   Trend Same Same Same   Last Week Total 20 mg 18 mg 20 mg   Next Week Total 18 mg 20 mg 20 mg   Sun 4 mg 4 mg 4 mg   Mon 2 mg 2 mg 2 mg   Tue 4 mg 4 mg 4 mg   Wed - - 2 mg   Thu 4 mg 4  mg 4 mg   Fri Hold (6/4) 2 mg 2 mg   Sat 2 mg 2 mg 2 mg   Visit Report - - -   Some recent data might be hidden       Plan:  1. INR is Therapeutic today- see above in Anticoagulation Summary.   Will instruct Arelis Johnson to Continue their warfarin regimen- see above in Anticoagulation Summary.  2. Follow up in 2 weeks  3.  Pt has agreed to only be called if INR out of range. They have been instructed to call if any changes in medications, doses, concerns, etc. Patient expresses understanding and has no further questions at this time.    Irvin Peraza, [facility]

## 2021-06-30 LAB — INR PPP: 2.9

## 2021-07-01 ENCOUNTER — ANTICOAGULATION VISIT (OUTPATIENT)
Dept: PHARMACY | Facility: HOSPITAL | Age: 81
End: 2021-07-01

## 2021-07-01 NOTE — PROGRESS NOTES
Anticoagulation Clinic Progress Note  Anticoagulation Summary  As of 2021    INR goal:  2.0-3.0   TTR:  94.3 % (2.6 y)   INR used for dosin.90 (2021)   Warfarin maintenance plan:  4 mg every Sun, Tue, Thu; 2 mg all other days   Weekly warfarin total:  20 mg   No change documented:  Isaiah Nuno, PharmD   Plan last modified:  Judie Barragan, [facility] (2018)   Next INR check:  2021   Priority:  Maintenance   Target end date:      Indications    Atrial fibrillation with RVR (CMS/HCC) (Resolved) [I48.91]             Anticoagulation Episode Summary     INR check location:      Preferred lab:      Send INR reminders to:  [facility]  POOL    Comments:  [facility]   ([facility])      Anticoagulation Care Providers     Provider Role Specialty Phone number    Sly Saleh MD Referring Cardiology [phone]        Clinic Interview:  Patient Findings     Negatives:  Signs/symptoms of thrombosis, Signs/symptoms of bleeding,   Laboratory test error suspected, Change in health, Change in alcohol use,   Change in activity, Upcoming invasive procedure, Emergency department   visit, Upcoming dental procedure, Missed doses, Extra doses, Change in   medications, Change in diet/appetite, Hospital admission, Bruising, Other   complaints    Comments:  Denies any bleeding      Clinical Outcomes     Negatives:  Major bleeding event, Thromboembolic event,   Anticoagulation-related hospital admission, Anticoagulation-related ED   visit, Anticoagulation-related fatality      INR History:  Anticoagulation Monitoring 6/10/2021 2021 2021 2021   INR 2.92 2.80 - 2.90   INR Date 2021 - 2021   INR Goal 2.0-3.0 2.0-3.0 2.0-3.0 2.0-3.0   Trend Same Same - Same   Last Week Total 18 mg 20 mg - 20 mg   Next Week Total 20 mg 20 mg - 20 mg   Sun 4 mg 4 mg - 4 mg   Mon 2 mg 2 mg - 2 mg   Tue 4 mg 4 mg - 4 mg   Wed - 2 mg - 2 mg   Thu 4 mg 4 mg - 4 mg   Fri 2 mg 2  mg - 2 mg   Sat 2 mg 2 mg - 2 mg   Visit Report - - - -   Some recent data might be hidden     Plan:  1. INR is Therapeutic today- see above in Anticoagulation Summary.   Will instruct Arelis Johnson to Continue their warfarin regimen- see above in Anticoagulation Summary.  2. Follow up in 2 weeks  3. They have been instructed to call if any changes in medications, doses, concerns, etc. Patient expresses understanding and has no further questions at this time.    Isaiah Nuno, PharmD

## 2021-07-15 ENCOUNTER — ANTICOAGULATION VISIT (OUTPATIENT)
Dept: PHARMACY | Facility: HOSPITAL | Age: 81
End: 2021-07-15

## 2021-07-15 LAB — INR PPP: 3.1

## 2021-07-15 NOTE — PROGRESS NOTES
Anticoagulation Clinic Progress Note  Anticoagulation Summary  As of 7/15/2021    INR goal:  2.0-3.0   TTR:  93.6 % (2.6 y)   INR used for dosing:  3.10 (7/14/2021)   Warfarin maintenance plan:  4 mg every Sun, Tue, Thu; 2 mg all other days   Weekly warfarin total:  20 mg   Plan last modified:  Judie Barragan [facility] (11/21/2018)   Next INR check:  7/28/2021   Priority:  Maintenance   Target end date:      Indications    Atrial fibrillation with RVR (CMS/HCC) (Resolved) [I48.91]             Anticoagulation Episode Summary     INR check location:      Preferred lab:      Send INR reminders to:  [facility]  POOL    Comments:  [facility]   ([facility])      Anticoagulation Care Providers     Provider Role Specialty Phone number    Sly Saleh MD Referring Cardiology [phone]        Clinic Interview:  Patient Findings     Negatives:  Signs/symptoms of thrombosis, Signs/symptoms of bleeding,   Laboratory test error suspected, Change in health, Change in alcohol use,   Change in activity, Upcoming invasive procedure, Emergency department   visit, Upcoming dental procedure, Missed doses, Extra doses, Change in   medications, Change in diet/appetite, Hospital admission, Bruising, Other   complaints      Clinical Outcomes     Negatives:  Major bleeding event, Thromboembolic event,   Anticoagulation-related hospital admission, Anticoagulation-related ED   visit, Anticoagulation-related fatality      INR History:  Anticoagulation Monitoring 6/17/2021 6/17/2021 7/1/2021 7/15/2021   INR 2.80 - 2.90 3.10   INR Date 6/16/2021 - 6/30/2021 7/14/2021   INR Goal 2.0-3.0 2.0-3.0 2.0-3.0 2.0-3.0   Trend Same - Same Same   Last Week Total 20 mg - 20 mg 20 mg   Next Week Total 20 mg - 20 mg 18 mg   Sun 4 mg - 4 mg 4 mg   Mon 2 mg - 2 mg 2 mg   Tue 4 mg - 4 mg 4 mg   Wed 2 mg - 2 mg 2 mg   Thu 4 mg - 4 mg 4 mg   Fri 2 mg - 2 mg Hold (7/16); Otherwise 2 mg   Sat 2 mg - 2 mg 2 mg   Visit Report - - - -    Some recent data might be hidden     Plan:  1. INR is Supratherapeutic today- see above in Anticoagulation Summary.   Will instruct Arelis Johnson to Change their warfarin regimen- see above in Anticoagulation Summary.  -Patient already took 4mg dose on 7/15 so plan to hold 2mg dose that is scheduled 7/16.  Resume Warfarin 7/17 as currently scheduled  2. Follow up in 2 weeks as previously therapeutic on this regimen  Patient agrees to contact MD immediately for any bleeding, falls, or bumps to the head.    3. They have been instructed to call if any changes in medications, doses, concerns, etc. Patient expresses understanding and has no further questions at this time.    Isaiah Nuno, RogerD

## 2021-07-27 LAB — INR PPP: 2.7

## 2021-07-28 ENCOUNTER — ANTICOAGULATION VISIT (OUTPATIENT)
Dept: PHARMACY | Facility: HOSPITAL | Age: 81
End: 2021-07-28

## 2021-07-28 NOTE — PROGRESS NOTES
Anticoagulation Clinic Progress Note    Anticoagulation Summary  As of 2021    INR goal:  2.0-3.0   TTR:  93.4 % (2.7 y)   INR used for dosin.70 (2021)   Warfarin maintenance plan:  4 mg every Sun, Tue, Thu; 2 mg all other days   Weekly warfarin total:  20 mg   No change documented:  Eulalia Reaves RPH   Plan last modified:  Judie Barragan RPH (2018)   Next INR check:  8/10/2021   Priority:  Maintenance   Target end date:      Indications    Atrial fibrillation with RVR (CMS/HCC) (Resolved) [I48.91]             Anticoagulation Episode Summary     INR check location:      Preferred lab:      Send INR reminders to:  [facility]  POOL    Comments:  [facility]   ([facility] clinic)      Anticoagulation Care Providers     Provider Role Specialty Phone number    Sly Saleh MD Referring Cardiology [phone]          Clinic Interview:  No pertinent clinical findings have been reported.    INR History:  Anticoagulation Monitoring 2021 7/15/2021 2021   INR 2.90 3.10 2.70   INR Date 2021   INR Goal 2.0-3.0 2.0-3.0 2.0-3.0   Trend Same Same Same   Last Week Total 20 mg 20 mg 20 mg   Next Week Total 20 mg 18 mg 20 mg   Sun 4 mg 4 mg 4 mg   Mon 2 mg 2 mg 2 mg   Tue 4 mg 4 mg 4 mg   Wed 2 mg 2 mg 2 mg   Thu 4 mg 4 mg 4 mg   Fri 2 mg Hold (); Otherwise 2 mg 2 mg   Sat 2 mg 2 mg 2 mg   Visit Report - - -   Some recent data might be hidden       Plan:  1. INR is therapeutic today- see above in Anticoagulation Summary.    Arelis SANDRA Johnson to continue their warfarin regimen- see above in Anticoagulation Summary.  2. Follow up in 2 weeks  3. Pt has agreed to only be called if INR out of range. They have been instructed to call if any changes in medications, doses, concerns, etc. Patient expresses understanding and has no further questions at this time.    Eulalia Reaves RPH

## 2021-08-09 RX ORDER — METFORMIN HYDROCHLORIDE 500 MG/1
500 TABLET, EXTENDED RELEASE ORAL
Qty: 90 TABLET | Refills: 0 | Status: SHIPPED | OUTPATIENT
Start: 2021-08-09 | End: 2021-09-20 | Stop reason: SDUPTHER

## 2021-08-11 LAB — INR PPP: 3.2

## 2021-08-12 ENCOUNTER — ANTICOAGULATION VISIT (OUTPATIENT)
Dept: PHARMACY | Facility: HOSPITAL | Age: 81
End: 2021-08-12

## 2021-08-12 NOTE — PROGRESS NOTES
Anticoagulation Clinic Progress Note    Anticoagulation Summary  As of 8/12/2021    INR goal:  2.0-3.0   TTR:  92.9 % (2.7 y)   INR used for dosing:  3.20 (8/11/2021)   Warfarin maintenance plan:  4 mg every Sun, Tue, Thu; 2 mg all other days   Weekly warfarin total:  20 mg   Plan last modified:  Judie Barragan [facility] (11/21/2018)   Next INR check:  8/25/2021   Priority:  Maintenance   Target end date:      Indications    Atrial fibrillation with RVR (CMS/HCC) (Resolved) [I48.91]             Anticoagulation Episode Summary     INR check location:      Preferred lab:      Send INR reminders to:  [facility]  POOL    Comments:  [facility]   ([facility] clinic)      Anticoagulation Care Providers     Provider Role Specialty Phone number    Sly Saleh MD Referring Cardiology [phone]            INR History:  Anticoagulation Monitoring 7/15/2021 7/28/2021 8/12/2021   INR 3.10 2.70 3.20   INR Date 7/14/2021 7/27/2021 8/11/2021   INR Goal 2.0-3.0 2.0-3.0 2.0-3.0   Trend Same Same Same   Last Week Total 20 mg 20 mg 20 mg   Next Week Total 18 mg 20 mg 18 mg   Sun 4 mg 4 mg 4 mg   Mon 2 mg 2 mg 2 mg   Tue 4 mg 4 mg 4 mg   Wed 2 mg 2 mg 2 mg   Thu 4 mg 4 mg 2 mg (8/12); Otherwise 4 mg   Fri Hold (7/16); Otherwise 2 mg 2 mg 2 mg   Sat 2 mg 2 mg 2 mg   Visit Report - - -   Some recent data might be hidden       Plan:  1. INR is Supratherapeutic today- see above in Anticoagulation Summary.   Will instruct Arelis Johnson to decrease today's dose to 2mg, then resume her current warfarin dosage regimen- see above in Anticoagulation Summary.  2. Follow up in 2 weeks  3. They have been instructed to call if any changes in medications, doses, concerns, etc. Patient expresses understanding and has no further questions at this time.    Krysta Quan [facility]

## 2021-08-25 LAB
INR PPP: 3.17 (ref 0.9–1.1)
PROTHROMBIN TIME: 32.3 SECONDS (ref 11.7–14.2)

## 2021-08-26 ENCOUNTER — ANTICOAGULATION VISIT (OUTPATIENT)
Dept: PHARMACY | Facility: HOSPITAL | Age: 81
End: 2021-08-26

## 2021-08-26 NOTE — PROGRESS NOTES
Anticoagulation Clinic Progress Note    Anticoagulation Summary  As of 8/26/2021    INR goal:  2.0-3.0   TTR:  91.5 % (2.7 y)   INR used for dosing:  3.17 (8/25/2021)   Warfarin maintenance plan:  4 mg every Tue, Thu; 2 mg all other days   Weekly warfarin total:  18 mg   Plan last modified:  Eulalia Reaves RPH (8/26/2021)   Next INR check:  9/8/2021   Priority:  Maintenance   Target end date:      Indications    Atrial fibrillation with RVR (CMS/HCC) (Resolved) [I48.91]             Anticoagulation Episode Summary     INR check location:      Preferred lab:      Send INR reminders to:  [email or web address]  POOL    Comments:  [facility]   ([facility] clinic)      Anticoagulation Care Providers     Provider Role Specialty Phone number    Sly Saleh MD Referring Cardiology [phone]          Clinic Interview:  Patient Findings     Negatives:  Signs/symptoms of thrombosis, Signs/symptoms of bleeding,   Laboratory test error suspected, Change in health, Change in alcohol use,   Change in activity, Upcoming invasive procedure, Emergency department   visit, Upcoming dental procedure, Missed doses, Extra doses, Change in   medications, Change in diet/appetite, Hospital admission, Bruising, Other   complaints      Clinical Outcomes     Negatives:  Major bleeding event, Thromboembolic event,   Anticoagulation-related hospital admission, Anticoagulation-related ED   visit, Anticoagulation-related fatality        INR History:  Anticoagulation Monitoring 7/28/2021 8/12/2021 8/26/2021   INR 2.70 3.20 3.17   INR Date 7/27/2021 8/11/2021 8/25/2021   INR Goal 2.0-3.0 2.0-3.0 2.0-3.0   Trend Same Same Down   Last Week Total 20 mg 20 mg 20 mg   Next Week Total 20 mg 18 mg 18 mg   Sun 4 mg 4 mg 2 mg   Mon 2 mg 2 mg 2 mg   Tue 4 mg 4 mg 4 mg   Wed 2 mg 2 mg 2 mg   Thu 4 mg 2 mg (8/12); Otherwise 4 mg 4 mg   Fri 2 mg 2 mg 2 mg   Sat 2 mg 2 mg 2 mg   Visit Report - - -   Some recent data might be hidden        Plan:  1. INR is Supratherapeutic today- see above in Anticoagulation Summary.   Will instruct Arelis Johnson to Change their warfarin regimen- see above in Anticoagulation Summary.  2. Follow up in 2 weeks  3. They have been instructed to call if any changes in medications, doses, concerns, etc. Patient expresses understanding and has no further questions at this time.    Eulalia Reaves, [facility]

## 2021-08-30 ENCOUNTER — HOSPITAL ENCOUNTER (OUTPATIENT)
Dept: BONE DENSITY | Facility: HOSPITAL | Age: 81
Discharge: HOME | End: 2021-08-30

## 2021-08-30 ENCOUNTER — HOSPITAL ENCOUNTER (OUTPATIENT)
Dept: MAMMOGRAPHY | Facility: HOSPITAL | Age: 81
Discharge: HOME | End: 2021-08-30

## 2021-08-30 DIAGNOSIS — Z12.31 SCREENING MAMMOGRAM, ENCOUNTER FOR: ICD-10-CM

## 2021-08-30 PROCEDURE — 77080 DXA BONE DENSITY AXIAL: CPT

## 2021-08-30 PROCEDURE — 77067 SCR MAMMO BI INCL CAD: CPT

## 2021-08-30 PROCEDURE — 77063 BREAST TOMOSYNTHESIS BI: CPT

## 2021-09-08 LAB
INR PPP: 2.38 (ref 0.9–1.1)
PROTHROMBIN TIME: 25.7 SECONDS (ref 11.7–14.2)

## 2021-09-09 ENCOUNTER — ANTICOAGULATION VISIT (OUTPATIENT)
Dept: PHARMACY | Facility: HOSPITAL | Age: 81
End: 2021-09-09

## 2021-09-09 NOTE — PROGRESS NOTES
Anticoagulation Clinic Progress Note    Anticoagulation Summary  As of 2021    INR goal:  2.0-3.0   TTR:  91.3 % (2.8 y)   INR used for dosin.38 (2021)   Warfarin maintenance plan:  4 mg every Tue, Thu; 2 mg all other days   Weekly warfarin total:  18 mg   No change documented:  Eulalia Reaves RPH   Plan last modified:  Eulalia Reaves RPH (2021)   Next INR check:  2021   Priority:  Maintenance   Target end date:      Indications    Atrial fibrillation with RVR (CMS/HCC) (Resolved) [I48.91]             Anticoagulation Episode Summary     INR check location:      Preferred lab:      Send INR reminders to:  [facility]  POOL    Comments:  [facility]   ([facility] clinic)      Anticoagulation Care Providers     Provider Role Specialty Phone number    Sly Saleh MD Referring Cardiology [phone]          Clinic Interview:  No pertinent clinical findings have been reported.    INR History:  Anticoagulation Monitoring 2021   INR 3.20 3.17 2.38   INR Date 2021   INR Goal 2.0-3.0 2.0-3.0 2.0-3.0   Trend Same Down Same   Last Week Total 20 mg 20 mg 18 mg   Next Week Total 18 mg 18 mg 18 mg   Sun 4 mg 2 mg 2 mg   Mon 2 mg 2 mg 2 mg   Tue 4 mg 4 mg 4 mg   Wed 2 mg 2 mg 2 mg   Thu 2 mg (); Otherwise 4 mg 4 mg 4 mg   Fri 2 mg 2 mg 2 mg   Sat 2 mg 2 mg 2 mg   Visit Report - - -   Some recent data might be hidden       Plan:  1. INR is therapeutic today- see above in Anticoagulation Summary.    Arelis SANDRA Johnson to continue their warfarin regimen- see above in Anticoagulation Summary.  2. Follow up in 2 weeks  3. Pt has agreed to only be called if INR out of range. They have been instructed to call if any changes in medications, doses, concerns, etc. Patient expresses understanding and has no further questions at this time.    Eulalia Reaves RPH

## 2021-09-20 ENCOUNTER — OFFICE VISIT (OUTPATIENT)
Dept: FAMILY MEDICINE CLINIC | Facility: CLINIC | Age: 81
End: 2021-09-20

## 2021-09-20 VITALS
SYSTOLIC BLOOD PRESSURE: 120 MMHG | BODY MASS INDEX: 29.66 KG/M2 | RESPIRATION RATE: 22 BRPM | DIASTOLIC BLOOD PRESSURE: 70 MMHG | HEART RATE: 68 BPM | WEIGHT: 178 LBS | TEMPERATURE: 97.3 F | HEIGHT: 65 IN

## 2021-09-20 DIAGNOSIS — I10 ESSENTIAL HYPERTENSION: Primary | ICD-10-CM

## 2021-09-20 DIAGNOSIS — M85.80 OSTEOPENIA, UNSPECIFIED LOCATION: ICD-10-CM

## 2021-09-20 DIAGNOSIS — E78.2 MIXED HYPERLIPIDEMIA: ICD-10-CM

## 2021-09-20 DIAGNOSIS — E11.9 TYPE 2 DIABETES MELLITUS WITHOUT COMPLICATION, WITHOUT LONG-TERM CURRENT USE OF INSULIN (HCC): ICD-10-CM

## 2021-09-20 DIAGNOSIS — Z13.0 SCREENING FOR DEFICIENCY ANEMIA: ICD-10-CM

## 2021-09-20 LAB
EXPIRATION DATE: ABNORMAL
HBA1C MFR BLD: 6.6 %
Lab: ABNORMAL

## 2021-09-20 PROCEDURE — G0439 PPPS, SUBSEQ VISIT: HCPCS | Performed by: NURSE PRACTITIONER

## 2021-09-20 PROCEDURE — 99214 OFFICE O/P EST MOD 30 MIN: CPT | Performed by: NURSE PRACTITIONER

## 2021-09-20 PROCEDURE — 83036 HEMOGLOBIN GLYCOSYLATED A1C: CPT | Performed by: NURSE PRACTITIONER

## 2021-09-20 RX ORDER — METFORMIN HYDROCHLORIDE 500 MG/1
500 TABLET, EXTENDED RELEASE ORAL
Qty: 90 TABLET | Refills: 0 | Status: SHIPPED | OUTPATIENT
Start: 2021-09-20 | End: 2022-02-04

## 2021-09-20 NOTE — PROGRESS NOTES
"Chief Complaint  Medicare Wellness-subsequent (6 month check with fasting lab)    Subjective          Arelis Johnson presents to [facility]  History of Present Illness    Objective   Vital Signs:   Pulse 68   Temp 97.3 °F (36.3 °C)   Resp 22   Ht 165 cm (64.96\")   Wt 80.7 kg (178 lb)   BMI 29.66 kg/m²     Physical Exam   Result Review :                 Assessment and Plan    There are no diagnoses linked to this encounter.    Follow Up   No follow-ups on file.  Patient was given instructions and counseling regarding her condition or for health maintenance advice. Please see specific information pulled into the AVS if appropriate.       "

## 2021-09-20 NOTE — PROGRESS NOTES
"Chief Complaint  Medicare Wellness-subsequent (6 month check with fasting lab)    Subjective          Arelis Johnson presents to [facility]  History of Present Illness  This is a 81-year-old female patient here today for evaluation of hypertension, hyperlipidemia, osteopenia and diabetes mellitus.  Patient is currently on metoprolol, Altace.  Blood pressure is stable.  She denies any headache shortness of breath lightheaded or dizziness.  She is currently on Metformin for her diabetes and reports blood sugars running in the nineties to one twenties.  Last hemoglobin A1c was stable.  She is currently on Zocor and is following a low fat diet.  She is recently had her DEXA scan and mammogram which is stable.      Objective   Vital Signs:   /70   Pulse 68   Temp 97.3 °F (36.3 °C)   Resp 22   Ht 165 cm (64.96\")   Wt 80.7 kg (178 lb)   BMI 29.66 kg/m²     Physical Exam  Vitals and nursing note reviewed.   HENT:      Head: Normocephalic.      Nose: Nose normal.   Eyes:      Pupils: Pupils are equal, round, and reactive to light.   Cardiovascular:      Rate and Rhythm: Normal rate and regular rhythm.      Pulses: Normal pulses.      Heart sounds: Normal heart sounds.   Pulmonary:      Effort: Pulmonary effort is normal. No respiratory distress.      Breath sounds: Normal breath sounds. No wheezing or rales.   Abdominal:      General: Bowel sounds are normal. There is no distension.      Tenderness: There is no abdominal tenderness.   Musculoskeletal:         General: No swelling.      Cervical back: Neck supple.      Right lower leg: No edema.      Left lower leg: No edema.   Skin:     General: Skin is warm and dry.   Neurological:      Mental Status: She is alert and oriented to person, place, and time.   Psychiatric:         Mood and Affect: Mood normal.        Result Review :                 Assessment and Plan    Diagnoses and all orders for this visit:    1. Essential " hypertension (Primary)    2. Osteopenia, unspecified location  -     Vitamin D 25 Hydroxy    3. Type 2 diabetes mellitus without complication, without long-term current use of insulin (CMS/[facility])  -     POCT glycated hemoglobin, total  -     metFORMIN ER (GLUCOPHAGE-XR) 500 MG 24 hr tablet; Take 1 tablet by mouth Daily With Breakfast.  Dispense: 90 tablet; Refill: 0    4. Screening for deficiency anemia  -     CBC & Differential    5. Mixed hyperlipidemia  -     Comprehensive Metabolic Panel  -     Lipid Panel    Hemoglobin A1c today is 6.6.  She will continue her current regimen as ordered.  We will obtain other labs today.    Follow Up   Return in about 6 months (around 3/20/2022).  Patient was given instructions and counseling regarding her condition or for health maintenance advice. Please see specific information pulled into the AVS if appropriate.

## 2021-09-21 ENCOUNTER — ANTICOAGULATION VISIT (OUTPATIENT)
Dept: PHARMACY | Facility: HOSPITAL | Age: 81
End: 2021-09-21

## 2021-09-21 LAB
25(OH)D3+25(OH)D2 SERPL-MCNC: 47 NG/ML (ref 30–100)
ALBUMIN SERPL-MCNC: 4.5 G/DL (ref 3.5–5.2)
ALBUMIN/GLOB SERPL: 2 G/DL
ALP SERPL-CCNC: 77 U/L (ref 39–117)
ALT SERPL-CCNC: 16 U/L (ref 1–33)
AST SERPL-CCNC: 27 U/L (ref 1–32)
BASOPHILS # BLD AUTO: 0.04 10*3/MM3 (ref 0–0.2)
BASOPHILS NFR BLD AUTO: 0.7 % (ref 0–1.5)
BILIRUB SERPL-MCNC: 0.6 MG/DL (ref 0–1.2)
BUN SERPL-MCNC: 20 MG/DL (ref 8–23)
BUN/CREAT SERPL: 20.6 (ref 7–25)
CALCIUM SERPL-MCNC: 10 MG/DL (ref 8.6–10.5)
CHLORIDE SERPL-SCNC: 98 MMOL/L (ref 98–107)
CHOLEST SERPL-MCNC: 107 MG/DL (ref 0–200)
CO2 SERPL-SCNC: 31 MMOL/L (ref 22–29)
CREAT SERPL-MCNC: 0.97 MG/DL (ref 0.57–1)
EOSINOPHIL # BLD AUTO: 0.06 10*3/MM3 (ref 0–0.4)
EOSINOPHIL NFR BLD AUTO: 1 % (ref 0.3–6.2)
ERYTHROCYTE [DISTWIDTH] IN BLOOD BY AUTOMATED COUNT: 12.2 % (ref 12.3–15.4)
GLOBULIN SER CALC-MCNC: 2.2 GM/DL
GLUCOSE SERPL-MCNC: 115 MG/DL (ref 65–99)
HCT VFR BLD AUTO: 42.1 % (ref 34–46.6)
HDLC SERPL-MCNC: 57 MG/DL (ref 40–60)
HGB BLD-MCNC: 14.3 G/DL (ref 12–15.9)
IMM GRANULOCYTES # BLD AUTO: 0.02 10*3/MM3 (ref 0–0.05)
IMM GRANULOCYTES NFR BLD AUTO: 0.3 % (ref 0–0.5)
LDLC SERPL CALC-MCNC: 32 MG/DL (ref 0–100)
LYMPHOCYTES # BLD AUTO: 1.27 10*3/MM3 (ref 0.7–3.1)
LYMPHOCYTES NFR BLD AUTO: 22 % (ref 19.6–45.3)
MCH RBC QN AUTO: 31.3 PG (ref 26.6–33)
MCHC RBC AUTO-ENTMCNC: 34 G/DL (ref 31.5–35.7)
MCV RBC AUTO: 92.1 FL (ref 79–97)
MONOCYTES # BLD AUTO: 0.41 10*3/MM3 (ref 0.1–0.9)
MONOCYTES NFR BLD AUTO: 7.1 % (ref 5–12)
NEUTROPHILS # BLD AUTO: 3.96 10*3/MM3 (ref 1.7–7)
NEUTROPHILS NFR BLD AUTO: 68.9 % (ref 42.7–76)
NRBC BLD AUTO-RTO: 0 /100 WBC (ref 0–0.2)
PLATELET # BLD AUTO: 237 10*3/MM3 (ref 140–450)
POTASSIUM SERPL-SCNC: 3.8 MMOL/L (ref 3.5–5.2)
PROT SERPL-MCNC: 6.7 G/DL (ref 6–8.5)
RBC # BLD AUTO: 4.57 10*6/MM3 (ref 3.77–5.28)
SODIUM SERPL-SCNC: 143 MMOL/L (ref 136–145)
TRIGL SERPL-MCNC: 92 MG/DL (ref 0–150)
VLDLC SERPL CALC-MCNC: 18 MG/DL (ref 5–40)
WBC # BLD AUTO: 5.76 10*3/MM3 (ref 3.4–10.8)

## 2021-10-06 LAB
INR PPP: 2.43 (ref 0.9–1.1)
PROTHROMBIN TIME: 26.2 SECONDS (ref 11.7–14.2)

## 2021-10-07 ENCOUNTER — ANTICOAGULATION VISIT (OUTPATIENT)
Dept: PHARMACY | Facility: HOSPITAL | Age: 81
End: 2021-10-07

## 2021-10-07 NOTE — PROGRESS NOTES
Anticoagulation Clinic Progress Note    Anticoagulation Summary  As of 10/7/2021    INR goal:  2.0-3.0   TTR:  91.6 % (2.8 y)   INR used for dosin.43 (10/6/2021)   Warfarin maintenance plan:  4 mg every Tue, Thu; 2 mg all other days   Weekly warfarin total:  18 mg   No change documented:  Eulalia Reaves RPH   Plan last modified:  Eulalia Reaves RPH (2021)   Next INR check:  11/3/2021   Priority:  Maintenance   Target end date:      Indications    Atrial fibrillation with RVR (HCC) (Resolved) [I48.91]             Anticoagulation Episode Summary     INR check location:      Preferred lab:      Send INR reminders to:  [facility]  POOL    Comments:  [facility]   ([facility])      Anticoagulation Care Providers     Provider Role Specialty Phone number    Sly Saleh MD Referring Cardiology [phone]          Clinic Interview:  Patient Findings     Negatives:  Signs/symptoms of thrombosis, Signs/symptoms of bleeding,   Laboratory test error suspected, Change in health, Change in alcohol use,   Change in activity, Upcoming invasive procedure, Emergency department   visit, Upcoming dental procedure, Missed doses, Extra doses, Change in   medications, Change in diet/appetite, Hospital admission, Bruising, Other   complaints      Clinical Outcomes     Negatives:  Major bleeding event, Thromboembolic event,   Anticoagulation-related hospital admission, Anticoagulation-related ED   visit, Anticoagulation-related fatality        INR History:  Anticoagulation Monitoring 2021 2021 10/7/2021   INR 2.38 2.30 2.43   INR Date 2021 2021 10/6/2021   INR Goal 2.0-3.0 2.0-3.0 2.0-3.0   Trend Same Same Same   Last Week Total 18 mg 18 mg 18 mg   Next Week Total 18 mg 18 mg 18 mg   Sun 2 mg 2 mg 2 mg   Mon 2 mg 2 mg 2 mg   Tue 4 mg 4 mg 4 mg   Wed 2 mg 2 mg 2 mg   Thu 4 mg 4 mg 4 mg   Fri 2 mg 2 mg 2 mg   Sat 2 mg 2 mg 2 mg   Visit Report - - -   Some recent data might  be hidden       Plan:  1. INR is Therapeutic today- see above in Anticoagulation Summary.   Will instruct Arelis Johnson to Continue their warfarin regimen- see above in Anticoagulation Summary.  2. Follow up in 4 weeks  3. They have been instructed to call if any changes in medications, doses, concerns, etc. Patient expresses understanding and has no further questions at this time.    Eulalia Reaves, [facility]

## 2021-10-19 RX ORDER — DILTIAZEM HYDROCHLORIDE 180 MG/1
CAPSULE, COATED, EXTENDED RELEASE ORAL
Qty: 90 CAPSULE | Refills: 3 | Status: SHIPPED | OUTPATIENT
Start: 2021-10-19 | End: 2022-10-06

## 2021-11-03 LAB — INR PPP: 2.2

## 2021-11-04 ENCOUNTER — OFFICE VISIT (OUTPATIENT)
Dept: CARDIOLOGY | Facility: CLINIC | Age: 81
End: 2021-11-04

## 2021-11-04 VITALS
WEIGHT: 176 LBS | BODY MASS INDEX: 29.32 KG/M2 | HEART RATE: 70 BPM | SYSTOLIC BLOOD PRESSURE: 118 MMHG | DIASTOLIC BLOOD PRESSURE: 80 MMHG | HEIGHT: 65 IN

## 2021-11-04 DIAGNOSIS — I48.21 PERMANENT ATRIAL FIBRILLATION (HCC): Primary | ICD-10-CM

## 2021-11-04 LAB
INR PPP: 2.2 (ref 0.9–1.2)
PROTHROMBIN TIME: 23.1 SEC (ref 9.1–12)

## 2021-11-04 PROCEDURE — 93000 ELECTROCARDIOGRAM COMPLETE: CPT | Performed by: INTERNAL MEDICINE

## 2021-11-04 PROCEDURE — 99213 OFFICE O/P EST LOW 20 MIN: CPT | Performed by: INTERNAL MEDICINE

## 2021-11-04 NOTE — PROGRESS NOTES
Date of Office Visit: 2021  Encounter Provider: Shashank Mckeon MD  Place of Service: [facility] CARDIOLOGY  Patient Name: Arelis Johnson  :1940    Chief Complaint   Patient presents with   • Follow-up     1 year   • Atrial Fibrillation   :     HPI: Arelis Johnson is a 81 y.o. female who presents today for follow-up of permanent atrial fibrillation.    She reports no acute issues.  She continues to live at home and do light to moderate housework.  She says she is not is able to do some outside work as she once was, particular if it involves bending over.    She remains on warfarin due to cost.  She reports her INRs have been pretty stable and she follows with the Coumadin clinic here.    No other significant changes to her health.          Past Medical History:   Diagnosis Date   • Diabetes mellitus (HCC)    • Hyperlipidemia    • Hypertension    • IFG (impaired fasting glucose)    • Palpitations    • SOB (shortness of breath)        Past Surgical History:   Procedure Laterality Date   • ANAL FISTULA REPAIR     • BACK SURGERY      l spine ruptured disk   • CHOLECYSTECTOMY     • JOINT REPLACEMENT      bilateral knees       Social History     Socioeconomic History   • Marital status:    Tobacco Use   • Smoking status: Never Smoker   • Smokeless tobacco: Never Used   Vaping Use   • Vaping Use: Never used   Substance and Sexual Activity   • Alcohol use: No   • Drug use: No   • Sexual activity: Never       Family History   Problem Relation Age of Onset   • Hypertension Mother    • Heart disease Father    • Hypertension Father    • Liver disease Brother        Review of Systems   Constitutional: Negative for malaise/fatigue.   HENT: Negative.    Eyes: Negative.    Cardiovascular: Negative for chest pain, dyspnea on exertion, leg swelling and near-syncope.   Respiratory: Negative for cough and shortness of breath.    Endocrine: Negative.    Hematologic/Lymphatic:  "Negative.    Skin: Negative.    Musculoskeletal: Negative.    Gastrointestinal: Negative.    Genitourinary: Negative.    Neurological: Negative.  Negative for weakness.   Psychiatric/Behavioral: Negative.    Allergic/Immunologic: Negative.        Allergies   Allergen Reactions   • Percocet [Oxycodone-Acetaminophen] GI Intolerance   • Sulfa Antibiotics Nausea And Vomiting   • Penicillins Rash         Current Outpatient Medications:   •  Calcium Carb-Cholecalciferol (CALCIUM + D3) 600-200 MG-UNIT tablet, Take 1 tablet by mouth Daily., Disp: , Rfl:   •  dilTIAZem CD (CARDIZEM CD) 180 MG 24 hr capsule, TAKE ONE CAPSULE (BY MOUTH) EACH DAY, Disp: 90 capsule, Rfl: 3  •  furosemide (LASIX) 40 MG tablet, TAKE 1 TABLET BY MOUTH DAILY., Disp: 90 tablet, Rfl: 4  •  metFORMIN ER (GLUCOPHAGE-XR) 500 MG 24 hr tablet, Take 1 tablet by mouth Daily With Breakfast., Disp: 90 tablet, Rfl: 0  •  metoprolol tartrate (LOPRESSOR) 50 MG tablet, Take 1 tablet by mouth 2 (Two) Times a Day., Disp: 180 tablet, Rfl: 3  •  ramipril (ALTACE) 5 MG capsule, TAKE 1 CAPSULE BY MOUTH DAILY., Disp: 90 capsule, Rfl: 3  •  simvastatin (ZOCOR) 40 MG tablet, Take 1 tablet by mouth Daily., Disp: 90 tablet, Rfl: 3  •  warfarin (COUMADIN) 4 MG tablet, Take one tablet (4 mg) by mouth on Sun, Tues, and Thurs, and take one-half tablet (2 mg) by mouth all other days or as directed., Disp: 65 tablet, Rfl: 1      Objective:     Vitals:    11/04/21 1114   BP: 118/80   Pulse: 70   Weight: 79.8 kg (176 lb)   Height: 165.1 cm (65\")     Body mass index is 29.29 kg/m².    PHYSICAL EXAM:    Vitals and nursing note reviewed.   Constitutional:       Appearance: Healthy appearance.   HENT:      Head: Normocephalic.    Mouth/Throat:      Pharynx: Oropharynx is clear.   Pulmonary:      Effort: Pulmonary effort is normal.   Cardiovascular:      Normal rate. Irregular rhythm.   Edema:     Peripheral edema absent.   Skin:     General: Skin is warm.   Neurological:      General: " No focal deficit present.      Mental Status: Alert, oriented to person, place, and time and oriented to person, place and time.   Psychiatric:         Attention and Perception: Attention and perception normal.         Mood and Affect: Mood and affect normal.         Speech: Speech normal.             ECG 12 Lead    Date/Time: 11/4/2021 11:34 AM  Performed by: Shashank Mckeon MD  Authorized by: Shashank Mckeon MD   Comparison: compared with previous ECG from 11/3/2020  Similar to previous ECG  Rhythm: atrial fibrillation              Assessment:       Diagnosis Plan   1. Permanent atrial fibrillation (HCC)            Plan:       Permanent atrial fibrillation.  Stable.  Continue metoprolol and Cardizem.  She is anticoagulated with warfarin and followed in the Coumadin clinic.    Follow-up in 1 year.    As always, it has been a pleasure to participate in your patient's care.      Sincerely,         Shashank Mckeon MD

## 2021-11-05 ENCOUNTER — ANTICOAGULATION VISIT (OUTPATIENT)
Dept: PHARMACY | Facility: HOSPITAL | Age: 81
End: 2021-11-05

## 2021-11-05 NOTE — PROGRESS NOTES
Anticoagulation Clinic Progress Note    Anticoagulation Summary  As of 2021    INR goal:  2.0-3.0   TTR:  91.8 % (2.9 y)   INR used for dosin.20 (11/3/2021)   Warfarin maintenance plan:  4 mg every Tue, Thu; 2 mg all other days   Weekly warfarin total:  18 mg   No change documented:  Eulalia Reaves RPH   Plan last modified:  Eulalia Reaves RPH (2021)   Next INR check:  2021   Priority:  Maintenance   Target end date:      Indications    Atrial fibrillation with RVR (HCC) (Resolved) [I48.91]             Anticoagulation Episode Summary     INR check location:      Preferred lab:      Send INR reminders to:  [facility]  POOL    Comments:  [facility]   ([facility])      Anticoagulation Care Providers     Provider Role Specialty Phone number    Sly Saleh MD Referring Cardiology [phone]          Clinic Interview:  Patient Findings     Negatives:  Signs/symptoms of thrombosis, Signs/symptoms of bleeding,   Laboratory test error suspected, Change in health, Change in alcohol use,   Change in activity, Upcoming invasive procedure, Emergency department   visit, Upcoming dental procedure, Missed doses, Extra doses, Change in   medications, Change in diet/appetite, Hospital admission, Bruising, Other   complaints      Clinical Outcomes     Negatives:  Major bleeding event, Thromboembolic event,   Anticoagulation-related hospital admission, Anticoagulation-related ED   visit, Anticoagulation-related fatality        INR History:  Anticoagulation Monitoring 2021 10/7/2021 2021   INR 2.30 2.43 2.20   INR Date 2021 10/6/2021 11/3/2021   INR Goal 2.0-3.0 2.0-3.0 2.0-3.0   Trend Same Same Same   Last Week Total 18 mg 18 mg 18 mg   Next Week Total 18 mg 18 mg 18 mg   Sun 2 mg 2 mg 2 mg   Mon 2 mg 2 mg 2 mg   Tue 4 mg 4 mg 4 mg   Wed 2 mg 2 mg 2 mg   Thu 4 mg 4 mg 4 mg   Fri 2 mg 2 mg 2 mg   Sat 2 mg 2 mg 2 mg   Visit Report - - -   Some recent data might  be hidden       Plan:  1. INR is Therapeutic today- see above in Anticoagulation Summary.   Will instruct Arelis Johnson to Continue their warfarin regimen- see above in Anticoagulation Summary.  2. Follow up in 4 weeks  3.  They have been instructed to call if any changes in medications, doses, concerns, etc. Patient expresses understanding and has no further questions at this time.    Eulalia Reaves, [facility]

## 2021-11-15 ENCOUNTER — TELEPHONE (OUTPATIENT)
Dept: FAMILY MEDICINE CLINIC | Facility: CLINIC | Age: 81
End: 2021-11-15

## 2021-12-01 LAB — INR PPP: 2.2

## 2021-12-02 ENCOUNTER — ANTICOAGULATION VISIT (OUTPATIENT)
Dept: PHARMACY | Facility: HOSPITAL | Age: 81
End: 2021-12-02

## 2021-12-02 RX ORDER — WARFARIN SODIUM 4 MG/1
TABLET ORAL
Qty: 60 TABLET | Refills: 1 | Status: SHIPPED | OUTPATIENT
Start: 2021-12-02 | End: 2022-05-24

## 2021-12-02 NOTE — PROGRESS NOTES
Anticoagulation Clinic Progress Note    Anticoagulation Summary  As of 2021    INR goal:  2.0-3.0   TTR:  92.0 % (3 y)   INR used for dosin.20 (2021)   Warfarin maintenance plan:  4 mg every Tue, Thu; 2 mg all other days   Weekly warfarin total:  18 mg   No change documented:  Eulalia Reaves RPH   Plan last modified:  Eulalia Reaves RPH (2021)   Next INR check:  2022   Priority:  Maintenance   Target end date:      Indications    Atrial fibrillation with RVR (HCC) (Resolved) [I48.91]             Anticoagulation Episode Summary     INR check location:      Preferred lab:      Send INR reminders to:  [facility]  POOL    Comments:  [facility]   ([facility] clinic)      Anticoagulation Care Providers     Provider Role Specialty Phone number    Sly Saleh MD Referring Cardiology [phone]          Clinic Interview:  Patient Findings     Negatives:  Signs/symptoms of thrombosis, Signs/symptoms of bleeding,   Laboratory test error suspected, Change in health, Change in alcohol use,   Change in activity, Upcoming invasive procedure, Emergency department   visit, Upcoming dental procedure, Missed doses, Extra doses, Change in   medications, Change in diet/appetite, Hospital admission, Bruising, Other   complaints      Clinical Outcomes     Negatives:  Major bleeding event, Thromboembolic event,   Anticoagulation-related hospital admission, Anticoagulation-related ED   visit, Anticoagulation-related fatality        INR History:  Anticoagulation Monitoring 10/7/2021 2021 2021   INR 2.43 2.20 2.20   INR Date 10/6/2021 11/3/2021 2021   INR Goal 2.0-3.0 2.0-3.0 2.0-3.0   Trend Same Same Same   Last Week Total 18 mg 18 mg 18 mg   Next Week Total 18 mg 18 mg 18 mg   Sun 2 mg 2 mg 2 mg   Mon 2 mg 2 mg 2 mg   Tue 4 mg 4 mg 4 mg   Wed 2 mg 2 mg 2 mg   Thu 4 mg 4 mg 4 mg   Fri 2 mg 2 mg 2 mg   Sat 2 mg 2 mg 2 mg   Visit Report - - -   Some recent data might  be hidden       Plan:  1. INR is Therapeutic today- see above in Anticoagulation Summary.   Will instruct Arelis Johnson to Continue their warfarin regimen- see above in Anticoagulation Summary.  2. Follow up in 6 weeks  3. They have been instructed to call if any changes in medications, doses, concerns, etc. Patient expresses understanding and has no further questions at this time.    Eulalia Reaves, [facility]

## 2021-12-08 RX ORDER — RAMIPRIL 5 MG/1
5 CAPSULE ORAL DAILY
Qty: 90 CAPSULE | Refills: 3 | Status: SHIPPED | OUTPATIENT
Start: 2021-12-08 | End: 2022-11-28

## 2021-12-10 DIAGNOSIS — I48.21 PERMANENT ATRIAL FIBRILLATION (HCC): Primary | ICD-10-CM

## 2022-01-13 ENCOUNTER — ANTICOAGULATION VISIT (OUTPATIENT)
Dept: PHARMACY | Facility: HOSPITAL | Age: 82
End: 2022-01-13

## 2022-01-13 LAB — INR PPP: 2.3

## 2022-01-13 NOTE — PROGRESS NOTES
Anticoagulation Clinic Progress Note    Anticoagulation Summary  As of 2022    INR goal:  2.0-3.0   TTR:  92.3 % (3.1 y)   INR used for dosin.30 (2022)   Warfarin maintenance plan:  4 mg every Tue, Thu; 2 mg all other days   Weekly warfarin total:  18 mg   No change documented:  Janie Dean, David   Plan last modified:  Eulalia Reaves RPH (2021)   Next INR check:  2022   Priority:  Maintenance   Target end date:      Indications    Atrial fibrillation with RVR (HCC) (Resolved) [I48.91]             Anticoagulation Episode Summary     INR check location:      Preferred lab:      Send INR reminders to:  [facility]  POOL    Comments:  [facility]   ([facility])      Anticoagulation Care Providers     Provider Role Specialty Phone number    Sly Saleh MD Referring Cardiology [phone]          Clinic Interview:  Patient Findings     Negatives:  Signs/symptoms of thrombosis, Signs/symptoms of bleeding,   Laboratory test error suspected, Change in health, Change in alcohol use,   Change in activity, Upcoming invasive procedure, Emergency department   visit, Upcoming dental procedure, Missed doses, Extra doses, Change in   medications, Change in diet/appetite, Hospital admission, Bruising, Other   complaints      Clinical Outcomes     Negatives:  Major bleeding event, Thromboembolic event,   Anticoagulation-related hospital admission, Anticoagulation-related ED   visit, Anticoagulation-related fatality        INR History:  Anticoagulation Monitoring 2021   INR 2.20 2.20 2.30   INR Date 11/3/2021 2021 2022   INR Goal 2.0-3.0 2.0-3.0 2.0-3.0   Trend Same Same Same   Last Week Total 18 mg 18 mg 18 mg   Next Week Total 18 mg 18 mg 18 mg   Sun 2 mg 2 mg 2 mg   Mon 2 mg 2 mg 2 mg   Tue 4 mg 4 mg 4 mg   Wed 2 mg 2 mg 2 mg   Thu 4 mg 4 mg 4 mg   Fri 2 mg 2 mg 2 mg   Sat 2 mg 2 mg 2 mg   Visit Report - - -   Some recent data  might be hidden       Plan:  1. INR is Therapeutic today- see above in Anticoagulation Summary.   Will instruct Arelis Johnson to Continue their warfarin regimen- see above in Anticoagulation Summary.  2. Follow up in 6 weeks  3. Patient has been instructed to call if any changes in medications, doses, concerns, etc. Patient expresses understanding and has no further questions at this time.    Janie Dean, PharmD

## 2022-02-04 DIAGNOSIS — E11.9 TYPE 2 DIABETES MELLITUS WITHOUT COMPLICATION, WITHOUT LONG-TERM CURRENT USE OF INSULIN: ICD-10-CM

## 2022-02-04 RX ORDER — METFORMIN HYDROCHLORIDE 500 MG/1
500 TABLET, EXTENDED RELEASE ORAL
Qty: 90 TABLET | Refills: 0 | Status: SHIPPED | OUTPATIENT
Start: 2022-02-04 | End: 2022-03-22 | Stop reason: SDUPTHER

## 2022-02-24 ENCOUNTER — ANTICOAGULATION VISIT (OUTPATIENT)
Dept: PHARMACY | Facility: HOSPITAL | Age: 82
End: 2022-02-24

## 2022-02-24 LAB
INR PPP: 2 (ref 0.9–1.2)
PROTHROMBIN TIME: 21.6 SEC (ref 9.1–12)

## 2022-02-24 NOTE — PROGRESS NOTES
Anticoagulation Clinic Progress Note    Anticoagulation Summary  As of 2022    INR goal:  2.0-3.0   TTR:  92.6 % (3.2 y)   INR used for dosin.0 (2022)   Warfarin maintenance plan:  4 mg every Tue, Thu; 2 mg all other days   Weekly warfarin total:  18 mg   No change documented:  Elyse Batista, PharmD   Plan last modified:  Eulalia Reaves RPH (2021)   Next INR check:  2022   Priority:  Maintenance   Target end date:      Indications    Atrial fibrillation with RVR (HCC) (Resolved) [I48.91]             Anticoagulation Episode Summary     INR check location:      Preferred lab:      Send INR reminders to:  [facility]  POOL    Comments:  [facility]   ([facility])      Anticoagulation Care Providers     Provider Role Specialty Phone number    Sly Saleh MD Referring Cardiology [phone]          Clinic Interview:  Patient Findings     Negatives:  Signs/symptoms of thrombosis, Signs/symptoms of bleeding,   Laboratory test error suspected, Change in health, Change in alcohol use,   Change in activity, Upcoming invasive procedure, Emergency department   visit, Upcoming dental procedure, Missed doses, Extra doses, Change in   medications, Change in diet/appetite, Hospital admission, Bruising, Other   complaints      Clinical Outcomes     Negatives:  Major bleeding event, Thromboembolic event,   Anticoagulation-related hospital admission, Anticoagulation-related ED   visit, Anticoagulation-related fatality        INR History:  Anticoagulation Monitoring 2021   INR 2.20 2.30 2.0   INR Date 2021   INR Goal 2.0-3.0 2.0-3.0 2.0-3.0   Trend Same Same Same   Last Week Total 18 mg 18 mg 18 mg   Next Week Total 18 mg 18 mg 18 mg   Sun 2 mg 2 mg 2 mg   Mon 2 mg 2 mg 2 mg   Tue 4 mg 4 mg 4 mg   Wed 2 mg 2 mg 2 mg   Thu 4 mg 4 mg 4 mg   Fri 2 mg 2 mg 2 mg   Sat 2 mg 2 mg 2 mg   Visit Report - - -   Some recent data might  be hidden       Plan:  1. INR is Therapeutic today- see above in Anticoagulation Summary.   Will instruct Arelis Johnson to Continue their warfarin regimen- see above in Anticoagulation Summary.  2. Follow up in 6 weeks    They have been instructed to call if any changes in medications, doses, concerns, etc. Patient expresses understanding and has no further questions at this time.    lEyse Batista, RogerD

## 2022-03-22 ENCOUNTER — OFFICE VISIT (OUTPATIENT)
Dept: FAMILY MEDICINE CLINIC | Facility: CLINIC | Age: 82
End: 2022-03-22

## 2022-03-22 VITALS
RESPIRATION RATE: 20 BRPM | TEMPERATURE: 97.5 F | WEIGHT: 179 LBS | HEART RATE: 90 BPM | DIASTOLIC BLOOD PRESSURE: 62 MMHG | SYSTOLIC BLOOD PRESSURE: 110 MMHG | HEIGHT: 65 IN | OXYGEN SATURATION: 99 % | BODY MASS INDEX: 29.82 KG/M2

## 2022-03-22 DIAGNOSIS — E11.9 TYPE 2 DIABETES MELLITUS WITHOUT COMPLICATION, WITHOUT LONG-TERM CURRENT USE OF INSULIN: ICD-10-CM

## 2022-03-22 DIAGNOSIS — Z13.0 SCREENING FOR DEFICIENCY ANEMIA: ICD-10-CM

## 2022-03-22 DIAGNOSIS — E78.2 MIXED HYPERLIPIDEMIA: ICD-10-CM

## 2022-03-22 DIAGNOSIS — I10 ESSENTIAL HYPERTENSION: Primary | ICD-10-CM

## 2022-03-22 PROCEDURE — 99214 OFFICE O/P EST MOD 30 MIN: CPT | Performed by: NURSE PRACTITIONER

## 2022-03-22 RX ORDER — METFORMIN HYDROCHLORIDE 500 MG/1
500 TABLET, EXTENDED RELEASE ORAL
Qty: 90 TABLET | Refills: 1 | Status: SHIPPED | OUTPATIENT
Start: 2022-03-22 | End: 2022-09-26 | Stop reason: SDUPTHER

## 2022-03-22 RX ORDER — SIMVASTATIN 40 MG
40 TABLET ORAL DAILY
Qty: 90 TABLET | Refills: 1 | Status: CANCELLED | OUTPATIENT
Start: 2022-03-22

## 2022-03-22 RX ORDER — SIMVASTATIN 40 MG
40 TABLET ORAL DAILY
Qty: 90 TABLET | Refills: 3 | Status: SHIPPED | OUTPATIENT
Start: 2022-03-22 | End: 2022-09-26 | Stop reason: SDUPTHER

## 2022-03-22 NOTE — PROGRESS NOTES
"Chief Complaint  Hypertension, Hyperlipidemia, and Diabetes    Subjective          Arelis Johnson presents to [facility]  History of Present Illness  This is a 82-year-old female patient here today for evaluation of hyperlipidemia, hypertension and diabetes.  She also has a history of atrial fibrillation and is being followed by cardiology.  She is on Coumadin which been checked through the Coumadin clinic.  No signs of bleeding noted.  Blood pressure is stable today.  She denies any chest pain or shortness of breath.  She is on Zocor 40 mg daily.  Lipid panel has been normal.  Last hemoglobin A1c was 6.6.  She reports her blood sugars this morning was 86.  She denies any hypoglycemic episodes.    Objective   Vital Signs:   /62   Pulse 90   Temp 97.5 °F (36.4 °C)   Resp 20   Ht 165 cm (64.96\")   Wt 81.2 kg (179 lb)   SpO2 99%   BMI 29.82 kg/m²            Physical Exam  Vitals and nursing note reviewed.   HENT:      Head: Normocephalic.      Nose: Nose normal.   Eyes:      Pupils: Pupils are equal, round, and reactive to light.   Cardiovascular:      Rate and Rhythm: Normal rate and regular rhythm.      Pulses: Normal pulses.      Heart sounds: Normal heart sounds.   Pulmonary:      Effort: Pulmonary effort is normal. No respiratory distress.      Breath sounds: Normal breath sounds. No wheezing or rales.   Abdominal:      General: Bowel sounds are normal. There is no distension.      Tenderness: There is no abdominal tenderness.   Musculoskeletal:         General: No swelling.      Cervical back: Neck supple.      Right lower leg: No edema.      Left lower leg: No edema.   Skin:     General: Skin is warm and dry.   Neurological:      Mental Status: She is alert and oriented to person, place, and time.   Psychiatric:         Mood and Affect: Mood normal.        Result Review :                 Assessment and Plan    Diagnoses and all orders for this visit:    1. " Essential hypertension (Primary)    2. Type 2 diabetes mellitus without complication, without long-term current use of insulin (HCC)  -     metFORMIN ER (GLUCOPHAGE-XR) 500 MG 24 hr tablet; Take 1 tablet by mouth Daily With Breakfast.  Dispense: 90 tablet; Refill: 1  -     Hemoglobin A1c  -     MicroAlbumin, Urine, Random - Urine, Clean Catch    3. Mixed hyperlipidemia  -     Comprehensive Metabolic Panel  -     Lipid Panel    4. Screening for deficiency anemia  -     CBC & Differential    Other orders  -     simvastatin (ZOCOR) 40 MG tablet; Take 1 tablet by mouth Daily.  Dispense: 90 tablet; Refill: 3    We will obtain lab work today along with urine.  She will continue her current regimen as ordered.  All screening is up-to-date.    Follow Up   Return in about 6 months (around 9/22/2022).  Patient was given instructions and counseling regarding her condition or for health maintenance advice. Please see specific information pulled into the AVS if appropriate.

## 2022-03-23 LAB
ALBUMIN SERPL-MCNC: 3.9 G/DL (ref 3.6–4.6)
ALBUMIN/GLOB SERPL: 1.5 {RATIO} (ref 1.2–2.2)
ALP SERPL-CCNC: 71 IU/L (ref 44–121)
ALT SERPL-CCNC: 17 IU/L (ref 0–32)
AST SERPL-CCNC: 31 IU/L (ref 0–40)
BASOPHILS # BLD AUTO: 0.1 X10E3/UL (ref 0–0.2)
BASOPHILS NFR BLD AUTO: 1 %
BILIRUB SERPL-MCNC: 0.6 MG/DL (ref 0–1.2)
BUN SERPL-MCNC: 26 MG/DL (ref 8–27)
BUN/CREAT SERPL: 23 (ref 12–28)
CALCIUM SERPL-MCNC: 9.8 MG/DL (ref 8.7–10.3)
CHLORIDE SERPL-SCNC: 100 MMOL/L (ref 96–106)
CHOLEST SERPL-MCNC: 123 MG/DL (ref 100–199)
CO2 SERPL-SCNC: 28 MMOL/L (ref 20–29)
CREAT SERPL-MCNC: 1.12 MG/DL (ref 0.57–1)
EGFRCR SERPLBLD CKD-EPI 2021: 49 ML/MIN/1.73
EOSINOPHIL # BLD AUTO: 0.1 X10E3/UL (ref 0–0.4)
EOSINOPHIL NFR BLD AUTO: 2 %
ERYTHROCYTE [DISTWIDTH] IN BLOOD BY AUTOMATED COUNT: 12.3 % (ref 11.7–15.4)
GLOBULIN SER CALC-MCNC: 2.6 G/DL (ref 1.5–4.5)
GLUCOSE SERPL-MCNC: 122 MG/DL (ref 65–99)
HBA1C MFR BLD: 6.9 % (ref 4.8–5.6)
HCT VFR BLD AUTO: 40 % (ref 34–46.6)
HDLC SERPL-MCNC: 51 MG/DL
HGB BLD-MCNC: 13.5 G/DL (ref 11.1–15.9)
IMM GRANULOCYTES # BLD AUTO: 0 X10E3/UL (ref 0–0.1)
IMM GRANULOCYTES NFR BLD AUTO: 1 %
LDLC SERPL CALC-MCNC: 50 MG/DL (ref 0–99)
LYMPHOCYTES # BLD AUTO: 0.9 X10E3/UL (ref 0.7–3.1)
LYMPHOCYTES NFR BLD AUTO: 21 %
MCH RBC QN AUTO: 31.3 PG (ref 26.6–33)
MCHC RBC AUTO-ENTMCNC: 33.8 G/DL (ref 31.5–35.7)
MCV RBC AUTO: 93 FL (ref 79–97)
MICROALBUMIN UR-MCNC: <3 UG/ML
MONOCYTES # BLD AUTO: 0.4 X10E3/UL (ref 0.1–0.9)
MONOCYTES NFR BLD AUTO: 8 %
NEUTROPHILS # BLD AUTO: 3 X10E3/UL (ref 1.4–7)
NEUTROPHILS NFR BLD AUTO: 67 %
PLATELET # BLD AUTO: 209 X10E3/UL (ref 150–450)
POTASSIUM SERPL-SCNC: 4 MMOL/L (ref 3.5–5.2)
PROT SERPL-MCNC: 6.5 G/DL (ref 6–8.5)
RBC # BLD AUTO: 4.32 X10E6/UL (ref 3.77–5.28)
SODIUM SERPL-SCNC: 143 MMOL/L (ref 134–144)
TRIGL SERPL-MCNC: 127 MG/DL (ref 0–149)
VLDLC SERPL CALC-MCNC: 22 MG/DL (ref 5–40)
WBC # BLD AUTO: 4.4 X10E3/UL (ref 3.4–10.8)

## 2022-04-06 ENCOUNTER — LAB (OUTPATIENT)
Dept: FAMILY MEDICINE CLINIC | Facility: CLINIC | Age: 82
End: 2022-04-06
Payer: MEDICARE

## 2022-04-06 LAB — INR PPP: 2.1

## 2022-04-07 ENCOUNTER — ANTICOAGULATION VISIT (OUTPATIENT)
Dept: PHARMACY | Facility: HOSPITAL | Age: 82
End: 2022-04-07

## 2022-04-07 NOTE — PROGRESS NOTES
Anticoagulation Clinic Progress Note    Anticoagulation Summary  As of 2022    INR goal:  2.0-3.0   TTR:  92.8 % (3.3 y)   INR used for dosin.10 (2022)   Warfarin maintenance plan:  4 mg every Tue, Thu; 2 mg all other days   Weekly warfarin total:  18 mg   No change documented:  Jamila Guerra [facility]   Plan last modified:  Eulalia Reaves RPH (2021)   Next INR check:  2022   Priority:  Maintenance   Target end date:      Indications    Atrial fibrillation with RVR (HCC) (Resolved) [I48.91]             Anticoagulation Episode Summary     INR check location:      Preferred lab:      Send INR reminders to:  [facility]  POOL    Comments:  [facility]   ([facility] clinic)      Anticoagulation Care Providers     Provider Role Specialty Phone number    Sly Saleh MD Referring Cardiology [phone]          Clinic Interview:  No pertinent clinical findings have been reported.    INR History:  Anticoagulation Monitoring 2022   INR 2.30 2.0 2.10   INR Date 2022   INR Goal 2.0-3.0 2.0-3.0 2.0-3.0   Trend Same Same Same   Last Week Total 18 mg 18 mg 18 mg   Next Week Total 18 mg 18 mg 18 mg   Sun 2 mg 2 mg 2 mg   Mon 2 mg 2 mg 2 mg   Tue 4 mg 4 mg 4 mg   Wed 2 mg 2 mg 2 mg   Thu 4 mg 4 mg 4 mg   Fri 2 mg 2 mg 2 mg   Sat 2 mg 2 mg 2 mg   Visit Report - - -   Some recent data might be hidden       Plan:  1. INR is therapeutic today- see above in Anticoagulation Summary.    Arelis FLORES Alex to continue their warfarin regimen- see above in Anticoagulation Summary.  2. Follow up in 6 weeks  3. Pt has agreed to only be called if INR out of range. They have been instructed to call if any changes in medications, doses, concerns, etc. Patient expresses understanding and has no further questions at this time.    Jamila Guerra [facility]

## 2022-04-18 RX ORDER — METOPROLOL TARTRATE 50 MG/1
50 TABLET, FILM COATED ORAL 2 TIMES DAILY
Qty: 180 TABLET | Refills: 3 | Status: SHIPPED | OUTPATIENT
Start: 2022-04-18

## 2022-04-26 RX ORDER — FUROSEMIDE 40 MG/1
40 TABLET ORAL DAILY
Qty: 90 TABLET | Refills: 4 | Status: SHIPPED | OUTPATIENT
Start: 2022-04-26

## 2022-05-18 ENCOUNTER — TELEPHONE (OUTPATIENT)
Dept: PHARMACY | Facility: HOSPITAL | Age: 82
End: 2022-05-18

## 2022-05-18 DIAGNOSIS — I48.21 PERMANENT ATRIAL FIBRILLATION: Primary | ICD-10-CM

## 2022-05-18 LAB — INR PPP: 2.5

## 2022-05-19 ENCOUNTER — ANTICOAGULATION VISIT (OUTPATIENT)
Dept: PHARMACY | Facility: HOSPITAL | Age: 82
End: 2022-05-19

## 2022-05-19 NOTE — PROGRESS NOTES
Anticoagulation Clinic Progress Note    Anticoagulation Summary  As of 2022    INR goal:  2.0-3.0   TTR:  93.1 % (3.5 y)   INR used for dosin.50 (2022)   Warfarin maintenance plan:  4 mg every Tue, Thu; 2 mg all other days   Weekly warfarin total:  18 mg   No change documented:  Stephanie Yeager, PharmD   Plan last modified:  Eulalia Reaves RPH (2021)   Next INR check:  2022   Priority:  Maintenance   Target end date:      Indications    Atrial fibrillation with RVR (HCC) (Resolved) [I48.91]             Anticoagulation Episode Summary     INR check location:      Preferred lab:      Send INR reminders to:  [facility]  POOL    Comments:  [facility]   ([facility])      Anticoagulation Care Providers     Provider Role Specialty Phone number    Sly Saleh MD Referring Cardiology [phone]          Clinic Interview:  Patient Findings     Negatives:  Signs/symptoms of thrombosis, Signs/symptoms of bleeding,   Laboratory test error suspected, Change in health, Change in alcohol use,   Change in activity, Upcoming invasive procedure, Emergency department   visit, Upcoming dental procedure, Missed doses, Extra doses, Change in   medications, Change in diet/appetite, Hospital admission, Bruising, Other   complaints      Clinical Outcomes     Negatives:  Major bleeding event, Thromboembolic event,   Anticoagulation-related hospital admission, Anticoagulation-related ED   visit, Anticoagulation-related fatality        INR History:  Anticoagulation Monitoring 2022   INR 2.0 2.10 2.50   INR Date 2022   INR Goal 2.0-3.0 2.0-3.0 2.0-3.0   Trend Same Same Same   Last Week Total 18 mg 18 mg 18 mg   Next Week Total 18 mg 18 mg 18 mg   Sun 2 mg 2 mg 2 mg   Mon 2 mg 2 mg 2 mg   Tue 4 mg 4 mg 4 mg   Wed 2 mg 2 mg 2 mg   Thu 4 mg 4 mg 4 mg   Fri 2 mg 2 mg 2 mg   Sat 2 mg 2 mg 2 mg   Visit Report - - -   Some recent data might  be hidden       Plan:  1. INR is Therapeutic today- see above in Anticoagulation Summary.   Will instruct rAelis Johnson to Continue their warfarin regimen- see above in Anticoagulation Summary.  2. Follow up in 6 weeks  3. They have been instructed to call if any changes in medications, doses, concerns, etc. Patient expresses understanding and has no further questions at this time.    Stephanie Yeager, PharmD

## 2022-05-24 RX ORDER — WARFARIN SODIUM 4 MG/1
TABLET ORAL
Qty: 60 TABLET | Refills: 1 | Status: SHIPPED | OUTPATIENT
Start: 2022-05-24 | End: 2022-11-28

## 2022-06-14 ENCOUNTER — OFFICE VISIT (OUTPATIENT)
Dept: FAMILY MEDICINE CLINIC | Facility: CLINIC | Age: 82
End: 2022-06-14

## 2022-06-14 VITALS
SYSTOLIC BLOOD PRESSURE: 118 MMHG | DIASTOLIC BLOOD PRESSURE: 72 MMHG | TEMPERATURE: 97.3 F | HEIGHT: 65 IN | HEART RATE: 85 BPM | WEIGHT: 177 LBS | BODY MASS INDEX: 29.49 KG/M2 | OXYGEN SATURATION: 99 %

## 2022-06-14 DIAGNOSIS — M10.9 ACUTE GOUT OF RIGHT FOOT, UNSPECIFIED CAUSE: Primary | ICD-10-CM

## 2022-06-14 PROCEDURE — 99213 OFFICE O/P EST LOW 20 MIN: CPT | Performed by: NURSE PRACTITIONER

## 2022-06-14 NOTE — PROGRESS NOTES
"Chief Complaint  Foot Swelling (Patient states that her right foot has been swollen and painful. X 2 weeks States she feels better now after she went to the foot doctor. They just told her she needed to follow up with her primary care. )    Subjective        Arelis Johnson presents to [facility]  History of Present Illness  This is a 82-year-old female patient here today for evaluation of right foot pain.  Patient reports she developed right foot pain 3 weeks ago.  She went to her podiatrist and was treated with gout.  She does have lab levels today that shows a uric acid level of 7.1.  She states she was treated with a purple pill for a week and now feels better.  No reports of previous history of gout in the past.      Objective   Vital Signs:  /72 (BP Location: Right arm, Patient Position: Sitting, Cuff Size: Adult)   Pulse 85   Temp 97.3 °F (36.3 °C) (Infrared)   Ht 165 cm (64.96\")   Wt 80.3 kg (177 lb)   SpO2 99%   BMI 29.49 kg/m²   Estimated body mass index is 29.49 kg/m² as calculated from the following:    Height as of this encounter: 165 cm (64.96\").    Weight as of this encounter: 80.3 kg (177 lb).          Physical Exam  Vitals and nursing note reviewed.   HENT:      Head: Normocephalic.      Nose: Nose normal.   Eyes:      Pupils: Pupils are equal, round, and reactive to light.   Cardiovascular:      Rate and Rhythm: Normal rate and regular rhythm.      Pulses: Normal pulses.      Heart sounds: Normal heart sounds.   Pulmonary:      Effort: Pulmonary effort is normal. No respiratory distress.      Breath sounds: Normal breath sounds. No wheezing or rales.   Abdominal:      General: There is no distension.      Tenderness: There is no abdominal tenderness.   Musculoskeletal:         General: No swelling. Normal range of motion.      Cervical back: Neck supple.      Right lower leg: No edema.      Left lower leg: No edema.   Skin:     General: Skin is warm and " dry.      Capillary Refill: Capillary refill takes less than 2 seconds.   Neurological:      Mental Status: She is alert and oriented to person, place, and time.   Psychiatric:         Mood and Affect: Mood normal.        Result Review :                Assessment and Plan   Diagnoses and all orders for this visit:    1. Acute gout of right foot, unspecified cause (Primary)  -     Uric Acid    Patient does not recall medication that she took but she does report her pain is better.  I will repeat her gout levels today.  She has an appointment with me in September and will keep that.  She will let me know if she has any problems before then         Follow Up   No follow-ups on file.  Patient was given instructions and counseling regarding her condition or for health maintenance advice. Please see specific information pulled into the AVS if appropriate.

## 2022-06-15 LAB — URATE SERPL-MCNC: 7.9 MG/DL (ref 3.1–7.9)

## 2022-06-29 ENCOUNTER — TELEPHONE (OUTPATIENT)
Dept: PHARMACY | Facility: HOSPITAL | Age: 82
End: 2022-06-29

## 2022-06-29 NOTE — TELEPHONE ENCOUNTER
Per phone call with [facility], they will not accept our standing INR orders that are entered in our system and faxed to them despite it containing all needed information. They have faxed their preferred form to us, and I have forwarded it to the Cardiologist for signing.

## 2022-07-05 LAB — INR PPP: 2.5

## 2022-07-06 ENCOUNTER — ANTICOAGULATION VISIT (OUTPATIENT)
Dept: PHARMACY | Facility: HOSPITAL | Age: 82
End: 2022-07-06

## 2022-07-06 NOTE — PROGRESS NOTES
Anticoagulation Clinic Progress Note    Anticoagulation Summary  As of 2022    INR goal:  2.0-3.0   TTR:  93.3 % (3.6 y)   INR used for dosin.50 (2022)   Warfarin maintenance plan:  4 mg every Tue, Thu; 2 mg all other days   Weekly warfarin total:  18 mg   No change documented:  Eulalia Reaves RPH   Plan last modified:  Eulalia Reaves RPH (2021)   Next INR check:  2022   Priority:  Maintenance   Target end date:      Indications    Atrial fibrillation with RVR (HCC) (Resolved) [I48.91]             Anticoagulation Episode Summary     INR check location:      Preferred lab:      Send INR reminders to:  [facility]  POOL    Comments:  [facility]   ([facility])      Anticoagulation Care Providers     Provider Role Specialty Phone number    Sly Saleh MD Referring Cardiology [phone]          Clinic Interview:  Patient Findings     Negatives:  Signs/symptoms of thrombosis, Signs/symptoms of bleeding,   Laboratory test error suspected, Change in health, Change in alcohol use,   Change in activity, Upcoming invasive procedure, Emergency department   visit, Upcoming dental procedure, Missed doses, Extra doses, Change in   medications, Change in diet/appetite, Hospital admission, Bruising, Other   complaints      Clinical Outcomes     Negatives:  Major bleeding event, Thromboembolic event,   Anticoagulation-related hospital admission, Anticoagulation-related ED   visit, Anticoagulation-related fatality        INR History:  Anticoagulation Monitoring 2022   INR 2.10 2.50 2.50   INR Date 2022   INR Goal 2.0-3.0 2.0-3.0 2.0-3.0   Trend Same Same Same   Last Week Total 18 mg 18 mg 18 mg   Next Week Total 18 mg 18 mg 18 mg   Sun 2 mg 2 mg 2 mg   Mon 2 mg 2 mg 2 mg   Tue 4 mg 4 mg 4 mg   Wed 2 mg 2 mg 2 mg   Thu 4 mg 4 mg 4 mg   Fri 2 mg 2 mg 2 mg   Sat 2 mg 2 mg 2 mg   Visit Report - - -   Some recent data might be  hidden       Plan:  1. INR is Therapeutic today- see above in Anticoagulation Summary.   Will instruct Arelis Johnson to Continue their warfarin regimen- see above in Anticoagulation Summary.  2. Follow up in 6 weeks  3. They have been instructed to call if any changes in medications, doses, concerns, etc. Patient expresses understanding and has no further questions at this time.    Eulalia Reaves, [facility]

## 2022-07-10 NOTE — PROGRESS NOTES
SUBJECTIVE:  The patient is a 78-year-old white female comes in for follow-up.  She has leg edema.  She was taking Lasix 40 mg over this past week.  There is been some improvement but not total.  The patient complains of fullness in her right ear.    PAST MEDICAL HISTORY:  Reviewed.    REVIEW OF SYSTEMS:  Please see above; 14 point ROS negative.      OBJECTIVE: Vitals signs are reviewed and are stable.    HEENT: PERRLA.  Cerumen impaction noted right ear.  Left ear okay  Neck:  Supple.   Lungs:  Clear.    Heart:  Regular rate and rhythm.   Abdomen:   Soft, nontender.   Extremities:  No cyanosis, clubbing .  Edema still present however there is about a 30% decrease from previous visit.    ASSESSMENT:     Leg edema-improving  Cerumen impaction right ear     PLAN: Increase Lasix to 50 mg daily.  BMP ordered.  Right ear is irrigated.  Patient will follow up on labs and let me know how she's doing next week.  If the edema has not resolved she will return.    Dictated utilizing Dragon dictation.   Home/with Baby

## 2022-07-14 ENCOUNTER — TRANSCRIBE ORDERS (OUTPATIENT)
Dept: ADMINISTRATIVE | Facility: HOSPITAL | Age: 82
End: 2022-07-14

## 2022-07-14 DIAGNOSIS — Z12.31 SCREENING MAMMOGRAM FOR BREAST CANCER: Primary | ICD-10-CM

## 2022-08-17 LAB — INR PPP: 3.4

## 2022-08-18 ENCOUNTER — ANTICOAGULATION VISIT (OUTPATIENT)
Dept: PHARMACY | Facility: HOSPITAL | Age: 82
End: 2022-08-18

## 2022-08-18 NOTE — PROGRESS NOTES
Anticoagulation Clinic Progress Note    Anticoagulation Summary  As of 8/18/2022    INR goal:  2.0-3.0   TTR:  92.1 % (3.7 y)   INR used for dosing:  3.40 (8/17/2022)   Warfarin maintenance plan:  4 mg every Tue, Thu; 2 mg all other days   Weekly warfarin total:  18 mg   Plan last modified:  Eulalia Reaves RPH (8/26/2021)   Next INR check:  8/31/2022   Priority:  Maintenance   Target end date:      Indications    Atrial fibrillation with RVR (HCC) (Resolved) [I48.91]             Anticoagulation Episode Summary     INR check location:      Preferred lab:      Send INR reminders to:  [facility]  POOL    Comments:  [facility]   ([facility] clinic)      Anticoagulation Care Providers     Provider Role Specialty Phone number    Sly Saleh MD Referring Cardiology [phone]          Clinic Interview:  Patient Findings     Positives:  Change in health, Change in diet/appetite    Negatives:  Signs/symptoms of thrombosis, Signs/symptoms of bleeding,   Laboratory test error suspected, Change in alcohol use, Change in   activity, Upcoming invasive procedure, Emergency department visit,   Upcoming dental procedure, Missed doses, Extra doses, Change in   medications, Hospital admission, Bruising, Other complaints    Comments:  Reports gout flare recently. She denies pharmacologic   management. She drank tart cherry juice for a period, which she has since   stopped. Her gout has flared again, and she is considering contacting PCP   for evaluation. She has already taken today's dose of warfarin.       Clinical Outcomes     Negatives:  Major bleeding event, Thromboembolic event,   Anticoagulation-related hospital admission, Anticoagulation-related ED   visit, Anticoagulation-related fatality    Comments:  Reports gout flare recently. She denies pharmacologic   management. She drank tart cherry juice for a period, which she has since   stopped. Her gout has flared again, and she is considering  contacting PCP   for evaluation. She has already taken today's dose of warfarin.         INR History:  Anticoagulation Monitoring 5/19/2022 7/6/2022 8/18/2022   INR 2.50 2.50 3.40   INR Date 5/18/2022 7/5/2022 8/17/2022   INR Goal 2.0-3.0 2.0-3.0 2.0-3.0   Trend Same Same Same   Last Week Total 18 mg 18 mg 18 mg   Next Week Total 18 mg 18 mg 16 mg   Sun 2 mg 2 mg 2 mg   Mon 2 mg 2 mg 2 mg   Tue 4 mg 4 mg 4 mg   Wed 2 mg 2 mg 2 mg   Thu 4 mg 4 mg 4 mg   Fri 2 mg 2 mg Hold (8/19); Otherwise 2 mg   Sat 2 mg 2 mg 2 mg   Visit Report - - -   Some recent data might be hidden       Plan:  1. INR is Supratherapeutic today- see above in Anticoagulation Summary.   Will instruct Arelis Johnson to Change their warfarin regimen- see above in Anticoagulation Summary.  2. Follow up in 2 weeks  3. They have been instructed to call if any changes in medications, doses, concerns, etc. Patient expresses understanding and has no further questions at this time.    Savage Wasserman, PharmD

## 2022-09-01 ENCOUNTER — HOSPITAL ENCOUNTER (OUTPATIENT)
Dept: MAMMOGRAPHY | Facility: HOSPITAL | Age: 82
Discharge: HOME | End: 2022-09-01
Admitting: NURSE PRACTITIONER

## 2022-09-01 DIAGNOSIS — Z12.31 SCREENING MAMMOGRAM FOR BREAST CANCER: ICD-10-CM

## 2022-09-01 LAB — INR PPP: 2.64

## 2022-09-01 PROCEDURE — 77067 SCR MAMMO BI INCL CAD: CPT

## 2022-09-01 PROCEDURE — 77063 BREAST TOMOSYNTHESIS BI: CPT

## 2022-09-02 ENCOUNTER — ANTICOAGULATION VISIT (OUTPATIENT)
Dept: PHARMACY | Facility: HOSPITAL | Age: 82
End: 2022-09-02

## 2022-09-02 NOTE — PROGRESS NOTES
Anticoagulation Clinic Progress Note    Anticoagulation Summary  As of 2022    INR goal:  2.0-3.0   TTR:  91.6 % (3.8 y)   INR used for dosin.64 (2022)   Warfarin maintenance plan:  4 mg every Tue, Thu; 2 mg all other days   Weekly warfarin total:  18 mg   No change documented:  Eulalia Reaves RPH   Plan last modified:  Eulalia Reaves RPH (2021)   Next INR check:  2022   Priority:  Maintenance   Target end date:      Indications    Atrial fibrillation with RVR (HCC) (Resolved) [I48.91]             Anticoagulation Episode Summary     INR check location:      Preferred lab:      Send INR reminders to:  [facility]  POOL    Comments:  [facility]   ([facility])      Anticoagulation Care Providers     Provider Role Specialty Phone number    Sly Saleh MD Referring Cardiology [phone]          Clinic Interview:  Patient Findings     Negatives:  Signs/symptoms of thrombosis, Signs/symptoms of bleeding,   Laboratory test error suspected, Change in health, Change in alcohol use,   Change in activity, Upcoming invasive procedure, Emergency department   visit, Upcoming dental procedure, Missed doses, Extra doses, Change in   medications, Change in diet/appetite, Hospital admission, Bruising, Other   complaints      Clinical Outcomes     Negatives:  Major bleeding event, Thromboembolic event,   Anticoagulation-related hospital admission, Anticoagulation-related ED   visit, Anticoagulation-related fatality        INR History:  Anticoagulation Monitoring 2022   INR 2.50 3.40 2.64   INR Date 2022   INR Goal 2.0-3.0 2.0-3.0 2.0-3.0   Trend Same Same Same   Last Week Total 18 mg 18 mg 18 mg   Next Week Total 18 mg 16 mg 18 mg   Sun 2 mg 2 mg 2 mg   Mon 2 mg 2 mg 2 mg   Tue 4 mg 4 mg 4 mg   Wed 2 mg 2 mg 2 mg   Thu 4 mg 4 mg 4 mg   Fri 2 mg Hold (); Otherwise 2 mg 2 mg   Sat 2 mg 2 mg 2 mg   Visit Report - - -   Some  recent data might be hidden       Plan:  1. INR is Therapeutic today- see above in Anticoagulation Summary.   Will instruct Arelis Johnson to Continue their warfarin regimen- see above in Anticoagulation Summary.  2. Follow up in 4 weeks (gout flare has resolved)   3. They have been instructed to call if any changes in medications, doses, concerns, etc. Patient expresses understanding and has no further questions at this time.    Eulalia Reaves [facility]

## 2022-09-26 ENCOUNTER — OFFICE VISIT (OUTPATIENT)
Dept: FAMILY MEDICINE CLINIC | Facility: CLINIC | Age: 82
End: 2022-09-26

## 2022-09-26 VITALS
OXYGEN SATURATION: 98 % | HEART RATE: 110 BPM | SYSTOLIC BLOOD PRESSURE: 116 MMHG | TEMPERATURE: 97.3 F | BODY MASS INDEX: 29.32 KG/M2 | HEIGHT: 65 IN | DIASTOLIC BLOOD PRESSURE: 70 MMHG | WEIGHT: 176 LBS

## 2022-09-26 DIAGNOSIS — E78.2 MIXED HYPERLIPIDEMIA: ICD-10-CM

## 2022-09-26 DIAGNOSIS — Z00.00 MEDICARE ANNUAL WELLNESS VISIT, SUBSEQUENT: ICD-10-CM

## 2022-09-26 DIAGNOSIS — I10 ESSENTIAL HYPERTENSION: ICD-10-CM

## 2022-09-26 DIAGNOSIS — M10.9 ACUTE GOUT OF RIGHT FOOT, UNSPECIFIED CAUSE: Primary | ICD-10-CM

## 2022-09-26 DIAGNOSIS — E11.9 TYPE 2 DIABETES MELLITUS WITHOUT COMPLICATION, WITHOUT LONG-TERM CURRENT USE OF INSULIN: ICD-10-CM

## 2022-09-26 DIAGNOSIS — Z13.0 SCREENING FOR DEFICIENCY ANEMIA: ICD-10-CM

## 2022-09-26 LAB
ALBUMIN SERPL-MCNC: 4.1 G/DL (ref 3.5–5.2)
ALBUMIN/GLOB SERPL: 2.1 G/DL
ALP SERPL-CCNC: 75 U/L (ref 39–117)
ALT SERPL-CCNC: 12 U/L (ref 1–33)
AST SERPL-CCNC: 21 U/L (ref 1–32)
BASOPHILS # BLD AUTO: 0.05 10*3/MM3 (ref 0–0.2)
BASOPHILS NFR BLD AUTO: 1 % (ref 0–1.5)
BILIRUB SERPL-MCNC: 0.8 MG/DL (ref 0–1.2)
BUN SERPL-MCNC: 23 MG/DL (ref 8–23)
BUN/CREAT SERPL: 22.8 (ref 7–25)
CALCIUM SERPL-MCNC: 10 MG/DL (ref 8.6–10.5)
CHLORIDE SERPL-SCNC: 100 MMOL/L (ref 98–107)
CHOLEST SERPL-MCNC: 97 MG/DL (ref 0–200)
CO2 SERPL-SCNC: 31 MMOL/L (ref 22–29)
CREAT SERPL-MCNC: 1.01 MG/DL (ref 0.57–1)
EGFRCR SERPLBLD CKD-EPI 2021: 55.3 ML/MIN/1.73
EOSINOPHIL # BLD AUTO: 0.08 10*3/MM3 (ref 0–0.4)
EOSINOPHIL NFR BLD AUTO: 1.6 % (ref 0.3–6.2)
ERYTHROCYTE [DISTWIDTH] IN BLOOD BY AUTOMATED COUNT: 12.4 % (ref 12.3–15.4)
ERYTHROCYTE [SEDIMENTATION RATE] IN BLOOD BY WESTERGREN METHOD: 11 MM/HR (ref 0–30)
EXPIRATION DATE: NORMAL
GLOBULIN SER CALC-MCNC: 2 GM/DL
GLUCOSE SERPL-MCNC: 123 MG/DL (ref 65–99)
HBA1C MFR BLD: 6.6 %
HCT VFR BLD AUTO: 39.3 % (ref 34–46.6)
HDLC SERPL-MCNC: 51 MG/DL (ref 40–60)
HGB BLD-MCNC: 13 G/DL (ref 12–15.9)
IMM GRANULOCYTES # BLD AUTO: 0.01 10*3/MM3 (ref 0–0.05)
IMM GRANULOCYTES NFR BLD AUTO: 0.2 % (ref 0–0.5)
LDLC SERPL CALC-MCNC: 29 MG/DL (ref 0–100)
LYMPHOCYTES # BLD AUTO: 1.05 10*3/MM3 (ref 0.7–3.1)
LYMPHOCYTES NFR BLD AUTO: 21 % (ref 19.6–45.3)
Lab: NORMAL
MCH RBC QN AUTO: 31.6 PG (ref 26.6–33)
MCHC RBC AUTO-ENTMCNC: 33.1 G/DL (ref 31.5–35.7)
MCV RBC AUTO: 95.6 FL (ref 79–97)
MONOCYTES # BLD AUTO: 0.42 10*3/MM3 (ref 0.1–0.9)
MONOCYTES NFR BLD AUTO: 8.4 % (ref 5–12)
NEUTROPHILS # BLD AUTO: 3.39 10*3/MM3 (ref 1.7–7)
NEUTROPHILS NFR BLD AUTO: 67.8 % (ref 42.7–76)
NRBC BLD AUTO-RTO: 0 /100 WBC (ref 0–0.2)
PLATELET # BLD AUTO: 214 10*3/MM3 (ref 140–450)
POTASSIUM SERPL-SCNC: 4 MMOL/L (ref 3.5–5.2)
PROT SERPL-MCNC: 6.1 G/DL (ref 6–8.5)
RBC # BLD AUTO: 4.11 10*6/MM3 (ref 3.77–5.28)
SODIUM SERPL-SCNC: 141 MMOL/L (ref 136–145)
TRIGL SERPL-MCNC: 89 MG/DL (ref 0–150)
URATE SERPL-MCNC: 7.6 MG/DL (ref 2.4–5.7)
VLDLC SERPL CALC-MCNC: 17 MG/DL (ref 5–40)
WBC # BLD AUTO: 5 10*3/MM3 (ref 3.4–10.8)

## 2022-09-26 PROCEDURE — 1126F AMNT PAIN NOTED NONE PRSNT: CPT | Performed by: NURSE PRACTITIONER

## 2022-09-26 PROCEDURE — 99214 OFFICE O/P EST MOD 30 MIN: CPT | Performed by: NURSE PRACTITIONER

## 2022-09-26 PROCEDURE — G0439 PPPS, SUBSEQ VISIT: HCPCS | Performed by: NURSE PRACTITIONER

## 2022-09-26 PROCEDURE — 3044F HG A1C LEVEL LT 7.0%: CPT | Performed by: NURSE PRACTITIONER

## 2022-09-26 PROCEDURE — 1159F MED LIST DOCD IN RCRD: CPT | Performed by: NURSE PRACTITIONER

## 2022-09-26 PROCEDURE — 1170F FXNL STATUS ASSESSED: CPT | Performed by: NURSE PRACTITIONER

## 2022-09-26 PROCEDURE — 83036 HEMOGLOBIN GLYCOSYLATED A1C: CPT | Performed by: NURSE PRACTITIONER

## 2022-09-26 RX ORDER — COLCHICINE 0.6 MG/1
0.6 TABLET ORAL DAILY
Qty: 3 TABLET | Refills: 0 | Status: SHIPPED | OUTPATIENT
Start: 2022-09-26 | End: 2022-11-03

## 2022-09-26 RX ORDER — METFORMIN HYDROCHLORIDE 500 MG/1
500 TABLET, EXTENDED RELEASE ORAL
Qty: 90 TABLET | Refills: 1 | Status: SHIPPED | OUTPATIENT
Start: 2022-09-26

## 2022-09-26 RX ORDER — SIMVASTATIN 40 MG
40 TABLET ORAL DAILY
Qty: 90 TABLET | Refills: 1 | Status: SHIPPED | OUTPATIENT
Start: 2022-09-26 | End: 2023-02-07 | Stop reason: SDUPTHER

## 2022-09-26 NOTE — PROGRESS NOTES
"Chief Complaint  Hypertension, Follow-up (Patient is here for a 6 month follow up), and Medicare Wellness-subsequent    Subjective        Arelis Johnson presents to [facility]  History of Present Illness  This is a 82-year-old female patient here today for evaluation of diabetes mellitus, hyperlipidemia, atrial fibrillation, and new right foot pain.  Patient had a previous episode of gout attack at last visit.  I do not recall what medication she was on but it was a 5-day treatment so I am assuming steroids.  She reports her pain improved but reports her pain returned in her right foot last Monday.  She states the pain has not gotten worse.  She is on metformin 500 mg daily for her diabetes.  Last hemoglobin A1c was less than 7.  Her blood sugar this a.m. was 121.  She is exercising daily and watching her diet.  She is currently on Zocor for hyperlipidemia that has been stable.  She follows cardiology for atrial fibrillation and is on Coumadin with no new concerns.  She does not report any abnormal bleeding.  Mammogram was recently done and negative.     Objective   Vital Signs:  /70 (BP Location: Left arm, Patient Position: Sitting, Cuff Size: Adult)   Pulse 110   Temp 97.3 °F (36.3 °C) (Infrared)   Ht 165 cm (64.96\")   Wt 79.8 kg (176 lb)   SpO2 98%   BMI 29.32 kg/m²   Estimated body mass index is 29.32 kg/m² as calculated from the following:    Height as of this encounter: 165 cm (64.96\").    Weight as of this encounter: 79.8 kg (176 lb).    BMI is >= 25 and <30. (Overweight) The following options were offered after discussion;: exercise counseling/recommendations and nutrition counseling/recommendations      Physical Exam  Vitals and nursing note reviewed.   HENT:      Head: Normocephalic.      Nose: Nose normal.   Eyes:      Pupils: Pupils are equal, round, and reactive to light.   Cardiovascular:      Rate and Rhythm: Normal rate and regular rhythm.      Pulses: " Normal pulses.      Heart sounds: Normal heart sounds.   Pulmonary:      Effort: Pulmonary effort is normal. No respiratory distress.      Breath sounds: Normal breath sounds. No wheezing or rales.   Abdominal:      General: Bowel sounds are normal. There is no distension.      Tenderness: There is no abdominal tenderness.   Musculoskeletal:         General: No swelling.      Cervical back: Neck supple.      Right lower leg: No edema.      Left lower leg: No edema.        Feet:    Skin:     General: Skin is warm and dry.   Neurological:      Mental Status: She is alert and oriented to person, place, and time.   Psychiatric:         Mood and Affect: Mood normal.        Result Review :                Assessment and Plan   Diagnoses and all orders for this visit:    1. Acute gout of right foot, unspecified cause (Primary)  -     Uric Acid  -     Sedimentation Rate    2. Type 2 diabetes mellitus without complication, without long-term current use of insulin (HCC)  -     POC Glycated Hemoglobin, Total  -     MicroAlbumin, Urine, Random - Urine, Clean Catch  -     metFORMIN ER (GLUCOPHAGE-XR) 500 MG 24 hr tablet; Take 1 tablet by mouth Daily With Breakfast.  Dispense: 90 tablet; Refill: 1    3. Essential hypertension    4. Mixed hyperlipidemia  -     Comprehensive Metabolic Panel  -     Lipid Panel    5. Screening for deficiency anemia  -     CBC & Differential    Other orders  -     simvastatin (ZOCOR) 40 MG tablet; Take 1 tablet by mouth Daily.  Dispense: 90 tablet; Refill: 1    Will obtain fasting labs today along with uric acid level.  My plan is to start colchicine and possibly allopurinol depending on labs.  She will return to see me in 6 months or sooner if needed  She will continue her current regimen for now         Follow Up   Return in about 6 months (around 3/26/2023).  Patient was given instructions and counseling regarding her condition or for health maintenance advice. Please see specific information pulled  into the AVS if appropriate.

## 2022-09-26 NOTE — PROGRESS NOTES
The ABCs of the Annual Wellness Visit  Subsequent Medicare Wellness Visit    Chief Complaint   Patient presents with   • Hypertension   • Follow-up     Patient is here for a 6 month follow up   • Medicare Wellness-subsequent      Subjective    History of Present Illness:  Arelis Johnson is a 82 y.o. female who presents for a Subsequent Medicare Wellness Visit.    The following portions of the patient's history were reviewed and   updated as appropriate: allergies, current medications, past family history, past medical history, past social history, past surgical history and problem list.    Compared to one year ago, the patient feels her physical   health is the same.    Compared to one year ago, the patient feels her mental   health is the same.    Recent Hospitalizations:  She was not admitted to the hospital during the last year.       Current Medical Providers:  Patient Care Team:  Amanda Felix APRN as PCP - General (Family Medicine)  Heriberto Singer MD as Consulting Physician (Orthopedic Surgery)  Sly Saleh MD as Consulting Physician (Cardiology)  Shashank Mckeon MD as Consulting Physician (Cardiology)    Outpatient Medications Prior to Visit   Medication Sig Dispense Refill   • Calcium Carb-Cholecalciferol (CALCIUM + D3) 600-200 MG-UNIT tablet Take 1 tablet by mouth Daily.     • dilTIAZem CD (CARDIZEM CD) 180 MG 24 hr capsule TAKE ONE CAPSULE (BY MOUTH) EACH DAY 90 capsule 3   • furosemide (LASIX) 40 MG tablet TAKE 1 TABLET BY MOUTH DAILY. 90 tablet 4   • metoprolol tartrate (LOPRESSOR) 50 MG tablet TAKE 1 TABLET BY MOUTH 2 (TWO) TIMES A DAY. 180 tablet 3   • ramipril (ALTACE) 5 MG capsule TAKE 1 CAPSULE BY MOUTH DAILY 90 capsule 3   • warfarin (COUMADIN) 4 MG tablet TAKE ONE TABLET BY MOUTH TUES, AND THURS, AND TAKE ONE-HALF TABLET BY MOUTH ALL OTHER DAYS OR AS DIRECTED. 60 tablet 1   • metFORMIN ER (GLUCOPHAGE-XR) 500 MG 24 hr tablet Take 1 tablet by mouth Daily With Breakfast.  "90 tablet 1   • simvastatin (ZOCOR) 40 MG tablet Take 1 tablet by mouth Daily. 90 tablet 3     No facility-administered medications prior to visit.       No opioid medication identified on active medication list. I have reviewed chart for other potential  high risk medication/s and harmful drug interactions in the elderly.          Aspirin is not on active medication list.  Aspirin use is contraindicated for this patient due to: current use of warfarin.  .    Patient Active Problem List   Diagnosis   • IFG (impaired fasting glucose)   • Hypertension   • Hyperlipidemia   • Diabetes mellitus (HCC)   • Hearing loss of right ear due to cerumen impaction   • Permanent atrial fibrillation (HCC)   • Chronic anticoagulation     Advance Care Planning  Advance Directive is not on file.  ACP discussion was held with the patient during this visit. Patient has an advance directive (not in EMR), copy requested.          Objective    Vitals:    09/26/22 0802   BP: 116/70   BP Location: Left arm   Patient Position: Sitting   Cuff Size: Adult   Pulse: 110   Temp: 97.3 °F (36.3 °C)   TempSrc: Infrared   SpO2: 98%   Weight: 79.8 kg (176 lb)   Height: 165 cm (64.96\")   PainSc: 0-No pain     Estimated body mass index is 29.32 kg/m² as calculated from the following:    Height as of this encounter: 165 cm (64.96\").    Weight as of this encounter: 79.8 kg (176 lb).    BMI is >= 25 and <30. (Overweight) The following options were offered after discussion;: exercise counseling/recommendations      Does the patient have evidence of cognitive impairment? No    Physical Exam  Lab Results   Component Value Date    CHLPL 97 09/26/2022    TRIG 89 09/26/2022    HDL 51 09/26/2022    LDL 29 09/26/2022    VLDL 17 09/26/2022    HGBA1C 6.6 09/26/2022            HEALTH RISK ASSESSMENT    Smoking Status:  Social History     Tobacco Use   Smoking Status Never Smoker   Smokeless Tobacco Never Used     Alcohol Consumption:  Social History     Substance and " Sexual Activity   Alcohol Use No     Fall Risk Screen:    GIRMA Fall Risk Assessment was completed, and patient is at LOW risk for falls.Assessment completed on:9/26/2022    Depression Screening:  PHQ-2/PHQ-9 Depression Screening 9/26/2022   Retired PHQ-9 Total Score -   Retired Total Score -   Little Interest or Pleasure in Doing Things 0-->not at all   Feeling Down, Depressed or Hopeless 0-->not at all   PHQ-9: Brief Depression Severity Measure Score 0       Health Habits and Functional and Cognitive Screening:  Functional & Cognitive Status 9/20/2021   Do you have difficulty preparing food and eating? No   Do you have difficulty bathing yourself, getting dressed or grooming yourself? No   Do you have difficulty using the toilet? No   Do you have difficulty moving around from place to place? No   Do you have trouble with steps or getting out of a bed or a chair? -   Current Diet Well Balanced Diet   Dental Exam Up to date   Eye Exam Up to date   Exercise (times per week) 5 times per week   Current Exercises Include Treadmill;Walking   Current Exercise Activities Include -   Do you need help using the phone?  No   Are you deaf or do you have serious difficulty hearing?  No   Do you need help with transportation? No   Do you need help shopping? No   Do you need help preparing meals?  No   Do you need help with housework?  No   Do you need help with laundry? No   Do you need help taking your medications? No   Do you need help managing money? No   Do you ever drive or ride in a car without wearing a seat belt? -   Have you felt unusual stress, anger or loneliness in the last month? No   Who do you live with? Alone   If you need help, do you have trouble finding someone available to you? No   Have you been bothered in the last four weeks by sexual problems? No   Do you have difficulty concentrating, remembering or making decisions? No       Age-appropriate Screening Schedule:  Refer to the list below for future  screening recommendations based on patient's age, sex and/or medical conditions. Orders for these recommended tests are listed in the plan section. The patient has been provided with a written plan.    Health Maintenance   Topic Date Due   • TDAP/TD VACCINES (1 - Tdap) Never done   • INFLUENZA VACCINE  08/01/2022   • DIABETIC EYE EXAM  10/05/2022   • HEMOGLOBIN A1C  03/26/2023   • DXA SCAN  08/30/2023   • LIPID PANEL  09/26/2023   • URINE MICROALBUMIN  09/26/2023   • MAMMOGRAM  09/01/2024   • ZOSTER VACCINE  Completed              Assessment & Plan   CMS Preventative Services Quick Reference  Risk Factors Identified During Encounter  Cardiovascular Disease  Fall Risk-High or Moderate  Obesity/Overweight   The above risks/problems have been discussed with the patient.  Follow up actions/plans if indicated are seen below in the Assessment/Plan Section.  Pertinent information has been shared with the patient in the After Visit Summary.    Diagnoses and all orders for this visit:    1. Medicare annual wellness visit, subsequent (Primary)    2. Acute gout of right foot, unspecified cause  -     Uric Acid  -     Sedimentation Rate    3. Type 2 diabetes mellitus without complication, without long-term current use of insulin (HCC)  -     POC Glycated Hemoglobin, Total  -     MicroAlbumin, Urine, Random - Urine, Clean Catch  -     metFORMIN ER (GLUCOPHAGE-XR) 500 MG 24 hr tablet; Take 1 tablet by mouth Daily With Breakfast.  Dispense: 90 tablet; Refill: 1    4. Essential hypertension    5. Mixed hyperlipidemia  -     Comprehensive Metabolic Panel  -     Lipid Panel    6. Screening for deficiency anemia  -     CBC & Differential    Other orders  -     simvastatin (ZOCOR) 40 MG tablet; Take 1 tablet by mouth Daily.  Dispense: 90 tablet; Refill: 1        Follow Up:   Return in about 6 months (around 3/26/2023).     An After Visit Summary and PPPS were made available to the patient.

## 2022-09-27 LAB — MICROALBUMIN UR-MCNC: <3 UG/ML

## 2022-09-27 NOTE — PROGRESS NOTES
Cholesterol is stable. HGB alc is 6.6 improved from 6.9. uric acid level is elevated. I will send in colchincine 0.6mg take 2 pills and 1 hrs later take 1 pill. If pain continues or worsens please let me know

## 2022-09-28 LAB — INR PPP: 3.07

## 2022-09-29 ENCOUNTER — ANTICOAGULATION VISIT (OUTPATIENT)
Dept: PHARMACY | Facility: HOSPITAL | Age: 82
End: 2022-09-29

## 2022-09-29 NOTE — PROGRESS NOTES
Anticoagulation Clinic Progress Note    Anticoagulation Summary  As of 9/29/2022    INR goal:  2.0-3.0   TTR:  91.5 % (3.8 y)   INR used for dosing:  3.07 (9/28/2022)   Warfarin maintenance plan:  4 mg every Tue, Thu; 2 mg all other days   Weekly warfarin total:  18 mg   No change documented:  Stephanie Yeager, PharmD   Plan last modified:  Eulalia Reaves RPH (8/26/2021)   Next INR check:  10/27/2022   Priority:  Maintenance   Target end date:      Indications    Atrial fibrillation with RVR (HCC) (Resolved) [I48.91]             Anticoagulation Episode Summary     INR check location:      Preferred lab:      Send INR reminders to:  [facility]  POOL    Comments:  [facility]   ([facility])      Anticoagulation Care Providers     Provider Role Specialty Phone number    Sly Saleh MD Referring Cardiology [phone]          Clinic Interview:  Patient Findings     Negatives:  Signs/symptoms of thrombosis, Signs/symptoms of bleeding,   Laboratory test error suspected, Change in health, Change in alcohol use,   Change in activity, Upcoming invasive procedure, Emergency department   visit, Upcoming dental procedure, Missed doses, Extra doses, Change in   medications, Change in diet/appetite, Hospital admission, Bruising, Other   complaints      Clinical Outcomes     Negatives:  Major bleeding event, Thromboembolic event,   Anticoagulation-related hospital admission, Anticoagulation-related ED   visit, Anticoagulation-related fatality        INR History:  Anticoagulation Monitoring 8/18/2022 9/2/2022 9/29/2022   INR 3.40 2.64 3.07   INR Date 8/17/2022 9/1/2022 9/28/2022   INR Goal 2.0-3.0 2.0-3.0 2.0-3.0   Trend Same Same Same   Last Week Total 18 mg 18 mg 18 mg   Next Week Total 16 mg 18 mg 18 mg   Sun 2 mg 2 mg 2 mg   Mon 2 mg 2 mg 2 mg   Tue 4 mg 4 mg 4 mg   Wed 2 mg 2 mg 2 mg   Thu 4 mg 4 mg 4 mg   Fri Hold (8/19); Otherwise 2 mg 2 mg 2 mg   Sat 2 mg 2 mg 2 mg   Visit Report - - -    Some recent data might be hidden       Plan:  1. INR is Therapeutic today- see above in Anticoagulation Summary.   Will instruct Arelis Johnson to Continue their warfarin regimen- see above in Anticoagulation Summary.  2. Follow up in 4 weeks  3. They have been instructed to call if any changes in medications, doses, concerns, etc. Patient expresses understanding and has no further questions at this time.    Stephanie Yeager, PharmD

## 2022-10-06 RX ORDER — DILTIAZEM HYDROCHLORIDE 180 MG/1
CAPSULE, COATED, EXTENDED RELEASE ORAL
Qty: 90 CAPSULE | Refills: 3 | Status: SHIPPED | OUTPATIENT
Start: 2022-10-06 | End: 2022-10-11

## 2022-10-11 RX ORDER — DILTIAZEM HYDROCHLORIDE 180 MG/1
CAPSULE, COATED, EXTENDED RELEASE ORAL
Qty: 90 CAPSULE | Refills: 3 | Status: SHIPPED | OUTPATIENT
Start: 2022-10-11

## 2022-10-14 ENCOUNTER — OFFICE VISIT (OUTPATIENT)
Dept: FAMILY MEDICINE CLINIC | Facility: CLINIC | Age: 82
End: 2022-10-14

## 2022-10-14 VITALS
TEMPERATURE: 97.7 F | SYSTOLIC BLOOD PRESSURE: 100 MMHG | OXYGEN SATURATION: 96 % | WEIGHT: 177.4 LBS | BODY MASS INDEX: 29.56 KG/M2 | HEART RATE: 63 BPM | DIASTOLIC BLOOD PRESSURE: 70 MMHG

## 2022-10-14 DIAGNOSIS — M10.9 ACUTE GOUT, UNSPECIFIED CAUSE, UNSPECIFIED SITE: Primary | ICD-10-CM

## 2022-10-14 PROCEDURE — 99213 OFFICE O/P EST LOW 20 MIN: CPT | Performed by: NURSE PRACTITIONER

## 2022-10-14 RX ORDER — COLCHICINE 0.6 MG/1
0.6 TABLET ORAL DAILY
Qty: 3 TABLET | Refills: 0 | Status: SHIPPED | OUTPATIENT
Start: 2022-10-14 | End: 2022-11-03

## 2022-10-14 RX ORDER — ALLOPURINOL 100 MG/1
100 TABLET ORAL DAILY
Qty: 30 TABLET | Refills: 5 | Status: SHIPPED | OUTPATIENT
Start: 2022-10-14

## 2022-10-15 LAB
ALBUMIN SERPL-MCNC: 4.4 G/DL (ref 3.5–5.2)
ALBUMIN/GLOB SERPL: 1.8 G/DL
ALP SERPL-CCNC: 86 U/L (ref 39–117)
ALT SERPL-CCNC: 15 U/L (ref 1–33)
AST SERPL-CCNC: 28 U/L (ref 1–32)
BILIRUB SERPL-MCNC: 0.4 MG/DL (ref 0–1.2)
BUN SERPL-MCNC: 24 MG/DL (ref 8–23)
BUN/CREAT SERPL: 24.2 (ref 7–25)
CALCIUM SERPL-MCNC: 10.2 MG/DL (ref 8.6–10.5)
CHLORIDE SERPL-SCNC: 100 MMOL/L (ref 98–107)
CO2 SERPL-SCNC: 29 MMOL/L (ref 22–29)
CREAT SERPL-MCNC: 0.99 MG/DL (ref 0.57–1)
EGFRCR SERPLBLD CKD-EPI 2021: 56.7 ML/MIN/1.73
GLOBULIN SER CALC-MCNC: 2.5 GM/DL
GLUCOSE SERPL-MCNC: 97 MG/DL (ref 65–99)
POTASSIUM SERPL-SCNC: 4.1 MMOL/L (ref 3.5–5.2)
PROT SERPL-MCNC: 6.9 G/DL (ref 6–8.5)
SODIUM SERPL-SCNC: 144 MMOL/L (ref 136–145)
URATE SERPL-MCNC: 7.5 MG/DL (ref 2.4–5.7)

## 2022-10-16 NOTE — PROGRESS NOTES
Uric acid level is elevated.  Patient was started on colchicine and now allopurinol.  Please make sure patient is feeling better if so go ahead and start allopurinol.

## 2022-10-22 NOTE — PROGRESS NOTES
"Chief Complaint  Foot Swelling (Gout flare up )    Subjective        Arelis Johnson presents to [facility]  History of Present Illness  This is a 81 yo female patient here today for evaluation of right foot pain. She has 2 episodes of gout in the past that was treated with cochicine. She reports her symptoms improved with medications but later returned in the same spot. She has pain by her right great toe. Her pain started a few days ago.    Objective   Vital Signs:  /70   Pulse 63   Temp 97.7 °F (36.5 °C) (Temporal)   Wt 80.5 kg (177 lb 6.4 oz)   SpO2 96%   BMI 29.56 kg/m²   Estimated body mass index is 29.56 kg/m² as calculated from the following:    Height as of 9/26/22: 165 cm (64.96\").    Weight as of this encounter: 80.5 kg (177 lb 6.4 oz).          Physical Exam  Musculoskeletal:        Feet:         Result Review :                Assessment and Plan   Diagnoses and all orders for this visit:    1. Acute gout, unspecified cause, unspecified site (Primary)  -     Uric Acid  -     Comprehensive metabolic panel  -     allopurinol (Zyloprim) 100 MG tablet; Take 1 tablet by mouth Daily.  Dispense: 30 tablet; Refill: 5  -     colchicine 0.6 MG tablet; Take 1 tablet by mouth Daily. Take 2 tabs now and 1 tab 1 hr later  Dispense: 3 tablet; Refill: 0    I will check labs today  I will start her on cochicine and after completion she will start allopurinol         Follow Up   No follow-ups on file.  Patient was given instructions and counseling regarding her condition or for health maintenance advice. Please see specific information pulled into the AVS if appropriate.       "

## 2022-10-27 DIAGNOSIS — E11.9 TYPE 2 DIABETES MELLITUS WITHOUT COMPLICATION, WITHOUT LONG-TERM CURRENT USE OF INSULIN: ICD-10-CM

## 2022-10-27 LAB — INR PPP: 2.28

## 2022-10-27 RX ORDER — METFORMIN HYDROCHLORIDE 500 MG/1
500 TABLET, EXTENDED RELEASE ORAL
Qty: 90 TABLET | Refills: 1 | OUTPATIENT
Start: 2022-10-27

## 2022-10-28 ENCOUNTER — ANTICOAGULATION VISIT (OUTPATIENT)
Dept: PHARMACY | Facility: HOSPITAL | Age: 82
End: 2022-10-28

## 2022-10-28 NOTE — PROGRESS NOTES
Anticoagulation Clinic Progress Note    Anticoagulation Summary  As of 10/28/2022    INR goal:  2.0-3.0   TTR:  91.5 % (3.9 y)   INR used for dosin.28 (10/27/2022)   Warfarin maintenance plan:  4 mg every Tue, Thu; 2 mg all other days   Weekly warfarin total:  18 mg   No change documented:  Eulalia Reaves RPH   Plan last modified:  Eulalia Reaves RPH (2021)   Next INR check:  2022   Priority:  Maintenance   Target end date:      Indications    Atrial fibrillation with RVR (HCC) (Resolved) [I48.91]             Anticoagulation Episode Summary     INR check location:      Preferred lab:      Send INR reminders to:  [facility] [place]  POOL    Comments:  [facility]   ([facility])      Anticoagulation Care Providers     Provider Role Specialty Phone number    Sly Saleh MD Referring Cardiology [phone]          Clinic Interview:  Patient Findings     Negatives:  Signs/symptoms of thrombosis, Signs/symptoms of bleeding,   Laboratory test error suspected, Change in health, Change in alcohol use,   Change in activity, Upcoming invasive procedure, Emergency department   visit, Upcoming dental procedure, Missed doses, Extra doses, Change in   medications, Change in diet/appetite, Hospital admission, Bruising, Other   complaints      Clinical Outcomes     Negatives:  Major bleeding event, Thromboembolic event,   Anticoagulation-related hospital admission, Anticoagulation-related ED   visit, Anticoagulation-related fatality        INR History:  Anticoagulation Monitoring 2022 2022 10/28/2022   INR 2.64 3.07 2.28   INR Date 2022 2022 10/27/2022   INR Goal 2.0-3.0 2.0-3.0 2.0-3.0   Trend Same Same Same   Last Week Total 18 mg 18 mg 18 mg   Next Week Total 18 mg 18 mg 18 mg   Sun 2 mg 2 mg 2 mg   Mon 2 mg 2 mg 2 mg   Tue 4 mg 4 mg 4 mg   Wed 2 mg 2 mg 2 mg   Thu 4 mg 4 mg 4 mg   Fri 2 mg 2 mg 2 mg   Sat 2 mg 2 mg 2 mg   Visit Report - - -   Some recent data  might be hidden       Plan:  1. INR is Therapeutic today- see above in Anticoagulation Summary.   Will instruct Arelis Johnson to Continue their warfarin regimen- see above in Anticoagulation Summary.  2. Follow up in 4 weeks  3. They have been instructed to call if any changes in medications, doses, concerns, etc. Patient expresses understanding and has no further questions at this time.    Eulalia Reaves, formerly [facility]

## 2022-11-03 ENCOUNTER — OFFICE VISIT (OUTPATIENT)
Dept: CARDIOLOGY | Facility: CLINIC | Age: 82
End: 2022-11-03

## 2022-11-03 VITALS
DIASTOLIC BLOOD PRESSURE: 76 MMHG | BODY MASS INDEX: 29.16 KG/M2 | WEIGHT: 175 LBS | HEIGHT: 65 IN | HEART RATE: 61 BPM | SYSTOLIC BLOOD PRESSURE: 114 MMHG

## 2022-11-03 DIAGNOSIS — I48.21 PERMANENT ATRIAL FIBRILLATION: Primary | ICD-10-CM

## 2022-11-03 PROCEDURE — 99213 OFFICE O/P EST LOW 20 MIN: CPT | Performed by: INTERNAL MEDICINE

## 2022-11-03 PROCEDURE — 93000 ELECTROCARDIOGRAM COMPLETE: CPT | Performed by: INTERNAL MEDICINE

## 2022-11-03 NOTE — PROGRESS NOTES
Date of Office Visit: 2022  Encounter Provider: Shashank Mckeon MD  Place of Service: [facility] CARDIOLOGY  Patient Name: Arelis Johnson  :1940    Chief Complaint   Patient presents with   • permanent AFIB     1 yr f/u   :     HPI: Arelis Johnson is a 82 y.o. female who presents today for persisted atrial fibrillation.      She has persistent atrial fibrillation.  She is on a rate control strategy.  She has no noticeable symptoms.      She lives independently.  She does light housework.      She is on warfarin.  Her INRs have been largely in range          Past Medical History:   Diagnosis Date   • Diabetes mellitus (HCC)    • Hyperlipidemia    • Hypertension    • IFG (impaired fasting glucose)    • Palpitations    • SOB (shortness of breath)        Past Surgical History:   Procedure Laterality Date   • ANAL FISTULA REPAIR     • BACK SURGERY      l spine ruptured disk   • CHOLECYSTECTOMY     • JOINT REPLACEMENT      bilateral knees       Social History     Socioeconomic History   • Marital status:    Tobacco Use   • Smoking status: Never   • Smokeless tobacco: Never   Vaping Use   • Vaping Use: Never used   Substance and Sexual Activity   • Alcohol use: No   • Drug use: No   • Sexual activity: Never       Family History   Problem Relation Age of Onset   • Hypertension Mother    • Heart disease Father    • Hypertension Father    • Liver disease Brother        Review of Systems   Constitutional: Negative.   Cardiovascular: Negative.    Respiratory: Negative.    Gastrointestinal: Negative.        Allergies   Allergen Reactions   • Percocet [Oxycodone-Acetaminophen] GI Intolerance   • Sulfa Antibiotics Nausea And Vomiting   • Penicillins Rash         Current Outpatient Medications:   •  allopurinol (Zyloprim) 100 MG tablet, Take 1 tablet by mouth Daily., Disp: 30 tablet, Rfl: 5  •  Calcium Carb-Cholecalciferol (CALCIUM + D3) 600-200 MG-UNIT tablet, Take 1  "tablet by mouth Daily., Disp: , Rfl:   •  dilTIAZem CD (CARDIZEM CD) 180 MG 24 hr capsule, TAKE ONE CAPSULE (BY MOUTH) EACH DAY, Disp: 90 capsule, Rfl: 3  •  furosemide (LASIX) 40 MG tablet, TAKE 1 TABLET BY MOUTH DAILY., Disp: 90 tablet, Rfl: 4  •  metFORMIN ER (GLUCOPHAGE-XR) 500 MG 24 hr tablet, Take 1 tablet by mouth Daily With Breakfast., Disp: 90 tablet, Rfl: 1  •  metoprolol tartrate (LOPRESSOR) 50 MG tablet, TAKE 1 TABLET BY MOUTH 2 (TWO) TIMES A DAY., Disp: 180 tablet, Rfl: 3  •  ramipril (ALTACE) 5 MG capsule, TAKE 1 CAPSULE BY MOUTH DAILY, Disp: 90 capsule, Rfl: 3  •  simvastatin (ZOCOR) 40 MG tablet, Take 1 tablet by mouth Daily., Disp: 90 tablet, Rfl: 1  •  warfarin (COUMADIN) 4 MG tablet, TAKE ONE TABLET BY MOUTH TUES, AND THURS, AND TAKE ONE-HALF TABLET BY MOUTH ALL OTHER DAYS OR AS DIRECTED., Disp: 60 tablet, Rfl: 1      Objective:     Vitals:    11/03/22 1110   BP: 114/76   Pulse: 61   Weight: 79.4 kg (175 lb)   Height: 165 cm (64.96\")     Body mass index is 29.16 kg/m².    PHYSICAL EXAM:    Vitals and nursing note reviewed.   Constitutional:       General: Not in acute distress.  Pulmonary:      Effort: Pulmonary effort is normal. No respiratory distress.   Cardiovascular:      Normal rate. Regular rhythm.   Edema:     Peripheral edema absent.   Skin:     General: Skin is warm and dry.   Neurological:      Mental Status: Alert and oriented to person, place, and time.   Psychiatric:         Behavior: Behavior normal.         Thought Content: Thought content normal.         Judgment: Judgment normal.             ECG 12 Lead    Date/Time: 11/3/2022 3:44 PM  Performed by: Shashank Mckeon MD  Authorized by: Shashank Mckeon MD   Comparison: compared with previous ECG from 11/4/2022  Similar to previous ECG  Rhythm: atrial fibrillation              Assessment:       Diagnosis Plan   1. Permanent atrial fibrillation (HCC)               Plan:       Permanent Atrial Fibrillation on rate control " strategy.  Her heart rate was probably a little low in clinic today.  She has no complaints related to low heart rate.  I looked back at there monitor, and it really wasn't much different.  I considered reducing her diltiazem, but ultimately, I left it the same.     As always, it has been a pleasure to participate in your patient's care.      Sincerely,         Shashank Mckeon MD

## 2022-11-23 LAB — INR PPP: 2.32

## 2022-11-28 ENCOUNTER — ANTICOAGULATION VISIT (OUTPATIENT)
Dept: PHARMACY | Facility: HOSPITAL | Age: 82
End: 2022-11-28

## 2022-11-28 RX ORDER — RAMIPRIL 5 MG/1
5 CAPSULE ORAL DAILY
Qty: 90 CAPSULE | Refills: 3 | Status: SHIPPED | OUTPATIENT
Start: 2022-11-28

## 2022-11-28 RX ORDER — WARFARIN SODIUM 4 MG/1
TABLET ORAL
Qty: 60 TABLET | Refills: 1 | Status: SHIPPED | OUTPATIENT
Start: 2022-11-28

## 2022-11-28 NOTE — PROGRESS NOTES
Anticoagulation Clinic Progress Note    Anticoagulation Summary  As of 2022    INR goal:  2.0-3.0   TTR:  91.6 % (4 y)   INR used for dosin.32 (2022)   Warfarin maintenance plan:  4 mg every Tue, Thu; 2 mg all other days; Starting 2022   Weekly warfarin total:  18 mg   No change documented:  Eulalia Reaves RPH   Plan last modified:  Eulalia Reaves RPH (2021)   Next INR check:  2023   Priority:  Maintenance   Target end date:      Indications    Atrial fibrillation with RVR (HCC) (Resolved) [I48.91]             Anticoagulation Episode Summary     INR check location:      Preferred lab:      Send INR reminders to:  [facility] [place]  POOL    Comments:  [facility]   ([facility] clinic)      Anticoagulation Care Providers     Provider Role Specialty Phone number    Sly Saleh MD Referring Cardiology [phone]          Clinic Interview:  Patient Findings     Negatives:  Signs/symptoms of thrombosis, Signs/symptoms of bleeding,   Laboratory test error suspected, Change in health, Change in alcohol use,   Change in activity, Upcoming invasive procedure, Emergency department   visit, Upcoming dental procedure, Missed doses, Extra doses, Change in   medications, Change in diet/appetite, Hospital admission, Bruising, Other   complaints      Clinical Outcomes     Negatives:  Major bleeding event, Thromboembolic event,   Anticoagulation-related hospital admission, Anticoagulation-related ED   visit, Anticoagulation-related fatality        INR History:  Anticoagulation Monitoring 2022 10/28/2022 2022   INR 3.07 2.28 2.32   INR Date 2022 10/27/2022 2022   INR Goal 2.0-3.0 2.0-3.0 2.0-3.0   Trend Same Same Same   Last Week Total 18 mg 18 mg 18 mg   Next Week Total 18 mg 18 mg 18 mg   Sun 2 mg 2 mg 2 mg   Mon 2 mg 2 mg 2 mg   Tue 4 mg 4 mg 4 mg   Wed 2 mg 2 mg 2 mg   Thu 4 mg 4 mg 4 mg   Fri 2 mg 2 mg 2 mg   Sat 2 mg 2 mg 2 mg   Visit Report - - -    Some recent data might be hidden       Plan:  1. INR is Therapeutic today- see above in Anticoagulation Summary.   Will instruct Arelis Johnson to Continue their warfarin regimen- see above in Anticoagulation Summary.  2. Follow up in 6 weeks  3. They have been instructed to call if any changes in medications, doses, concerns, etc. Patient expresses understanding and has no further questions at this time.    Eulalia Reaves, [facility]

## 2023-01-03 LAB — INR PPP: 1.97

## 2023-01-04 ENCOUNTER — ANTICOAGULATION VISIT (OUTPATIENT)
Dept: PHARMACY | Facility: HOSPITAL | Age: 83
End: 2023-01-04
Payer: MEDICARE

## 2023-01-04 DIAGNOSIS — I48.21 PERMANENT ATRIAL FIBRILLATION: Primary | ICD-10-CM

## 2023-01-04 NOTE — PROGRESS NOTES
Anticoagulation Clinic Progress Note    Anticoagulation Summary  As of 2023    INR goal:  2.0-3.0   TTR:  91.6 % (4.1 y)   INR used for dosin.97 (1/3/2023)   Warfarin maintenance plan:  4 mg every Tue, Th; 2 mg all other days; Starting 2023   Weekly warfarin total:  18 mg   No change documented:  Savage Wasserman, PharmD   Plan last modified:  Eulalia Reaves RPH (2021)   Next INR check:  2023   Priority:  Maintenance   Target end date:      Indications    Atrial fibrillation with RVR (HCC) (Resolved) [I48.91]             Anticoagulation Episode Summary     INR check location:      Preferred lab:      Send INR reminders to:  [facility]  POOL    Comments:  [facility]   ([facility] clinic)      Anticoagulation Care Providers     Provider Role Specialty Phone number    Sly Saleh MD Referring Cardiology [phone]          Clinic Interview:  Patient Findings     Positives:  Change in diet/appetite    Negatives:  Signs/symptoms of thrombosis, Signs/symptoms of bleeding,   Laboratory test error suspected, Change in health, Change in alcohol use,   Change in activity, Upcoming invasive procedure, Emergency department   visit, Upcoming dental procedure, Missed doses, Extra doses, Change in   medications, Hospital admission, Bruising, Other complaints    Comments:  Reports possibly more vit k in diet than usual.       Clinical Outcomes     Negatives:  Major bleeding event, Thromboembolic event,   Anticoagulation-related hospital admission, Anticoagulation-related ED   visit, Anticoagulation-related fatality    Comments:  Reports possibly more vit k in diet than usual.         INR History:  Anticoagulation Monitoring 10/28/2022 2022 2023   INR 2.28 2.32 1.97   INR Date 10/27/2022 2022 1/3/2023   INR Goal 2.0-3.0 2.0-3.0 2.0-3.0   Trend Same Same Same   Last Week Total 18 mg 18 mg 18 mg   Next Week Total 18 mg 18 mg 18 mg   Sun 2 mg 2 mg 2 mg   Mon 2 mg 2  mg 2 mg   Tue 4 mg 4 mg 4 mg   Wed 2 mg 2 mg 2 mg   Thu 4 mg 4 mg 4 mg   Fri 2 mg 2 mg 2 mg   Sat 2 mg 2 mg 2 mg   Visit Report - - -   Some recent data might be hidden       Plan:  1. INR is Subtherapeutic today (INR 1.97; goal 2.0-3.0) - see above in Anticoagulation Summary.   Will instruct Arelis Johnson to Continue their warfarin regimen- see above in Anticoagulation Summary.  2. Follow up in 4 weeks.  3. They have been instructed to call if any changes in medications, doses, concerns, etc. Patient expresses understanding and has no further questions at this time.    Savage Wasserman, PharmD

## 2023-01-31 LAB — INR PPP: 2

## 2023-02-01 ENCOUNTER — ANTICOAGULATION VISIT (OUTPATIENT)
Dept: PHARMACY | Facility: HOSPITAL | Age: 83
End: 2023-02-01
Payer: MEDICARE

## 2023-02-01 NOTE — PROGRESS NOTES
Anticoagulation Clinic Progress Note    Anticoagulation Summary  As of 2023    INR goal:  2.0-3.0   TTR:  89.9 % (4.2 y)   INR used for dosin.00 (2023)   Warfarin maintenance plan:  4 mg every Tue, Thu; 2 mg all other days; Starting 2023   Weekly warfarin total:  18 mg   No change documented:  Eulalia Reaves RPH   Plan last modified:  Eulalia Reaves RPH (2021)   Next INR check:  3/14/2023   Priority:  Maintenance   Target end date:      Indications    Atrial fibrillation with RVR (HCC) (Resolved) [I48.91]             Anticoagulation Episode Summary     INR check location:      Preferred lab:      Send INR reminders to:  [facility]  POOL    Comments:  [facility]  ([place], F: [phone])      Anticoagulation Care Providers     Provider Role Specialty Phone number    Sly Saleh MD Referring Cardiology [phone]          Clinic Interview:  Patient Findings     Negatives:  Signs/symptoms of thrombosis, Signs/symptoms of bleeding,   Laboratory test error suspected, Change in health, Change in alcohol use,   Change in activity, Upcoming invasive procedure, Emergency department   visit, Upcoming dental procedure, Missed doses, Extra doses, Change in   medications, Change in diet/appetite, Hospital admission, Bruising, Other   complaints      Clinical Outcomes     Negatives:  Major bleeding event, Thromboembolic event,   Anticoagulation-related hospital admission, Anticoagulation-related ED   visit, Anticoagulation-related fatality        INR History:  Anticoagulation Monitoring 2022   INR 2.32 1.97 2.00   INR Date 2022 1/3/2023 2023   INR Goal 2.0-3.0 2.0-3.0 2.0-3.0   Trend Same Same Same   Last Week Total 18 mg 18 mg 18 mg   Next Week Total 18 mg 18 mg 18 mg   Sun 2 mg 2 mg 2 mg   Mon 2 mg 2 mg 2 mg   Tue 4 mg 4 mg 4 mg   Wed 2 mg 2 mg 2 mg   Thu 4 mg 4 mg 4 mg   Fri 2 mg 2 mg 2 mg   Sat 2 mg 2 mg 2 mg   Visit Report - - -   Some  recent data might be hidden       Plan:  1. INR is Therapeutic today- see above in Anticoagulation Summary.   Will instruct Arelis Johnson to Continue their warfarin regimen- see above in Anticoagulation Summary.  2. Follow up in 6 weeks  3. They have been instructed to call if any changes in medications, doses, concerns, etc. Patient expresses understanding and has no further questions at this time.    Eulalia Reaves, [facility]

## 2023-02-07 RX ORDER — SIMVASTATIN 40 MG
40 TABLET ORAL DAILY
Qty: 90 TABLET | Refills: 1 | Status: SHIPPED | OUTPATIENT
Start: 2023-02-07 | End: 2023-02-10 | Stop reason: SDUPTHER

## 2023-02-10 RX ORDER — SIMVASTATIN 40 MG
40 TABLET ORAL DAILY
Qty: 90 TABLET | Refills: 0 | Status: SHIPPED | OUTPATIENT
Start: 2023-02-10 | End: 2023-02-28 | Stop reason: SDUPTHER

## 2023-02-10 NOTE — TELEPHONE ENCOUNTER
Caller: Arelis Johnson    Relationship: Self    Best call back number:     [phone]    Requested Prescriptions:   Requested Prescriptions     Pending Prescriptions Disp Refills   • simvastatin (ZOCOR) 40 MG tablet 90 tablet 1     Sig: Take 1 tablet by mouth Daily.        [facility] [place] [address] - [address] [facility] AT PORTAL TO REGISTERED [facility] - [phone]  - [phone]      Additional details provided by patient: PATIENT CALLING STATING THIS MEDICATION WAS SENT TO THE WRONG PHARMACY     Does the patient have less than a 3 day supply:  [] Yes  [x] No    Would you like a call back once the refill request has been completed: [x] Yes [] No    If the office needs to give you a call back, can they leave a voicemail: [x] Yes [] No    Annabelle Lagos Rep   02/10/23 13:15 EST

## 2023-02-16 ENCOUNTER — TELEPHONE (OUTPATIENT)
Dept: FAMILY MEDICINE CLINIC | Facility: CLINIC | Age: 83
End: 2023-02-16

## 2023-02-16 NOTE — TELEPHONE ENCOUNTER
Medication was sent on 02/10/23. Patient is going to call [facility] to see if they have received the medication or not. She is going to call our office back and let us know if they have not received the simvastatin so that we can send it over again. - KL

## 2023-02-16 NOTE — TELEPHONE ENCOUNTER
Tried to call patient but she did not answer. TR - KL     I was going to ask patient to call back [facility] mail service pharmacy and have them send us a refill request to ensure that we send it to the right pharmacy. Our fax number is [phone].     HUB OKAY TO READ AND RELAY MESSAGE

## 2023-02-16 NOTE — TELEPHONE ENCOUNTER
PATIENT IS CALLING BACK TO LET MEIR KNOW THAT [facility] STILL HAVE NOT RECEIVED THIS. PHONE: [phone]     PATIENT'S CALLBACK IS: [phone]

## 2023-02-16 NOTE — TELEPHONE ENCOUNTER
Caller: Arelis Johnson     Relationship: [unfilled]     Best call back number: [phone]    What is your medical concern? PATIENT IS CALLING TO LET CLINICAL KNOW THAT [facility] NEVER RECEIVED THE MED REFILL FOR THE simvastatin (ZOCOR) 40 MG tablet

## 2023-02-20 LAB — INR PPP: 1.85

## 2023-02-21 ENCOUNTER — ANTICOAGULATION VISIT (OUTPATIENT)
Dept: PHARMACY | Facility: HOSPITAL | Age: 83
End: 2023-02-21
Payer: MEDICARE

## 2023-02-21 NOTE — PROGRESS NOTES
Anticoagulation Clinic Progress Note    Anticoagulation Summary  As of 2023    INR goal:  2.0-3.0   TTR:  88.8 % (4.2 y)   INR used for dosin.85 (2023)   Warfarin maintenance plan:  4 mg every Tue, Thu; 2 mg all other days; Starting 2023   Weekly warfarin total:  18 mg   Plan last modified:  Eulalia Reaves RPH (2021)   Next INR check:  3/7/2023   Priority:  Maintenance   Target end date:      Indications    Atrial fibrillation with RVR (HCC) (Resolved) [I48.91]             Anticoagulation Episode Summary     INR check location:      Preferred lab:      Send INR reminders to:  [facility]  POOL    Comments:  [facility]  ([place], [place]: [phone])      Anticoagulation Care Providers     Provider Role Specialty Phone number    Sly Saleh MD Referring Cardiology [phone]          Clinic Interview:  Patient Findings     Negatives:  Signs/symptoms of thrombosis, Signs/symptoms of bleeding,   Laboratory test error suspected, Change in health, Change in alcohol use,   Change in activity, Upcoming invasive procedure, Emergency department   visit, Upcoming dental procedure, Missed doses, Extra doses, Change in   medications, Change in diet/appetite, Hospital admission, Bruising, Other   complaints    Comments:  Requests lab results be faxed to her dermatologist for a   procedure.       Clinical Outcomes     Negatives:  Major bleeding event, Thromboembolic event,   Anticoagulation-related hospital admission, Anticoagulation-related ED   visit, Anticoagulation-related fatality    Comments:  Requests lab results be faxed to her dermatologist for a   procedure.         INR History:  Anticoagulation Monitoring 2023   INR 1.97 2.00 1.85   INR Date 1/3/2023 2023 2023   INR Goal 2.0-3.0 2.0-3.0 2.0-3.0   Trend Same Same Same   Last Week Total 18 mg 18 mg 18 mg   Next Week Total 18 mg 18 mg 20 mg   Sun 2 mg 2 mg 2 mg   Mon 2 mg 2 mg 2 mg   Tue 4 mg 4  mg 6 mg (2/21); Otherwise 4 mg   Wed 2 mg 2 mg 2 mg   Thu 4 mg 4 mg 4 mg   Fri 2 mg 2 mg 2 mg   Sat 2 mg 2 mg 2 mg   Visit Report - - -   Some recent data might be hidden       Plan:  1. INR is Subtherapeutic today- see above in Anticoagulation Summary.   Will instruct Arelis Johnson to Change their warfarin regimen- see above in Anticoagulation Summary.  2. Follow up in 2 weeks  3. They have been instructed to call if any changes in medications, doses, concerns, etc. Patient expresses understanding and has no further questions at this time.    Stephanie Yeager, PharmD

## 2023-02-23 ENCOUNTER — TELEPHONE (OUTPATIENT)
Dept: CARDIOLOGY | Facility: CLINIC | Age: 83
End: 2023-02-23
Payer: MEDICARE

## 2023-02-23 NOTE — TELEPHONE ENCOUNTER
Very sweet patient calling to report several weeks of dizziness occurring a few hours after morning meds.  By afternoon it subsides.  She did check her BP while she was dizzy today and it was 108/63 HR 53.  By that afternoon when the dizziness had subsided BP was 130/80 HR in 60s.      Meds reviewed with patient:  Warfarin daily  Ramipril 5 mg daily in AM  Diltiazem 180 mg daily in AM  Furosemide 40 mg daily in AM  Metoprolol tartrate 50 mg BID    This consistently happens about every day.  She has not fallen.  I encouraged her to be sure to change positions slowly and I will send a message to  to see if he feels she needs medications adjusted.    Jamaica Ba RN  [facility] Triage  02/23/23 11:06 EST

## 2023-02-23 NOTE — TELEPHONE ENCOUNTER
Called and spoke with patient and have her instructions to stop ramipril.  Asked her to continue to monitor her BP.  She is going to check it once a few hours after her morning meds and then again if she has dizziness.    She is going to call on Monday and ask for triage nurse and give us an update.  She verbalizes understanding of all and agrees with plan.    Jamaica Ba RN  [facility] Triage  02/23/23 15:18 EST

## 2023-02-27 NOTE — TELEPHONE ENCOUNTER
"Pablo,    Pt called with an update since stopping her Ramipril. She said she feels much better. She has still had a few episodes of her \"head feeling funny\", but nothing like how bad it was with the ramipril. B/P readings:    113/60, HR 52  139/96, HR 89  112/61, HR 41  155/88, HR 73  124/82, HR 77  104/70, HR 54    I told her I would call her back if you have any further recommendations for her.    Thank you,    Tanika Jeffrey RN  Triage [facility]  02/27/23 09:21 EST    "

## 2023-02-27 NOTE — TELEPHONE ENCOUNTER
Caller: Alex Arelis B    Relationship: Self    Best call back number: [phone]    Requested Prescriptions:   Requested Prescriptions     Pending Prescriptions Disp Refills   • simvastatin (ZOCOR) 40 MG tablet 90 tablet 0     Sig: Take 1 tablet by mouth Daily.        Pharmacy where request should be sent: [facility] [address] - [phone]  - [phone] FX     Additional details provided by patient: PATIENT HAS TWO OR THREE LEFT.    PATIENT REQUESTING TO SEND TO THE LOCAL PHARMACY.    HEYDI STATED THEY STILL HAD NOT RECEIVED IT.  PATIENT REQUESTING TO JUST GET IT LOCALLY NOW.    Does the patient have less than a 3 day supply:  [x] Yes  [] No    Would you like a call back once the refill request has been completed: [] Yes [x] No    If the office needs to give you a call back, can they leave a voicemail: [] Yes [x] No    Annabelle Kingsley Rep   02/27/23 10:03 EST

## 2023-02-27 NOTE — TELEPHONE ENCOUNTER
INCREASE CRESTOR TO 40MG DAILY   Notified pt of Pablo's recommendations. Pt verbalized understanding.    Thank you,    Tanika Jeffrey, RN  Triage [facility]  02/27/23 12:05 EST

## 2023-02-28 RX ORDER — SIMVASTATIN 40 MG
40 TABLET ORAL DAILY
Qty: 90 TABLET | Refills: 0 | Status: CANCELLED | OUTPATIENT
Start: 2023-02-28

## 2023-02-28 RX ORDER — SIMVASTATIN 40 MG
40 TABLET ORAL DAILY
Qty: 90 TABLET | Refills: 1 | Status: SHIPPED | OUTPATIENT
Start: 2023-02-28

## 2023-03-06 ENCOUNTER — TELEPHONE (OUTPATIENT)
Dept: CARDIOLOGY | Facility: CLINIC | Age: 83
End: 2023-03-06
Payer: MEDICARE

## 2023-03-06 NOTE — TELEPHONE ENCOUNTER
Patient called with an update of BP/HR readings. She states she is not as dizzy anymore.     2/27  AM: 104/70 HR 54    2/28  AM: 115/75 HR 72    3/1  AM: 111/70 HR 54  PM: 116/71 HR 77    3/2  AM: 112/75 HR 77    3/4  AM: 118/68 HR 54  PM: 110/77 HR 73    3/5  AM: 111/72 HR 59  PM: 117/72 HR 74    3/6  AM: 122/74 HR 57    Patient said if she does not answer when we call back we can leave a VM.     Aida Murdock RN  Triage [facility]

## 2023-03-06 NOTE — TELEPHONE ENCOUNTER
Notified patient of recommendations. Patient verbalized understanding.    Aida Murdock RN  Triage [facility]

## 2023-03-08 ENCOUNTER — TELEPHONE (OUTPATIENT)
Dept: PHARMACY | Facility: HOSPITAL | Age: 83
End: 2023-03-08
Payer: MEDICARE

## 2023-03-08 NOTE — TELEPHONE ENCOUNTER
Received aPTT result from [facility] rather than the ordered Protime-INR. Spoke with Ms. Johnson by phone. [facility], and she believes that could have been a contributing factor. She confirmed she took a printed lab order that was specific for [facility]INR. [facility] [place] [place] this week for [facility] check.

## 2023-03-09 LAB — INR PPP: 2.18

## 2023-03-10 ENCOUNTER — ANTICOAGULATION VISIT (OUTPATIENT)
Dept: PHARMACY | Facility: HOSPITAL | Age: 83
End: 2023-03-10
Payer: MEDICARE

## 2023-03-10 NOTE — PROGRESS NOTES
Anticoagulation Clinic Progress Note    Anticoagulation Summary  As of 3/10/2023    INR goal:  2.0-3.0   TTR:  88.4 % (4.3 y)   INR used for dosin.18 (3/9/2023)   Warfarin maintenance plan:  4 mg every Tue, Thu; 2 mg all other days; Starting 3/10/2023   Weekly warfarin total:  18 mg   No change documented:  Stephanie Yeager, PharmD   Plan last modified:  Eulalia Reaves RPH (2021)   Next INR check:  2023   Priority:  Maintenance   Target end date:      Indications    Atrial fibrillation with RVR (HCC) (Resolved) [I48.91]             Anticoagulation Episode Summary     INR check location:      Preferred lab:      Send INR reminders to:  [facility]  POOL    Comments:  [facility]  ([place], F: [phone])      Anticoagulation Care Providers     Provider Role Specialty Phone number    Sly Saleh MD Referring Cardiology [phone]          Clinic Interview:  Patient Findings     Negatives:  Signs/symptoms of thrombosis, Signs/symptoms of bleeding,   Laboratory test error suspected, Change in health, Change in alcohol use,   Change in activity, Upcoming invasive procedure, Emergency department   visit, Upcoming dental procedure, Missed doses, Extra doses, Change in   medications, Change in diet/appetite, Hospital admission, Bruising, Other   complaints    Comments:  Stopped ramipril and decreased metoprolol to 25 mg twice daily   due to low blood pressure.      Clinical Outcomes     Negatives:  Major bleeding event, Thromboembolic event,   Anticoagulation-related hospital admission, Anticoagulation-related ED   visit, Anticoagulation-related fatality    Comments:  Stopped ramipril and decreased metoprolol to 25 mg twice daily   due to low blood pressure.        INR History:  Anticoagulation Monitoring 2023 2023 3/10/2023   INR 2.00 1.85 2.18   INR Date 2023 2023 3/9/2023   INR Goal 2.0-3.0 2.0-3.0 2.0-3.0   Trend Same Same Same   Last Week Total 18 mg 18 mg 18 mg    Next Week Total 18 mg 20 mg 18 mg   Sun 2 mg 2 mg 2 mg   Mon 2 mg 2 mg 2 mg   Tue 4 mg 6 mg (2/21); Otherwise 4 mg 4 mg   Wed 2 mg 2 mg 2 mg   Thu 4 mg 4 mg 4 mg   Fri 2 mg 2 mg 2 mg   Sat 2 mg 2 mg 2 mg   Visit Report - - -   Some recent data might be hidden       Plan:  1. INR is Therapeutic today- see above in Anticoagulation Summary.   Will instruct Arelis SANDRA Johnson to Continue their warfarin regimen- see above in Anticoagulation Summary.  2. Follow up in 4 weeks  3. They have been instructed to call if any changes in medications, doses, concerns, etc. Patient expresses understanding and has no further questions at this time.    Stephanie Yeager, PharmD

## 2023-04-06 LAB — INR PPP: 1.82

## 2023-04-07 ENCOUNTER — ANTICOAGULATION VISIT (OUTPATIENT)
Dept: PHARMACY | Facility: HOSPITAL | Age: 83
End: 2023-04-07
Payer: MEDICARE

## 2023-04-07 NOTE — PROGRESS NOTES
Anticoagulation Clinic Progress Note    Anticoagulation Summary  As of 2023    INR goal:  2.0-3.0   TTR:  87.7 % (4.3 y)   INR used for dosin.82 (2023)   Warfarin maintenance plan:  4 mg every Tue, Thu, Sat; 2 mg all other days; Starting 2023   Weekly warfarin total:  20 mg   Plan last modified:  Savage Wasserman, PharmD (2023)   Next INR check:  2023   Priority:  Maintenance   Target end date:      Indications    Atrial fibrillation with RVR (Resolved) [I48.91]             Anticoagulation Episode Summary     INR check location:      Preferred lab:      Send INR reminders to:  [facility]  POOL    Comments:  [facility]  ([place], F: [phone])      Anticoagulation Care Providers     Provider Role Specialty Phone number    Sly Saleh MD Referring Cardiology [phone]          Clinic Interview:  Patient Findings     Positives:  Change in diet/appetite    Negatives:  Signs/symptoms of thrombosis, Signs/symptoms of bleeding,   Laboratory test error suspected, Change in health, Change in alcohol use,   Change in activity, Upcoming invasive procedure, Emergency department   visit, Upcoming dental procedure, Missed doses, Extra doses, Change in   medications, Hospital admission, Bruising, Other complaints    Comments:  Reports she ate a small serving of peas this week; no other   changes identified.       Clinical Outcomes     Negatives:  Major bleeding event, Thromboembolic event,   Anticoagulation-related hospital admission, Anticoagulation-related ED   visit, Anticoagulation-related fatality    Comments:  Reports she ate a small serving of peas this week; no other   changes identified.         INR History:  Anticoagulation Monitoring 2023 3/10/2023 2023   INR 1.85 2.18 1.82   INR Date 2023 3/9/2023 2023   INR Goal 2.0-3.0 2.0-3.0 2.0-3.0   Trend Same Same Up   Last Week Total 18 mg 18 mg 18 mg   Next Week Total 20 mg 18 mg 20 mg   Sun 2 mg 2 mg 2 mg    Mon 2 mg 2 mg 2 mg   Tue 6 mg (2/21); Otherwise 4 mg 4 mg 4 mg   Wed 2 mg 2 mg 2 mg   Thu 4 mg 4 mg 4 mg   Fri 2 mg 2 mg 2 mg   Sat 2 mg 2 mg 4 mg   Visit Report - - -   Some recent data might be hidden       Plan:  1. INR was Subtherapeutic yesterday- see above in Anticoagulation Summary.   Will instruct Arelis Johnson to Increase their warfarin regimen to 4 mg Tues/Thurs/Sat, 2 mg all other days - see above in Anticoagulation Summary.  2. Follow up in 2 weeks.  3. They have been instructed to call if any changes in medications, doses, concerns, etc. Patient expresses understanding and has no further questions at this time.    Savage Wasserman, PharmD

## 2023-04-11 ENCOUNTER — OFFICE VISIT (OUTPATIENT)
Dept: FAMILY MEDICINE CLINIC | Facility: CLINIC | Age: 83
End: 2023-04-11
Payer: MEDICARE

## 2023-04-11 VITALS
HEIGHT: 65 IN | HEART RATE: 85 BPM | TEMPERATURE: 97.3 F | SYSTOLIC BLOOD PRESSURE: 120 MMHG | WEIGHT: 176 LBS | OXYGEN SATURATION: 98 % | BODY MASS INDEX: 29.32 KG/M2 | DIASTOLIC BLOOD PRESSURE: 78 MMHG

## 2023-04-11 DIAGNOSIS — M10.9 ACUTE GOUT, UNSPECIFIED CAUSE, UNSPECIFIED SITE: ICD-10-CM

## 2023-04-11 DIAGNOSIS — M85.80 OSTEOPENIA, UNSPECIFIED LOCATION: ICD-10-CM

## 2023-04-11 DIAGNOSIS — E11.9 TYPE 2 DIABETES MELLITUS WITHOUT COMPLICATION, WITHOUT LONG-TERM CURRENT USE OF INSULIN: Primary | ICD-10-CM

## 2023-04-11 DIAGNOSIS — Z12.31 ENCOUNTER FOR SCREENING MAMMOGRAM FOR MALIGNANT NEOPLASM OF BREAST: ICD-10-CM

## 2023-04-11 DIAGNOSIS — E78.2 MIXED HYPERLIPIDEMIA: ICD-10-CM

## 2023-04-11 DIAGNOSIS — Z78.0 POST-MENOPAUSAL: ICD-10-CM

## 2023-04-11 DIAGNOSIS — Z13.0 SCREENING FOR DEFICIENCY ANEMIA: ICD-10-CM

## 2023-04-11 DIAGNOSIS — I10 ESSENTIAL HYPERTENSION: ICD-10-CM

## 2023-04-11 DIAGNOSIS — Z13.89 SCREENING FOR HEMATURIA OR PROTEINURIA: ICD-10-CM

## 2023-04-11 DIAGNOSIS — M1A.0790 CHRONIC GOUT OF FOOT, UNSPECIFIED CAUSE, UNSPECIFIED LATERALITY: ICD-10-CM

## 2023-04-11 LAB
EXPIRATION DATE: ABNORMAL
HBA1C MFR BLD: 6.5 %
Lab: ABNORMAL

## 2023-04-11 RX ORDER — ALLOPURINOL 100 MG/1
100 TABLET ORAL DAILY
Qty: 30 TABLET | Refills: 5 | Status: SHIPPED | OUTPATIENT
Start: 2023-04-11

## 2023-04-11 NOTE — PROGRESS NOTES
"Chief Complaint  Gout  and Follow-up (Patient is here for a 6 month follow up )    Subjective        Arelis Johnson presents to [facility]  History of Present Illness  This is a 83-year-old female patient here today for follow-up on her atrial fibrillation, gout, diabetes mellitus.  Patient reports recently having skin cancer removed from her scalp which was basal cell carcinoma.  She has seen her cardiologist for atrial fibrillation with no new changes.  She is currently on Coumadin with no new reports of bleeding.  Hemoglobin A1c has been stable with metformin.  She denies any hypoglycemic episodes.  She is now on allopurinol for gout with no new flareups.  She is tolerating her Zocor and eating a healthy diet.      Objective   Vital Signs:  /78   Pulse 85   Temp 97.3 °F (36.3 °C)   Ht 165 cm (64.96\")   Wt 79.8 kg (176 lb)   SpO2 98%   BMI 29.32 kg/m²   Estimated body mass index is 29.32 kg/m² as calculated from the following:    Height as of this encounter: 165 cm (64.96\").    Weight as of this encounter: 79.8 kg (176 lb).             Physical Exam  Vitals and nursing note reviewed.   HENT:      Head: Normocephalic.      Nose: Nose normal.   Eyes:      Pupils: Pupils are equal, round, and reactive to light.   Cardiovascular:      Rate and Rhythm: Normal rate and regular rhythm.      Pulses: Normal pulses.      Heart sounds: Normal heart sounds.   Pulmonary:      Effort: Pulmonary effort is normal. No respiratory distress.      Breath sounds: Normal breath sounds. No wheezing or rales.   Abdominal:      General: Bowel sounds are normal. There is no distension.      Tenderness: There is no abdominal tenderness.   Musculoskeletal:         General: No swelling.      Cervical back: Neck supple.      Right lower leg: No edema.      Left lower leg: No edema.   Skin:     General: Skin is warm and dry.   Neurological:      Mental Status: She is alert and oriented to person, " place, and time.   Psychiatric:         Mood and Affect: Mood normal.        Result Review :                   Assessment and Plan   Diagnoses and all orders for this visit:    1. Type 2 diabetes mellitus without complication, without long-term current use of insulin (Primary)  -     POCT glycated hemoglobin, total    2. Essential hypertension    3. Mixed hyperlipidemia  -     Comprehensive Metabolic Panel  -     Lipid Panel    4. Screening for deficiency anemia  -     CBC & Differential    5. Chronic gout of foot, unspecified cause, unspecified laterality  -     Urinalysis With Culture If Indicated -  -     Uric Acid    6. Screening for hematuria or proteinuria    7. Osteopenia, unspecified location  -     DEXA Bone Density Axial; Future    8. Post-menopausal  -     DEXA Bone Density Axial; Future    9. Encounter for screening mammogram for malignant neoplasm of breast  -     Mammo Screening Bilateral With CAD; Future    hgb alc 6.5 today.  She will continue metformin as ordered.  We will obtain off fasting labs today along with uric acid.  She will be due DEXA scan and mammo in August so I will put those orders in and she will return to see me in 6 months or sooner if needed along with her wellness visit         Follow Up   Return in about 6 months (around 10/11/2023).  Patient was given instructions and counseling regarding her condition or for health maintenance advice. Please see specific information pulled into the AVS if appropriate.       Answers for HPI/ROS submitted by the patient on 4/4/2023  What is the primary reason for your visit?: Physical

## 2023-04-12 LAB
ALBUMIN SERPL-MCNC: 4 G/DL (ref 3.5–5.2)
ALBUMIN/GLOB SERPL: 1.9 G/DL
ALP SERPL-CCNC: 86 U/L (ref 39–117)
ALT SERPL-CCNC: 13 U/L (ref 1–33)
AST SERPL-CCNC: 20 U/L (ref 1–32)
BASOPHILS # BLD AUTO: 0.05 10*3/MM3 (ref 0–0.2)
BASOPHILS NFR BLD AUTO: 1.2 % (ref 0–1.5)
BILIRUB SERPL-MCNC: 0.6 MG/DL (ref 0–1.2)
BUN SERPL-MCNC: 17 MG/DL (ref 8–23)
BUN/CREAT SERPL: 16.5 (ref 7–25)
CALCIUM SERPL-MCNC: 9.9 MG/DL (ref 8.6–10.5)
CHLORIDE SERPL-SCNC: 99 MMOL/L (ref 98–107)
CHOLEST SERPL-MCNC: 103 MG/DL (ref 0–200)
CO2 SERPL-SCNC: 31.2 MMOL/L (ref 22–29)
CREAT SERPL-MCNC: 1.03 MG/DL (ref 0.57–1)
EGFRCR SERPLBLD CKD-EPI 2021: 53.7 ML/MIN/1.73
EOSINOPHIL # BLD AUTO: 0.06 10*3/MM3 (ref 0–0.4)
EOSINOPHIL NFR BLD AUTO: 1.4 % (ref 0.3–6.2)
ERYTHROCYTE [DISTWIDTH] IN BLOOD BY AUTOMATED COUNT: 12.4 % (ref 12.3–15.4)
GLOBULIN SER CALC-MCNC: 2.1 GM/DL
GLUCOSE SERPL-MCNC: 127 MG/DL (ref 65–99)
HCT VFR BLD AUTO: 40 % (ref 34–46.6)
HDLC SERPL-MCNC: 55 MG/DL (ref 40–60)
HGB BLD-MCNC: 13.6 G/DL (ref 12–15.9)
IMM GRANULOCYTES # BLD AUTO: 0.01 10*3/MM3 (ref 0–0.05)
IMM GRANULOCYTES NFR BLD AUTO: 0.2 % (ref 0–0.5)
LDLC SERPL CALC-MCNC: 31 MG/DL (ref 0–100)
LYMPHOCYTES # BLD AUTO: 0.97 10*3/MM3 (ref 0.7–3.1)
LYMPHOCYTES NFR BLD AUTO: 22.8 % (ref 19.6–45.3)
MCH RBC QN AUTO: 32 PG (ref 26.6–33)
MCHC RBC AUTO-ENTMCNC: 34 G/DL (ref 31.5–35.7)
MCV RBC AUTO: 94.1 FL (ref 79–97)
MONOCYTES # BLD AUTO: 0.38 10*3/MM3 (ref 0.1–0.9)
MONOCYTES NFR BLD AUTO: 8.9 % (ref 5–12)
NEUTROPHILS # BLD AUTO: 2.79 10*3/MM3 (ref 1.7–7)
NEUTROPHILS NFR BLD AUTO: 65.5 % (ref 42.7–76)
NRBC BLD AUTO-RTO: 0 /100 WBC (ref 0–0.2)
PLATELET # BLD AUTO: 217 10*3/MM3 (ref 140–450)
POTASSIUM SERPL-SCNC: 3.9 MMOL/L (ref 3.5–5.2)
PROT SERPL-MCNC: 6.1 G/DL (ref 6–8.5)
RBC # BLD AUTO: 4.25 10*6/MM3 (ref 3.77–5.28)
SODIUM SERPL-SCNC: 139 MMOL/L (ref 136–145)
TRIGL SERPL-MCNC: 86 MG/DL (ref 0–150)
URATE SERPL-MCNC: 5.7 MG/DL (ref 2.4–5.7)
VLDLC SERPL CALC-MCNC: 17 MG/DL (ref 5–40)
WBC # BLD AUTO: 4.26 10*3/MM3 (ref 3.4–10.8)

## 2023-04-13 LAB
APPEARANCE UR: CLEAR
BACTERIA #/AREA URNS HPF: ABNORMAL /HPF
BACTERIA UR CULT: NORMAL
BACTERIA UR CULT: NORMAL
BILIRUB UR QL STRIP: NEGATIVE
CASTS URNS QL MICRO: ABNORMAL /LPF
COLOR UR: YELLOW
EPI CELLS #/AREA URNS HPF: ABNORMAL /HPF (ref 0–10)
GLUCOSE UR QL STRIP: NEGATIVE
HGB UR QL STRIP: ABNORMAL
KETONES UR QL STRIP: NEGATIVE
LEUKOCYTE ESTERASE UR QL STRIP: ABNORMAL
MICRO URNS: ABNORMAL
NITRITE UR QL STRIP: NEGATIVE
PH UR STRIP: 6 [PH] (ref 5–7.5)
PROT UR QL STRIP: ABNORMAL
RBC #/AREA URNS HPF: ABNORMAL /HPF (ref 0–2)
SP GR UR STRIP: 1.02 (ref 1–1.03)
URINALYSIS REFLEX: ABNORMAL
UROBILINOGEN UR STRIP-MCNC: 1 MG/DL (ref 0.2–1)
WBC #/AREA URNS HPF: ABNORMAL /HPF (ref 0–5)

## 2023-04-20 ENCOUNTER — TRANSCRIBE ORDERS (OUTPATIENT)
Dept: ADMINISTRATIVE | Facility: HOSPITAL | Age: 83
End: 2023-04-20
Payer: MEDICARE

## 2023-04-20 DIAGNOSIS — Z12.31 BREAST CANCER SCREENING BY MAMMOGRAM: Primary | ICD-10-CM

## 2023-04-20 LAB — INR PPP: 2.16

## 2023-04-21 ENCOUNTER — ANTICOAGULATION VISIT (OUTPATIENT)
Dept: PHARMACY | Facility: HOSPITAL | Age: 83
End: 2023-04-21
Payer: MEDICARE

## 2023-04-21 DIAGNOSIS — E11.9 TYPE 2 DIABETES MELLITUS WITHOUT COMPLICATION, WITHOUT LONG-TERM CURRENT USE OF INSULIN: ICD-10-CM

## 2023-04-21 RX ORDER — METFORMIN HYDROCHLORIDE 500 MG/1
500 TABLET, EXTENDED RELEASE ORAL
Qty: 90 TABLET | Refills: 1 | Status: SHIPPED | OUTPATIENT
Start: 2023-04-21

## 2023-04-21 NOTE — PROGRESS NOTES
Anticoagulation Clinic Progress Note    Anticoagulation Summary  As of 2023    INR goal:  2.0-3.0   TTR:  87.4 % (4.4 y)   INR used for dosin.16 (2023)   Warfarin maintenance plan:  4 mg every Tue, Thu, Sat; 2 mg all other days; Starting 2023   Weekly warfarin total:  20 mg   No change documented:  Eulalia Reaves, SARAH   Plan last modified:  Savage Wasserman, PharmD (2023)   Next INR check:  2023   Priority:  Maintenance   Target end date:      Indications    Atrial fibrillation with RVR (Resolved) [I48.91]             Anticoagulation Episode Summary     INR check location:      Preferred lab:      Send INR reminders to:  [facility] [place]  POOL    Comments:  [facility]  ([place], F: [phone])      Anticoagulation Care Providers     Provider Role Specialty Phone number    Sly Saleh MD Referring Cardiology [phone]          Clinic Interview:  No pertinent clinical findings have been reported.    INR History:      2023     8:27 AM 3/9/2023    12:00 AM 3/10/2023    10:24 AM 2023    12:00 AM 2023    11:53 AM 2023    12:00 AM 2023     8:47 AM   Anticoagulation Monitoring   INR 1.85  2.18  1.82  2.16   INR Date 2023  3/9/2023  2023  2023   INR Goal 2.0-3.0  2.0-3.0  2.0-3.0  2.0-3.0   Trend Same  Same  Up  Same   Last Week Total 18 mg  18 mg  18 mg  20 mg   Next Week Total 20 mg  18 mg  20 mg  20 mg   Sun 2 mg  2 mg  2 mg  2 mg   Mon 2 mg  2 mg  2 mg  2 mg   Tue 6 mg (); Otherwise 4 mg  4 mg  4 mg  4 mg   Wed 2 mg  2 mg  2 mg  2 mg   Thu 4 mg  4 mg  4 mg  4 mg   Fri 2 mg  2 mg  2 mg  2 mg   Sat 2 mg  2 mg  4 mg  4 mg   Historical INR  2.18       1.82       2.16  C          C Corrected result    This result is from an external source.       Plan:  1. INR is therapeutic today- see above in Anticoagulation Summary.    Arelis Johnson to continue their warfarin regimen- see above in Anticoagulation Summary.  2. Follow up in 2  weeks  3. They have been instructed to call if any changes in medications, doses, concerns, etc. Patient expresses understanding and has no further questions at this time.    Eulalia Reaves [facility]

## 2023-05-04 LAB — INR PPP: 2.33

## 2023-05-05 ENCOUNTER — ANTICOAGULATION VISIT (OUTPATIENT)
Dept: PHARMACY | Facility: HOSPITAL | Age: 83
End: 2023-05-05
Payer: MEDICARE

## 2023-05-05 NOTE — PROGRESS NOTES
Anticoagulation Clinic Progress Note    Anticoagulation Summary  As of 2023    INR goal:  2.0-3.0   TTR:  87.5 % (4.4 y)   INR used for dosin.33 (2023)   Warfarin maintenance plan:  4 mg every Tue, Thu, Sat; 2 mg all other days; Starting 2023   Weekly warfarin total:  20 mg   No change documented:  Eulalia Reaves RPH   Plan last modified:  Savage Wasserman, PharmD (2023)   Next INR check:  2023   Priority:  Maintenance   Target end date:      Indications    Atrial fibrillation with RVR (Resolved) [I48.91]             Anticoagulation Episode Summary     INR check location:      Preferred lab:      Send INR reminders to:  [facility]  POOL    Comments:  [facility]  ([place], F: [phone])      Anticoagulation Care Providers     Provider Role Specialty Phone number    Sly Saleh MD Referring Cardiology [phone]          Clinic Interview:  Patient Findings     Negatives:  Signs/symptoms of thrombosis, Signs/symptoms of bleeding,   Laboratory test error suspected, Change in health, Change in alcohol use,   Change in activity, Upcoming invasive procedure, Emergency department   visit, Upcoming dental procedure, Missed doses, Extra doses, Change in   medications, Change in diet/appetite, Hospital admission, Bruising, Other   complaints      Clinical Outcomes     Negatives:  Major bleeding event, Thromboembolic event,   Anticoagulation-related hospital admission, Anticoagulation-related ED   visit, Anticoagulation-related fatality        INR History:      3/10/2023    10:24 AM 2023    12:00 AM 2023    11:53 AM 2023    12:00 AM 2023     8:47 AM 2023    12:00 AM 2023     8:39 AM   Anticoagulation Monitoring   INR 2.18  1.82  2.16  2.33   INR Date 3/9/2023  2023  2023  2023   INR Goal 2.0-3.0  2.0-3.0  2.0-3.0  2.0-3.0   Trend Same  Up  Same  Same   Last Week Total 18 mg  18 mg  20 mg  20 mg   Next Week Total 18 mg  20 mg  20 mg  20 mg    Sun 2 mg  2 mg  2 mg  2 mg   Mon 2 mg  2 mg  2 mg  2 mg   Tue 4 mg  4 mg  4 mg  4 mg   Wed 2 mg  2 mg  2 mg  2 mg   Thu 4 mg  4 mg  4 mg  4 mg   Fri 2 mg  2 mg  2 mg  2 mg   Sat 2 mg  4 mg  4 mg  4 mg   Historical INR  1.82       2.16  C     2.33            C Corrected result    This result is from an external source.       Plan:  1. INR is Therapeutic today- see above in Anticoagulation Summary.   Will instruct Arelis SANDRA Johnson to Continue their warfarin regimen- see above in Anticoagulation Summary.  2. Follow up in 4 weeks  3.They have been instructed to call if any changes in medications, doses, concerns, etc. Patient expresses understanding and has no further questions at this time.    Eulalia Reaves [facility]

## 2023-05-08 ENCOUNTER — TELEPHONE (OUTPATIENT)
Dept: FAMILY MEDICINE CLINIC | Facility: CLINIC | Age: 83
End: 2023-05-08

## 2023-05-08 NOTE — TELEPHONE ENCOUNTER
[facility] CALLED FOR A SIGNED FORM FOR BLOOD GLUCOSE MONITOR, STRIPS AND LANCETS    CALL BACK NUMBER [phone]    A FORM WAS SENT TO OFFICE LAST WEEK.     [phone]

## 2023-05-08 NOTE — TELEPHONE ENCOUNTER
I called [facility] and they are going to re fax the paperwork that needs to be signed. - KL They will call back tomorrow to ensure that we received the paperwork.

## 2023-05-18 RX ORDER — WARFARIN SODIUM 4 MG/1
TABLET ORAL
Qty: 65 TABLET | Refills: 1 | Status: SHIPPED | OUTPATIENT
Start: 2023-05-18

## 2023-05-24 ENCOUNTER — TELEPHONE (OUTPATIENT)
Dept: FAMILY MEDICINE CLINIC | Facility: CLINIC | Age: 83
End: 2023-05-24

## 2023-05-24 NOTE — TELEPHONE ENCOUNTER
Caller: ROE    Relationship to patient: Other    Best call back number: [phone]     Patient is needing: ROE FROM [facility] STATES THAT THE PRESCRIPTION FOR A BLOOD GLUCOSE MONITOR IS MISSING HUNTER SANTILLAN'S SIGNATURE AND THEY NEED A NEW PRESCRIPTION SENT.   CAN BE FAXED  [phone]

## 2023-06-01 LAB — INR PPP: 2.8

## 2023-06-02 ENCOUNTER — ANTICOAGULATION VISIT (OUTPATIENT)
Dept: PHARMACY | Facility: HOSPITAL | Age: 83
End: 2023-06-02

## 2023-06-02 DIAGNOSIS — I48.21 PERMANENT ATRIAL FIBRILLATION: Primary | ICD-10-CM

## 2023-06-02 NOTE — PROGRESS NOTES
Anticoagulation Clinic Progress Note    Anticoagulation Summary  As of 2023    INR goal:  2.0-3.0   TTR:  87.7 % (4.5 y)   INR used for dosin.80 (2023)   Warfarin maintenance plan:  4 mg every Tue, Thu, Sat; 2 mg all other days; Starting 2023   Weekly warfarin total:  20 mg   No change documented:  Eulalia Reaves RPH   Plan last modified:  Savage Wasserman, PharmD (2023)   Next INR check:  2023   Priority:  Maintenance   Target end date:      Indications    Atrial fibrillation with RVR (Resolved) [I48.91]             Anticoagulation Episode Summary     INR check location:      Preferred lab:      Send INR reminders to:  [facility]  POOL    Comments:  [facility]  ([place], F: [phone])      Anticoagulation Care Providers     Provider Role Specialty Phone number    Sly Saleh MD Referring Cardiology [phone]          Clinic Interview:  Patient Findings     Negatives:  Signs/symptoms of thrombosis, Signs/symptoms of bleeding,   Laboratory test error suspected, Change in health, Change in alcohol use,   Change in activity, Upcoming invasive procedure, Emergency department   visit, Upcoming dental procedure, Missed doses, Extra doses, Change in   medications, Change in diet/appetite, Hospital admission, Bruising, Other   complaints      Clinical Outcomes     Negatives:  Major bleeding event, Thromboembolic event,   Anticoagulation-related hospital admission, Anticoagulation-related ED   visit, Anticoagulation-related fatality        INR History:      2023    11:53 AM 2023    12:00 AM 2023     8:47 AM 2023    12:00 AM 2023     8:39 AM 2023    12:00 AM 2023     9:05 AM   Anticoagulation Monitoring   INR 1.82  2.16  2.33  2.80   INR Date 2023   INR Goal 2.0-3.0  2.0-3.0  2.0-3.0  2.0-3.0   Trend Up  Same  Same  Same   Last Week Total 18 mg  20 mg  20 mg  20 mg   Next Week Total 20 mg  20 mg  20 mg  20 mg    Sun 2 mg  2 mg  2 mg  2 mg   Mon 2 mg  2 mg  2 mg  2 mg   Tue 4 mg  4 mg  4 mg  4 mg   Wed 2 mg  2 mg  2 mg  2 mg   Thu 4 mg  4 mg  4 mg  4 mg   Fri 2 mg  2 mg  2 mg  2 mg   Sat 4 mg  4 mg  4 mg  4 mg   Historical INR  2.16  C     2.33       2.80            C Corrected result    This result is from an external source.       Plan:  1. INR is Therapeutic today- see above in Anticoagulation Summary.   Will instruct Arelis SANDRA Johnson to Continue their warfarin regimen- see above in Anticoagulation Summary.  2. Follow up in 4 weeks  3.  They have been instructed to call if any changes in medications, doses, concerns, etc. Patient expresses understanding and has no further questions at this time.    Eulalia Reaves [facility]

## 2023-07-27 LAB — INR PPP: 2.4

## 2023-07-28 ENCOUNTER — ANTICOAGULATION VISIT (OUTPATIENT)
Dept: PHARMACY | Facility: HOSPITAL | Age: 83
End: 2023-07-28
Payer: MEDICARE

## 2023-07-28 NOTE — PROGRESS NOTES
Anticoagulation Clinic Progress Note    Anticoagulation Summary  As of 2023      INR goal:  2.0-3.0   TTR:  88.1 % (4.7 y)   INR used for dosin.40 (2023)   Warfarin maintenance plan:  4 mg every Tue, Thu, Sat; 2 mg all other days   Weekly warfarin total:  20 mg   No change documented:  Eulalia Reaves, SARAH   Plan last modified:  Savage Wasserman, PharmD (2023)   Next INR check:  2023   Priority:  Maintenance   Target end date:      Indications    Atrial fibrillation with RVR (Resolved) [I48.91]                 Anticoagulation Episode Summary       INR check location:      Preferred lab:      Send INR reminders to:  [facility]  POOL    Comments:  [facility]  ([place], [place]: [phone])          Anticoagulation Care Providers       Provider Role Specialty Phone number    Sly Saleh MD Referring Cardiology [phone]            Clinic Interview:      INR History:      2023     8:39 AM 2023    12:00 AM 2023     9:05 AM 2023    12:00 AM 2023    12:22 PM 2023    12:00 AM 2023     9:32 AM   Anticoagulation Monitoring   INR 2.33  2.80  2.44  2.40   INR Date 2023   INR Goal 2.0-3.0  2.0-3.0  2.0-3.0  2.0-3.0   Trend Same  Same  Same  Same   Last Week Total 20 mg  20 mg  20 mg  20 mg   Next Week Total 20 mg  20 mg  20 mg  20 mg   Sun 2 mg  2 mg  2 mg  2 mg   Mon 2 mg  2 mg  2 mg  2 mg   Tue 4 mg  4 mg  4 mg  4 mg   Wed 2 mg  2 mg  2 mg  2 mg   Thu 4 mg  4 mg  4 mg  4 mg   Fri 2 mg  2 mg  2 mg  2 mg   Sat 4 mg  4 mg  4 mg  4 mg   Historical INR  2.80      2.44      2.40            This result is from an external source.       Plan:  1. INR is Therapeutic today- see above in Anticoagulation Summary.   Will instruct Arelis Johnson to Continue their warfarin regimen- see above in Anticoagulation Summary.  2. Follow up in 6 weeks  3. They have been instructed to call if any changes in medications, doses, concerns,  etc. Patient expresses understanding and has no further questions at this time.    Eulalia Reaves, [facility]

## 2023-08-28 ENCOUNTER — TELEPHONE (OUTPATIENT)
Dept: FAMILY MEDICINE CLINIC | Facility: CLINIC | Age: 83
End: 2023-08-28
Payer: MEDICARE

## 2023-08-28 NOTE — TELEPHONE ENCOUNTER
Caller: Arelis Johnson    Relationship: Self    Best call back [phone] (Home)        NEE STRIPS, NEEDLES AND TEST ION SOLUTION FOR EASY MAX V GLUCOMETER NEARLY OUT OF SUPPLIES    Pharmacy where request should be sent:    [facility]  [phone]    Last office visit with prescribing clinician: 4/11/2023   Last telemedicine visit with prescribing clinician: Visit date not found   Next office visit with prescribing clinician: 10/17/2023     Would you like a call back once the refill request has been completed: [x] Yes [] No    If the office needs to give you a call back, can they leave a voicemail: [x] Yes [] No    Annabelle Knight Rep   08/28/23 14:29 EDT

## 2023-08-29 NOTE — TELEPHONE ENCOUNTER
SPOKE WITH PT TO LET HER KNOW THAT STEFANO FAXED [phone]   THAT P/W OVER TO [facility] THIS MORNING

## 2023-09-05 ENCOUNTER — OFFICE VISIT (OUTPATIENT)
Dept: FAMILY MEDICINE CLINIC | Facility: CLINIC | Age: 83
End: 2023-09-05
Payer: MEDICARE

## 2023-09-05 VITALS
OXYGEN SATURATION: 98 % | DIASTOLIC BLOOD PRESSURE: 62 MMHG | HEART RATE: 77 BPM | BODY MASS INDEX: 29.32 KG/M2 | WEIGHT: 176 LBS | TEMPERATURE: 97.8 F | SYSTOLIC BLOOD PRESSURE: 122 MMHG | HEIGHT: 65 IN

## 2023-09-05 DIAGNOSIS — R09.81 HEAD CONGESTION: ICD-10-CM

## 2023-09-05 DIAGNOSIS — R50.9 FEVER, UNSPECIFIED FEVER CAUSE: Primary | ICD-10-CM

## 2023-09-05 LAB
EXPIRATION DATE: ABNORMAL
FLUAV AG UPPER RESP QL IA.RAPID: NOT DETECTED
FLUBV AG UPPER RESP QL IA.RAPID: NOT DETECTED
INTERNAL CONTROL: ABNORMAL
Lab: ABNORMAL
SARS-COV-2 AG UPPER RESP QL IA.RAPID: DETECTED

## 2023-09-05 PROCEDURE — 3074F SYST BP LT 130 MM HG: CPT | Performed by: NURSE PRACTITIONER

## 2023-09-05 PROCEDURE — 87428 SARSCOV & INF VIR A&B AG IA: CPT | Performed by: NURSE PRACTITIONER

## 2023-09-05 PROCEDURE — 99213 OFFICE O/P EST LOW 20 MIN: CPT | Performed by: NURSE PRACTITIONER

## 2023-09-05 PROCEDURE — 3078F DIAST BP <80 MM HG: CPT | Performed by: NURSE PRACTITIONER

## 2023-09-05 RX ORDER — SIMVASTATIN 40 MG
40 TABLET ORAL DAILY
Qty: 90 TABLET | Refills: 1 | Status: SHIPPED | OUTPATIENT
Start: 2023-09-05

## 2023-09-05 NOTE — PROGRESS NOTES
"Chief Complaint  Fever (Patient states that she was exposed to someone who had covid last Monday. Patient has had her symptoms for 1 day ) and Nasal Congestion    Subjective        Arelis Johnson presents to [facility]  History of Present Illness  This is an 83-year-old female patient here today with fever and nasal congestion.  Patient reports going to the  home over a week ago where she then has had increased nasal drainage.  She denies any cough or shortness of breath.  She is afebrile today.      Objective   Vital Signs:  /62   Pulse 77   Temp 97.8 °F (36.6 °C)   Ht 165 cm (64.96\")   Wt 79.8 kg (176 lb)   SpO2 98%   BMI 29.32 kg/m²   Estimated body mass index is 29.32 kg/m² as calculated from the following:    Height as of this encounter: 165 cm (64.96\").    Weight as of this encounter: 79.8 kg (176 lb).             Physical Exam  Vitals and nursing note reviewed.   Constitutional:       Appearance: Normal appearance.   HENT:      Head: Normocephalic.      Right Ear: Tympanic membrane normal.      Left Ear: Tympanic membrane normal.      Nose: Congestion present.      Mouth/Throat:      Mouth: Mucous membranes are moist.      Pharynx: No oropharyngeal exudate or posterior oropharyngeal erythema.   Cardiovascular:      Rate and Rhythm: Normal rate and regular rhythm.      Pulses: Normal pulses.      Heart sounds: Normal heart sounds.   Pulmonary:      Effort: Pulmonary effort is normal.      Breath sounds: Normal breath sounds.   Musculoskeletal:      Cervical back: Neck supple.   Lymphadenopathy:      Cervical: No cervical adenopathy.   Skin:     General: Skin is warm and dry.   Neurological:      Mental Status: She is alert and oriented to person, place, and time.      Result Review :                   Assessment and Plan   Diagnoses and all orders for this visit:    1. Fever, unspecified fever cause (Primary)  -     POCT SARS-CoV-2 Antigen MARY JANE + Flu    2. " Head congestion  -     POCT SARS-CoV-2 Antigen MARY JANE + Flu      Patient is positive for COVID here in the office today.  Her symptoms are mild and her assessment is normal.  I did encourage her to rest and take Zyrtec, Mucinex for nasal/chest congestion.  She was informed to report to the emergency room if she devleops any respiratory distress or return for further evaluation       Follow Up   No follow-ups on file.  Patient was given instructions and counseling regarding her condition or for health maintenance advice. Please see specific information pulled into the AVS if appropriate.

## 2023-09-12 ENCOUNTER — ANTICOAGULATION VISIT (OUTPATIENT)
Dept: PHARMACY | Facility: HOSPITAL | Age: 83
End: 2023-09-12
Payer: MEDICARE

## 2023-09-12 ENCOUNTER — OFFICE VISIT (OUTPATIENT)
Dept: FAMILY MEDICINE CLINIC | Facility: CLINIC | Age: 83
End: 2023-09-12
Payer: MEDICARE

## 2023-09-12 VITALS
TEMPERATURE: 97.3 F | HEART RATE: 73 BPM | HEIGHT: 65 IN | OXYGEN SATURATION: 98 % | DIASTOLIC BLOOD PRESSURE: 64 MMHG | WEIGHT: 173 LBS | SYSTOLIC BLOOD PRESSURE: 112 MMHG | BODY MASS INDEX: 28.82 KG/M2

## 2023-09-12 DIAGNOSIS — R19.5 DARK STOOLS: Primary | ICD-10-CM

## 2023-09-12 LAB — INR PPP: 2.6

## 2023-09-12 PROCEDURE — 99213 OFFICE O/P EST LOW 20 MIN: CPT | Performed by: NURSE PRACTITIONER

## 2023-09-12 PROCEDURE — 3074F SYST BP LT 130 MM HG: CPT | Performed by: NURSE PRACTITIONER

## 2023-09-12 PROCEDURE — 3078F DIAST BP <80 MM HG: CPT | Performed by: NURSE PRACTITIONER

## 2023-09-12 NOTE — PROGRESS NOTES
"Chief Complaint  Diarrhea (Patient states that she took mucinex on Wednesday and then on Thursday she and diarrhea and her stool was black. Patient states then the next time she went to the restroom her stool was still black but it was it was a little more hard and today she went to the restroom and her stool was normal color and texture. )    Subjective        Arelis Johnson presents to [facility]  History of Present Illness  This is a 84 yo female patient here today for evaluation of diarrhea. She had some congestion and took mucinex on Thursday. After that she noticed black stool. This happened twice and today she had normal stool. She denies any blood in stool or diarrhea. NO abdominal pain or vomiting. She has had cologuard in the past with no family history of colon cancer. She is on coumadin that is checked with cardiology. INR yesterday was 2.6.     Objective   Vital Signs:  /64   Pulse 73   Temp 97.3 °F (36.3 °C)   Ht 165 cm (64.96\")   Wt 78.5 kg (173 lb)   SpO2 98%   BMI 28.82 kg/m²   Estimated body mass index is 28.82 kg/m² as calculated from the following:    Height as of this encounter: 165 cm (64.96\").    Weight as of this encounter: 78.5 kg (173 lb).       BMI is >= 25 and <30. (Overweight) The following options were offered after discussion;: exercise counseling/recommendations and nutrition counseling/recommendations      Physical Exam  Vitals and nursing note reviewed.   HENT:      Head: Normocephalic.      Nose: Nose normal.   Eyes:      Pupils: Pupils are equal, round, and reactive to light.   Cardiovascular:      Rate and Rhythm: Normal rate and regular rhythm.      Pulses: Normal pulses.      Heart sounds: Normal heart sounds.   Pulmonary:      Effort: Pulmonary effort is normal. No respiratory distress.      Breath sounds: Normal breath sounds. No wheezing or rales.   Abdominal:      General: Bowel sounds are normal. There is no distension.      " Palpations: Abdomen is soft.      Tenderness: There is no abdominal tenderness.   Musculoskeletal:         General: No swelling.      Cervical back: Neck supple.      Right lower leg: No edema.      Left lower leg: No edema.   Skin:     General: Skin is warm and dry.   Neurological:      Mental Status: She is alert and oriented to person, place, and time.   Psychiatric:         Mood and Affect: Mood normal.      Result Review :                   Assessment and Plan   Diagnoses and all orders for this visit:    1. Dark stools (Primary)  -     CBC w AUTO Differential  -     Cancel: POCT occult blood x 1 stool  -     Occult Blood X 3, Stool - Stool, Per Rectum; Future  -     Occult Blood X 3, Stool - Stool, Per Rectum    I will check cbc and send her home with kit of occult stool.  Her symptoms have improved and stool is normal in color.   She was instructed to call or go to ER if symptoms return or worsen         Follow Up   No follow-ups on file.  Patient was given instructions and counseling regarding her condition or for health maintenance advice. Please see specific information pulled into the AVS if appropriate.

## 2023-09-12 NOTE — PROGRESS NOTES
Anticoagulation Clinic Progress Note    Anticoagulation Summary  As of 2023      INR goal:  2.0-3.0   TTR:  88.4 % (4.8 y)   INR used for dosin.60 (2023)   Warfarin maintenance plan:  4 mg every Tue, Thu, Sat; 2 mg all other days   Weekly warfarin total:  20 mg   No change documented:  Eulalia Reaves RPH   Plan last modified:  Savage Wasserman, PharmD (2023)   Next INR check:  10/24/2023   Priority:  Maintenance   Target end date:      Indications    Atrial fibrillation with RVR (Resolved) [I48.91]                 Anticoagulation Episode Summary       INR check location:      Preferred lab:      Send INR reminders to:  [facility]  POOL    Comments:  [facility]  ([place], F: [phone])          Anticoagulation Care Providers       Provider Role Specialty Phone number    Sly Saleh MD Referring Cardiology [phone]            Clinic Interview:  Patient Findings     Negatives:  Signs/symptoms of thrombosis, Signs/symptoms of bleeding,   Laboratory test error suspected, Change in health, Change in alcohol use,   Change in activity, Upcoming invasive procedure, Emergency department   visit, Upcoming dental procedure, Missed doses, Extra doses, Change in   medications, Change in diet/appetite, Hospital admission, Bruising, Other   complaints      Clinical Outcomes     Negatives:  Major bleeding event, Thromboembolic event,   Anticoagulation-related hospital admission, Anticoagulation-related ED   visit, Anticoagulation-related fatality        INR History:      2023     9:05 AM 2023    12:00 AM 2023    12:22 PM 2023    12:00 AM 2023     9:32 AM 2023    12:00 AM 2023     9:42 AM   Anticoagulation Monitoring   INR 2.80  2.44  2.40  2.60   INR Date 2023   INR Goal 2.0-3.0  2.0-3.0  2.0-3.0  2.0-3.0   Trend Same  Same  Same  Same   Last Week Total 20 mg  20 mg  20 mg  20 mg   Next Week Total 20 mg  20 mg  20 mg   20 mg   Sun 2 mg  2 mg  2 mg  2 mg   Mon 2 mg  2 mg  2 mg  2 mg   Tue 4 mg  4 mg  4 mg  4 mg   Wed 2 mg  2 mg  2 mg  2 mg   Thu 4 mg  4 mg  4 mg  4 mg   Fri 2 mg  2 mg  2 mg  2 mg   Sat 4 mg  4 mg  4 mg  4 mg   Historical INR  2.44      2.40      2.60            This result is from an external source.       Plan:  1. INR is Therapeutic today- see above in Anticoagulation Summary.   Will instruct Arelis Johnson to Continue their warfarin regimen- see above in Anticoagulation Summary.  Diagnosed with covid last week, took mucinex and experienced black stools. Completely resolved at this point. She reports she is seeing her doctor this week about the black stools she experienced.  2. Follow up in 6 weeks  3.  They have been instructed to call if any changes in medications, doses, concerns, etc. Patient expresses understanding and has no further questions at this time.    Eulalia Reaves [facility]

## 2023-09-13 LAB
BASOPHILS # BLD AUTO: 0 X10E3/UL (ref 0–0.2)
BASOPHILS NFR BLD AUTO: 1 %
EOSINOPHIL # BLD AUTO: 0.1 X10E3/UL (ref 0–0.4)
EOSINOPHIL NFR BLD AUTO: 1 %
ERYTHROCYTE [DISTWIDTH] IN BLOOD BY AUTOMATED COUNT: 12.4 % (ref 11.7–15.4)
HCT VFR BLD AUTO: 36.7 % (ref 34–46.6)
HGB BLD-MCNC: 12.2 G/DL (ref 11.1–15.9)
IMM GRANULOCYTES # BLD AUTO: 0 X10E3/UL (ref 0–0.1)
IMM GRANULOCYTES NFR BLD AUTO: 1 %
LYMPHOCYTES # BLD AUTO: 1.6 X10E3/UL (ref 0.7–3.1)
LYMPHOCYTES NFR BLD AUTO: 31 %
MCH RBC QN AUTO: 31.7 PG (ref 26.6–33)
MCHC RBC AUTO-ENTMCNC: 33.2 G/DL (ref 31.5–35.7)
MCV RBC AUTO: 95 FL (ref 79–97)
MONOCYTES # BLD AUTO: 0.5 X10E3/UL (ref 0.1–0.9)
MONOCYTES NFR BLD AUTO: 9 %
NEUTROPHILS # BLD AUTO: 3 X10E3/UL (ref 1.4–7)
NEUTROPHILS NFR BLD AUTO: 57 %
PLATELET # BLD AUTO: 257 X10E3/UL (ref 150–450)
RBC # BLD AUTO: 3.85 X10E6/UL (ref 3.77–5.28)
WBC # BLD AUTO: 5.2 X10E3/UL (ref 3.4–10.8)

## 2023-09-17 LAB
HEMOCCULT STL QL IA: NEGATIVE

## 2023-09-19 NOTE — PROGRESS NOTES
Her blood count has dropped slightly from last time but still in normal range.  Her stools were negative for blood.  If patient is not showing any symptoms of dark bloody stools I will follow-up with her on her routine follow-up appointment

## 2023-09-26 RX ORDER — DILTIAZEM HYDROCHLORIDE 180 MG/1
CAPSULE, COATED, EXTENDED RELEASE ORAL
Qty: 90 CAPSULE | Refills: 0 | Status: SHIPPED | OUTPATIENT
Start: 2023-09-26

## 2023-10-02 DIAGNOSIS — M10.9 ACUTE GOUT, UNSPECIFIED CAUSE, UNSPECIFIED SITE: ICD-10-CM

## 2023-10-02 RX ORDER — ALLOPURINOL 100 MG/1
100 TABLET ORAL DAILY
Qty: 30 TABLET | Refills: 5 | Status: SHIPPED | OUTPATIENT
Start: 2023-10-02

## 2023-10-09 DIAGNOSIS — E11.9 TYPE 2 DIABETES MELLITUS WITHOUT COMPLICATION, WITHOUT LONG-TERM CURRENT USE OF INSULIN: ICD-10-CM

## 2023-10-09 RX ORDER — METFORMIN HYDROCHLORIDE 500 MG/1
500 TABLET, EXTENDED RELEASE ORAL
Qty: 90 TABLET | Refills: 1 | Status: SHIPPED | OUTPATIENT
Start: 2023-10-09

## 2023-10-17 ENCOUNTER — OFFICE VISIT (OUTPATIENT)
Dept: FAMILY MEDICINE CLINIC | Facility: CLINIC | Age: 83
End: 2023-10-17
Payer: MEDICARE

## 2023-10-17 VITALS
SYSTOLIC BLOOD PRESSURE: 112 MMHG | TEMPERATURE: 97.1 F | WEIGHT: 167 LBS | BODY MASS INDEX: 27.82 KG/M2 | OXYGEN SATURATION: 99 % | DIASTOLIC BLOOD PRESSURE: 82 MMHG | HEART RATE: 87 BPM | HEIGHT: 65 IN

## 2023-10-17 DIAGNOSIS — E11.9 TYPE 2 DIABETES MELLITUS WITHOUT COMPLICATION, WITHOUT LONG-TERM CURRENT USE OF INSULIN: Primary | ICD-10-CM

## 2023-10-17 DIAGNOSIS — Z00.00 MEDICARE ANNUAL WELLNESS VISIT, SUBSEQUENT: ICD-10-CM

## 2023-10-17 DIAGNOSIS — I10 ESSENTIAL HYPERTENSION: ICD-10-CM

## 2023-10-17 DIAGNOSIS — Z13.0 SCREENING FOR DEFICIENCY ANEMIA: ICD-10-CM

## 2023-10-17 DIAGNOSIS — E78.2 MIXED HYPERLIPIDEMIA: ICD-10-CM

## 2023-10-17 DIAGNOSIS — M1A.9XX0 CHRONIC GOUT WITHOUT TOPHUS, UNSPECIFIED CAUSE, UNSPECIFIED SITE: ICD-10-CM

## 2023-10-17 LAB
EXPIRATION DATE: ABNORMAL
HBA1C MFR BLD: 6.9 % (ref 4.5–5.7)
Lab: ABNORMAL

## 2023-10-17 NOTE — PROGRESS NOTES
The ABCs of the Annual Wellness Visit  Subsequent Medicare Wellness Visit    Subjective    Arelis Johnson is a 83 y.o. female who presents for a Subsequent Medicare Wellness Visit.    The following portions of the patient's history were reviewed and   updated as appropriate: allergies, current medications, past family history, past medical history, past social history, past surgical history, and problem list.    Compared to one year ago, the patient feels her physical   health is the same.    Compared to one year ago, the patient feels her mental   health is the same.    Recent Hospitalizations:  She was not admitted to the hospital during the last year.       Current Medical Providers:  Patient Care Team:  Amanda Felix APRN as PCP - General (Family Medicine)  Heriberto Singer MD as Consulting Physician (Orthopedic Surgery)  Sly Saleh MD as Consulting Physician (Cardiology)  Shashank Mckeon MD as Consulting Physician (Cardiology)    Outpatient Medications Prior to Visit   Medication Sig Dispense Refill    allopurinol (ZYLOPRIM) 100 MG tablet Take 1 tablet by mouth Daily. 30 tablet 5    Calcium Carb-Cholecalciferol (CALCIUM + D3) 600-200 MG-UNIT tablet Take 1 tablet by mouth Daily.      dilTIAZem CD (CARDIZEM CD) 180 MG 24 hr capsule TAKE ONE CAPSULE (BY MOUTH) EACH DAY 90 capsule 0    furosemide (LASIX) 40 MG tablet TAKE 1 TABLET BY MOUTH DAILY. 90 tablet 4    metFORMIN ER (GLUCOPHAGE-XR) 500 MG 24 hr tablet take 1 tablet by mouth daily with breakfast 90 tablet 1    metoprolol tartrate (LOPRESSOR) 50 MG tablet Take 0.5 tablets by mouth 2 (Two) Times a Day. 90 tablet 3    warfarin (COUMADIN) 4 MG tablet TAKE ONE TABLET BY MOUTH TUES, THURS, SAT AND TAKE ONE-HALF TABLET BY MOUTH ALL OTHER DAYS OR AS DIRECTED. 65 tablet 1    simvastatin (ZOCOR) 40 MG tablet Take 1 tablet by mouth Daily. 90 tablet 1     No facility-administered medications prior to visit.       No opioid medication  "identified on active medication list. I have reviewed chart for other potential  high risk medication/s and harmful drug interactions in the elderly.        Aspirin is not on active medication list.  Aspirin use is not indicated based on review of current medical condition/s. Risk of harm outweighs potential benefits.  .    Patient Active Problem List   Diagnosis    IFG (impaired fasting glucose)    Hypertension    Hyperlipidemia    Diabetes mellitus    Hearing loss of right ear due to cerumen impaction    Permanent atrial fibrillation    Chronic anticoagulation     Advance Care Planning   Advance Care Planning     Advance Directive is not on file.  ACP discussion was held with the patient during this visit. Patient does not have an advance directive, declines further assistance.     Objective    Vitals:    10/17/23 0821   BP: 112/82   Pulse: 87   Temp: 97.1 °F (36.2 °C)   SpO2: 99%   Weight: 75.8 kg (167 lb)   Height: 165 cm (64.96\")   PainSc: 0-No pain     Estimated body mass index is 27.82 kg/m² as calculated from the following:    Height as of this encounter: 165 cm (64.96\").    Weight as of this encounter: 75.8 kg (167 lb).           Does the patient have evidence of cognitive impairment? No    Lab Results   Component Value Date    CHLPL 125 10/17/2023    TRIG 86 10/17/2023    HDL 70 (H) 10/17/2023    LDL 39 10/17/2023    VLDL 16 10/17/2023    HGBA1C 6.9 (A) 10/17/2023        HEALTH RISK ASSESSMENT    Smoking Status:  Social History     Tobacco Use   Smoking Status Never   Smokeless Tobacco Never     Alcohol Consumption:  Social History     Substance and Sexual Activity   Alcohol Use No     Fall Risk Screen:    STEADI Fall Risk Assessment was completed, and patient is at LOW risk for falls.Assessment completed on:10/17/2023    Depression Screening:      10/17/2023     8:26 AM   PHQ-2/PHQ-9 Depression Screening   Little Interest or Pleasure in Doing Things 0-->not at all   Feeling Down, Depressed or Hopeless " 0-->not at all   PHQ-9: Brief Depression Severity Measure Score 0       Health Habits and Functional and Cognitive Screening:      10/17/2023     8:00 AM   Functional & Cognitive Status   Do you have difficulty preparing food and eating? No   Do you have difficulty bathing yourself, getting dressed or grooming yourself? No   Do you have difficulty using the toilet? No   Do you have difficulty moving around from place to place? No   Do you have trouble with steps or getting out of a bed or a chair? No   Current Diet Well Balanced Diet   Dental Exam Up to date   Eye Exam Up to date   Exercise (times per week) 6 times per week   Current Exercises Include No Regular Exercise   Do you need help using the phone?  No   Are you deaf or do you have serious difficulty hearing?  Yes   Do you need help to go to places out of walking distance? No   Do you need help shopping? No   Do you need help preparing meals?  No   Do you need help with housework?  No   Do you need help with laundry? No   Do you need help taking your medications? No   Do you need help managing money? No   Do you ever drive or ride in a car without wearing a seat belt? No   Have you felt unusual stress, anger or loneliness in the last month? No   Who do you live with? Alone   If you need help, do you have trouble finding someone available to you? No   Have you been bothered in the last four weeks by sexual problems? No   Do you have difficulty concentrating, remembering or making decisions? No       Age-appropriate Screening Schedule:  Refer to the list below for future screening recommendations based on patient's age, sex and/or medical conditions. Orders for these recommended tests are listed in the plan section. The patient has been provided with a written plan.    Health Maintenance   Topic Date Due    TDAP/TD VACCINES (1 - Tdap) Never done    DXA SCAN  08/30/2023    COVID-19 Vaccine (6 - 2023-24 season) 09/01/2023    ANNUAL WELLNESS VISIT  09/26/2023     "DIABETIC EYE EXAM  09/28/2023    HEMOGLOBIN A1C  04/17/2024    BMI FOLLOWUP  09/12/2024    LIPID PANEL  10/17/2024    URINE MICROALBUMIN  10/17/2024    INFLUENZA VACCINE  Completed    Pneumococcal Vaccine 65+  Completed    ZOSTER VACCINE  Completed                  CMS Preventative Services Quick Reference  Risk Factors Identified During Encounter  Fall Risk-High or Moderate: Discussed Fall Prevention in the home  The above risks/problems have been discussed with the patient.  Pertinent information has been shared with the patient in the After Visit Summary.  An After Visit Summary and PPPS were made available to the patient.    Follow Up:   Next Medicare Wellness visit to be scheduled in 1 year.       Additional E&M Note during same encounter follows:  Patient has multiple medical problems which are significant and separately identifiable that require additional work above and beyond the Medicare Wellness Visit.      Chief Complaint  Medicare Wellness-subsequent    Subjective        HPI  Arelis Johnson is also being seen today for this is a 83-year-old female patient here today for annual wellness visit and follow-up.  She did recently have a cortisone shot in her right shoulder a few weeks ago and states it is feeling better.  She does start physical therapy tomorrow.  Since the injection she reports her blood sugars are slightly higher but they are coming down.  Her last eye exam was last month with Dr. Ruffin.  Blood pressure is stable.  She denies any chest pain or shortness of breath.  She does suffer from gout but does not report any gout flareups.  She is on Coumadin and follows cardiology.  She denies any abnormal bleeding or any recent falls  Objective   Vital Signs:  /82   Pulse 87   Temp 97.1 °F (36.2 °C)   Ht 165 cm (64.96\")   Wt 75.8 kg (167 lb)   SpO2 99%   BMI 27.82 kg/m²     Physical Exam  Vitals and nursing note reviewed.   HENT:      Head: Normocephalic.      Nose: Nose normal. "   Eyes:      Pupils: Pupils are equal, round, and reactive to light.   Cardiovascular:      Rate and Rhythm: Normal rate and regular rhythm.      Pulses: Normal pulses.      Heart sounds: Normal heart sounds.   Pulmonary:      Effort: Pulmonary effort is normal. No respiratory distress.      Breath sounds: Normal breath sounds. No wheezing or rales.   Abdominal:      General: Bowel sounds are normal. There is no distension.      Tenderness: There is no abdominal tenderness.   Musculoskeletal:         General: No swelling.      Cervical back: Neck supple.      Right lower leg: No edema.      Left lower leg: No edema.   Skin:     General: Skin is warm and dry.   Neurological:      Mental Status: She is alert and oriented to person, place, and time.   Psychiatric:         Mood and Affect: Mood normal.                         Assessment and Plan   Diagnoses and all orders for this visit:    1. Type 2 diabetes mellitus without complication, without long-term current use of insulin (Primary)  -     POCT glycated hemoglobin, total  -     MicroAlbumin, Urine, Random - Urine, Clean Catch    2. Essential hypertension  -     Urinalysis With Culture If Indicated - Urine, Clean Catch    3. Mixed hyperlipidemia  -     Comprehensive Metabolic Panel  -     Lipid Panel    4. Screening for deficiency anemia  -     CBC & Differential    5. Chronic gout without tophus, unspecified cause, unspecified site  -     Uric Acid    6. Medicare annual wellness visit, subsequent    Other orders  -     Microscopic Examination -    I spent a total of 30 minutes with the patient today including face-to-face encounter, reviewing data in the system, coordination of care with the nursing staff as well as consultants, documentation and entering orders.           Follow Up   No follow-ups on file.  Patient was given instructions and counseling regarding her condition or for health maintenance advice. Please see specific information pulled into the AVS  if appropriate.

## 2023-10-18 LAB
ALBUMIN SERPL-MCNC: 4.8 G/DL (ref 3.5–5.2)
ALBUMIN/GLOB SERPL: 1.8 G/DL
ALP SERPL-CCNC: 95 U/L (ref 39–117)
ALT SERPL-CCNC: 20 U/L (ref 1–33)
APPEARANCE UR: CLEAR
AST SERPL-CCNC: 29 U/L (ref 1–32)
BACTERIA #/AREA URNS HPF: NORMAL /HPF
BASOPHILS # BLD AUTO: 0.09 10*3/MM3 (ref 0–0.2)
BASOPHILS NFR BLD AUTO: 1.1 % (ref 0–1.5)
BILIRUB SERPL-MCNC: 0.8 MG/DL (ref 0–1.2)
BILIRUB UR QL STRIP: NEGATIVE
BUN SERPL-MCNC: 25 MG/DL (ref 8–23)
BUN/CREAT SERPL: 21.7 (ref 7–25)
CALCIUM SERPL-MCNC: 11.1 MG/DL (ref 8.6–10.5)
CASTS URNS QL MICRO: NORMAL /LPF
CHLORIDE SERPL-SCNC: 99 MMOL/L (ref 98–107)
CHOLEST SERPL-MCNC: 125 MG/DL (ref 0–200)
CO2 SERPL-SCNC: 31.3 MMOL/L (ref 22–29)
COLOR UR: YELLOW
CREAT SERPL-MCNC: 1.15 MG/DL (ref 0.57–1)
EGFRCR SERPLBLD CKD-EPI 2021: 47.1 ML/MIN/1.73
EOSINOPHIL # BLD AUTO: 0.05 10*3/MM3 (ref 0–0.4)
EOSINOPHIL NFR BLD AUTO: 0.6 % (ref 0.3–6.2)
EPI CELLS #/AREA URNS HPF: NORMAL /HPF (ref 0–10)
ERYTHROCYTE [DISTWIDTH] IN BLOOD BY AUTOMATED COUNT: 13 % (ref 12.3–15.4)
GLOBULIN SER CALC-MCNC: 2.6 GM/DL
GLUCOSE SERPL-MCNC: 131 MG/DL (ref 65–99)
GLUCOSE UR QL STRIP: NEGATIVE
HCT VFR BLD AUTO: 42.6 % (ref 34–46.6)
HDLC SERPL-MCNC: 70 MG/DL (ref 40–60)
HGB BLD-MCNC: 14.7 G/DL (ref 12–15.9)
HGB UR QL STRIP: NEGATIVE
IMM GRANULOCYTES # BLD AUTO: 0.03 10*3/MM3 (ref 0–0.05)
IMM GRANULOCYTES NFR BLD AUTO: 0.4 % (ref 0–0.5)
KETONES UR QL STRIP: NEGATIVE
LDLC SERPL CALC-MCNC: 39 MG/DL (ref 0–100)
LEUKOCYTE ESTERASE UR QL STRIP: NEGATIVE
LYMPHOCYTES # BLD AUTO: 1.62 10*3/MM3 (ref 0.7–3.1)
LYMPHOCYTES NFR BLD AUTO: 19.8 % (ref 19.6–45.3)
MCH RBC QN AUTO: 32.2 PG (ref 26.6–33)
MCHC RBC AUTO-ENTMCNC: 34.5 G/DL (ref 31.5–35.7)
MCV RBC AUTO: 93.2 FL (ref 79–97)
MICRO URNS: NORMAL
MICRO URNS: NORMAL
MICROALBUMIN UR-MCNC: 7.2 UG/ML
MONOCYTES # BLD AUTO: 0.49 10*3/MM3 (ref 0.1–0.9)
MONOCYTES NFR BLD AUTO: 6 % (ref 5–12)
NEUTROPHILS # BLD AUTO: 5.92 10*3/MM3 (ref 1.7–7)
NEUTROPHILS NFR BLD AUTO: 72.1 % (ref 42.7–76)
NITRITE UR QL STRIP: NEGATIVE
NRBC BLD AUTO-RTO: 0 /100 WBC (ref 0–0.2)
PH UR STRIP: 6.5 [PH] (ref 5–7.5)
PLATELET # BLD AUTO: 269 10*3/MM3 (ref 140–450)
POTASSIUM SERPL-SCNC: 4.4 MMOL/L (ref 3.5–5.2)
PROT SERPL-MCNC: 7.4 G/DL (ref 6–8.5)
PROT UR QL STRIP: NEGATIVE
RBC # BLD AUTO: 4.57 10*6/MM3 (ref 3.77–5.28)
RBC #/AREA URNS HPF: NORMAL /HPF (ref 0–2)
SODIUM SERPL-SCNC: 144 MMOL/L (ref 136–145)
SP GR UR STRIP: 1.01 (ref 1–1.03)
TRIGL SERPL-MCNC: 86 MG/DL (ref 0–150)
URATE SERPL-MCNC: 5.8 MG/DL (ref 2.4–5.7)
URINALYSIS REFLEX: NORMAL
UROBILINOGEN UR STRIP-MCNC: 0.2 MG/DL (ref 0.2–1)
VLDLC SERPL CALC-MCNC: 16 MG/DL (ref 5–40)
WBC # BLD AUTO: 8.2 10*3/MM3 (ref 3.4–10.8)
WBC #/AREA URNS HPF: NORMAL /HPF (ref 0–5)

## 2023-10-19 ENCOUNTER — PATIENT ROUNDING (BHMG ONLY) (OUTPATIENT)
Dept: FAMILY MEDICINE CLINIC | Facility: CLINIC | Age: 83
End: 2023-10-19
Payer: MEDICARE

## 2023-10-19 NOTE — PROGRESS NOTES
"My name is Cheikh Kendall     I am the Practice Manager with   [facility] [address] [address] [phone]      I am messaging to ask you about our practice and your recent visit.     Tell me about your visit with us. What things went well?         We're always looking for ways to make our patients' experiences even better. Do you have recommendations on ways we may improve?       Overall were you satisfied with your first visit to our practice?        Is there anything else I can do for you?     Also, please note that you may receive a survey from \"[employer]\" please take time to fill that out, as it provides important feedback for our office.       Thank you, and have a great day.   "

## 2023-10-24 ENCOUNTER — ANTICOAGULATION VISIT (OUTPATIENT)
Dept: PHARMACY | Facility: HOSPITAL | Age: 83
End: 2023-10-24
Payer: MEDICARE

## 2023-10-24 LAB — INR PPP: 2.4

## 2023-10-24 RX ORDER — WARFARIN SODIUM 4 MG/1
TABLET ORAL
Qty: 65 TABLET | Refills: 1 | Status: SHIPPED | OUTPATIENT
Start: 2023-10-24

## 2023-10-24 NOTE — PROGRESS NOTES
Anticoagulation Clinic Progress Note    Anticoagulation Summary  As of 10/24/2023      INR goal:  2.0-3.0   TTR:  88.7% (4.9 y)   INR used for dosin.40 (10/24/2023)   Warfarin maintenance plan:  4 mg every Tue, Thu, Sat; 2 mg all other days   Weekly warfarin total:  20 mg   No change documented:  Eulalia Reaves RPH   Plan last modified:  Savage Wasserman, PharmD (2023)   Next INR check:  2023   Priority:  Maintenance   Target end date:      Indications    Atrial fibrillation with RVR (Resolved) [I48.91]                 Anticoagulation Episode Summary       INR check location:      Preferred lab:      Send INR reminders to:  [facility]  POOL    Comments:  [facility]  ([place], F: [phone])          Anticoagulation Care Providers       Provider Role Specialty Phone number    Sly Saleh MD Referring Cardiology [phone]            Clinic Interview:  Patient Findings     Negatives:  Signs/symptoms of thrombosis, Signs/symptoms of bleeding,   Laboratory test error suspected, Change in health, Change in alcohol use,   Change in activity, Upcoming invasive procedure, Emergency department   visit, Upcoming dental procedure, Missed doses, Extra doses, Change in   medications, Change in diet/appetite, Hospital admission, Bruising, Other   complaints      Clinical Outcomes     Negatives:  Major bleeding event, Thromboembolic event,   Anticoagulation-related hospital admission, Anticoagulation-related ED   visit, Anticoagulation-related fatality        INR History:      2023    12:22 PM 2023    12:00 AM 2023     9:32 AM 2023    12:00 AM 2023     9:42 AM 10/24/2023    12:00 AM 10/24/2023    10:02 AM   Anticoagulation Monitoring   INR 2.44  2.40  2.60  2.40   INR Date 2023  2023  2023  10/24/2023   INR Goal 2.0-3.0  2.0-3.0  2.0-3.0  2.0-3.0   Trend Same  Same  Same  Same   Last Week Total 20 mg  20 mg  20 mg  20 mg   Next Week Total 20 mg  20 mg   20 mg  20 mg   Sun 2 mg  2 mg  2 mg  2 mg   Mon 2 mg  2 mg  2 mg  2 mg   Tue 4 mg  4 mg  4 mg  4 mg   Wed 2 mg  2 mg  2 mg  2 mg   Thu 4 mg  4 mg  4 mg  4 mg   Fri 2 mg  2 mg  2 mg  2 mg   Sat 4 mg  4 mg  4 mg  4 mg   Historical INR  2.40      2.60      2.40        Visit Report    Report Report         This result is from an external source.       Plan:  1. INR is Therapeutic today- see above in Anticoagulation Summary.   Will instruct Arelis SANDRA Johnson to Continue their warfarin regimen- see above in Anticoagulation Summary.  2. Follow up in 6 weeks  3.  They have been instructed to call if any changes in medications, doses, concerns, etc. Patient expresses understanding and has no further questions at this time.    Eulalia Reaves [facility]

## 2023-10-30 ENCOUNTER — HOSPITAL ENCOUNTER (OUTPATIENT)
Dept: BONE DENSITY | Facility: HOSPITAL | Age: 83
Discharge: HOME | End: 2023-10-30
Payer: MEDICARE

## 2023-10-30 ENCOUNTER — HOSPITAL ENCOUNTER (OUTPATIENT)
Dept: MAMMOGRAPHY | Facility: HOSPITAL | Age: 83
Discharge: HOME | End: 2023-10-30
Payer: MEDICARE

## 2023-10-30 DIAGNOSIS — Z12.31 BREAST CANCER SCREENING BY MAMMOGRAM: ICD-10-CM

## 2023-10-30 DIAGNOSIS — Z78.0 POST-MENOPAUSAL: ICD-10-CM

## 2023-10-30 DIAGNOSIS — M85.80 OSTEOPENIA, UNSPECIFIED LOCATION: ICD-10-CM

## 2023-10-30 PROCEDURE — 77063 BREAST TOMOSYNTHESIS BI: CPT

## 2023-10-30 PROCEDURE — 77067 SCR MAMMO BI INCL CAD: CPT

## 2023-10-30 PROCEDURE — 77080 DXA BONE DENSITY AXIAL: CPT

## 2023-10-31 ENCOUNTER — TELEPHONE (OUTPATIENT)
Dept: FAMILY MEDICINE CLINIC | Facility: CLINIC | Age: 83
End: 2023-10-31

## 2023-10-31 NOTE — TELEPHONE ENCOUNTER
Caller: Arelis Johnson    Relationship: Self    Best call back number: [phone]     Caller requesting test results:    What test was performed: BLOOD WORK    When was the test performed: 10/17/23    Where was the test performed: IN OFFICE    Additional notes: PATIENT CALLING WANTING SOMEONE TO CALL AND GIVE HER RESULTS

## 2023-10-31 NOTE — PROGRESS NOTES
There has been some mild changes to DEXA scan but recommendations is to continue current regimen and repeat again in 2 years

## 2023-11-14 ENCOUNTER — OFFICE VISIT (OUTPATIENT)
Age: 83
End: 2023-11-14
Payer: MEDICARE

## 2023-11-14 VITALS
DIASTOLIC BLOOD PRESSURE: 74 MMHG | SYSTOLIC BLOOD PRESSURE: 120 MMHG | BODY MASS INDEX: 28.32 KG/M2 | HEART RATE: 76 BPM | HEIGHT: 65 IN | WEIGHT: 170 LBS

## 2023-11-14 DIAGNOSIS — I48.20 ATRIAL FIBRILLATION, CHRONIC: Primary | ICD-10-CM

## 2023-11-14 PROBLEM — I48.21 PERMANENT ATRIAL FIBRILLATION: Status: RESOLVED | Noted: 2019-11-01 | Resolved: 2023-11-14

## 2023-11-14 PROCEDURE — 3078F DIAST BP <80 MM HG: CPT

## 2023-11-14 PROCEDURE — 1159F MED LIST DOCD IN RCRD: CPT

## 2023-11-14 PROCEDURE — 99213 OFFICE O/P EST LOW 20 MIN: CPT

## 2023-11-14 PROCEDURE — 3074F SYST BP LT 130 MM HG: CPT

## 2023-11-14 PROCEDURE — 93000 ELECTROCARDIOGRAM COMPLETE: CPT

## 2023-11-14 PROCEDURE — 1160F RVW MEDS BY RX/DR IN RCRD: CPT

## 2023-11-14 RX ORDER — SIMVASTATIN 40 MG
40 TABLET ORAL NIGHTLY
COMMUNITY

## 2023-11-14 NOTE — PROGRESS NOTES
Date of Office Visit: 2023  Encounter Provider: CHARMAINE Luciano  Place of Service: [facility] CARDIOLOGY  Patient Name: Arelis Johnson  :1940    Chief Complaint   Patient presents with    permanent AFIB   :     HPI: Arelis Johnson is a 83 y.o. female who follows with Dr. Mckeon for permanent atrial fibrillation.     She has several year history of persistent atrial fibrillation.     She has been on rate control strategy due to lack of symptoms.     She saw Dr. Mckeon last year. Was doing well. Heart rate well controlled. She had some minor bradycardia with no complaints so no medication adjustments.                 She presents today for follow up appt.     Since last visit she has been doing very well..    She has no awareness of her atrial fibrillation.    Denies any chest pain, shortness of breath, palpitations.    She says very rarely when she is walking on the treadmill.  She may notice her heart racing for a few seconds afterwards.    She is currently on metoprolol and diltiazem for rate control.  Her metoprolol was recently cut to 25 mg twice daily.    She is tolerating medications well without any issues.    EKG shows rate controlled atrial fibrillation.    She is on warfarin for AC.  Follows with INR clinic and last several INRs appear to be in range                Past Medical History:   Diagnosis Date    Diabetes mellitus     Hyperlipidemia     Hypertension     IFG (impaired fasting glucose)     Palpitations     SOB (shortness of breath)        Past Surgical History:   Procedure Laterality Date    ANAL FISTULA REPAIR      BACK SURGERY      l spine ruptured disk    CHOLECYSTECTOMY      JOINT REPLACEMENT      bilateral knees       Social History     Socioeconomic History    Marital status:    Tobacco Use    Smoking status: Never    Smokeless tobacco: Never   Vaping Use    Vaping Use: Never used   Substance and Sexual Activity    Alcohol use: No     Drug use: No    Sexual activity: Never       Family History   Problem Relation Age of Onset    Hypertension Mother     Heart disease Father     Hypertension Father     Liver disease Brother        Review of Systems   Constitutional: Negative for chills, fever and malaise/fatigue.   Cardiovascular:  Negative for chest pain, dyspnea on exertion, leg swelling, near-syncope, orthopnea, palpitations, paroxysmal nocturnal dyspnea and syncope.   Respiratory:  Negative for cough and shortness of breath.    Hematologic/Lymphatic: Negative.    Musculoskeletal:  Negative for joint pain, joint swelling and myalgias.   Gastrointestinal:  Negative for abdominal pain, diarrhea, melena, nausea and vomiting.   Genitourinary:  Negative for frequency and hematuria.   Neurological:  Negative for light-headedness, numbness, paresthesias and seizures.   Allergic/Immunologic: Negative.    All other systems reviewed and are negative.      Allergies   Allergen Reactions    Percocet [Oxycodone-Acetaminophen] GI Intolerance    Sulfa Antibiotics Nausea And Vomiting    Penicillins Rash         Current Outpatient Medications:     simvastatin (ZOCOR) 40 MG tablet, Take 1 tablet by mouth Every Night., Disp: , Rfl:     allopurinol (ZYLOPRIM) 100 MG tablet, Take 1 tablet by mouth Daily., Disp: 30 tablet, Rfl: 5    Calcium Carb-Cholecalciferol (CALCIUM + D3) 600-200 MG-UNIT tablet, Take 1 tablet by mouth Daily., Disp: , Rfl:     dilTIAZem CD (CARDIZEM CD) 180 MG 24 hr capsule, TAKE ONE CAPSULE (BY MOUTH) EACH DAY, Disp: 90 capsule, Rfl: 0    furosemide (LASIX) 40 MG tablet, TAKE 1 TABLET BY MOUTH DAILY., Disp: 90 tablet, Rfl: 4    metFORMIN ER (GLUCOPHAGE-XR) 500 MG 24 hr tablet, take 1 tablet by mouth daily with breakfast, Disp: 90 tablet, Rfl: 1    metoprolol tartrate (LOPRESSOR) 50 MG tablet, Take 0.5 tablets by mouth 2 (Two) Times a Day., Disp: 90 tablet, Rfl: 3    warfarin (COUMADIN) 4 MG tablet, TAKE ONE TABLET BY MOUTH TUES, THURS, SAT AND  "TAKE ONE-HALF TABLET BY MOUTH ALL OTHER DAYS OR AS DIRECTED., Disp: 65 tablet, Rfl: 1      Objective:     Vitals:    11/14/23 1057   BP: 120/74   Pulse: 76   Weight: 77.1 kg (170 lb)   Height: 165 cm (64.96\")     Body mass index is 28.32 kg/m².    PHYSICAL EXAM:    Vitals Reviewed.   General Appearance: No acute distress, well developed and well nourished.   Eyes: Conjunctiva and lids: No erythema, swelling, or discharge. Sclera non-icteric.   HENT: Atraumatic, normocephalic. External eyes, ears, and nose normal.   Respiratory: No signs of respiratory distress. Respiration rhythm and depth normal.   Clear to auscultation. No rales, crackles, rhonchi, or wheezing auscultated.   Cardiovascular:  Heart Rate and Rhythm: Normal, Heart Sounds: Normal S1 and S2. No S3 or S4 noted.  Gastrointestinal:  Abdomen soft, non-distended, non-tender.   Musculoskeletal: Normal movement of extremities  Skin: Warm and dry.   Psychiatric: Patient alert and oriented to person, place, and time. Speech and behavior appropriate. Normal mood and affect.       ECG 12 Lead    Date/Time: 11/14/2023 12:04 PM  Performed by: Pablo Elise APRN    Authorized by: Pablo Elise APRN  Comparison: compared with previous ECG   Similar to previous ECG  Rhythm: atrial fibrillation            Assessment:       Diagnosis Plan   1. Atrial fibrillation, chronic               Plan:   Chronic atrial fibrillation--- she has no awareness of her atrial fibrillation.  Her heart rate is well controlled on rate control strategy.  She is on diltiazem and metoprolol, metoprolol was recently decreased and she is doing well.  She has no complaints or concerns in office today.    We will continue current treatment and have her follow-up in 1 year or sooner if she has any issues.    As always, it has been a pleasure to participate in your patient's care.      Sincerely,         CHARMAINE Luciano   "

## 2023-12-06 LAB — INR PPP: 2.26

## 2023-12-07 ENCOUNTER — ANTICOAGULATION VISIT (OUTPATIENT)
Dept: PHARMACY | Facility: HOSPITAL | Age: 83
End: 2023-12-07
Payer: MEDICARE

## 2023-12-07 NOTE — PROGRESS NOTES
Anticoagulation Clinic Progress Note    Anticoagulation Summary  As of 2023      INR goal:  2.0-3.0   TTR:  89.0% (5 y)   INR used for dosin.26 (2023)   Warfarin maintenance plan:  4 mg every Tue, Thu, Sat; 2 mg all other days   Weekly warfarin total:  20 mg   No change documented:  Eulalia Reaves RPH   Plan last modified:  Savage Wasserman, PharmD (2023)   Next INR check:  2024   Priority:  Maintenance   Target end date:      Indications    Atrial fibrillation with RVR (Resolved) [I48.91]                 Anticoagulation Episode Summary       INR check location:      Preferred lab:      Send INR reminders to:  [facility]  POOL    Comments:  [facility]  ([place], [place]: [phone])          Anticoagulation Care Providers       Provider Role Specialty Phone number    Sly Saleh MD Referring Cardiology [phone]            Clinic Interview:  Patient Findings     Negatives:  Signs/symptoms of thrombosis, Signs/symptoms of bleeding,   Laboratory test error suspected, Change in health, Change in alcohol use,   Change in activity, Upcoming invasive procedure, Emergency department   visit, Upcoming dental procedure, Missed doses, Extra doses, Change in   medications, Change in diet/appetite, Hospital admission, Bruising, Other   complaints      Clinical Outcomes     Negatives:  Major bleeding event, Thromboembolic event,   Anticoagulation-related hospital admission, Anticoagulation-related ED   visit, Anticoagulation-related fatality        INR History:      2023     9:32 AM 2023    12:00 AM 2023     9:42 AM 10/24/2023    12:00 AM 10/24/2023    10:02 AM 2023    12:00 AM 2023     8:00 AM   Anticoagulation Monitoring   INR 2.40  2.60  2.40  2.26   INR Date 2023  2023  10/24/2023  2023   INR Goal 2.0-3.0  2.0-3.0  2.0-3.0  2.0-3.0   Trend Same  Same  Same  Same   Last Week Total 20 mg  20 mg  20 mg  20 mg   Next Week Total 20 mg  20 mg  20 mg   20 mg   Sun 2 mg  2 mg  2 mg  2 mg   Mon 2 mg  2 mg  2 mg  2 mg   Tue 4 mg  4 mg  4 mg  4 mg   Wed 2 mg  2 mg  2 mg  2 mg   Thu 4 mg  4 mg  4 mg  4 mg   Fri 2 mg  2 mg  2 mg  2 mg   Sat 4 mg  4 mg  4 mg  4 mg   Historical INR  2.60      2.40      2.26        Visit Report  Report Report           This result is from an external source.       Plan:  1. INR is Therapeutic today- see above in Anticoagulation Summary.   Will instruct Arelis Johnson to Continue their warfarin regimen- see above in Anticoagulation Summary.  2. Follow up in 6 weeks  3. Pt has agreed to only be called if INR out of range. They have been instructed to call if any changes in medications, doses, concerns, etc. Patient expresses understanding and has no further questions at this time.    Eulalia Reaves [facility]

## 2024-01-02 RX ORDER — DILTIAZEM HYDROCHLORIDE 180 MG/1
CAPSULE, COATED, EXTENDED RELEASE ORAL
Qty: 90 CAPSULE | Refills: 0 | Status: SHIPPED | OUTPATIENT
Start: 2024-01-02

## 2024-01-17 LAB — INR PPP: 3

## 2024-01-18 ENCOUNTER — ANTICOAGULATION VISIT (OUTPATIENT)
Dept: PHARMACY | Facility: HOSPITAL | Age: 84
End: 2024-01-18
Payer: MEDICARE

## 2024-01-18 NOTE — PROGRESS NOTES
Anticoagulation Clinic Progress Note    Anticoagulation Summary  As of 1/18/2024      INR goal:  2.0-3.0   TTR:  89.2% (5.1 y)   INR used for dosing:  3.00 (1/17/2024)   Warfarin maintenance plan:  4 mg every Tue, Thu, Sat; 2 mg all other days   Weekly warfarin total:  20 mg   No change documented:  Eulalia Reaves RPH   Plan last modified:  Savage Wasserman, PharmD (4/7/2023)   Next INR check:  2/28/2024   Priority:  Maintenance   Target end date:      Indications    Atrial fibrillation with RVR (Resolved) [I48.91]                 Anticoagulation Episode Summary       INR check location:      Preferred lab:      Send INR reminders to:  [facility]  POOL    Comments:  [facility]  ([place], F: [phone])          Anticoagulation Care Providers       Provider Role Specialty Phone number    Sly Saleh MD Referring Cardiology [phone]            Clinic Interview:  Patient Findings     Negatives:  Signs/symptoms of thrombosis, Signs/symptoms of bleeding,   Laboratory test error suspected, Change in health, Change in alcohol use,   Change in activity, Upcoming invasive procedure, Emergency department   visit, Upcoming dental procedure, Missed doses, Extra doses, Change in   medications, Change in diet/appetite, Hospital admission, Bruising, Other   complaints      Clinical Outcomes     Negatives:  Major bleeding event, Thromboembolic event,   Anticoagulation-related hospital admission, Anticoagulation-related ED   visit, Anticoagulation-related fatality        INR History:      9/12/2023     9:42 AM 10/24/2023    12:00 AM 10/24/2023    10:02 AM 12/6/2023    12:00 AM 12/7/2023     8:00 AM 1/17/2024    12:00 AM 1/18/2024     9:21 AM   Anticoagulation Monitoring   INR 2.60  2.40  2.26  3.00   INR Date 9/12/2023  10/24/2023  12/6/2023  1/17/2024   INR Goal 2.0-3.0  2.0-3.0  2.0-3.0  2.0-3.0   Trend Same  Same  Same  Same   Last Week Total 20 mg  20 mg  20 mg  20 mg   Next Week Total 20 mg  20 mg  20  mg  20 mg   Sun 2 mg  2 mg  2 mg  2 mg   Mon 2 mg  2 mg  2 mg  2 mg   Tue 4 mg  4 mg  4 mg  4 mg   Wed 2 mg  2 mg  2 mg  2 mg   Thu 4 mg  4 mg  4 mg  4 mg   Fri 2 mg  2 mg  2 mg  2 mg   Sat 4 mg  4 mg  4 mg  4 mg   Historical INR  2.40      2.26      3.00        Visit Report Report             This result is from an external source.       Plan:  1. INR is Therapeutic today- see above in Anticoagulation Summary.   Will instruct Arelis SANDRA Johnson to Continue their warfarin regimen- see above in Anticoagulation Summary.  2. Follow up in 6 weeks  3. They have been instructed to call if any changes in medications, doses, concerns, etc. Patient expresses understanding and has no further questions at this time.    Eulalia Reaves RP

## 2024-01-22 ENCOUNTER — OFFICE VISIT (OUTPATIENT)
Dept: FAMILY MEDICINE CLINIC | Facility: CLINIC | Age: 84
End: 2024-01-22
Payer: MEDICARE

## 2024-01-22 VITALS
SYSTOLIC BLOOD PRESSURE: 112 MMHG | HEART RATE: 85 BPM | OXYGEN SATURATION: 98 % | DIASTOLIC BLOOD PRESSURE: 86 MMHG | BODY MASS INDEX: 28.66 KG/M2 | TEMPERATURE: 97.6 F | WEIGHT: 172 LBS | HEIGHT: 65 IN

## 2024-01-22 DIAGNOSIS — J06.9 ACUTE URI: ICD-10-CM

## 2024-01-22 DIAGNOSIS — R51.9 NONINTRACTABLE HEADACHE, UNSPECIFIED CHRONICITY PATTERN, UNSPECIFIED HEADACHE TYPE: Primary | ICD-10-CM

## 2024-01-22 DIAGNOSIS — R09.81 NASAL CONGESTION: ICD-10-CM

## 2024-01-22 DIAGNOSIS — M54.2 NECK PAIN: ICD-10-CM

## 2024-01-22 DIAGNOSIS — M54.12 CERVICAL RADICULOPATHY: ICD-10-CM

## 2024-01-22 LAB
EXPIRATION DATE: NORMAL
FLUAV AG UPPER RESP QL IA.RAPID: NOT DETECTED
FLUBV AG UPPER RESP QL IA.RAPID: NOT DETECTED
INTERNAL CONTROL: NORMAL
Lab: NORMAL
SARS-COV-2 AG UPPER RESP QL IA.RAPID: NOT DETECTED

## 2024-01-22 PROCEDURE — 1159F MED LIST DOCD IN RCRD: CPT | Performed by: PHYSICIAN ASSISTANT

## 2024-01-22 PROCEDURE — 3079F DIAST BP 80-89 MM HG: CPT | Performed by: PHYSICIAN ASSISTANT

## 2024-01-22 PROCEDURE — 87428 SARSCOV & INF VIR A&B AG IA: CPT | Performed by: PHYSICIAN ASSISTANT

## 2024-01-22 PROCEDURE — 3074F SYST BP LT 130 MM HG: CPT | Performed by: PHYSICIAN ASSISTANT

## 2024-01-22 PROCEDURE — 99213 OFFICE O/P EST LOW 20 MIN: CPT | Performed by: PHYSICIAN ASSISTANT

## 2024-01-22 PROCEDURE — 1160F RVW MEDS BY RX/DR IN RCRD: CPT | Performed by: PHYSICIAN ASSISTANT

## 2024-01-22 RX ORDER — ZINC 25 MG
TABLET ORAL
COMMUNITY

## 2024-01-22 RX ORDER — TRIAMCINOLONE ACETONIDE 55 UG/1
1 SPRAY, METERED NASAL DAILY
Qty: 16.9 ML | Refills: 0 | Status: SHIPPED | OUTPATIENT
Start: 2024-01-22 | End: 2025-01-21

## 2024-01-22 RX ORDER — MULTIVIT WITH MINERALS/LUTEIN
250 TABLET ORAL DAILY
COMMUNITY

## 2024-01-22 RX ORDER — GUAIFENESIN AND DEXTROMETHORPHAN HYDROBROMIDE 600; 30 MG/1; MG/1
1 TABLET, EXTENDED RELEASE ORAL 2 TIMES DAILY
Qty: 45 TABLET | Refills: 0 | Status: SHIPPED | OUTPATIENT
Start: 2024-01-22

## 2024-01-22 NOTE — PROGRESS NOTES
Subjective   Arelis Johnson is a 84 y.o. female who presents today for evaluation of congestion, neck pain with radiation.     History of Present Illness     Feels like a ball of cotton in the back of her throat and a little congested. Certain way she turns her head, she has pain in her neck, down her chest, and up her neck to head. Some pain to her ears at times. Worse in the morning. No injury. No known neck issues.     The following portions of the patient's history were reviewed and updated as appropriate: allergies, current medications, past family history, past medical history, past social history, past surgical history and problem list.    Review of Systems    Objective   Vitals:    01/22/24 1532   BP: 112/86   Pulse: 85   Temp: 97.6 °F (36.4 °C)   SpO2: 98%     Body mass index is 28.66 kg/m².    Physical Exam  Vitals and nursing note reviewed.   Constitutional:       Appearance: She is well-developed.   HENT:      Head: Normocephalic and atraumatic.      Right Ear: Tympanic membrane, ear canal and external ear normal.      Left Ear: Tympanic membrane, ear canal and external ear normal.      Nose: Nose normal.      Mouth/Throat:      Pharynx: Uvula midline.   Eyes:      General: Lids are normal.      Conjunctiva/sclera: Conjunctivae normal.   Neck:      Vascular: No carotid bruit.   Cardiovascular:      Rate and Rhythm: Normal rate. Rhythm irregular.      Heart sounds: Normal heart sounds. No murmur heard.     No friction rub. No gallop.   Pulmonary:      Effort: Pulmonary effort is normal. No respiratory distress.      Breath sounds: Normal breath sounds. No wheezing, rhonchi or rales.   Musculoskeletal:         General: No deformity.      Cervical back: Neck supple.   Skin:     General: Skin is warm and dry.   Neurological:      Mental Status: She is alert and oriented to person, place, and time.      Gait: Gait normal.   Psychiatric:         Speech: Speech normal.         Behavior: Behavior normal.          Thought Content: Thought content normal.         Assessment & Plan   Diagnoses and all orders for this visit:    1. Nonintractable headache, unspecified chronicity pattern, unspecified headache type (Primary)  -     POCT SARS-CoV-2 Antigen MARY JANE + Flu    2. Nasal congestion  -     POCT SARS-CoV-2 Antigen MARY JANE + Flu    3. Acute URI  -     guaifenesin-dextromethorphan (MUCINEX DM)  MG tablet sustained-release 12 hour tablet; Take 1 tablet by mouth 2 (Two) Times a Day.  Dispense: 45 tablet; Refill: 0  -     Triamcinolone Acetonide (Nasacort Allergy 24HR) 55 MCG/ACT nasal inhaler; 1 spray into the nostril(s) as directed by provider Daily.  Dispense: 16.9 mL; Refill: 0    4. Cervical radiculopathy  -     XR Spine Cervical Complete 4 or 5 View (In Office); Future    5. Neck pain  -     XR Spine Cervical Complete 4 or 5 View (In Office); Future        Assessment and Plan  Patient with 3-4 day history of postnasal drainage, congestion, and neck pain with radiation to her chest, head, and ear depending on position and movement at her neck.  She only has pain with cervical spine range of motion.  No falls or injuries.  I discussed with her concern for possible URI/COVID-19 infection but has negative testing today.  She could consider retesting in a couple days.  I am unable to give her Medrol Dosepak due to last A1c 6.9% 10/2023.  I discussed possible muscle relaxer, however, she does have some concerns about sedation or feeling off balance.  I would be concerned about this as well.  She has some improvement as the day goes on and with Tylenol.  She can continue Tylenol as needed.  She can also use Flonase or Nasacort 2 sprays each nostril once daily and Mucinex DM twice daily.  She will need to have cervical spine x-ray ASAP if she does not have resolution of symptoms.  She should be seen in the emergency department immediately if she has any cardiac symptoms, chest pain, shortness of air, palpitations, dizziness,  weakness, worsening pain, weakness or dropping things.  Patient verbalized understanding and agreement with plan and recommendations.    I spent 20 minutes caring for Arelis Johnson on this date of service. This time includes time spent by me in the following activities as necessary: preparing for the visit, reviewing tests, specialists records and previous visits, obtaining and/or reviewing a separately obtained history, performing a medically appropriate exam and/or evaluation, counseling and educating the patient, family, caregiver, referring and/or communicating with other healthcare professionals, documenting information in the medical record, independently interpreting results and communicating that information with the patient, family, caregiver, and developing a medically appropriate treatment plan with consideration of other conditions, medications, and treatments.

## 2024-02-21 LAB — INR PPP: 2.7

## 2024-02-22 ENCOUNTER — ANTICOAGULATION VISIT (OUTPATIENT)
Dept: PHARMACY | Facility: HOSPITAL | Age: 84
End: 2024-02-22
Payer: MEDICARE

## 2024-02-22 NOTE — PROGRESS NOTES
Anticoagulation Clinic Progress Note    Anticoagulation Summary  As of 2024      INR goal:  2.0-3.0   TTR:  89.4% (5.2 y)   INR used for dosin.70 (2024)   Warfarin maintenance plan:  4 mg every Tue, Thu, Sat; 2 mg all other days   Weekly warfarin total:  20 mg   Plan last modified:  Savage Wasserman, PharmD (2023)   Next INR check:  3/21/2024   Priority:  Maintenance   Target end date:      Indications    Atrial fibrillation with RVR (Resolved) [I48.91]                 Anticoagulation Episode Summary       INR check location:      Preferred lab:      Send INR reminders to:  [facility]  POOL    Comments:  [facility]  ([place], [place]: [phone])          Anticoagulation Care Providers       Provider Role Specialty Phone number    Sly Saleh MD Referring Cardiology [phone]            Drug interactions: has remained unchanged.  Diet: has remained unchanged.    Clinic Interview:  No pertinent clinical findings have been reported.    INR History:      10/24/2023    10:02 AM 2023    12:00 AM 2023     8:00 AM 2024    12:00 AM 2024     9:21 AM 2024    12:00 AM 2024     8:52 AM   Anticoagulation Monitoring   INR 2.40  2.26  3.00  2.70   INR Date 10/24/2023  2023  2024  2024   INR Goal 2.0-3.0  2.0-3.0  2.0-3.0  2.0-3.0   Trend Same  Same  Same  Same   Last Week Total 20 mg  20 mg  20 mg  20 mg   Next Week Total 20 mg  20 mg  20 mg  20 mg   Sun 2 mg  2 mg  2 mg  2 mg   Mon 2 mg  2 mg  2 mg  2 mg   Tue 4 mg  4 mg  4 mg  4 mg   Wed 2 mg  2 mg  2 mg  2 mg   Thu 4 mg  4 mg  4 mg  4 mg   Fri 2 mg  2 mg  2 mg  2 mg   Sat 4 mg  4 mg  4 mg  4 mg   Historical INR  2.26      3.00      2.70            This result is from an external source.       Plan:  1. INR is Therapeutic today- see above in Anticoagulation Summary.   Will instruct Arelis Johnson to Continue their warfarin regimen- see above in Anticoagulation Summary.  2. Follow up in 4  weeks  3. Pt has agreed to only be called if INR out of range. They have been instructed to call if any changes in medications, doses, concerns, etc. Patient expresses understanding and has no further questions at this time.    Jamila Guerra [facility]

## 2024-03-19 LAB — INR PPP: 3.2

## 2024-03-20 ENCOUNTER — ANTICOAGULATION VISIT (OUTPATIENT)
Dept: PHARMACY | Facility: HOSPITAL | Age: 84
End: 2024-03-20
Payer: MEDICARE

## 2024-03-20 NOTE — PROGRESS NOTES
Anticoagulation Clinic Progress Note    Anticoagulation Summary  As of 3/20/2024      INR goal:  2.0-3.0   TTR:  89.0% (5.3 y)   INR used for dosing:  3.20 (3/19/2024)   Warfarin maintenance plan:  4 mg every Tue, Thu, Sat; 2 mg all other days   Weekly warfarin total:  20 mg   No change documented:  Savage Wasserman, David   Plan last modified:  Savage Wasserman, PharmD (4/7/2023)   Next INR check:  4/2/2024   Priority:  Maintenance   Target end date:      Indications    Atrial fibrillation with RVR (Resolved) [I48.91]                 Anticoagulation Episode Summary       INR check location:      Preferred lab:      Send INR reminders to:  [facility]  POOL    Comments:  [facility]  ([place], [place]: [phone])          Anticoagulation Care Providers       Provider Role Specialty Phone number    Sly Saleh MD Referring Cardiology [phone]            Clinic Interview:  Patient Findings     Positives:  Change in diet/appetite    Negatives:  Signs/symptoms of thrombosis, Signs/symptoms of bleeding,   Laboratory test error suspected, Change in health, Change in alcohol use,   Change in activity, Upcoming invasive procedure, Emergency department   visit, Upcoming dental procedure, Missed doses, Extra doses, Change in   medications, Hospital admission, Bruising, Other complaints    Comments:  Reports possibly less vit k in diet than usual.       Clinical Outcomes     Negatives:  Major bleeding event, Thromboembolic event,   Anticoagulation-related hospital admission, Anticoagulation-related ED   visit, Anticoagulation-related fatality    Comments:  Reports possibly less vit k in diet than usual.         INR History:      12/7/2023     8:00 AM 1/17/2024    12:00 AM 1/18/2024     9:21 AM 2/21/2024    12:00 AM 2/22/2024     8:52 AM 3/19/2024    12:00 AM 3/20/2024     9:48 AM   Anticoagulation Monitoring   INR 2.26  3.00  2.70  3.20   INR Date 12/6/2023  1/17/2024  2/21/2024  3/19/2024   INR Goal 2.0-3.0   2.0-3.0  2.0-3.0  2.0-3.0   Trend Same  Same  Same  Same   Last Week Total 20 mg  20 mg  20 mg  20 mg   Next Week Total 20 mg  20 mg  20 mg  20 mg   Sun 2 mg  2 mg  2 mg  2 mg   Mon 2 mg  2 mg  2 mg  2 mg   Tue 4 mg  4 mg  4 mg  4 mg   Wed 2 mg  2 mg  2 mg  2 mg   Thu 4 mg  4 mg  4 mg  4 mg   Fri 2 mg  2 mg  2 mg  2 mg   Sat 4 mg  4 mg  4 mg  4 mg   Historical INR  3.00      2.70      3.20            This result is from an external source.       Plan:  1. INR is Supratherapeutic today- see above in Anticoagulation Summary.   Will instruct Arelis Johnson to Continue their warfarin regimen- see above in Anticoagulation Summary.  2. Follow up in 2 weeks.  3. They have been instructed to call if any changes in medications, doses, concerns, etc. Patient expresses understanding and has no further questions at this time.    Savage Wasserman, PharmD

## 2024-03-27 DIAGNOSIS — M10.9 ACUTE GOUT, UNSPECIFIED CAUSE, UNSPECIFIED SITE: ICD-10-CM

## 2024-03-27 RX ORDER — ALLOPURINOL 100 MG/1
100 TABLET ORAL DAILY
Qty: 30 TABLET | Refills: 5 | Status: SHIPPED | OUTPATIENT
Start: 2024-03-27

## 2024-03-29 RX ORDER — DILTIAZEM HYDROCHLORIDE 180 MG/1
CAPSULE, COATED, EXTENDED RELEASE ORAL
Qty: 90 CAPSULE | Refills: 2 | Status: SHIPPED | OUTPATIENT
Start: 2024-03-29

## 2024-04-03 LAB — INR PPP: 3.22

## 2024-04-04 ENCOUNTER — ANTICOAGULATION VISIT (OUTPATIENT)
Dept: PHARMACY | Facility: HOSPITAL | Age: 84
End: 2024-04-04
Payer: MEDICARE

## 2024-04-04 NOTE — PROGRESS NOTES
Anticoagulation Clinic Progress Note    Anticoagulation Summary  As of 4/4/2024      INR goal:  2.0-3.0   TTR:  88.3% (5.3 y)   INR used for dosing:  3.22 (4/3/2024)   Warfarin maintenance plan:  4 mg every Tue, Sat; 2 mg all other days   Weekly warfarin total:  18 mg   Plan last modified:  Savage Wasserman, PharmD (4/4/2024)   Next INR check:  4/17/2024   Priority:  Maintenance   Target end date:      Indications    Atrial fibrillation with RVR (Resolved) [I48.91]                 Anticoagulation Episode Summary       INR check location:      Preferred lab:      Send INR reminders to:   [facility]  POOL    Comments:  [facility]  ([place], [place]: [phone])          Anticoagulation Care Providers       Provider Role Specialty Phone number    Sly Saleh MD Referring Cardiology [phone]            Clinic Interview:  Patient Findings     Negatives:  Signs/symptoms of thrombosis, Signs/symptoms of bleeding,   Laboratory test error suspected, Change in health, Change in alcohol use,   Change in activity, Upcoming invasive procedure, Emergency department   visit, Upcoming dental procedure, Missed doses, Extra doses, Change in   medications, Change in diet/appetite, Hospital admission, Bruising, Other   complaints      Clinical Outcomes     Negatives:  Major bleeding event, Thromboembolic event,   Anticoagulation-related hospital admission, Anticoagulation-related ED   visit, Anticoagulation-related fatality        INR History:      1/18/2024     9:21 AM 2/21/2024    12:00 AM 2/22/2024     8:52 AM 3/19/2024    12:00 AM 3/20/2024     9:48 AM 4/3/2024    12:00 AM 4/4/2024     8:53 AM   Anticoagulation Monitoring   INR 3.00  2.70  3.20  3.22   INR Date 1/17/2024  2/21/2024  3/19/2024  4/3/2024   INR Goal 2.0-3.0  2.0-3.0  2.0-3.0  2.0-3.0   Trend Same  Same  Same  Down   Last Week Total 20 mg  20 mg  20 mg  20 mg   Next Week Total 20 mg  20 mg  20 mg  18 mg   Sun 2 mg  2 mg  2 mg  2 mg   Mon 2 mg  2 mg  2  mg  2 mg   Tue 4 mg  4 mg  4 mg  4 mg   Wed 2 mg  2 mg  2 mg  2 mg   Thu 4 mg  4 mg  4 mg  2 mg   Fri 2 mg  2 mg  2 mg  2 mg   Sat 4 mg  4 mg  4 mg  4 mg   Historical INR  2.70      3.20      3.22            This result is from an external source.       Plan:  1. INR is Supratherapeutic today- see above in Anticoagulation Summary.   Will instruct Arelis Johnson to Change their warfarin regimen to 4 mg Tues/Sat, 2 mg all other days - see above in Anticoagulation Summary.  2. Follow up in 2 weeks.  3. They have been instructed to call if any changes in medications, doses, concerns, etc. Patient expresses understanding and has no further questions at this time.    Savage Wasserman, PharmD

## 2024-04-15 DIAGNOSIS — E11.9 TYPE 2 DIABETES MELLITUS WITHOUT COMPLICATION, WITHOUT LONG-TERM CURRENT USE OF INSULIN: ICD-10-CM

## 2024-04-15 RX ORDER — WARFARIN SODIUM 4 MG/1
TABLET ORAL
Qty: 60 TABLET | Refills: 1 | Status: SHIPPED | OUTPATIENT
Start: 2024-04-15

## 2024-04-15 RX ORDER — METOPROLOL TARTRATE 50 MG/1
25 TABLET, FILM COATED ORAL 2 TIMES DAILY
Qty: 90 TABLET | Refills: 3 | Status: SHIPPED | OUTPATIENT
Start: 2024-04-15

## 2024-04-15 RX ORDER — METFORMIN HYDROCHLORIDE 500 MG/1
500 TABLET, EXTENDED RELEASE ORAL
Qty: 90 TABLET | Refills: 1 | Status: SHIPPED | OUTPATIENT
Start: 2024-04-15 | End: 2024-04-18 | Stop reason: SDUPTHER

## 2024-04-17 LAB — INR PPP: 2.4

## 2024-04-18 ENCOUNTER — OFFICE VISIT (OUTPATIENT)
Dept: FAMILY MEDICINE CLINIC | Facility: CLINIC | Age: 84
End: 2024-04-18
Payer: MEDICARE

## 2024-04-18 ENCOUNTER — ANTICOAGULATION VISIT (OUTPATIENT)
Dept: PHARMACY | Facility: HOSPITAL | Age: 84
End: 2024-04-18
Payer: MEDICARE

## 2024-04-18 VITALS
TEMPERATURE: 96.8 F | HEART RATE: 102 BPM | WEIGHT: 175 LBS | BODY MASS INDEX: 29.16 KG/M2 | HEIGHT: 65 IN | RESPIRATION RATE: 18 BRPM | DIASTOLIC BLOOD PRESSURE: 82 MMHG | OXYGEN SATURATION: 98 % | SYSTOLIC BLOOD PRESSURE: 118 MMHG

## 2024-04-18 DIAGNOSIS — M1A.9XX0 CHRONIC GOUT WITHOUT TOPHUS, UNSPECIFIED CAUSE, UNSPECIFIED SITE: ICD-10-CM

## 2024-04-18 DIAGNOSIS — E78.2 MIXED HYPERLIPIDEMIA: ICD-10-CM

## 2024-04-18 DIAGNOSIS — I10 PRIMARY HYPERTENSION: ICD-10-CM

## 2024-04-18 DIAGNOSIS — M10.9 ACUTE GOUT, UNSPECIFIED CAUSE, UNSPECIFIED SITE: ICD-10-CM

## 2024-04-18 DIAGNOSIS — E11.9 TYPE 2 DIABETES MELLITUS WITHOUT COMPLICATION, WITHOUT LONG-TERM CURRENT USE OF INSULIN: Primary | ICD-10-CM

## 2024-04-18 DIAGNOSIS — Z13.0 SCREENING FOR DEFICIENCY ANEMIA: ICD-10-CM

## 2024-04-18 LAB
EXPIRATION DATE: ABNORMAL
HBA1C MFR BLD: 7.2 % (ref 4.5–5.7)
Lab: ABNORMAL

## 2024-04-18 PROCEDURE — 99214 OFFICE O/P EST MOD 30 MIN: CPT | Performed by: NURSE PRACTITIONER

## 2024-04-18 PROCEDURE — 83036 HEMOGLOBIN GLYCOSYLATED A1C: CPT | Performed by: NURSE PRACTITIONER

## 2024-04-18 PROCEDURE — 3051F HG A1C>EQUAL 7.0%<8.0%: CPT | Performed by: NURSE PRACTITIONER

## 2024-04-18 PROCEDURE — 3079F DIAST BP 80-89 MM HG: CPT | Performed by: NURSE PRACTITIONER

## 2024-04-18 PROCEDURE — 3074F SYST BP LT 130 MM HG: CPT | Performed by: NURSE PRACTITIONER

## 2024-04-18 RX ORDER — ALLOPURINOL 100 MG/1
100 TABLET ORAL DAILY
Qty: 30 TABLET | Refills: 5 | Status: SHIPPED | OUTPATIENT
Start: 2024-04-18

## 2024-04-18 RX ORDER — SIMVASTATIN 40 MG
40 TABLET ORAL NIGHTLY
Qty: 90 TABLET | Refills: 1 | Status: SHIPPED | OUTPATIENT
Start: 2024-04-18

## 2024-04-18 RX ORDER — METFORMIN HYDROCHLORIDE 500 MG/1
500 TABLET, EXTENDED RELEASE ORAL
Qty: 90 TABLET | Refills: 1 | Status: SHIPPED | OUTPATIENT
Start: 2024-04-18

## 2024-04-18 NOTE — PROGRESS NOTES
"Chief Complaint  Diabetes (Patient is here for a 6 month follow up )    Subjective        Arelis Johnson presents to [facility]  History of Present Illness  This is a 83 yo female patient here today to follow up. She is a diabetic that is controlled with metformin, her [place].Last hgb alc 7.2. She denies any hypoglycemic episodes. She is seeing cardiology for afib and is on coumadin. Her bp is stable and she denies chest pain or shortness of breath. No reports of any falls. She does suffer from gout in the past and is on allopurinol. No current issues.          Objective   Vital Signs:  /82   Pulse 102   Temp 96.8 °F (36 °C)   Resp 18   Ht 165 cm (64.96\")   Wt 79.4 kg (175 lb)   SpO2 98%   BMI 29.16 kg/m²   Estimated body mass index is 29.16 kg/m² as calculated from the following:    Height as of this encounter: 165 cm (64.96\").    Weight as of this encounter: 79.4 kg (175 lb).               Physical Exam  Vitals and nursing note reviewed.   HENT:      Head: Normocephalic.      Nose: Nose normal.   Eyes:      Pupils: Pupils are equal, round, and reactive to light.   Cardiovascular:      Rate and Rhythm: Normal rate and regular rhythm.      Pulses: Normal pulses.      Heart sounds: Normal heart sounds.   Pulmonary:      Effort: Pulmonary effort is normal. No respiratory distress.      Breath sounds: Normal breath sounds. No wheezing or rales.   Abdominal:      General: Bowel sounds are normal. There is no distension.      Tenderness: There is no abdominal tenderness.   Musculoskeletal:         General: No swelling.      Cervical back: Neck supple.      Right lower leg: No edema.      Left lower leg: No edema.   Skin:     General: Skin is warm and dry.   Neurological:      Mental Status: She is alert and oriented to person, place, and time.   Psychiatric:         Mood and Affect: Mood normal.        Result Review :                "      Assessment and Plan     Diagnoses and all orders for this visit:    1. Type 2 diabetes mellitus without complication, without long-term current use of insulin (Primary)  -     POCT glycated hemoglobin, total  -     metFORMIN ER (GLUCOPHAGE-XR) 500 MG 24 hr tablet; Take 1 tablet by mouth Daily With Breakfast.  Dispense: 90 tablet; Refill: 1    2. Primary hypertension  -     Urinalysis With Culture If Indicated - Urine, Clean Catch    3. Mixed hyperlipidemia  -     Comprehensive Metabolic Panel  -     Lipid Panel    4. Screening for deficiency anemia  -     CBC & Differential    5. Acute gout, unspecified cause, unspecified site  -     allopurinol (ZYLOPRIM) 100 MG tablet; Take 1 tablet by mouth Daily.  Dispense: 30 tablet; Refill: 5    6. Chronic gout without tophus, unspecified cause, unspecified site    Other orders  -     simvastatin (ZOCOR) 40 MG tablet; Take 1 tablet by mouth Every Night.  Dispense: 90 tablet; Refill: 1  -     Microscopic Examination -      Hgb alc is 7.2 today. She will continue her current medication regimen and will work on low sugar/low carb diet  Will will obtain labs today  She will continue to follow her specialist and will return to me in 6 months.     I spent a total of30  minutes with the patient today including face-to-face encounter, reviewing data in the system, coordination of care with the nursing staff as well as consultants, documentation and entering orders.         Follow Up     Return in about 6 months (around 10/18/2024).  Patient was given instructions and counseling regarding her condition or for health maintenance advice. Please see specific information pulled into the AVS if appropriate.

## 2024-04-18 NOTE — PROGRESS NOTES
Anticoagulation Clinic Progress Note    Anticoagulation Summary  As of 2024      INR goal:  2.0-3.0   TTR:  88.2% (5.4 y)   INR used for dosin.40 (2024)   Warfarin maintenance plan:  4 mg every Tue, Sat; 2 mg all other days   Weekly warfarin total:  18 mg   No change documented:  Eulalia Reaves RPH   Plan last modified:  Savage Wasserman, PharmD (2024)   Next INR check:  2024   Priority:  Maintenance   Target end date:      Indications    Atrial fibrillation with RVR (Resolved) [I48.91]                 Anticoagulation Episode Summary       INR check location:      Preferred lab:      Send INR reminders to:  [facility]  POOL    Comments:  [facility]  ([place], [place]: [phone])          Anticoagulation Care Providers       Provider Role Specialty Phone number    Sly Saleh MD Referring Cardiology [phone]            Clinic Interview:  No pertinent clinical findings have been reported.    INR History:      2024     8:52 AM 3/19/2024    12:00 AM 3/20/2024     9:48 AM 4/3/2024    12:00 AM 2024     8:53 AM 2024    12:00 AM 2024     2:21 PM   Anticoagulation Monitoring   INR 2.70  3.20  3.22  2.40   INR Date 2024  3/19/2024  4/3/2024  2024   INR Goal 2.0-3.0  2.0-3.0  2.0-3.0  2.0-3.0   Trend Same  Same  Down  Same   Last Week Total 20 mg  20 mg  20 mg  18 mg   Next Week Total 20 mg  20 mg  18 mg  18 mg   Sun 2 mg  2 mg  2 mg  2 mg   Mon 2 mg  2 mg  2 mg  2 mg   Tue 4 mg  4 mg  4 mg  4 mg   Wed 2 mg  2 mg  2 mg  2 mg   Thu 4 mg  4 mg  2 mg  2 mg   Fri 2 mg  2 mg  2 mg  2 mg   Sat 4 mg  4 mg  4 mg  4 mg   Historical INR  3.20      3.22      2.40        Visit Report       Report       This result is from an external source.       Plan:  1. INR is therapeutic today- see above in Anticoagulation Summary.    Arelis Johnson to continue their warfarin regimen- see above in Anticoagulation Summary.  2. Follow up in 2 weeks (Will be in [place] until  May 6th- plans to go on May 7th)   3. They have been instructed to call if any changes in medications, doses, concerns, etc. Patient expresses understanding and has no further questions at this time.    Eulalia Reaves [facility]

## 2024-04-19 LAB
ALBUMIN SERPL-MCNC: 3.9 G/DL (ref 3.5–5.2)
ALBUMIN/GLOB SERPL: 1.8 G/DL
ALP SERPL-CCNC: 93 U/L (ref 39–117)
ALT SERPL-CCNC: 15 U/L (ref 1–33)
APPEARANCE UR: CLEAR
AST SERPL-CCNC: 22 U/L (ref 1–32)
BACTERIA #/AREA URNS HPF: NORMAL /[HPF]
BASOPHILS # BLD AUTO: 0.06 10*3/MM3 (ref 0–0.2)
BASOPHILS NFR BLD AUTO: 1.2 % (ref 0–1.5)
BILIRUB SERPL-MCNC: 0.6 MG/DL (ref 0–1.2)
BILIRUB UR QL STRIP: NEGATIVE
BUN SERPL-MCNC: 18 MG/DL (ref 8–23)
BUN/CREAT SERPL: 18.4 (ref 7–25)
CALCIUM SERPL-MCNC: 9.7 MG/DL (ref 8.6–10.5)
CASTS URNS QL MICRO: NORMAL /LPF
CHLORIDE SERPL-SCNC: 102 MMOL/L (ref 98–107)
CHOLEST SERPL-MCNC: 102 MG/DL (ref 0–200)
CO2 SERPL-SCNC: 30.3 MMOL/L (ref 22–29)
COLOR UR: YELLOW
CREAT SERPL-MCNC: 0.98 MG/DL (ref 0.57–1)
EGFRCR SERPLBLD CKD-EPI 2021: 56.7 ML/MIN/1.73
EOSINOPHIL # BLD AUTO: 0.08 10*3/MM3 (ref 0–0.4)
EOSINOPHIL NFR BLD AUTO: 1.6 % (ref 0.3–6.2)
EPI CELLS #/AREA URNS HPF: NORMAL /HPF (ref 0–10)
ERYTHROCYTE [DISTWIDTH] IN BLOOD BY AUTOMATED COUNT: 12.3 % (ref 12.3–15.4)
GLOBULIN SER CALC-MCNC: 2.2 GM/DL
GLUCOSE SERPL-MCNC: 134 MG/DL (ref 65–99)
GLUCOSE UR QL STRIP: NEGATIVE
HCT VFR BLD AUTO: 39 % (ref 34–46.6)
HDLC SERPL-MCNC: 50 MG/DL (ref 40–60)
HGB BLD-MCNC: 12.9 G/DL (ref 12–15.9)
HGB UR QL STRIP: NEGATIVE
IMM GRANULOCYTES # BLD AUTO: 0.01 10*3/MM3 (ref 0–0.05)
IMM GRANULOCYTES NFR BLD AUTO: 0.2 % (ref 0–0.5)
KETONES UR QL STRIP: NEGATIVE
LDLC SERPL CALC-MCNC: 34 MG/DL (ref 0–100)
LEUKOCYTE ESTERASE UR QL STRIP: NEGATIVE
LYMPHOCYTES # BLD AUTO: 1.08 10*3/MM3 (ref 0.7–3.1)
LYMPHOCYTES NFR BLD AUTO: 21.3 % (ref 19.6–45.3)
MCH RBC QN AUTO: 31.1 PG (ref 26.6–33)
MCHC RBC AUTO-ENTMCNC: 33.1 G/DL (ref 31.5–35.7)
MCV RBC AUTO: 94 FL (ref 79–97)
MICRO URNS: NORMAL
MICRO URNS: NORMAL
MONOCYTES # BLD AUTO: 0.43 10*3/MM3 (ref 0.1–0.9)
MONOCYTES NFR BLD AUTO: 8.5 % (ref 5–12)
NEUTROPHILS # BLD AUTO: 3.4 10*3/MM3 (ref 1.7–7)
NEUTROPHILS NFR BLD AUTO: 67.2 % (ref 42.7–76)
NITRITE UR QL STRIP: NEGATIVE
NRBC BLD AUTO-RTO: 0 /100 WBC (ref 0–0.2)
PH UR STRIP: 7 [PH] (ref 5–7.5)
PLATELET # BLD AUTO: 238 10*3/MM3 (ref 140–450)
POTASSIUM SERPL-SCNC: 3.7 MMOL/L (ref 3.5–5.2)
PROT SERPL-MCNC: 6.1 G/DL (ref 6–8.5)
PROT UR QL STRIP: NEGATIVE
RBC # BLD AUTO: 4.15 10*6/MM3 (ref 3.77–5.28)
RBC #/AREA URNS HPF: NORMAL /HPF (ref 0–2)
SODIUM SERPL-SCNC: 141 MMOL/L (ref 136–145)
SP GR UR STRIP: 1.01 (ref 1–1.03)
TRIGL SERPL-MCNC: 96 MG/DL (ref 0–150)
URINALYSIS REFLEX: NORMAL
UROBILINOGEN UR STRIP-MCNC: 0.2 MG/DL (ref 0.2–1)
VLDLC SERPL CALC-MCNC: 18 MG/DL (ref 5–40)
WBC # BLD AUTO: 5.06 10*3/MM3 (ref 3.4–10.8)
WBC #/AREA URNS HPF: NORMAL /HPF (ref 0–5)

## 2024-04-25 NOTE — PROGRESS NOTES
Please let patient know all labs are in good range.  Her hemoglobin A1c is slightly above the previous at 7.2.  She can to continue her current regimen for now.  Limit sugary foods/carbs and we will recheck at next visit

## 2024-05-08 LAB — INR PPP: 3

## 2024-05-09 ENCOUNTER — ANTICOAGULATION VISIT (OUTPATIENT)
Dept: PHARMACY | Facility: HOSPITAL | Age: 84
End: 2024-05-09
Payer: MEDICARE

## 2024-05-09 DIAGNOSIS — I48.20 ATRIAL FIBRILLATION, CHRONIC: Primary | ICD-10-CM

## 2024-06-03 LAB — INR PPP: 2.4

## 2024-06-04 ENCOUNTER — ANTICOAGULATION VISIT (OUTPATIENT)
Dept: PHARMACY | Facility: HOSPITAL | Age: 84
End: 2024-06-04
Payer: MEDICARE

## 2024-06-04 NOTE — PROGRESS NOTES
Anticoagulation Clinic Progress Note    Anticoagulation Summary  As of 2024      INR goal:  2.0-3.0   TTR:  88.5% (5.5 y)   INR used for dosin.40 (6/3/2024)   Warfarin maintenance plan:  4 mg every e, Sat; 2 mg all other days   Weekly warfarin total:  18 mg   No change documented:  Eulalia Reaves RPH   Plan last modified:  Savage Wasserman, PharmD (2024)   Next INR check:  2024   Priority:  Maintenance   Target end date:      Indications    Atrial fibrillation with RVR (Resolved) [I48.91]                 Anticoagulation Episode Summary       INR check location:      Preferred lab:      Send INR reminders to:  [facility]  POOL    Comments:  [facility]  ([place], [place]: [phone])          Anticoagulation Care Providers       Provider Role Specialty Phone number    Sly Saleh MD Referring Cardiology [phone]            Clinic Interview:  Patient Findings     Negatives:  Signs/symptoms of thrombosis, Signs/symptoms of bleeding,   Laboratory test error suspected, Change in health, Change in alcohol use,   Change in activity, Upcoming invasive procedure, Emergency department   visit, Upcoming dental procedure, Missed doses, Extra doses, Change in   medications, Change in diet/appetite, Hospital admission, Bruising, Other   complaints      Clinical Outcomes     Negatives:  Major bleeding event, Thromboembolic event,   Anticoagulation-related hospital admission, Anticoagulation-related ED   visit, Anticoagulation-related fatality        INR History:      2024     8:53 AM 2024    12:00 AM 2024     2:21 PM 2024    12:00 AM 2024     9:28 AM 6/3/2024    12:00 AM 2024    10:20 AM   Anticoagulation Monitoring   INR 3.22  2.40  3.00  2.40   INR Date 4/3/2024  2024  2024  6/3/2024   INR Goal 2.0-3.0  2.0-3.0  2.0-3.0  2.0-3.0   Trend Down  Same  Same  Same   Last Week Total 20 mg  18 mg  18 mg  18 mg   Next Week Total 18 mg  18 mg  18 mg  18 mg   Sun 2  mg  2 mg  2 mg  2 mg   Mon 2 mg  2 mg  2 mg  2 mg   Tue 4 mg  4 mg  4 mg  4 mg   Wed 2 mg  2 mg  2 mg  2 mg   Thu 2 mg  2 mg  2 mg  2 mg   Fri 2 mg  2 mg  2 mg  2 mg   Sat 4 mg  4 mg  4 mg  4 mg   Historical INR  2.40      3.00      2.40        Visit Report   Report           This result is from an external source.       Plan:  1. INR is Therapeutic today- see above in Anticoagulation Summary.   Will instruct Arelis SANDRA Johnson to Continue their warfarin regimen- see above in Anticoagulation Summary.  2. Follow up in 4 weeks  3. They have been instructed to call if any changes in medications, doses, concerns, etc. Patient expresses understanding and has no further questions at this time.    Eulalia Reaves [facility]

## 2024-07-01 LAB — INR PPP: 2.62

## 2024-07-02 ENCOUNTER — APPOINTMENT (OUTPATIENT)
Dept: CT IMAGING | Facility: HOSPITAL | Age: 84
DRG: 378 | End: 2024-07-02
Payer: MEDICARE

## 2024-07-02 ENCOUNTER — HOSPITAL ENCOUNTER (INPATIENT)
Facility: HOSPITAL | Age: 84
LOS: 1 days | Discharge: HOME | DRG: 378 | End: 2024-07-05
Attending: EMERGENCY MEDICINE | Admitting: STUDENT IN AN ORGANIZED HEALTH CARE EDUCATION/TRAINING PROGRAM
Payer: MEDICARE

## 2024-07-02 ENCOUNTER — ANTICOAGULATION VISIT (OUTPATIENT)
Dept: PHARMACY | Facility: HOSPITAL | Age: 84
End: 2024-07-02
Payer: MEDICARE

## 2024-07-02 ENCOUNTER — APPOINTMENT (OUTPATIENT)
Dept: GENERAL RADIOLOGY | Facility: HOSPITAL | Age: 84
DRG: 378 | End: 2024-07-02
Payer: MEDICARE

## 2024-07-02 DIAGNOSIS — I48.20 CHRONIC ATRIAL FIBRILLATION: ICD-10-CM

## 2024-07-02 DIAGNOSIS — K57.90 DIVERTICULOSIS: ICD-10-CM

## 2024-07-02 DIAGNOSIS — R91.1 PULMONARY NODULE: ICD-10-CM

## 2024-07-02 DIAGNOSIS — N20.0 NEPHROLITHIASIS: ICD-10-CM

## 2024-07-02 DIAGNOSIS — K92.1 GASTROINTESTINAL HEMORRHAGE WITH MELENA: ICD-10-CM

## 2024-07-02 DIAGNOSIS — K92.2 ACUTE UPPER GI HEMORRHAGE: Primary | ICD-10-CM

## 2024-07-02 LAB
ALBUMIN SERPL-MCNC: 3.9 G/DL (ref 3.5–5.2)
ALBUMIN/GLOB SERPL: 1.7 G/DL
ALP SERPL-CCNC: 82 U/L (ref 39–117)
ALT SERPL W P-5'-P-CCNC: 14 U/L (ref 1–33)
ANION GAP SERPL CALCULATED.3IONS-SCNC: 9.1 MMOL/L (ref 5–15)
AST SERPL-CCNC: 26 U/L (ref 1–32)
BASOPHILS # BLD AUTO: 0.03 10*3/MM3 (ref 0–0.2)
BASOPHILS NFR BLD AUTO: 0.4 % (ref 0–1.5)
BILIRUB SERPL-MCNC: 0.4 MG/DL (ref 0–1.2)
BILIRUB UR QL STRIP: NEGATIVE
BUN SERPL-MCNC: 35 MG/DL (ref 8–23)
BUN/CREAT SERPL: 36.1 (ref 7–25)
CALCIUM SPEC-SCNC: 9.8 MG/DL (ref 8.6–10.5)
CHLORIDE SERPL-SCNC: 102 MMOL/L (ref 98–107)
CLARITY UR: CLEAR
CO2 SERPL-SCNC: 27.9 MMOL/L (ref 22–29)
COLOR UR: YELLOW
CREAT SERPL-MCNC: 0.97 MG/DL (ref 0.57–1)
D-LACTATE SERPL-SCNC: 1.4 MMOL/L (ref 0.5–2)
D-LACTATE SERPL-SCNC: 3.1 MMOL/L (ref 0.5–2)
DEPRECATED RDW RBC AUTO: 45.6 FL (ref 37–54)
EGFRCR SERPLBLD CKD-EPI 2021: 57.4 ML/MIN/1.73
EOSINOPHIL # BLD AUTO: 0.03 10*3/MM3 (ref 0–0.4)
EOSINOPHIL NFR BLD AUTO: 0.4 % (ref 0.3–6.2)
ERYTHROCYTE [DISTWIDTH] IN BLOOD BY AUTOMATED COUNT: 12.9 % (ref 12.3–15.4)
GLOBULIN UR ELPH-MCNC: 2.3 GM/DL
GLUCOSE SERPL-MCNC: 154 MG/DL (ref 65–99)
GLUCOSE UR STRIP-MCNC: NEGATIVE MG/DL
HCT VFR BLD AUTO: 33.9 % (ref 34–46.6)
HEMOCCULT STL QL IA: POSITIVE
HGB BLD-MCNC: 10.8 G/DL (ref 12–15.9)
HGB UR QL STRIP.AUTO: ABNORMAL
IMM GRANULOCYTES # BLD AUTO: 0.02 10*3/MM3 (ref 0–0.05)
IMM GRANULOCYTES NFR BLD AUTO: 0.3 % (ref 0–0.5)
INR PPP: 2.9
KETONES UR QL STRIP: NEGATIVE
LEUKOCYTE ESTERASE UR QL STRIP.AUTO: NEGATIVE
LIPASE SERPL-CCNC: 26 U/L (ref 13–60)
LYMPHOCYTES # BLD AUTO: 1.28 10*3/MM3 (ref 0.7–3.1)
LYMPHOCYTES NFR BLD AUTO: 17.7 % (ref 19.6–45.3)
MAGNESIUM SERPL-MCNC: 1.6 MG/DL (ref 1.6–2.4)
MCH RBC QN AUTO: 30.5 PG (ref 26.6–33)
MCHC RBC AUTO-ENTMCNC: 31.9 G/DL (ref 31.5–35.7)
MCV RBC AUTO: 95.8 FL (ref 79–97)
MONOCYTES # BLD AUTO: 0.43 10*3/MM3 (ref 0.1–0.9)
MONOCYTES NFR BLD AUTO: 5.9 % (ref 5–12)
NEUTROPHILS NFR BLD AUTO: 5.46 10*3/MM3 (ref 1.7–7)
NEUTROPHILS NFR BLD AUTO: 75.3 % (ref 42.7–76)
NITRITE UR QL STRIP: NEGATIVE
PH UR STRIP.AUTO: 5.5 [PH] (ref 5–8)
PLATELET # BLD AUTO: 260 10*3/MM3 (ref 140–450)
PMV BLD AUTO: 10.1 FL (ref 6–12)
POTASSIUM SERPL-SCNC: 4 MMOL/L (ref 3.5–5.2)
PROT SERPL-MCNC: 6.2 G/DL (ref 6–8.5)
PROT UR QL STRIP: NEGATIVE
PROTHROMBIN TIME: 28.9 SECONDS (ref 11–15)
QT INTERVAL: 507 MS
QTC INTERVAL: 603 MS
RBC # BLD AUTO: 3.54 10*6/MM3 (ref 3.77–5.28)
SODIUM SERPL-SCNC: 139 MMOL/L (ref 136–145)
SP GR UR STRIP: <=1.005 (ref 1–1.03)
TROPONIN T SERPL HS-MCNC: 17 NG/L
UROBILINOGEN UR QL STRIP: ABNORMAL
WBC NRBC COR # BLD AUTO: 7.25 10*3/MM3 (ref 3.4–10.8)

## 2024-07-02 PROCEDURE — 84484 ASSAY OF TROPONIN QUANT: CPT | Performed by: EMERGENCY MEDICINE

## 2024-07-02 PROCEDURE — 93010 ELECTROCARDIOGRAM REPORT: CPT | Performed by: INTERNAL MEDICINE

## 2024-07-02 PROCEDURE — 82274 ASSAY TEST FOR BLOOD FECAL: CPT | Performed by: EMERGENCY MEDICINE

## 2024-07-02 PROCEDURE — 25810000003 SODIUM CHLORIDE 0.9 % SOLUTION: Performed by: EMERGENCY MEDICINE

## 2024-07-02 PROCEDURE — 36415 COLL VENOUS BLD VENIPUNCTURE: CPT

## 2024-07-02 PROCEDURE — 83735 ASSAY OF MAGNESIUM: CPT | Performed by: EMERGENCY MEDICINE

## 2024-07-02 PROCEDURE — 85025 COMPLETE CBC W/AUTO DIFF WBC: CPT | Performed by: EMERGENCY MEDICINE

## 2024-07-02 PROCEDURE — G0378 HOSPITAL OBSERVATION PER HR: HCPCS

## 2024-07-02 PROCEDURE — 83605 ASSAY OF LACTIC ACID: CPT | Performed by: EMERGENCY MEDICINE

## 2024-07-02 PROCEDURE — 80053 COMPREHEN METABOLIC PANEL: CPT | Performed by: EMERGENCY MEDICINE

## 2024-07-02 PROCEDURE — 81003 URINALYSIS AUTO W/O SCOPE: CPT | Performed by: EMERGENCY MEDICINE

## 2024-07-02 PROCEDURE — 85610 PROTHROMBIN TIME: CPT

## 2024-07-02 PROCEDURE — 25810000003 SODIUM CHLORIDE 0.9 % SOLUTION

## 2024-07-02 PROCEDURE — 99285 EMERGENCY DEPT VISIT HI MDM: CPT

## 2024-07-02 PROCEDURE — 74174 CTA ABD&PLVS W/CONTRAST: CPT

## 2024-07-02 PROCEDURE — 83690 ASSAY OF LIPASE: CPT | Performed by: EMERGENCY MEDICINE

## 2024-07-02 PROCEDURE — 25510000001 IOPAMIDOL PER 1 ML: Performed by: EMERGENCY MEDICINE

## 2024-07-02 PROCEDURE — 74022 RADEX COMPL AQT ABD SERIES: CPT

## 2024-07-02 PROCEDURE — 93005 ELECTROCARDIOGRAM TRACING: CPT | Performed by: EMERGENCY MEDICINE

## 2024-07-02 PROCEDURE — 99285 EMERGENCY DEPT VISIT HI MDM: CPT | Performed by: EMERGENCY MEDICINE

## 2024-07-02 RX ORDER — DEXTROSE MONOHYDRATE 25 G/50ML
25 INJECTION, SOLUTION INTRAVENOUS
Status: DISCONTINUED | OUTPATIENT
Start: 2024-07-02 | End: 2024-07-05 | Stop reason: HOSPADM

## 2024-07-02 RX ORDER — PANTOPRAZOLE SODIUM 40 MG/10ML
40 INJECTION, POWDER, LYOPHILIZED, FOR SOLUTION INTRAVENOUS EVERY 12 HOURS SCHEDULED
Status: DISCONTINUED | OUTPATIENT
Start: 2024-07-02 | End: 2024-07-04

## 2024-07-02 RX ORDER — ACETAMINOPHEN 160 MG/5ML
650 SOLUTION ORAL EVERY 4 HOURS PRN
Status: DISCONTINUED | OUTPATIENT
Start: 2024-07-02 | End: 2024-07-05 | Stop reason: HOSPADM

## 2024-07-02 RX ORDER — ATORVASTATIN CALCIUM 20 MG/1
20 TABLET, FILM COATED ORAL DAILY
Status: DISCONTINUED | OUTPATIENT
Start: 2024-07-03 | End: 2024-07-05 | Stop reason: HOSPADM

## 2024-07-02 RX ORDER — POLYETHYLENE GLYCOL 3350 17 G/17G
17 POWDER, FOR SOLUTION ORAL DAILY PRN
Status: DISCONTINUED | OUTPATIENT
Start: 2024-07-02 | End: 2024-07-05 | Stop reason: HOSPADM

## 2024-07-02 RX ORDER — ACETAMINOPHEN 325 MG/1
650 TABLET ORAL EVERY 4 HOURS PRN
Status: DISCONTINUED | OUTPATIENT
Start: 2024-07-02 | End: 2024-07-05 | Stop reason: HOSPADM

## 2024-07-02 RX ORDER — AMOXICILLIN 250 MG
2 CAPSULE ORAL 2 TIMES DAILY PRN
Status: DISCONTINUED | OUTPATIENT
Start: 2024-07-02 | End: 2024-07-05 | Stop reason: HOSPADM

## 2024-07-02 RX ORDER — ONDANSETRON 2 MG/ML
4 INJECTION INTRAMUSCULAR; INTRAVENOUS EVERY 6 HOURS PRN
Status: DISCONTINUED | OUTPATIENT
Start: 2024-07-02 | End: 2024-07-05 | Stop reason: HOSPADM

## 2024-07-02 RX ORDER — INSULIN LISPRO 100 [IU]/ML
2-7 INJECTION, SOLUTION INTRAVENOUS; SUBCUTANEOUS
Status: DISCONTINUED | OUTPATIENT
Start: 2024-07-03 | End: 2024-07-05 | Stop reason: HOSPADM

## 2024-07-02 RX ORDER — ONDANSETRON 4 MG/1
4 TABLET, ORALLY DISINTEGRATING ORAL EVERY 6 HOURS PRN
Status: DISCONTINUED | OUTPATIENT
Start: 2024-07-02 | End: 2024-07-05 | Stop reason: HOSPADM

## 2024-07-02 RX ORDER — ACETAMINOPHEN 650 MG/1
650 SUPPOSITORY RECTAL EVERY 4 HOURS PRN
Status: DISCONTINUED | OUTPATIENT
Start: 2024-07-02 | End: 2024-07-05 | Stop reason: HOSPADM

## 2024-07-02 RX ORDER — DILTIAZEM HYDROCHLORIDE 180 MG/1
180 CAPSULE, COATED, EXTENDED RELEASE ORAL DAILY
Status: DISCONTINUED | OUTPATIENT
Start: 2024-07-03 | End: 2024-07-05 | Stop reason: HOSPADM

## 2024-07-02 RX ORDER — IBUPROFEN 600 MG/1
1 TABLET ORAL
Status: DISCONTINUED | OUTPATIENT
Start: 2024-07-02 | End: 2024-07-05 | Stop reason: HOSPADM

## 2024-07-02 RX ORDER — SODIUM CHLORIDE 9 MG/ML
75 INJECTION, SOLUTION INTRAVENOUS CONTINUOUS
Status: DISCONTINUED | OUTPATIENT
Start: 2024-07-02 | End: 2024-07-03

## 2024-07-02 RX ORDER — SODIUM CHLORIDE 9 MG/ML
100 INJECTION, SOLUTION INTRAVENOUS CONTINUOUS
Status: DISCONTINUED | OUTPATIENT
Start: 2024-07-02 | End: 2024-07-02

## 2024-07-02 RX ORDER — PANTOPRAZOLE SODIUM 40 MG/10ML
40 INJECTION, POWDER, LYOPHILIZED, FOR SOLUTION INTRAVENOUS ONCE
Status: COMPLETED | OUTPATIENT
Start: 2024-07-02 | End: 2024-07-02

## 2024-07-02 RX ORDER — BISACODYL 10 MG
10 SUPPOSITORY, RECTAL RECTAL DAILY PRN
Status: DISCONTINUED | OUTPATIENT
Start: 2024-07-02 | End: 2024-07-05 | Stop reason: HOSPADM

## 2024-07-02 RX ORDER — BISACODYL 5 MG/1
5 TABLET, DELAYED RELEASE ORAL DAILY PRN
Status: DISCONTINUED | OUTPATIENT
Start: 2024-07-02 | End: 2024-07-05 | Stop reason: HOSPADM

## 2024-07-02 RX ORDER — NITROGLYCERIN 0.4 MG/1
0.4 TABLET SUBLINGUAL
Status: DISCONTINUED | OUTPATIENT
Start: 2024-07-02 | End: 2024-07-05 | Stop reason: HOSPADM

## 2024-07-02 RX ORDER — NICOTINE POLACRILEX 4 MG
15 LOZENGE BUCCAL
Status: DISCONTINUED | OUTPATIENT
Start: 2024-07-02 | End: 2024-07-05 | Stop reason: HOSPADM

## 2024-07-02 RX ADMIN — METOPROLOL TARTRATE 25 MG: 25 TABLET, FILM COATED ORAL at 23:31

## 2024-07-02 RX ADMIN — SODIUM CHLORIDE 75 ML/HR: 9 INJECTION, SOLUTION INTRAVENOUS at 23:31

## 2024-07-02 RX ADMIN — IOPAMIDOL 100 ML: 755 INJECTION, SOLUTION INTRAVENOUS at 12:02

## 2024-07-02 RX ADMIN — PANTOPRAZOLE SODIUM 40 MG: 40 INJECTION, POWDER, FOR SOLUTION INTRAVENOUS at 23:31

## 2024-07-02 RX ADMIN — SODIUM CHLORIDE 100 ML/HR: 9 INJECTION, SOLUTION INTRAVENOUS at 10:33

## 2024-07-02 RX ADMIN — PANTOPRAZOLE SODIUM 40 MG: 40 INJECTION, POWDER, FOR SOLUTION INTRAVENOUS at 10:33

## 2024-07-02 RX ADMIN — SODIUM CHLORIDE 1000 ML: 9 INJECTION, SOLUTION INTRAVENOUS at 11:30

## 2024-07-02 NOTE — FSED PROVIDER NOTE
"Subjective   History of Present Illness  85yo female pmh significant htn/hyperlipidemia/dm2/atrial fibrillation presents ED c/o 2d hx \"black stool\" x 2 episodes.  Pt notably currently anticoagulated with warfarin; pt referred ED for evaluation.  ROS neg fever/chills/chest pain/soa/syncope/abdominal pain/hematochoezia/hematemesis/lightheaded.    History provided by:  Patient  Illness  Associated symptoms: no abdominal pain, no diarrhea, no nausea and no vomiting        Review of Systems   Constitutional: Negative.    HENT: Negative.     Eyes: Negative.    Respiratory: Negative.     Cardiovascular: Negative.    Gastrointestinal:  Negative for abdominal pain, anal bleeding, diarrhea, nausea and vomiting.   Genitourinary: Negative.    Allergic/Immunologic: Negative for immunocompromised state.   Hematological:  Bruises/bleeds easily.   All other systems reviewed and are negative.      Past Medical History:   Diagnosis Date    Arthritis     Diabetes mellitus     Hyperlipidemia     Hypertension     IFG (impaired fasting glucose)     Palpitations     SOB (shortness of breath)        Allergies   Allergen Reactions    Percocet [Oxycodone-Acetaminophen] GI Intolerance    Sulfa Antibiotics Nausea And Vomiting    Penicillins Rash       Past Surgical History:   Procedure Laterality Date    ANAL FISTULA REPAIR      APPENDECTOMY      BACK SURGERY      l spine ruptured disk    CHOLECYSTECTOMY      JOINT REPLACEMENT      bilateral knees    SPINE SURGERY  1-1998       Family History   Problem Relation Age of Onset    Hypertension Mother     Arthritis Mother     Heart disease Father     Hypertension Father     Liver disease Brother     Liver disease Sister        Social History     Socioeconomic History    Marital status:    Tobacco Use    Smoking status: Never    Smokeless tobacco: Never   Vaping Use    Vaping status: Never Used   Substance and Sexual Activity    Alcohol use: Never    Drug use: Never    Sexual activity: Not " Currently           Objective   Physical Exam  Vitals and nursing note reviewed. Exam conducted with a chaperone present.   Constitutional:       Appearance: Normal appearance.   HENT:      Head: Normocephalic and atraumatic.      Right Ear: External ear normal.      Left Ear: External ear normal.      Nose: Nose normal. No congestion or rhinorrhea.      Mouth/Throat:      Mouth: Mucous membranes are moist.      Pharynx: Oropharynx is clear. No oropharyngeal exudate or posterior oropharyngeal erythema.   Eyes:      Pupils: Pupils are equal, round, and reactive to light.   Cardiovascular:      Rate and Rhythm: Normal rate. Rhythm irregularly irregular.      Pulses: Normal pulses.      Heart sounds: Normal heart sounds, S1 normal and S2 normal. No murmur heard.     No friction rub. No gallop.   Pulmonary:      Effort: Pulmonary effort is normal. No respiratory distress.      Breath sounds: Normal breath sounds. No wheezing, rhonchi or rales.   Abdominal:      General: Abdomen is flat. Bowel sounds are normal. There is no distension.      Palpations: Abdomen is soft. There is no mass.      Tenderness: There is no abdominal tenderness. There is no right CVA tenderness, left CVA tenderness, guarding or rebound.      Hernia: No hernia is present.   Musculoskeletal:         General: No swelling or deformity. Normal range of motion.      Cervical back: Normal range of motion and neck supple. No rigidity.      Right lower leg: No edema.      Left lower leg: No edema.   Lymphadenopathy:      Cervical: No cervical adenopathy.   Skin:     General: Skin is warm and dry.   Neurological:      General: No focal deficit present.      Mental Status: She is alert and oriented to person, place, and time.      GCS: GCS eye subscore is 4. GCS verbal subscore is 5. GCS motor subscore is 6.         ECG 12 Lead      Date/Time: 7/2/2024 10:47 AM    Performed by: Santiago Brandt MD  Authorized by: Santiago Brandt MD  Interpreted by ED  physician  Rhythm: atrial fibrillation  Rate: normal  BPM: 85  QRS axis: normal  Conduction: conduction normal  ST Segments: ST segments normal  T flattening: all  Other findings: prolonged QTc interval  Clinical impression: abnormal ECG and dysrhythmia - atrial               ED Course  ED Course as of 07/02/24 1250   Tue Jul 02, 2024   1140 [facility] paged for admit [SD]   1202 D/w Dr. JULISA Webster, [facility] hospitalist accepting. Pt stable transport. [SD]      ED Course User Index  [SD] Santiago Brandt MD      Labs Reviewed   COMPREHENSIVE METABOLIC PANEL - Abnormal; Notable for the following components:       Result Value    Glucose 154 (*)     BUN 35 (*)     BUN/Creatinine Ratio 36.1 (*)     eGFR 57.4 (*)     All other components within normal limits    Narrative:     GFR Normal >60  Chronic Kidney Disease <60  Kidney Failure <15    The GFR formula is only valid for adults with stable renal function between ages 18 and 70.   LACTIC ACID, PLASMA - Abnormal; Notable for the following components:    Lactate 3.1 (*)     All other components within normal limits   URINALYSIS WITHOUT MICROSCOPIC (NO CULTURE) - Abnormal; Notable for the following components:    Blood, UA Trace (*)     All other components within normal limits   SINGLE HS TROPONIN T - Abnormal; Notable for the following components:    HS Troponin T 17 (*)     All other components within normal limits    Narrative:     High Sensitive Troponin T Reference Range:  <14.0 ng/L- Negative Female for AMI  <22.0 ng/L- Negative Male for AMI  >=14 - Abnormal Female indicating possible myocardial injury.  >=22 - Abnormal Male indicating possible myocardial injury.   Clinicians would have to utilize clinical acumen, EKG, Troponin, and serial changes to determine if it is an Acute Myocardial Infarction or myocardial injury due to an underlying chronic condition.        CBC WITH AUTO DIFFERENTIAL - Abnormal; Notable for the following components:    RBC 3.54 (*)     Hemoglobin 10.8  (*)     Hematocrit 33.9 (*)     Lymphocyte % 17.7 (*)     All other components within normal limits   OCCULT BLOOD, FECAL BY IMMUNOASSAY - Abnormal; Notable for the following components:    Occult Blood, Fecal by Immunoassay Positive (*)     All other components within normal limits   LAB PROTIME-INR, FINGERSTICK - Abnormal; Notable for the following components:    Protime 28.9 (*)     All other components within normal limits   LIPASE - Normal   MAGNESIUM - Normal   LACTIC ACID, REFLEX   POCT PROTIME - INR   CBC AND DIFFERENTIAL    Narrative:     The following orders were created for panel order CBC & Differential.  Procedure                               Abnormality         Status                     ---------                               -----------         ------                     CBC Auto Differential[[id number]]        Abnormal            Final result                 Please view results for these tests on the individual orders.     CT Angiogram Abdomen Pelvis    Result Date: 7/2/2024  Narrative: CT ANGIOGRAM ABDOMEN PELVIS-  INDICATION: GI hemorrhage. Black stools.  COMPARISON: None  TECHNIQUE: CTA abdomen/pelvis with and without IV contrast. Noncontrast, arterial and venous phases. Coronal and sagittal reformats. Three dimensional reconstructions. Radiation dose reduction techniques were utilized, including automated exposure control and exposure modulation based on body size.  FINDINGS:  Lung bases: Small amount of subsegmental atelectasis or scarring at the bases. Calcified pulmonary nodule in the posterior basilar left lower lobe, consistent with prior granulomatous infection. Solid pulmonary nodule in the anterior basilar right lower lobe, series 12, axial mage 16, measures 3 mm. Coronary artery atherosclerotic calcifications. Aortic valve calcification.  ABDOMEN: Normal liver. Cholecystectomy. No biliary ductal dilatation. Calcifications in the spleen, consistent with prior granulomatous infection.  Spleen is normal in size. Mild pancreatic atrophy. No pancreatic ductal dilatation or mass seen. No adrenal nodules. Large urolithiasis seen in the right renal pelvis, series 16, axial mage 51, measuring 17 mm. Lobular left renal contour with some cortical loss, suspect scarring. No solid-appearing renal mass or hydronephrosis.  Pelvis: Underdistended bladder. No bladder calculus. Anteverted uterus. Partially calcified mass in the left uterus, series 12, axial image 165, measures approximately 4.1 x 3.7 cm, suspect leiomyoma. No adnexal mass.  Bowel: No obstruction. Colonic diverticulosis. Normal appendix. No active bowel hemorrhage identified.  Abdominal wall: Small fat-containing umbilical hernia.  Retroperitoneum: No lymphadenopathy.  Vasculature: Patent. No abdominal aortic aneurysm. Mild to moderate aortoiliac atherosclerotic calcification.  Osseous structures: Small amount of bilateral hip osteoarthritic change. Lumbar facet degenerative arthropathy with grade 1 anterolisthesis of L3 on L4. Severe disc height loss at L4/L5..      Impression:  1. No active bowel hemorrhage identified. 2. Large urolithiasis seen in the right renal pelvis, without hydronephrosis. 3. Colonic diverticulosis. 4. Uterine leiomyoma. 5. Small pulmonary nodule in the right lower lobe measuring 3 mm. If low risk patient, no follow-up recommendations. If high risk patient, optional 12-month follow-up chest CT to reevaluate.  This report was finalized on 7/2/2024 12:35 PM by Dr. Akash Smith M.D on Workstation: [id number]      XR Abdomen 2+ VW with Chest 1 VW    Result Date: 7/2/2024  Narrative: XR ABDOMEN 2+ VIEWS W CHEST 1 VW-  Clinical: Black tarry stools, on blood thinners, history of renal calculus.  FINDINGS: Cardiac size upper normal to mildly enlarged. Fine linear areas of scarring demonstrated at the lung bases. No pleural effusion, vascular congestion or acute airspace disease is demonstrated.  17 mm calculus superimposes  the right renal silhouettes. There is nonobstructive bowel gas pattern. Soft tissue planes are preserved. Moderate fecal material within the colon is greater than typically seen. There is no indication of underlying impaction.  CONCLUSION: No active cardiovascular or pulmonary process is demonstrated, nonobstructive bowel gas pattern with right renal calculus. Amount of fecal material within the colon is greater than typically seen.  This report was finalized on 7/2/2024 11:41 AM by Dr. Michael Moscoso M.D on Workstation: [id number]                                          Medical Decision Making  Problems Addressed:  Acute upper GI hemorrhage: complicated acute illness or injury  Chronic atrial fibrillation: complicated acute illness or injury  Diverticulosis: complicated acute illness or injury  Nephrolithiasis: complicated acute illness or injury  Pulmonary nodule: complicated acute illness or injury    Amount and/or Complexity of Data Reviewed  Labs: ordered.  Radiology: ordered.  ECG/medicine tests: ordered and independent interpretation performed.    Risk  Prescription drug management.  Decision regarding hospitalization.        Final diagnoses:   Acute upper GI hemorrhage   Chronic atrial fibrillation   Pulmonary nodule   Nephrolithiasis   Diverticulosis       ED Disposition  ED Disposition       ED Disposition   Decision to Admit    Condition   --    Comment   Level of Care: Telemetry [5]   Diagnosis: GI hemorrhage [[id number]]   Admitting Physician: ADAM TOWNSEND [[id number]]   Attending Physician: ADAM TOWNSEND [[id number]]                 No follow-up provider specified.       Medication List      No changes were made to your prescriptions during this visit.

## 2024-07-02 NOTE — PROGRESS NOTES
Anticoagulation Clinic Progress Note    Anticoagulation Summary  As of 2024      INR goal:  2.0-3.0   TTR:  88.6% (5.6 y)   INR used for dosin.62 (2024)   Warfarin maintenance plan:  4 mg every e, Sat; 2 mg all other days   Weekly warfarin total:  18 mg   No change documented:  Eulalia Reaves RPH   Plan last modified:  Savage Wasserman, PharmD (2024)   Next INR check:  2024   Priority:  Maintenance   Target end date:      Indications    Atrial fibrillation with RVR (Resolved) [I48.91]                 Anticoagulation Episode Summary       INR check location:      Preferred lab:      Send INR reminders to:  [facility]  POOL    Comments:  [facility]  ([place], F: [phone])          Anticoagulation Care Providers       Provider Role Specialty Phone number    Sly Saleh MD Referring Cardiology [phone]            Clinic Interview:  Patient Findings     Positives:  Signs/symptoms of bleeding    Negatives:  Signs/symptoms of thrombosis, Laboratory test error   suspected, Change in health, Change in alcohol use, Change in activity,   Upcoming invasive procedure, Emergency department visit, Upcoming dental   procedure, Missed doses, Extra doses, Change in medications, Change in   diet/appetite, Hospital admission, Bruising, Other complaints      Clinical Outcomes     Negatives:  Major bleeding event, Thromboembolic event,   Anticoagulation-related hospital admission, Anticoagulation-related ED   visit, Anticoagulation-related fatality        INR History:      2024     2:21 PM 2024    12:00 AM 2024     9:28 AM 6/3/2024    12:00 AM 2024    10:20 AM 2024    12:00 AM 2024     8:40 AM   Anticoagulation Monitoring   INR 2.40  3.00  2.40  2.62   INR Date 2024  2024  6/3/2024  2024   INR Goal 2.0-3.0  2.0-3.0  2.0-3.0  2.0-3.0   Trend Same  Same  Same  Same   Last Week Total 18 mg  18 mg  18 mg  18 mg   Next Week Total 18 mg  18 mg  18 mg  18  mg   Sun 2 mg  2 mg  2 mg  2 mg   Mon 2 mg  2 mg  2 mg  2 mg   Tue 4 mg  4 mg  4 mg  4 mg   Wed 2 mg  2 mg  2 mg  2 mg   Thu 2 mg  2 mg  2 mg  2 mg   Fri 2 mg  2 mg  2 mg  2 mg   Sat 4 mg  4 mg  4 mg  4 mg   Historical INR  3.00      2.40      2.62        Visit Report Report             This result is from an external source.       Plan:  1. INR is Therapeutic today- see above in Anticoagulation Summary.   Will instruct Arelis Johnson to hold their warfarin regimen- see above in Anticoagulation Summary.  Patient reports no changes in diet or medications. Patient reports dark black stools over last night and this morning. She reports no weakness or fatigue. She does not take iron. Advised patient to go to the ED to be evaluated. Advised patient to hold warfarin today until she sees the doctor. Follow up in the AM.   2. Follow up tomorrow   3. They have been instructed to call if any changes in medications, doses, concerns, etc. Patient expresses understanding and has no further questions at this time.    Eulalia Reaves [facility]

## 2024-07-03 LAB
ALBUMIN SERPL-MCNC: 3.1 G/DL (ref 3.5–5.2)
ALBUMIN/GLOB SERPL: 1.3 G/DL
ALP SERPL-CCNC: 65 U/L (ref 39–117)
ALT SERPL W P-5'-P-CCNC: 13 U/L (ref 1–33)
ANION GAP SERPL CALCULATED.3IONS-SCNC: 7.3 MMOL/L (ref 5–15)
AST SERPL-CCNC: 25 U/L (ref 1–32)
BILIRUB SERPL-MCNC: 0.3 MG/DL (ref 0–1.2)
BUN SERPL-MCNC: 24 MG/DL (ref 8–23)
BUN/CREAT SERPL: 27.9 (ref 7–25)
CALCIUM SPEC-SCNC: 9 MG/DL (ref 8.6–10.5)
CHLORIDE SERPL-SCNC: 105 MMOL/L (ref 98–107)
CO2 SERPL-SCNC: 28.7 MMOL/L (ref 22–29)
CREAT SERPL-MCNC: 0.86 MG/DL (ref 0.57–1)
DEPRECATED RDW RBC AUTO: 42 FL (ref 37–54)
DEPRECATED RDW RBC AUTO: 44.8 FL (ref 37–54)
EGFRCR SERPLBLD CKD-EPI 2021: 66.3 ML/MIN/1.73
ERYTHROCYTE [DISTWIDTH] IN BLOOD BY AUTOMATED COUNT: 12.6 % (ref 12.3–15.4)
ERYTHROCYTE [DISTWIDTH] IN BLOOD BY AUTOMATED COUNT: 12.8 % (ref 12.3–15.4)
GLOBULIN UR ELPH-MCNC: 2.3 GM/DL
GLUCOSE BLDC GLUCOMTR-MCNC: 109 MG/DL (ref 70–130)
GLUCOSE BLDC GLUCOMTR-MCNC: 127 MG/DL (ref 70–130)
GLUCOSE BLDC GLUCOMTR-MCNC: 129 MG/DL (ref 70–130)
GLUCOSE BLDC GLUCOMTR-MCNC: 131 MG/DL (ref 70–130)
GLUCOSE SERPL-MCNC: 111 MG/DL (ref 65–99)
HCT VFR BLD AUTO: 29.7 % (ref 34–46.6)
HCT VFR BLD AUTO: 29.9 % (ref 34–46.6)
HCT VFR BLD AUTO: 30.1 % (ref 34–46.6)
HCT VFR BLD AUTO: 30.2 % (ref 34–46.6)
HGB BLD-MCNC: 9.6 G/DL (ref 12–15.9)
HGB BLD-MCNC: 9.7 G/DL (ref 12–15.9)
HGB BLD-MCNC: 9.7 G/DL (ref 12–15.9)
HGB BLD-MCNC: 9.8 G/DL (ref 12–15.9)
INR PPP: 2.29 (ref 0.9–1.1)
INR PPP: 2.3 (ref 0.9–1.1)
MCH RBC QN AUTO: 30.1 PG (ref 26.6–33)
MCH RBC QN AUTO: 30.3 PG (ref 26.6–33)
MCHC RBC AUTO-ENTMCNC: 31.9 G/DL (ref 31.5–35.7)
MCHC RBC AUTO-ENTMCNC: 32.5 G/DL (ref 31.5–35.7)
MCV RBC AUTO: 92.6 FL (ref 79–97)
MCV RBC AUTO: 95 FL (ref 79–97)
PLATELET # BLD AUTO: 214 10*3/MM3 (ref 140–450)
PLATELET # BLD AUTO: 219 10*3/MM3 (ref 140–450)
PMV BLD AUTO: 10.1 FL (ref 6–12)
PMV BLD AUTO: 10.5 FL (ref 6–12)
POTASSIUM SERPL-SCNC: 3.8 MMOL/L (ref 3.5–5.2)
PROT SERPL-MCNC: 5.4 G/DL (ref 6–8.5)
PROTHROMBIN TIME: 25.5 SECONDS (ref 11.7–14.2)
PROTHROMBIN TIME: 25.6 SECONDS (ref 11.7–14.2)
RBC # BLD AUTO: 3.17 10*6/MM3 (ref 3.77–5.28)
RBC # BLD AUTO: 3.26 10*6/MM3 (ref 3.77–5.28)
SODIUM SERPL-SCNC: 141 MMOL/L (ref 136–145)
WBC NRBC COR # BLD AUTO: 4.23 10*3/MM3 (ref 3.4–10.8)
WBC NRBC COR # BLD AUTO: 4.63 10*3/MM3 (ref 3.4–10.8)

## 2024-07-03 PROCEDURE — 86927 PLASMA FRESH FROZEN: CPT

## 2024-07-03 PROCEDURE — G0378 HOSPITAL OBSERVATION PER HR: HCPCS

## 2024-07-03 PROCEDURE — 36430 TRANSFUSION BLD/BLD COMPNT: CPT

## 2024-07-03 PROCEDURE — 85027 COMPLETE CBC AUTOMATED: CPT

## 2024-07-03 PROCEDURE — 85018 HEMOGLOBIN: CPT | Performed by: HOSPITALIST

## 2024-07-03 PROCEDURE — 25810000003 SODIUM CHLORIDE 0.9 % SOLUTION

## 2024-07-03 PROCEDURE — 82948 REAGENT STRIP/BLOOD GLUCOSE: CPT

## 2024-07-03 PROCEDURE — P9059 PLASMA, FRZ BETWEEN 8-24HOUR: HCPCS

## 2024-07-03 PROCEDURE — 80053 COMPREHEN METABOLIC PANEL: CPT

## 2024-07-03 PROCEDURE — 85014 HEMATOCRIT: CPT | Performed by: HOSPITALIST

## 2024-07-03 PROCEDURE — 85027 COMPLETE CBC AUTOMATED: CPT | Performed by: INTERNAL MEDICINE

## 2024-07-03 PROCEDURE — 99204 OFFICE O/P NEW MOD 45 MIN: CPT | Performed by: INTERNAL MEDICINE

## 2024-07-03 PROCEDURE — 85610 PROTHROMBIN TIME: CPT | Performed by: INTERNAL MEDICINE

## 2024-07-03 RX ADMIN — ATORVASTATIN CALCIUM 20 MG: 20 TABLET, FILM COATED ORAL at 09:57

## 2024-07-03 RX ADMIN — METOPROLOL TARTRATE 25 MG: 25 TABLET, FILM COATED ORAL at 09:57

## 2024-07-03 RX ADMIN — DILTIAZEM HYDROCHLORIDE 180 MG: 180 CAPSULE, EXTENDED RELEASE ORAL at 09:57

## 2024-07-03 RX ADMIN — METOPROLOL TARTRATE 25 MG: 25 TABLET, FILM COATED ORAL at 20:14

## 2024-07-03 RX ADMIN — PANTOPRAZOLE SODIUM 40 MG: 40 INJECTION, POWDER, FOR SOLUTION INTRAVENOUS at 20:14

## 2024-07-03 RX ADMIN — PANTOPRAZOLE SODIUM 40 MG: 40 INJECTION, POWDER, FOR SOLUTION INTRAVENOUS at 09:56

## 2024-07-03 RX ADMIN — SODIUM CHLORIDE 75 ML/HR: 9 INJECTION, SOLUTION INTRAVENOUS at 13:24

## 2024-07-03 NOTE — H&P
Patient Name:  Arelis Johnson  YOB: 1940  MRN:  [id number]  Admit Date:  7/2/2024  Patient Care Team:  Amanda Felix APRN as PCP - General (Family Medicine)  Heriberto Singer MD as Consulting Physician (Orthopedic Surgery)  Sly Saleh MD as Consulting Physician (Cardiology)  Shashank Mckeon MD as Consulting Physician (Cardiology)      Subjective   History Present Illness     Chief Complaint   Patient presents with    Black or Bloody Stool     1 episode of black stool yesterday morning and a second episode this morning, +thinners. Denies abdominal pain, light headed, SOA, chest pain, n/v.          History of Present Illness    Ms. Johnson is a very pleasant 84-year-old female with a past medical history of A-fib, chronic anticoagulation use, type 2 diabetes, and hypertension presents to [facility] ED with 2-day history of black stools.  She reports that she had 1 other occurrence of black stools in 2023, saw her PCP who ordered stool studies for 3 days, and reports that those studies were negative.  She uses [employer] colorectal cancer screening.  She reports she had her INR checked on Monday, cardiologist office followed up with her today to let her know that her INR was therapeutic, patient reported her symptoms to the office and they referred her to the emergency room.  She denies any headache, fever, shortness of breath, chest pain, nausea, vomiting, constipation, bright red blood, dysuria, or low back pain.  She reports she did have 1 episode of diarrhea last week after eating a salad but stool was not black.  Patient advised of large nephrolithiasis to the right kidney, and patient reports she has had this  for 45 years.  She reports that she did not have any treatment and was advised seek treatment if she became symptomatic.  Patient also advised of the small pulmonary nodule noted in the right lower lobe.  Patient is a non-smoker.  Patient reports  "that her brother did have a \"spot \"on his mom he was 90 years old.  Patient is alert and oriented and lives at home alone.    Review of Systems   Constitutional:  Negative for fever.   Respiratory:  Negative for shortness of breath.    Cardiovascular:  Positive for chest pain.   Gastrointestinal:  Positive for blood in stool. Negative for abdominal pain, constipation, diarrhea, nausea and vomiting.   Genitourinary:  Negative for difficulty urinating.   Musculoskeletal:  Negative for back pain.        Personal History     Past Medical History:   Diagnosis Date    Arthritis     Diabetes mellitus     Hyperlipidemia     Hypertension     IFG (impaired fasting glucose)     Palpitations     SOB (shortness of breath)      Past Surgical History:   Procedure Laterality Date    ANAL FISTULA REPAIR      APPENDECTOMY      BACK SURGERY      l spine ruptured disk    CHOLECYSTECTOMY      JOINT REPLACEMENT      bilateral knees    SPINE SURGERY  1-1998     Family History   Problem Relation Age of Onset    Hypertension Mother     Arthritis Mother     Heart disease Father     Hypertension Father     Liver disease Brother     Liver disease Sister      Social History     Tobacco Use    Smoking status: Never    Smokeless tobacco: Never   Vaping Use    Vaping status: Never Used   Substance Use Topics    Alcohol use: Never    Drug use: Never     No current facility-administered medications on file prior to encounter.     Current Outpatient Medications on File Prior to Encounter   Medication Sig Dispense Refill    allopurinol (ZYLOPRIM) 100 MG tablet Take 1 tablet by mouth Daily. 30 tablet 5    Calcium Carb-Cholecalciferol (CALCIUM + D3) 600-200 MG-UNIT tablet Take 1 tablet by mouth Daily.      dilTIAZem CD (CARDIZEM CD) 180 MG 24 hr capsule TAKE ONE CAPSULE (BY MOUTH) EACH DAY 90 capsule 2    furosemide (LASIX) 40 MG tablet TAKE 1 TABLET BY MOUTH DAILY. 90 tablet 4    guaifenesin-dextromethorphan (MUCINEX DM)  MG tablet " sustained-release 12 hour tablet Take 1 tablet by mouth 2 (Two) Times a Day. 45 tablet 0    metFORMIN ER (GLUCOPHAGE-XR) 500 MG 24 hr tablet Take 1 tablet by mouth Daily With Breakfast. 90 tablet 1    metoprolol tartrate (LOPRESSOR) 50 MG tablet Take 1/2 tablet by mouth 2 Times a Day. 90 tablet 3    simvastatin (ZOCOR) 40 MG tablet Take 1 tablet by mouth Every Night. 90 tablet 1    Triamcinolone Acetonide (Nasacort Allergy 24HR) 55 MCG/ACT nasal inhaler 1 spray into the nostril(s) as directed by provider Daily. 16.9 mL 0    vitamin C (ASCORBIC ACID) 250 MG tablet Take 1 tablet by mouth Daily.      warfarin (COUMADIN) 4 MG tablet TAKE ONE TABLET BY MOUTH ON TUESDAY AND SATURDAY AND THEN TAKE 1/2 TABLET BY MOUTH ON ALL OTHER DAYS OR AS DIRECTED 60 tablet 1    Zinc 25 MG tablet Take  by mouth.       Allergies   Allergen Reactions    Percocet [Oxycodone-Acetaminophen] GI Intolerance    Sulfa Antibiotics Nausea And Vomiting    Penicillins Rash       Objective    Objective     Vital Signs  Temp:  [97.7 °F (36.5 °C)-98 °F (36.7 °C)] 97.7 °F (36.5 °C)  Heart Rate:  [69-89] 83  Resp:  [16-18] 18  BP: (105-138)/(68-88) 138/81  SpO2:  [95 %-100 %] 99 %  on   ;   Device (Oxygen Therapy): room air  Body mass index is 27.79 kg/m².    Physical Exam  Vitals and nursing note reviewed.   Constitutional:       General: She is not in acute distress.     Appearance: Normal appearance. She is not ill-appearing.   Cardiovascular:      Rate and Rhythm: Normal rate. Rhythm irregular.      Pulses: Normal pulses.      Heart sounds: Normal heart sounds.      Comments: Heart rate 103, A-fib per my interpretation.   Pulmonary:      Effort: Pulmonary effort is normal. No respiratory distress.      Breath sounds: Normal breath sounds.   Abdominal:      General: Bowel sounds are normal. There is no distension.      Palpations: Abdomen is soft.      Tenderness: There is no abdominal tenderness.   Musculoskeletal:      Right lower le+ Edema  present.      Left lower le+ Edema present.   Skin:     General: Skin is warm and dry.      Capillary Refill: Capillary refill takes less than 2 seconds.   Neurological:      Mental Status: She is alert and oriented to person, place, and time.         Results Review:  I reviewed the patient's new clinical results.  I reviewed the patient's new imaging results and agree with the interpretation.  I reviewed the patient's other test results and agree with the interpretation  I personally viewed and interpreted the patient's EKG/Telemetry data  Discussed with ED provider.    Lab Results (last 24 hours)       Procedure Component Value Units Date/Time    Occult Blood, Fecal By Immunoassay - Stool, Per Rectum [[id number]]  (Abnormal) Collected: 24 1020    Specimen: Stool from Per Rectum Updated: 24 1038     Occult Blood, Fecal by Immunoassay Positive    CBC & Differential [[id number]]  (Abnormal) Collected: 24 1033    Specimen: Blood Updated: 24 1044    Narrative:      The following orders were created for panel order CBC & Differential.  Procedure                               Abnormality         Status                     ---------                               -----------         ------                     CBC Auto Differential[[id number]]        Abnormal            Final result                 Please view results for these tests on the individual orders.    Comprehensive Metabolic Panel [[id number]]  (Abnormal) Collected: 24 1033    Specimen: Blood Updated: 24 1156     Glucose 154 mg/dL      BUN 35 mg/dL      Creatinine 0.97 mg/dL      Sodium 139 mmol/L      Potassium 4.0 mmol/L      Comment: Slight hemolysis detected by analyzer. Result may be falsely elevated.        Chloride 102 mmol/L      CO2 27.9 mmol/L      Calcium 9.8 mg/dL      Total Protein 6.2 g/dL      Albumin 3.9 g/dL      ALT (SGPT) 14 U/L      AST (SGOT) 26 U/L      Alkaline Phosphatase 82 U/L      Total Bilirubin 0.4  mg/dL      Globulin 2.3 gm/dL      A/G Ratio 1.7 g/dL      BUN/Creatinine Ratio 36.1     Anion Gap 9.1 mmol/L      eGFR 57.4 mL/min/1.73     Narrative:      GFR Normal >60  Chronic Kidney Disease <60  Kidney Failure <15    The GFR formula is only valid for adults with stable renal function between ages 18 and 70.    Lipase [[id number]]  (Normal) Collected: 07/02/24 1033    Specimen: Blood Updated: 07/02/24 1156     Lipase 26 U/L     Lactic Acid, Plasma [[id number]]  (Abnormal) Collected: 07/02/24 1033    Specimen: Blood Updated: 07/02/24 1102     Lactate 3.1 mmol/L     Single High Sensitivity Troponin T [[id number]]  (Abnormal) Collected: 07/02/24 1033    Specimen: Blood Updated: 07/02/24 1156     HS Troponin T 17 ng/L     Narrative:      High Sensitive Troponin T Reference Range:  <14.0 ng/L- Negative Female for AMI  <22.0 ng/L- Negative Male for AMI  >=14 - Abnormal Female indicating possible myocardial injury.  >=22 - Abnormal Male indicating possible myocardial injury.   Clinicians would have to utilize clinical acumen, EKG, Troponin, and serial changes to determine if it is an Acute Myocardial Infarction or myocardial injury due to an underlying chronic condition.         CBC Auto Differential [[id number]]  (Abnormal) Collected: 07/02/24 1033    Specimen: Blood Updated: 07/02/24 1044     WBC 7.25 10*3/mm3      RBC 3.54 10*6/mm3      Hemoglobin 10.8 g/dL      Hematocrit 33.9 %      MCV 95.8 fL      MCH 30.5 pg      MCHC 31.9 g/dL      RDW 12.9 %      RDW-SD 45.6 fl      MPV 10.1 fL      Platelets 260 10*3/mm3      Neutrophil % 75.3 %      Lymphocyte % 17.7 %      Monocyte % 5.9 %      Eosinophil % 0.4 %      Basophil % 0.4 %      Immature Grans % 0.3 %      Neutrophils, Absolute 5.46 10*3/mm3      Lymphocytes, Absolute 1.28 10*3/mm3      Monocytes, Absolute 0.43 10*3/mm3      Eosinophils, Absolute 0.03 10*3/mm3      Basophils, Absolute 0.03 10*3/mm3      Immature Grans, Absolute 0.02 10*3/mm3     Magnesium  [[id number]]  (Normal) Collected: 07/02/24 1033    Specimen: Blood Updated: 07/02/24 1156     Magnesium 1.6 mg/dL     Protime-INR, Fingerstick [[id number]]  (Abnormal) Collected: 07/02/24 1100    Specimen: Capillary Blood Updated: 07/02/24 1102     Protime 28.9 Seconds      Comment: Serial Number: [id number]:  [id number]        INR 2.90    Urinalysis without microscopic (no culture) - Urine, Clean Catch [[id number]]  (Abnormal) Collected: 07/02/24 1211    Specimen: Urine, Clean Catch Updated: 07/02/24 1222     Color, UA Yellow     Appearance, UA Clear     pH, UA 5.5     Specific Gravity, UA <=1.005     Glucose, UA Negative     Ketones, UA Negative     Bilirubin, UA Negative     Blood, UA Trace     Protein, UA Negative     Leuk Esterase, UA Negative     Nitrite, UA Negative     Urobilinogen, UA 0.2 E.U./dL    STAT Lactic Acid, Reflex [[id number]]  (Normal) Collected: 07/02/24 1400    Specimen: Blood Updated: 07/02/24 1419     Lactate 1.4 mmol/L             Imaging Results (Last 24 Hours)       Procedure Component Value Units Date/Time    CT Angiogram Abdomen Pelvis [[id number]] Collected: 07/02/24 1223     Updated: 07/02/24 1238    Narrative:      CT ANGIOGRAM ABDOMEN PELVIS-     INDICATION: GI hemorrhage. Black stools.     COMPARISON: None     TECHNIQUE:  CTA abdomen/pelvis with and without IV contrast. Noncontrast, arterial  and venous phases. Coronal and sagittal reformats. Three dimensional  reconstructions. Radiation dose reduction techniques were utilized,  including automated exposure control and exposure modulation based on  body size.     FINDINGS:      Lung bases: Small amount of subsegmental atelectasis or scarring at the  bases. Calcified pulmonary nodule in the posterior basilar left lower  lobe, consistent with prior granulomatous infection. Solid pulmonary  nodule in the anterior basilar right lower lobe, series 12, axial mage  16, measures 3 mm. Coronary artery atherosclerotic calcifications.  Aortic  valve calcification.     ABDOMEN: Normal liver. Cholecystectomy. No biliary ductal dilatation.  Calcifications in the spleen, consistent with prior granulomatous  infection. Spleen is normal in size. Mild pancreatic atrophy. No  pancreatic ductal dilatation or mass seen. No adrenal nodules. Large  urolithiasis seen in the right renal pelvis, series 16, axial mage 51,  measuring 17 mm. Lobular left renal contour with some cortical loss,  suspect scarring. No solid-appearing renal mass or hydronephrosis.     Pelvis: Underdistended bladder. No bladder calculus. Anteverted uterus.  Partially calcified mass in the left uterus, series 12, axial image 165,  measures approximately 4.1 x 3.7 cm, suspect leiomyoma. No adnexal mass.     Bowel: No obstruction. Colonic diverticulosis. Normal appendix. No  active bowel hemorrhage identified.     Abdominal wall: Small fat-containing umbilical hernia.     Retroperitoneum: No lymphadenopathy.     Vasculature: Patent. No abdominal aortic aneurysm. Mild to moderate  aortoiliac atherosclerotic calcification.     Osseous structures: Small amount of bilateral hip osteoarthritic change.  Lumbar facet degenerative arthropathy with grade 1 anterolisthesis of L3  on L4. Severe disc height loss at L4/L5..       Impression:         1. No active bowel hemorrhage identified.  2. Large urolithiasis seen in the right renal pelvis, without  hydronephrosis.  3. Colonic diverticulosis.  4. Uterine leiomyoma.  5. Small pulmonary nodule in the right lower lobe measuring 3 mm. If low  risk patient, no follow-up recommendations. If high risk patient,  optional 12-month follow-up chest CT to reevaluate.     This report was finalized on 7/2/2024 12:35 PM by Dr. Akash Smith M.D on Workstation: [id number]       XR Abdomen 2+ VW with Chest 1 VW [[id number]] Collected: 07/02/24 1138     Updated: 07/02/24 1145    Narrative:      XR ABDOMEN 2+ VIEWS W CHEST 1 VW-     Clinical: Black tarry stools, on blood  thinners, history of renal  calculus.     FINDINGS: Cardiac size upper normal to mildly enlarged. Fine linear  areas of scarring demonstrated at the lung bases. No pleural effusion,  vascular congestion or acute airspace disease is demonstrated.     17 mm calculus superimposes the right renal silhouettes. There is  nonobstructive bowel gas pattern. Soft tissue planes are preserved.  Moderate fecal material within the colon is greater than typically seen.  There is no indication of underlying impaction.     CONCLUSION: No active cardiovascular or pulmonary process is  demonstrated, nonobstructive bowel gas pattern with right renal  calculus. Amount of fecal material within the colon is greater than  typically seen.     This report was finalized on 7/2/2024 11:41 AM by Dr. Michael Moscoso M.D  on Workstation: [id number]               Results for orders placed during the hospital encounter of 04/12/17    Adult Transthoracic Echo Complete With Contrast    Interpretation Summary  · Left ventricular wall thickness is consistent with borderline concentric hypertrophy.  · Left ventricular function is normal. Estimated EF = 63%.  · Left ventricular diastolic function was unable to be assessed. Elevated left atrial pressure.    ECG 12 Lead   ED Interpretation   Abnormal   Santiago Brandt MD     7/2/2024 12:50 PM   ECG 12 Lead         Date/Time: 7/2/2024 10:47 AM      Performed by: Santiago Brandt MD   Authorized by: Santiago Brandt MD  Interpreted by ED physician   Rhythm: atrial fibrillation   Rate: normal   BPM: 85   QRS axis: normal   Conduction: conduction normal   ST Segments: ST segments normal   T flattening: all   Other findings: prolonged QTc interval   Clinical impression: abnormal ECG and dysrhythmia - atrial      ECG 12 Lead Rhythm Change   Final Result   HEART RATE=85  bpm   RR [place]hyix=914  ms   WI Interval=  ms   P Horizontal Axis=  deg   P Front Axis=  deg   QRSD Interval=99  ms   QT Tphtauhd=401  ms    RQfR=492  ms   QRS Axis=-29  deg   T Wave Axis=131  deg   - ABNORMAL ECG -   Poor quality tracing, repeat.   Electronically Signed By: Sly Saleh ([facility]) 2024-07-02 15:44:48   Date and Time of Study:2024-07-02 10:34:38      Telemetry Scan   Final Result        Assessment/Plan   Assessment & Plan   Active Hospital Problems    Diagnosis  POA    **GI hemorrhage [K92.2]  Yes    Atrial fibrillation, chronic [I48.20]  Yes    Chronic anticoagulation [Z79.01]  Not Applicable    Diabetes mellitus [E11.9]  Yes    Hyperlipidemia [E78.5]  Yes    Hypertension [I10]  Yes      Resolved Hospital Problems   No resolved problems to display.       Ms. Johnson is a very pleasant 84-year-old female with a past medical history of A-fib, chronic anticoagulation use, type 2 diabetes, and hypertension presents to [facility] ED with 2-day history of black stools.     GI bleed   -Upper GI bleeding due to warfarin use for atrial fib  -Hemoglobin 10.8, previous hemoglobin 12.9 from April 2024  -X-ray of the abdomen-and amount of fecal matter in the colon, incidental finding of 17 mm right renal calculus  -CTA of abdomen and pelvis-no active bowel hemorrhage, colonic diverticulosis, additional incidental findings include large urolithiasis seen in the right renal pelvis without hydronephrosis, urine leimyoma, small pulmonary nodule in the right lower lobe  -Hemoccult positive  -Received Protonix 40 mg IV in the ER, start Protonix 40 mg IV twice a day  -INR 2.9, hold warfarin  -Consult GI  -IV fluids  -Clear liquid diet  -Will trend H&H  -Bowel regimen for constipation      Type 2 diabetes mellitus   - Most recent A1c: 7.2 in April 2024  -Currently takes metformin at home, will hold for now  -Glucose checks before meals and at bedtime  -Correctional sliding scale for hyperglycemia    Atrial fibrillation on long term anticoagulation  -EKG-A-fib  -Rate appears stable on current regimen of Cardizem and metoprolol  -Will review  and continue home meds once home med rec complete  -Telemetry monitoring  -EKG for any change in rhythm      ,Hypertension   -Blood pressure appears stable  -Will review continue home meds once home med rec complete  - Will continue to monitor BP daily to guide further management decisions       SCDs for DVT prophylaxis.  Limited code - CPR, but no intubation .  Discussed with patient and nursing staff.      CHARMAINE Nguyễn  [facility]  07/02/24  21:42 EDT

## 2024-07-03 NOTE — CONSULTS
[facility]  Initial Inpatient Consult Note    Referring Provider: [facility]    Reason for Consultation: GI bleed with melena    Subjective     History of present illness:    84 y.o. female admitted through the ER with reports of black stool x 2 episodes.  She has a history of atrial fibrillation and is anticoagulated with Coumadin.  INR was therapeutic at 2.9 on initial presentation.  She has had a downward trend in her hemoglobin over the past 2 months.  Normal hemoglobin noted 2 months ago and hemoglobin this morning is 9.8.  Indices are normocytic.    She reports black stool the past 2 days.  No prior bleeding.  No prior EGD or colonoscopy.  She denies any GI symptoms specifically abdominal pain nausea vomiting dysphagia or heartburn.  She has had no dizziness with standing, chest pain or shortness of air.  She denies NSAIDs.  She has been on Coumadin for years.  No prior TIA or CVA.    CT angiogram performed yesterday, reviewed by me.  Normal liver.  No active bowel extravasation seen.    Past Medical History:  Past Medical History:   Diagnosis Date    Arthritis     Diabetes mellitus     Hyperlipidemia     Hypertension     IFG (impaired fasting glucose)     Palpitations     SOB (shortness of breath)      Past Surgical History:  Past Surgical History:   Procedure Laterality Date    ANAL FISTULA REPAIR      APPENDECTOMY      BACK SURGERY      l spine ruptured disk    CHOLECYSTECTOMY      JOINT REPLACEMENT      bilateral knees    SPINE SURGERY  1-1998      Social History:   Social History     Tobacco Use    Smoking status: Never    Smokeless tobacco: Never   Substance Use Topics    Alcohol use: Never      Family History:  Family History   Problem Relation Age of Onset    Hypertension Mother     Arthritis Mother     Heart disease Father     Hypertension Father     Liver disease Brother     Liver disease Sister        Home Meds:  Medications Prior to Admission   Medication Sig Dispense Refill Last  Dose    allopurinol (ZYLOPRIM) 100 MG tablet Take 1 tablet by mouth Daily. 30 tablet 5 7/2/2024 at 0900    Calcium Carb-Cholecalciferol (CALCIUM + D3) 600-200 MG-UNIT tablet Take 1 tablet by mouth Daily.   7/2/2024 at 0900    dilTIAZem CD (CARDIZEM CD) 180 MG 24 hr capsule TAKE ONE CAPSULE (BY MOUTH) EACH DAY 90 capsule 2 7/2/2024 at 0900    furosemide (LASIX) 40 MG tablet TAKE 1 TABLET BY MOUTH DAILY. 90 tablet 4 7/2/2024 at 0900    metFORMIN ER (GLUCOPHAGE-XR) 500 MG 24 hr tablet Take 1 tablet by mouth Daily With Breakfast. 90 tablet 1 7/2/2024 at 0900    metoprolol tartrate (LOPRESSOR) 50 MG tablet Take 1/2 tablet by mouth 2 Times a Day. 90 tablet 3 7/2/2024 at 0900    simvastatin (ZOCOR) 40 MG tablet Take 1 tablet by mouth Every Night. 90 tablet 1 7/1/2024 at 2100    vitamin C (ASCORBIC ACID) 250 MG tablet Take 1 tablet by mouth Daily.   7/2/2024 at 0900    warfarin (COUMADIN) 4 MG tablet TAKE ONE TABLET BY MOUTH ON TUESDAY AND SATURDAY AND THEN TAKE 1/2 TABLET BY MOUTH ON ALL OTHER DAYS OR AS DIRECTED 60 tablet 1 Past Week at 0900    Zinc 25 MG tablet Take  by mouth.   7/2/2024 at 0900    guaifenesin-dextromethorphan (MUCINEX DM)  MG tablet sustained-release 12 hour tablet Take 1 tablet by mouth 2 (Two) Times a Day. 45 tablet 0 More than a month    Triamcinolone Acetonide (Nasacort Allergy 24HR) 55 MCG/ACT nasal inhaler 1 spray into the nostril(s) as directed by provider Daily. 16.9 mL 0 Unknown     Current Meds:   atorvastatin, 20 mg, Oral, Daily  dilTIAZem CD, 180 mg, Oral, Daily  insulin lispro, 2-7 Units, Subcutaneous, 4x Daily AC & at Bedtime  metoprolol tartrate, 25 mg, Oral, BID  pantoprazole, 40 mg, Intravenous, Q12H      Allergies:  Allergies   Allergen Reactions    Percocet [Oxycodone-Acetaminophen] GI Intolerance    Sulfa Antibiotics Nausea And Vomiting    Penicillins Rash     Review of Systems  Pertinent items are noted in HPI, all other systems reviewed and negative     Objective     Vital  Signs  Temp:  [97.7 °F (36.5 °C)-98 °F (36.7 °C)] 97.7 °F (36.5 °C)  Heart Rate:  [69-89] 86  Resp:  [16-18] 18  BP: (105-138)/(68-88) 126/88  Physical Exam:  General Appearance:    Alert, cooperative, in no acute distress   Head:    Normocephalic, without obvious abnormality, atraumatic   Eyes:          conjunctivae and sclerae normal, no   icterus   Throat:   no thrush, oral mucosa moist   Neck:   Supple, no adenopathy   Lungs:     Clear to auscultation bilaterally    Heart:    Regular rhythm and normal rate    Chest Wall:    No abnormalities observed   Abdomen:     Soft, nondistended, nontender; normal bowel sounds   Extremities:   no edema, no redness   Skin:   No bruising or rash   Psychiatric:  normal mood and insight     Results Review:   I reviewed the patient's new clinical results.    Results from last 7 days   Lab Units 07/03/24  0738 07/03/24  0557 07/03/24  0000 07/02/24  1033   WBC 10*3/mm3 4.23 4.63  --  7.25   HEMOGLOBIN g/dL 9.8* 9.6* 9.7* 10.8*   HEMATOCRIT % 30.2* 30.1* 29.9* 33.9*   PLATELETS 10*3/mm3 219 214  --  260     Results from last 7 days   Lab Units 07/03/24  0000 07/02/24  1033   SODIUM mmol/L 141 139   POTASSIUM mmol/L 3.8 4.0   CHLORIDE mmol/L 105 102   CO2 mmol/L 28.7 27.9   BUN mg/dL 24* 35*   CREATININE mg/dL 0.86 0.97   CALCIUM mg/dL 9.0 9.8   BILIRUBIN mg/dL 0.3 0.4   ALK PHOS U/L 65 82   ALT (SGPT) U/L 13 14   AST (SGOT) U/L 25 26   GLUCOSE mg/dL 111* 154*     Results from last 7 days   Lab Units 07/02/24  1100 07/01/24  0000   INR  2.90 2.62     Lab Results   Lab Value Date/Time    LIPASE 26 07/02/2024 1033       Radiology:  CT Angiogram Abdomen Pelvis   Final Result       1. No active bowel hemorrhage identified.   2. Large urolithiasis seen in the right renal pelvis, without   hydronephrosis.   3. Colonic diverticulosis.   4. Uterine leiomyoma.   5. Small pulmonary nodule in the right lower lobe measuring 3 mm. If low   risk patient, no follow-up recommendations. If high risk  patient,   optional 12-month follow-up chest CT to reevaluate.       This report was finalized on 7/2/2024 12:35 PM by Dr. Akash Smith M.D on Workstation: [id number]          XR Abdomen 2+ VW with Chest 1 VW   Final Result          Assessment & Plan   Active Hospital Problems    Diagnosis     **GI hemorrhage     Atrial fibrillation, chronic     Chronic anticoagulation     Diabetes mellitus     Hyperlipidemia     Hypertension        Assessment:  GI bleed with melena  Acute blood loss anemia  Atrial fibrillation on outpatient Coumadin    Plan:  Probable upper GI bleed given elevated BUN relative to creatinine-hemodynamically stable with mild anemia.  Currently therapeutic and relation to her Coumadin.  Plan to perform EGD when INR is less than 1.8.  Will discuss with primary team whether we could administer vitamin K in the hopes of proceeding tomorrow  Clear liquid diet  Protonix drip in the interim      I discussed the patients findings and my recommendations with patient and family.          Daly Mariscal M.D.  [facility]  [phone]

## 2024-07-03 NOTE — CASE MANAGEMENT/SOCIAL WORK
Discharge Planning Assessment  [facility]     Patient Name: Arelis Johnson  MRN: [id number]  Today's Date: 7/3/2024    Admit Date: 7/2/2024    Plan: Home   Discharge Needs Assessment       Row Name 07/03/24 1154       Living Environment    People in Home alone    Current Living Arrangements home    Potentially Unsafe Housing Conditions none    In the past 12 months has the electric, gas, oil, or water company threatened to shut off services in your home? No    Primary Care Provided by self    Provides Primary Care For no one    Family Caregiver if Needed child(josiah), adult    Quality of Family Relationships helpful;supportive;involved    Able to Return to Prior Arrangements yes       Resource/Environmental Concerns    Resource/Environmental Concerns none       Transportation Needs    In the past 12 months, has lack of transportation kept you from medical appointments or from getting medications? no    In the past 12 months, has lack of transportation kept you from meetings, work, or from getting things needed for daily living? No       Food Insecurity    Within the past 12 months, you worried that your food would run out before you got the money to buy more. Never true    Within the past 12 months, the food you bought just didn't last and you didn't have money to get more. Never true       Transition Planning    Patient/Family Anticipates Transition to home    Patient/Family Anticipated Services at Transition none    Transportation Anticipated family or friend will provide;car, drives self       Discharge Needs Assessment    Readmission Within the Last 30 Days no previous admission in last 30 days    Equipment Currently Used at Home none    Concerns to be Addressed denies needs/concerns at this time    Anticipated Changes Related to Illness none    Equipment Needed After Discharge none    Provided Post Acute Provider List? N/A    Provided Post Acute Provider Quality & Resource List? N/A                    Discharge Plan       Row Name 07/03/24 1154       Plan    Plan Home    Plan Comments S/W pt and her dtr Emily at bedside.  Facesheet info confirmed.  Pt lives alone in a house, is IADLs and can drive.  Her dtr lives locally and they talk to each other daily.  Pt does not use any home DME.  Pt has used home health in the past and has been to SNF at [facility].  Pt states she is ambulating without difficulty and plans to return home upon DC.  She denies any DC needs at this time.  CCP will continue to follow and assist if needed. ..............Lyla VARGAS/ CHERYL                  Continued Care and Services - Admitted Since 7/2/2024    No active coordination exists for this encounter.          Demographic Summary       Row Name 07/03/24 1153       General Information    Admission Type observation    Arrived From home    Required Notices Provided Observation Status Notice    Referral Source admission list    Reason for Consult discharge planning    Preferred Language English                   Functional Status       Row Name 07/03/24 1153       Functional Status    Usual Activity Tolerance good    Current Activity Tolerance good       Functional Status, IADL    Medications independent    Meal Preparation independent    Housekeeping independent    Laundry independent    Shopping independent       Mental Status    General Appearance WDL WDL       Mental Status Summary    Recent Changes in Mental Status/Cognitive Functioning no changes       Employment/    Employment Status retired                    Lyla Patel RN

## 2024-07-03 NOTE — PROGRESS NOTES
Name: Arelis Johnson ADMIT: 2024   : 1940  PCP: Amanda Felix APRN    MRN: [id number] LOS: 0 days   AGE/SEX: 84 y.o. female  ROOM: [facility]     Subjective   Subjective   No further BM or any complaints       Objective   Objective   Vital Signs  Temp:  [97.7 °F (36.5 °C)-98 °F (36.7 °C)] 97.7 °F (36.5 °C)  Heart Rate:  [68-89] 87  Resp:  [16-18] 16  BP: (105-138)/(68-88) 132/72  SpO2:  [95 %-99 %] 96 %  on   ;   Device (Oxygen Therapy): room air  Body mass index is 27.79 kg/m².  Physical Exam  Vitals reviewed.   Constitutional:       General: She is not in acute distress.  HENT:      Head: Normocephalic and atraumatic.      Mouth/Throat:      Mouth: Mucous membranes are moist.   Eyes:      General: No scleral icterus.  Neck:      Vascular: No JVD.   Cardiovascular:      Rate and Rhythm: Normal rate. Rhythm irregular.      Heart sounds: No murmur heard.  Pulmonary:      Effort: Pulmonary effort is normal. No respiratory distress.   Abdominal:      General: There is no distension.      Palpations: Abdomen is soft.      Tenderness: There is no abdominal tenderness.   Musculoskeletal:         General: No swelling or tenderness.   Skin:     General: Skin is warm and dry.      Coloration: Skin is not jaundiced.      Findings: No rash.   Neurological:      Mental Status: She is alert and oriented to person, place, and time.   Psychiatric:         Mood and Affect: Mood normal.         Behavior: Behavior normal.       Results Review     I reviewed the patient's new clinical results.  Results from last 7 days   Lab Units 24  1608 24  0738 24  0557 24  0000 24  1033   WBC 10*3/mm3  --  4.23 4.63  --  7.25   HEMOGLOBIN g/dL 9.7* 9.8* 9.6* 9.7* 10.8*   PLATELETS 10*3/mm3  --  219 214  --  260     Results from last 7 days   Lab Units 24  0000 24  1033   SODIUM mmol/L 141 139   POTASSIUM mmol/L 3.8 4.0   CHLORIDE mmol/L 105 102   CO2 mmol/L 28.7 27.9   BUN mg/dL 24*  35*   CREATININE mg/dL 0.86 0.97   GLUCOSE mg/dL 111* 154*   EGFR mL/min/1.73 66.3 57.4*     Results from last 7 days   Lab Units 07/03/24  0000 07/02/24  1033   ALBUMIN g/dL 3.1* 3.9   BILIRUBIN mg/dL 0.3 0.4   ALK PHOS U/L 65 82   AST (SGOT) U/L 25 26   ALT (SGPT) U/L 13 14     Results from last 7 days   Lab Units 07/03/24  0000 07/02/24  1033   CALCIUM mg/dL 9.0 9.8   ALBUMIN g/dL 3.1* 3.9   MAGNESIUM mg/dL  --  1.6     Results from last 7 days   Lab Units 07/02/24  1400 07/02/24  1033   LACTATE mmol/L 1.4 3.1*     Glucose   Date/Time Value Ref Range Status   07/03/2024 1529 127 70 - 130 mg/dL Final   07/03/2024 1043 131 (H) 70 - 130 mg/dL Final   07/03/2024 0619 109 70 - 130 mg/dL Final       CT Angiogram Abdomen Pelvis    Result Date: 7/2/2024   1. No active bowel hemorrhage identified. 2. Large urolithiasis seen in the right renal pelvis, without hydronephrosis. 3. Colonic diverticulosis. 4. Uterine leiomyoma. 5. Small pulmonary nodule in the right lower lobe measuring 3 mm. If low risk patient, no follow-up recommendations. If high risk patient, optional 12-month follow-up chest CT to reevaluate.  This report was finalized on 7/2/2024 12:35 PM by Dr. Akash Smith M.D on Workstation: [id number]       I have personally reviewed all medications:  Scheduled Medications  atorvastatin, 20 mg, Oral, Daily  dilTIAZem CD, 180 mg, Oral, Daily  insulin lispro, 2-7 Units, Subcutaneous, 4x Daily AC & at Bedtime  metoprolol tartrate, 25 mg, Oral, BID  pantoprazole, 40 mg, Intravenous, Q12H    Infusions  sodium chloride, 75 mL/hr, Last Rate: 75 mL/hr (07/03/24 1324)    Diet  Diet: Liquid; Clear Liquid; Fluid Consistency: Thin (IDDSI 0)  NPO Diet NPO Type: Strict NPO    I have personally reviewed:  [x]  Laboratory   [x]  Microbiology   [x]  Radiology   [x]  EKG/Telemetry  [x]  Cardiology/Vascular   []  Pathology    []  Records       Assessment/Plan     Active Hospital Problems    Diagnosis  POA    **GI hemorrhage  [K92.2]  Yes    Atrial fibrillation, chronic [I48.20]  Yes    Chronic anticoagulation [Z79.01]  Not Applicable    Diabetes mellitus [E11.9]  Yes    Hyperlipidemia [E78.5]  Yes    Hypertension [I10]  Yes      Resolved Hospital Problems   No resolved problems to display.       84 y.o. female with history of A-fib on warfarin admitted with melena.    Melena, uncertain source.  Continue PPI.  Discussed with GI who is planning EGD tomorrow if INR <1.8  -Repeat labs tonight.  If it has not decreased to near 2 or less we will go ahead and give FFP.  Would prefer to hold off on vitamin K to reduce any risk for stroke given chronic A-fib.  Discussed with Dr. Mariscal    Anemia: Stable but much worse than it was in April (12.9).  Will certainly need endoscopy.  Check anemia labs    Chronic A-fib is stable.  Continue metoprolol    Diabetes stable.  Correctional insulin available if needed      SCDs  Disposition: Potentially home after EGD depending on findings and stability      Joel Pantoja MD  [facility]  07/03/24  17:19 EDT

## 2024-07-03 NOTE — PLAN OF CARE
Goal Outcome Evaluation:      Patient will remain free from falls while on this unit.

## 2024-07-04 ENCOUNTER — ANESTHESIA (OUTPATIENT)
Dept: GASTROENTEROLOGY | Facility: HOSPITAL | Age: 84
End: 2024-07-04
Payer: MEDICARE

## 2024-07-04 ENCOUNTER — ANESTHESIA EVENT (OUTPATIENT)
Dept: GASTROENTEROLOGY | Facility: HOSPITAL | Age: 84
End: 2024-07-04
Payer: MEDICARE

## 2024-07-04 PROBLEM — K25.9 GASTRIC ULCER: Status: ACTIVE | Noted: 2024-07-04

## 2024-07-04 LAB
ANION GAP SERPL CALCULATED.3IONS-SCNC: 13.5 MMOL/L (ref 5–15)
BASOPHILS # BLD AUTO: 0.02 10*3/MM3 (ref 0–0.2)
BASOPHILS NFR BLD AUTO: 0.5 % (ref 0–1.5)
BH BB BLOOD EXPIRATION DATE: NORMAL
BH BB BLOOD EXPIRATION DATE: NORMAL
BH BB BLOOD TYPE BARCODE: 5100
BH BB BLOOD TYPE BARCODE: 5100
BH BB DISPENSE STATUS: NORMAL
BH BB DISPENSE STATUS: NORMAL
BH BB PRODUCT CODE: NORMAL
BH BB PRODUCT CODE: NORMAL
BH BB UNIT NUMBER: NORMAL
BH BB UNIT NUMBER: NORMAL
BUN SERPL-MCNC: 12 MG/DL (ref 8–23)
BUN/CREAT SERPL: 17.1 (ref 7–25)
CALCIUM SPEC-SCNC: 8.9 MG/DL (ref 8.6–10.5)
CHLORIDE SERPL-SCNC: 108 MMOL/L (ref 98–107)
CO2 SERPL-SCNC: 22.5 MMOL/L (ref 22–29)
CREAT SERPL-MCNC: 0.7 MG/DL (ref 0.57–1)
DEPRECATED RDW RBC AUTO: 42.5 FL (ref 37–54)
EGFRCR SERPLBLD CKD-EPI 2021: 84.9 ML/MIN/1.73
EOSINOPHIL # BLD AUTO: 0.12 10*3/MM3 (ref 0–0.4)
EOSINOPHIL NFR BLD AUTO: 2.8 % (ref 0.3–6.2)
ERYTHROCYTE [DISTWIDTH] IN BLOOD BY AUTOMATED COUNT: 12.5 % (ref 12.3–15.4)
FOLATE SERPL-MCNC: >20 NG/ML (ref 4.78–24.2)
GLUCOSE BLDC GLUCOMTR-MCNC: 106 MG/DL (ref 70–130)
GLUCOSE BLDC GLUCOMTR-MCNC: 112 MG/DL (ref 70–130)
GLUCOSE BLDC GLUCOMTR-MCNC: 132 MG/DL (ref 70–130)
GLUCOSE BLDC GLUCOMTR-MCNC: 171 MG/DL (ref 70–130)
GLUCOSE SERPL-MCNC: 97 MG/DL (ref 65–99)
HCT VFR BLD AUTO: 26.8 % (ref 34–46.6)
HCT VFR BLD AUTO: 26.8 % (ref 34–46.6)
HCT VFR BLD AUTO: 27.3 % (ref 34–46.6)
HGB BLD-MCNC: 8.8 G/DL (ref 12–15.9)
HGB BLD-MCNC: 8.8 G/DL (ref 12–15.9)
HGB BLD-MCNC: 8.9 G/DL (ref 12–15.9)
IMM GRANULOCYTES # BLD AUTO: 0.03 10*3/MM3 (ref 0–0.05)
IMM GRANULOCYTES NFR BLD AUTO: 0.7 % (ref 0–0.5)
INR PPP: 1.63 (ref 0.9–1.1)
IRON 24H UR-MRATE: 56 MCG/DL (ref 37–145)
IRON SATN MFR SERPL: 17 % (ref 20–50)
LYMPHOCYTES # BLD AUTO: 1.15 10*3/MM3 (ref 0.7–3.1)
LYMPHOCYTES NFR BLD AUTO: 26.6 % (ref 19.6–45.3)
MCH RBC QN AUTO: 30.4 PG (ref 26.6–33)
MCHC RBC AUTO-ENTMCNC: 32.8 G/DL (ref 31.5–35.7)
MCV RBC AUTO: 92.7 FL (ref 79–97)
MONOCYTES # BLD AUTO: 0.39 10*3/MM3 (ref 0.1–0.9)
MONOCYTES NFR BLD AUTO: 9 % (ref 5–12)
NEUTROPHILS NFR BLD AUTO: 2.62 10*3/MM3 (ref 1.7–7)
NEUTROPHILS NFR BLD AUTO: 60.4 % (ref 42.7–76)
NRBC BLD AUTO-RTO: 0 /100 WBC (ref 0–0.2)
PLATELET # BLD AUTO: 198 10*3/MM3 (ref 140–450)
PMV BLD AUTO: 10.3 FL (ref 6–12)
POTASSIUM SERPL-SCNC: 3.9 MMOL/L (ref 3.5–5.2)
PROTHROMBIN TIME: 19.5 SECONDS (ref 11.7–14.2)
RBC # BLD AUTO: 2.89 10*6/MM3 (ref 3.77–5.28)
SODIUM SERPL-SCNC: 144 MMOL/L (ref 136–145)
TIBC SERPL-MCNC: 331 MCG/DL (ref 298–536)
TRANSFERRIN SERPL-MCNC: 222 MG/DL (ref 200–360)
UNIT  ABO: NORMAL
UNIT  ABO: NORMAL
UNIT  RH: NORMAL
UNIT  RH: NORMAL
VIT B12 BLD-MCNC: 522 PG/ML (ref 211–946)
WBC NRBC COR # BLD AUTO: 4.33 10*3/MM3 (ref 3.4–10.8)

## 2024-07-04 PROCEDURE — 25010000002 PROPOFOL 10 MG/ML EMULSION: Performed by: STUDENT IN AN ORGANIZED HEALTH CARE EDUCATION/TRAINING PROGRAM

## 2024-07-04 PROCEDURE — 85610 PROTHROMBIN TIME: CPT | Performed by: INTERNAL MEDICINE

## 2024-07-04 PROCEDURE — 82607 VITAMIN B-12: CPT | Performed by: HOSPITALIST

## 2024-07-04 PROCEDURE — 25810000003 SODIUM CHLORIDE 0.9 % SOLUTION: Performed by: INTERNAL MEDICINE

## 2024-07-04 PROCEDURE — 80048 BASIC METABOLIC PNL TOTAL CA: CPT | Performed by: INTERNAL MEDICINE

## 2024-07-04 PROCEDURE — 43239 EGD BIOPSY SINGLE/MULTIPLE: CPT | Performed by: INTERNAL MEDICINE

## 2024-07-04 PROCEDURE — 82948 REAGENT STRIP/BLOOD GLUCOSE: CPT

## 2024-07-04 PROCEDURE — 85014 HEMATOCRIT: CPT | Performed by: HOSPITALIST

## 2024-07-04 PROCEDURE — 85018 HEMOGLOBIN: CPT | Performed by: HOSPITALIST

## 2024-07-04 PROCEDURE — 88305 TISSUE EXAM BY PATHOLOGIST: CPT | Performed by: INTERNAL MEDICINE

## 2024-07-04 PROCEDURE — 0DB78ZX EXCISION OF STOMACH, PYLORUS, VIA NATURAL OR ARTIFICIAL OPENING ENDOSCOPIC, DIAGNOSTIC: ICD-10-PCS | Performed by: INTERNAL MEDICINE

## 2024-07-04 PROCEDURE — 25010000002 GLYCOPYRROLATE 1 MG/5ML SOLUTION: Performed by: STUDENT IN AN ORGANIZED HEALTH CARE EDUCATION/TRAINING PROGRAM

## 2024-07-04 PROCEDURE — 84466 ASSAY OF TRANSFERRIN: CPT | Performed by: HOSPITALIST

## 2024-07-04 PROCEDURE — 85025 COMPLETE CBC W/AUTO DIFF WBC: CPT | Performed by: INTERNAL MEDICINE

## 2024-07-04 PROCEDURE — 63710000001 INSULIN LISPRO (HUMAN) PER 5 UNITS: Performed by: INTERNAL MEDICINE

## 2024-07-04 PROCEDURE — 83540 ASSAY OF IRON: CPT | Performed by: HOSPITALIST

## 2024-07-04 PROCEDURE — 25810000003 LACTATED RINGERS PER 1000 ML: Performed by: STUDENT IN AN ORGANIZED HEALTH CARE EDUCATION/TRAINING PROGRAM

## 2024-07-04 PROCEDURE — 0DB68ZX EXCISION OF STOMACH, VIA NATURAL OR ARTIFICIAL OPENING ENDOSCOPIC, DIAGNOSTIC: ICD-10-PCS | Performed by: INTERNAL MEDICINE

## 2024-07-04 PROCEDURE — 82746 ASSAY OF FOLIC ACID SERUM: CPT | Performed by: HOSPITALIST

## 2024-07-04 RX ORDER — SODIUM CHLORIDE 9 MG/ML
30 INJECTION, SOLUTION INTRAVENOUS CONTINUOUS PRN
Status: DISCONTINUED | OUTPATIENT
Start: 2024-07-04 | End: 2024-07-04

## 2024-07-04 RX ORDER — SUCRALFATE 1 G/1
1 TABLET ORAL
Status: DISCONTINUED | OUTPATIENT
Start: 2024-07-04 | End: 2024-07-05 | Stop reason: HOSPADM

## 2024-07-04 RX ORDER — PANTOPRAZOLE SODIUM 40 MG/1
40 TABLET, DELAYED RELEASE ORAL
Status: DISCONTINUED | OUTPATIENT
Start: 2024-07-04 | End: 2024-07-05 | Stop reason: HOSPADM

## 2024-07-04 RX ORDER — PROPOFOL 10 MG/ML
VIAL (ML) INTRAVENOUS AS NEEDED
Status: DISCONTINUED | OUTPATIENT
Start: 2024-07-04 | End: 2024-07-04 | Stop reason: SURG

## 2024-07-04 RX ORDER — SODIUM CHLORIDE, SODIUM LACTATE, POTASSIUM CHLORIDE, CALCIUM CHLORIDE 600; 310; 30; 20 MG/100ML; MG/100ML; MG/100ML; MG/100ML
INJECTION, SOLUTION INTRAVENOUS CONTINUOUS PRN
Status: DISCONTINUED | OUTPATIENT
Start: 2024-07-04 | End: 2024-07-04 | Stop reason: SURG

## 2024-07-04 RX ORDER — GLYCOPYRROLATE 0.2 MG/ML
INJECTION INTRAMUSCULAR; INTRAVENOUS AS NEEDED
Status: DISCONTINUED | OUTPATIENT
Start: 2024-07-04 | End: 2024-07-04 | Stop reason: SURG

## 2024-07-04 RX ORDER — LIDOCAINE HYDROCHLORIDE 20 MG/ML
INJECTION, SOLUTION INFILTRATION; PERINEURAL AS NEEDED
Status: DISCONTINUED | OUTPATIENT
Start: 2024-07-04 | End: 2024-07-04 | Stop reason: SURG

## 2024-07-04 RX ADMIN — LIDOCAINE HYDROCHLORIDE 60 MG: 20 INJECTION, SOLUTION INFILTRATION; PERINEURAL at 11:00

## 2024-07-04 RX ADMIN — METOPROLOL TARTRATE 25 MG: 25 TABLET, FILM COATED ORAL at 19:50

## 2024-07-04 RX ADMIN — SODIUM CHLORIDE 30 ML/HR: 9 INJECTION, SOLUTION INTRAVENOUS at 10:46

## 2024-07-04 RX ADMIN — DILTIAZEM HYDROCHLORIDE 180 MG: 180 CAPSULE, EXTENDED RELEASE ORAL at 11:42

## 2024-07-04 RX ADMIN — PROPOFOL 20 MG: 10 INJECTION, EMULSION INTRAVENOUS at 11:00

## 2024-07-04 RX ADMIN — PANTOPRAZOLE SODIUM 40 MG: 40 INJECTION, POWDER, FOR SOLUTION INTRAVENOUS at 11:42

## 2024-07-04 RX ADMIN — ACETAMINOPHEN 325MG 650 MG: 325 TABLET ORAL at 19:50

## 2024-07-04 RX ADMIN — SUCRALFATE 1 G: 1 TABLET ORAL at 16:35

## 2024-07-04 RX ADMIN — INSULIN LISPRO 2 UNITS: 100 INJECTION, SOLUTION INTRAVENOUS; SUBCUTANEOUS at 21:26

## 2024-07-04 RX ADMIN — SUCRALFATE 1 G: 1 TABLET ORAL at 11:47

## 2024-07-04 RX ADMIN — METOPROLOL TARTRATE 25 MG: 25 TABLET, FILM COATED ORAL at 11:42

## 2024-07-04 RX ADMIN — GLYCOPYRROLATE 0.1 MG: 0.2 INJECTION, SOLUTION INTRAMUSCULAR; INTRAVENOUS at 11:00

## 2024-07-04 RX ADMIN — SODIUM CHLORIDE, SODIUM LACTATE, POTASSIUM CHLORIDE, AND CALCIUM CHLORIDE: 600; 310; 30; 20 INJECTION, SOLUTION INTRAVENOUS at 10:55

## 2024-07-04 RX ADMIN — PANTOPRAZOLE SODIUM 40 MG: 40 TABLET, DELAYED RELEASE ORAL at 16:35

## 2024-07-04 RX ADMIN — ATORVASTATIN CALCIUM 20 MG: 20 TABLET, FILM COATED ORAL at 11:42

## 2024-07-04 RX ADMIN — PROPOFOL 200 MCG/KG/MIN: 10 INJECTION, EMULSION INTRAVENOUS at 11:00

## 2024-07-04 NOTE — ANESTHESIA PREPROCEDURE EVALUATION
Anesthesia Evaluation     NPO Solid Status: > 8 hours  NPO Liquid Status: > 8 hours           Airway   Mallampati: II  TM distance: >3 FB  Neck ROM: full  No difficulty expected  Dental    (+) upper dentures and partials    Pulmonary - normal exam   (-) COPD, asthma, no home oxygen  Cardiovascular     Rhythm: irregular  Rate: normal    (+) hypertension, dysrhythmias Atrial Fib, hyperlipidemia      Neuro/Psych  (+) seizures  (-) CVA  GI/Hepatic/Renal/Endo    (+) GI bleeding , diabetes mellitus    Musculoskeletal     Abdominal  - normal exam   Substance History      OB/GYN          Other   arthritis,                 Anesthesia Plan    ASA 3     MAC     intravenous induction     Anesthetic plan, risks, benefits, and alternatives have been provided, discussed and informed consent has been obtained with: patient.    Plan discussed with CRNA and attending.    CODE STATUS:    Medical Intervention Limits: No intubation (DNI)  Level Of Support Discussed With: Patient  Code Status (Patient has no pulse and is not breathing): CPR (Attempt to Resuscitate)  Medical Interventions (Patient has pulse or is breathing): Limited Support

## 2024-07-04 NOTE — PROGRESS NOTES
Name: Arelis Johnson ADMIT: 2024   : 1940  PCP: Amanda Felix APRN    MRN: [id number] LOS: 0 days   AGE/SEX: 84 y.o. female  ROOM: [facility]     Subjective   Subjective   She reports she did have a dark bowel movement yesterday.  Hemoglobin down slightly       Objective   Objective   Vital Signs  Temp:  [97.7 °F (36.5 °C)-98.6 °F (37 °C)] 98.4 °F (36.9 °C)  Heart Rate:  [62-97] 92  Resp:  [11-21] 16  BP: (105-133)/(52-96) 128/79  SpO2:  [93 %-100 %] 100 %  on  Flow (L/min):  [10] 10;   Device (Oxygen Therapy): room air  Body mass index is 27.79 kg/m².  Physical Exam  Vitals reviewed.   Constitutional:       General: She is not in acute distress.  HENT:      Head: Normocephalic and atraumatic.   Eyes:      General: No scleral icterus.  Neck:      Vascular: No JVD.   Pulmonary:      Effort: Pulmonary effort is normal. No respiratory distress.   Abdominal:      General: There is no distension.      Palpations: Abdomen is soft.      Tenderness: There is no abdominal tenderness.   Musculoskeletal:         General: No swelling or tenderness.   Skin:     General: Skin is warm and dry.      Coloration: Skin is not jaundiced.      Findings: No rash.   Neurological:      Mental Status: She is alert and oriented to person, place, and time.   Psychiatric:         Mood and Affect: Mood normal.         Behavior: Behavior normal.       Results Review     I reviewed the patient's new clinical results.  Results from last 7 days   Lab Units 24  0601 24  0011 24  1608 24  0738 24  0557 24  0000 24  1033   WBC 10*3/mm3 4.33  --   --  4.23 4.63  --  7.25   HEMOGLOBIN g/dL 8.8*  8.8* 8.9* 9.7* 9.8* 9.6*   < > 10.8*   PLATELETS 10*3/mm3 198  --   --  219 214  --  260    < > = values in this interval not displayed.     Results from last 7 days   Lab Units 24  0601 24  0000 24  1033   SODIUM mmol/L 144 141 139   POTASSIUM mmol/L 3.9 3.8 4.0   CHLORIDE mmol/L  108* 105 102   CO2 mmol/L 22.5 28.7 27.9   BUN mg/dL 12 24* 35*   CREATININE mg/dL 0.70 0.86 0.97   GLUCOSE mg/dL 97 111* 154*   EGFR mL/min/1.73 84.9 66.3 57.4*     Results from last 7 days   Lab Units 07/03/24  0000 07/02/24  1033   ALBUMIN g/dL 3.1* 3.9   BILIRUBIN mg/dL 0.3 0.4   ALK PHOS U/L 65 82   AST (SGOT) U/L 25 26   ALT (SGPT) U/L 13 14     Results from last 7 days   Lab Units 07/04/24  0601 07/03/24  0000 07/02/24  1033   CALCIUM mg/dL 8.9 9.0 9.8   ALBUMIN g/dL  --  3.1* 3.9   MAGNESIUM mg/dL  --   --  1.6     Results from last 7 days   Lab Units 07/02/24  1400 07/02/24  1033   LACTATE mmol/L 1.4 3.1*     Glucose   Date/Time Value Ref Range Status   07/04/2024 1140 106 70 - 130 mg/dL Final   07/04/2024 0614 112 70 - 130 mg/dL Final   07/03/2024 1956 129 70 - 130 mg/dL Final   07/03/2024 1529 127 70 - 130 mg/dL Final   07/03/2024 1043 131 (H) 70 - 130 mg/dL Final   07/03/2024 0619 109 70 - 130 mg/dL Final       No radiology results for the last day    I have personally reviewed all medications:  Scheduled Medications  atorvastatin, 20 mg, Oral, Daily  dilTIAZem CD, 180 mg, Oral, Daily  insulin lispro, 2-7 Units, Subcutaneous, 4x Daily AC & at Bedtime  metoprolol tartrate, 25 mg, Oral, BID  pantoprazole, 40 mg, Intravenous, Q12H  sucralfate, 1 g, Oral, TID AC    Infusions  sodium chloride, 30 mL/hr, Last Rate: Stopped (07/04/24 1118)    Diet  Diet: Cardiac; Healthy Heart (2-3 Na+); Fluid Consistency: Thin (IDDSI 0)    I have personally reviewed:  [x]  Laboratory   []  Microbiology   []  Radiology   []  EKG/Telemetry  []  Cardiology/Vascular   []  Pathology    []  Records       Assessment/Plan     Active Hospital Problems    Diagnosis  POA    **GI hemorrhage [K92.2]  Yes    Atrial fibrillation, chronic [I48.20]  Yes    Chronic anticoagulation [Z79.01]  Not Applicable    Diabetes mellitus [E11.9]  Yes    Hyperlipidemia [E78.5]  Yes    Hypertension [I10]  Yes      Resolved Hospital Problems   No resolved  problems to display.       84 y.o. female with history of A-fib on warfarin admitted with melena.    Status post EGD with findings of 2 nonbleeding cratered gastric ulcers.  - Transition to oral PPI twice daily  - Carafate as recommended by GI  - Hold warfarin x 1 week per their recommendations    Anemia: Slightly lower today.  Would like to watch overnight given drop since yesterday.  - Reviewed anemia labs.  Iron saturation just minimally low but total serum iron normal.    Chronic A-fib is stable.  Continue metoprolol    Diabetes stable.  Correctional insulin available if needed      SCDs  Disposition: Home tomorrow if hemoglobin stable      Joel Pantoja MD  [facility]  07/04/24  15:15 EDT

## 2024-07-04 NOTE — ANESTHESIA POSTPROCEDURE EVALUATION
"Patient: Arelis Johnson    Procedure Summary       Date: 07/04/24 Room / Location:  [facility] /  [facility]    Anesthesia Start: 1055 Anesthesia Stop: 1112    Procedure: ESOPHAGOGASTRODUODENOSCOPY with biopsies (Esophagus) Diagnosis:       Gastrointestinal hemorrhage with melena      (Gastrointestinal hemorrhage with melena [K92.1])    Surgeons: Daly Mariscal MD Provider: Harinder De Leon MD    Anesthesia Type: MAC ASA Status: 3            Anesthesia Type: No value filed.    Vitals  Vitals Value Taken Time   /96 07/04/24 1125   Temp     Pulse 97 07/04/24 1125   Resp 17 07/04/24 1125   SpO2 93 % 07/04/24 1125           Post Anesthesia Care and Evaluation    Patient location during evaluation: bedside  Patient participation: complete - patient participated  Level of consciousness: awake and alert  Pain management: adequate    Airway patency: patent  Anesthetic complications: No anesthetic complications    Cardiovascular status: acceptable  Respiratory status: acceptable  Hydration status: acceptable    Comments: /79 (BP Location: Left arm, Patient Position: Lying)   Pulse 92   Temp 36.9 °C (98.4 °F) (Oral)   Resp 16   Ht 165.1 cm (65\")   Wt 75.8 kg (167 lb)   LMP  (LMP Unknown)   SpO2 100%   BMI 27.79 kg/m²     "

## 2024-07-05 ENCOUNTER — READMISSION MANAGEMENT (OUTPATIENT)
Dept: CALL CENTER | Facility: HOSPITAL | Age: 84
End: 2024-07-05
Payer: MEDICARE

## 2024-07-05 VITALS
BODY MASS INDEX: 29.61 KG/M2 | HEIGHT: 65 IN | TEMPERATURE: 98.1 F | HEART RATE: 92 BPM | SYSTOLIC BLOOD PRESSURE: 135 MMHG | WEIGHT: 177.69 LBS | DIASTOLIC BLOOD PRESSURE: 67 MMHG | RESPIRATION RATE: 16 BRPM | OXYGEN SATURATION: 99 %

## 2024-07-05 LAB
BASOPHILS # BLD AUTO: 0.01 10*3/MM3 (ref 0–0.2)
BASOPHILS NFR BLD AUTO: 0.2 % (ref 0–1.5)
DEPRECATED RDW RBC AUTO: 44.3 FL (ref 37–54)
EOSINOPHIL # BLD AUTO: 0.09 10*3/MM3 (ref 0–0.4)
EOSINOPHIL NFR BLD AUTO: 2.2 % (ref 0.3–6.2)
ERYTHROCYTE [DISTWIDTH] IN BLOOD BY AUTOMATED COUNT: 12.8 % (ref 12.3–15.4)
GLUCOSE BLDC GLUCOMTR-MCNC: 103 MG/DL (ref 70–130)
HCT VFR BLD AUTO: 28.1 % (ref 34–46.6)
HGB BLD-MCNC: 9.1 G/DL (ref 12–15.9)
IMM GRANULOCYTES # BLD AUTO: 0.02 10*3/MM3 (ref 0–0.05)
IMM GRANULOCYTES NFR BLD AUTO: 0.5 % (ref 0–0.5)
LYMPHOCYTES # BLD AUTO: 0.93 10*3/MM3 (ref 0.7–3.1)
LYMPHOCYTES NFR BLD AUTO: 23 % (ref 19.6–45.3)
MCH RBC QN AUTO: 31 PG (ref 26.6–33)
MCHC RBC AUTO-ENTMCNC: 32.4 G/DL (ref 31.5–35.7)
MCV RBC AUTO: 95.6 FL (ref 79–97)
MONOCYTES # BLD AUTO: 0.39 10*3/MM3 (ref 0.1–0.9)
MONOCYTES NFR BLD AUTO: 9.6 % (ref 5–12)
NEUTROPHILS NFR BLD AUTO: 2.61 10*3/MM3 (ref 1.7–7)
NEUTROPHILS NFR BLD AUTO: 64.5 % (ref 42.7–76)
NRBC BLD AUTO-RTO: 0 /100 WBC (ref 0–0.2)
PLATELET # BLD AUTO: 190 10*3/MM3 (ref 140–450)
PMV BLD AUTO: 10.5 FL (ref 6–12)
RBC # BLD AUTO: 2.94 10*6/MM3 (ref 3.77–5.28)
WBC NRBC COR # BLD AUTO: 4.05 10*3/MM3 (ref 3.4–10.8)

## 2024-07-05 PROCEDURE — 82948 REAGENT STRIP/BLOOD GLUCOSE: CPT

## 2024-07-05 PROCEDURE — 99232 SBSQ HOSP IP/OBS MODERATE 35: CPT | Performed by: PHYSICIAN ASSISTANT

## 2024-07-05 PROCEDURE — 85025 COMPLETE CBC W/AUTO DIFF WBC: CPT | Performed by: INTERNAL MEDICINE

## 2024-07-05 RX ORDER — PANTOPRAZOLE SODIUM 40 MG/1
40 TABLET, DELAYED RELEASE ORAL
Qty: 60 TABLET | Refills: 2 | Status: SHIPPED | OUTPATIENT
Start: 2024-07-05

## 2024-07-05 RX ORDER — SUCRALFATE 1 G/1
1 TABLET ORAL
Qty: 90 TABLET | Refills: 0 | Status: SHIPPED | OUTPATIENT
Start: 2024-07-05

## 2024-07-05 RX ADMIN — PANTOPRAZOLE SODIUM 40 MG: 40 TABLET, DELAYED RELEASE ORAL at 05:24

## 2024-07-05 RX ADMIN — SUCRALFATE 1 G: 1 TABLET ORAL at 05:23

## 2024-07-05 RX ADMIN — METOPROLOL TARTRATE 25 MG: 25 TABLET, FILM COATED ORAL at 09:13

## 2024-07-05 RX ADMIN — DILTIAZEM HYDROCHLORIDE 180 MG: 180 CAPSULE, EXTENDED RELEASE ORAL at 09:12

## 2024-07-05 NOTE — PLAN OF CARE
Patient was AO4, calm and cooperative. VSS, on room air. Patient complained of mild sore throat post EGD, ice chips and tylenol given with relief. Patient has refused to wear SCD tonight, education provided but still refusing. Patient had no signs of bleeding. No bowel movement overnight.    Goal Outcome Evaluation:  Plan of Care Reviewed With: patient        Progress: improving

## 2024-07-05 NOTE — DISCHARGE SUMMARY
Patient Name: Arelis Johnson  : 1940  MRN: [id number]    Date of Admission: 2024  Date of Discharge:  2024  Primary Care Physician: Amanda Felix APRN      Chief Complaint:   Black or Bloody Stool (1 episode of black stool yesterday morning and a second episode this morning, +thinners. Denies abdominal pain, light headed, SOA, chest pain, n/v. )      Discharge Diagnoses     Active Hospital Problems    Diagnosis  POA    **GI hemorrhage [K92.2]  Yes    Gastric ulcer [K25.9]  Yes    Atrial fibrillation, chronic [I48.20]  Yes    Chronic anticoagulation [Z79.01]  Not Applicable    Diabetes mellitus [E11.9]  Yes    Hyperlipidemia [E78.5]  Yes    Hypertension [I10]  Yes      Resolved Hospital Problems   No resolved problems to display.        Hospital Course     Ms. Johnson is a 84 y.o. female with a history of atrial fibrillation on chronic warfarin along with diabetes, hypertension who presented to the hospital complaining of melena x 2.  She really had no other associated symptoms.  Please see H&P for details.  Her INR was therapeutic but her hemoglobin was only 10.8 and had previously been normal a few months before.  Lactate level initially was 3.1 but came down with hydration.  She was Hemoccult positive in the emergency room.  A CT angiogram of the abdomen and pelvis was performed showing no active bowel hemorrhage.  There was a large urolithiasis in the right renal pelvis with no hydronephrosis, uterine leiomyoma, as well as a small pulmonary nodule felt to be low risk.  Patient apparently has known about the urolithiasis for many years and is not causing her any current symptoms.  Serial hemoglobins were checked and it did trend downward before stabilizing around 9.  She had at least 1 further episode of melena after admission.  She was seen by GI and EGD was performed revealing 2 clean-based cratered gastric ulcers with no active bleeding.  They recommended 1 month of Carafate along  with twice daily Protonix for 3 months.  Given stability of hemoglobin she is stable for discharge today.  They recommended holding the warfarin for 1 week which patient is aware of.  She can follow-up with her PCP next week for CBC check and for further recommendations.  She is agreeable with plans for discharge    Day of Discharge     Subjective:  Uneventful night.  She feels good.  Had 1 BM which was green    Physical Exam:  Temp:  [98.1 °F (36.7 °C)-99.1 °F (37.3 °C)] 98.1 °F (36.7 °C)  Heart Rate:  [58-97] 92  Resp:  [11-21] 16  BP: (104-135)/(67-96) 135/67  Body mass index is 29.57 kg/m².  Physical Exam  Vitals reviewed.   Constitutional:       General: She is not in acute distress.  HENT:      Head: Normocephalic and atraumatic.   Eyes:      General: No scleral icterus.  Neck:      Vascular: No JVD.   Pulmonary:      Effort: Pulmonary effort is normal. No respiratory distress.   Abdominal:      General: There is no distension.      Palpations: Abdomen is soft.      Tenderness: There is no abdominal tenderness.   Musculoskeletal:         General: No swelling or tenderness.   Skin:     General: Skin is warm and dry.      Coloration: Skin is not jaundiced.      Findings: No rash.   Neurological:      Mental Status: She is alert and oriented to person, place, and time.   Psychiatric:         Mood and Affect: Mood normal.         Behavior: Behavior normal.         Consultants     Consult Orders (all) (From admission, onward)       Start     Ordered    07/03/24 0702  Inpatient Gastroenterology Consult  [place]         Specialty:  Gastroenterology  Provider:  Daly Mariscal MD    07/02/24 2240                  Procedures     ESOPHAGOGASTRODUODENOSCOPY with biopsies    Imaging Results (All)       Procedure Component Value Units Date/Time    CT Angiogram Abdomen Pelvis [[id number]] Collected: 07/02/24 1223     Updated: 07/02/24 1238    Narrative:      CT ANGIOGRAM ABDOMEN PELVIS-     INDICATION: GI hemorrhage. Black  stools.     COMPARISON: None     TECHNIQUE:  CTA abdomen/pelvis with and without IV contrast. Noncontrast, arterial  and venous phases. Coronal and sagittal reformats. Three dimensional  reconstructions. Radiation dose reduction techniques were utilized,  including automated exposure control and exposure modulation based on  body size.     FINDINGS:      Lung bases: Small amount of subsegmental atelectasis or scarring at the  bases. Calcified pulmonary nodule in the posterior basilar left lower  lobe, consistent with prior granulomatous infection. Solid pulmonary  nodule in the anterior basilar right lower lobe, series 12, axial mage  16, measures 3 mm. Coronary artery atherosclerotic calcifications.  Aortic valve calcification.     ABDOMEN: Normal liver. Cholecystectomy. No biliary ductal dilatation.  Calcifications in the spleen, consistent with prior granulomatous  infection. Spleen is normal in size. Mild pancreatic atrophy. No  pancreatic ductal dilatation or mass seen. No adrenal nodules. Large  urolithiasis seen in the right renal pelvis, series 16, axial mage 51,  measuring 17 mm. Lobular left renal contour with some cortical loss,  suspect scarring. No solid-appearing renal mass or hydronephrosis.     Pelvis: Underdistended bladder. No bladder calculus. Anteverted uterus.  Partially calcified mass in the left uterus, series 12, axial image 165,  measures approximately 4.1 x 3.7 cm, suspect leiomyoma. No adnexal mass.     Bowel: No obstruction. Colonic diverticulosis. Normal appendix. No  active bowel hemorrhage identified.     Abdominal wall: Small fat-containing umbilical hernia.     Retroperitoneum: No lymphadenopathy.     Vasculature: Patent. No abdominal aortic aneurysm. Mild to moderate  aortoiliac atherosclerotic calcification.     Osseous structures: Small amount of bilateral hip osteoarthritic change.  Lumbar facet degenerative arthropathy with grade 1 anterolisthesis of L3  on L4. Severe disc  height loss at L4/L5..       Impression:         1. No active bowel hemorrhage identified.  2. Large urolithiasis seen in the right renal pelvis, without  hydronephrosis.  3. Colonic diverticulosis.  4. Uterine leiomyoma.  5. Small pulmonary nodule in the right lower lobe measuring 3 mm. If low  risk patient, no follow-up recommendations. If high risk patient,  optional 12-month follow-up chest CT to reevaluate.     This report was finalized on 7/2/2024 12:35 PM by Dr. Akash Smith M.D on Workstation: [id number]       XR Abdomen 2+ VW with Chest 1 VW [[id number]] Collected: 07/02/24 1138     Updated: 07/02/24 1145    Narrative:      XR ABDOMEN 2+ VIEWS W CHEST 1 VW-     Clinical: Black tarry stools, on blood thinners, history of renal  calculus.     FINDINGS: Cardiac size upper normal to mildly enlarged. Fine linear  areas of scarring demonstrated at the lung bases. No pleural effusion,  vascular congestion or acute airspace disease is demonstrated.     17 mm calculus superimposes the right renal silhouettes. There is  nonobstructive bowel gas pattern. Soft tissue planes are preserved.  Moderate fecal material within the colon is greater than typically seen.  There is no indication of underlying impaction.     CONCLUSION: No active cardiovascular or pulmonary process is  demonstrated, nonobstructive bowel gas pattern with right renal  calculus. Amount of fecal material within the colon is greater than  typically seen.     This report was finalized on 7/2/2024 11:41 AM by Dr. Michael Moscoso M.D  on Workstation: [id number]               Results for orders placed during the hospital encounter of 04/12/17    Adult Transthoracic Echo Complete With Contrast    Interpretation Summary  · Left ventricular wall thickness is consistent with borderline concentric hypertrophy.  · Left ventricular function is normal. Estimated EF = 63%.  · Left ventricular diastolic function was unable to be assessed. Elevated left atrial  "pressure.    Pertinent Labs     Results from last 7 days   Lab Units 07/05/24  0506 07/04/24  0601 07/04/24  0011 07/03/24  1608 07/03/24  0738 07/03/24  0557   WBC 10*3/mm3 4.05 4.33  --   --  4.23 4.63   HEMOGLOBIN g/dL 9.1* 8.8*  8.8* 8.9* 9.7* 9.8* 9.6*   PLATELETS 10*3/mm3 190 198  --   --  219 214     Results from last 7 days   Lab Units 07/04/24  0601 07/03/24  0000 07/02/24  1033   SODIUM mmol/L 144 141 139   POTASSIUM mmol/L 3.9 3.8 4.0   CHLORIDE mmol/L 108* 105 102   CO2 mmol/L 22.5 28.7 27.9   BUN mg/dL 12 24* 35*   CREATININE mg/dL 0.70 0.86 0.97   GLUCOSE mg/dL 97 111* 154*   EGFR mL/min/1.73 84.9 66.3 57.4*     Results from last 7 days   Lab Units 07/03/24  0000 07/02/24  1033   ALBUMIN g/dL 3.1* 3.9   BILIRUBIN mg/dL 0.3 0.4   ALK PHOS U/L 65 82   AST (SGOT) U/L 25 26   ALT (SGPT) U/L 13 14     Results from last 7 days   Lab Units 07/04/24  0601 07/03/24  0000 07/02/24  1033   CALCIUM mg/dL 8.9 9.0 9.8   ALBUMIN g/dL  --  3.1* 3.9   MAGNESIUM mg/dL  --   --  1.6     Results from last 7 days   Lab Units 07/02/24  1033   LIPASE U/L 26     Results from last 7 days   Lab Units 07/02/24  1033   HSTROP T ng/L 17*           Invalid input(s): \"LDLCALC\"          Test Results Pending at Discharge     Pending Labs       Order Current Status    Tissue Pathology Exam In process            Discharge Details        Discharge Medications        New Medications        Instructions Start Date   pantoprazole 40 MG EC tablet  Commonly known as: PROTONIX   40 mg, Oral, 2 Times Daily Before Meals      sucralfate 1 g tablet  Commonly known as: CARAFATE   1 g, Oral, 3 Times Daily Before Meals             Continue These Medications        Instructions Start Date   allopurinol 100 MG tablet  Commonly known as: ZYLOPRIM   100 mg, Oral, Daily      Calcium + D3 600-200 MG-UNIT tablet   1 tablet, Oral, Daily      dilTIAZem  MG 24 hr capsule  Commonly known as: CARDIZEM CD   TAKE ONE CAPSULE (BY MOUTH) EACH DAY    "   furosemide 40 MG tablet  Commonly known as: LASIX   40 mg, Oral, Daily      metFORMIN  MG 24 hr tablet  Commonly known as: GLUCOPHAGE-XR   500 mg, Oral, Daily With Breakfast      metoprolol tartrate 50 MG tablet  Commonly known as: LOPRESSOR   25 mg, Oral, 2 Times Daily      simvastatin 40 MG tablet  Commonly known as: ZOCOR   40 mg, Oral, Nightly      vitamin C 250 MG tablet  Commonly known as: ASCORBIC ACID   250 mg, Oral, Daily      Zinc 25 MG tablet   Oral             Stop These Medications      guaifenesin-dextromethorphan 600-30 mg  MG tablet sustained-release 12 hour     Triamcinolone Acetonide 55 MCG/ACT nasal inhaler  Commonly known as: Nasacort Allergy 24HR     warfarin 4 MG tablet  Commonly known as: COUMADIN              Allergies   Allergen Reactions    Percocet [Oxycodone-Acetaminophen] GI Intolerance    Sulfa Antibiotics Nausea And Vomiting    Penicillins Rash       Discharge Disposition:  Home or Self Care      Discharge Diet:  Diet Order   Procedures    Diet: Cardiac; Healthy Heart (2-3 Na+); Fluid Consistency: Thin (IDDSI 0)       Discharge Activity:       CODE STATUS:    Code Status and Medical Interventions:   Ordered at: 07/02/24 2240     Medical Intervention Limits:    No intubation (DNI)     Level Of Support Discussed With:    Patient     Code Status (Patient has no pulse and is not breathing):    CPR (Attempt to Resuscitate)     Medical Interventions (Patient has pulse or is breathing):    Limited Support       Future Appointments   Date Time Provider Department Center   10/22/2024  8:00 AM Amanda Felix APRN [facility]   11/19/2024 11:15 AM Shashank Mckeon MD [id number] None      Follow-up Information       Amanda Felix APRN Follow up in 1 week(s).    Specialty: Family Medicine  Why: with [facility]  Contact information:  [address] [id number] [phone]                             Time Spent on Discharge:  Greater than 30  minutes      Joel Pantoja MD  [facility]  07/05/24  08:59 EDT

## 2024-07-05 NOTE — OUTREACH NOTE
Prep Survey      Flowsheet Row Responses   [facility] patient discharged from? [place]   Is LACE score < 7 ? No   Eligibility [facility]   Date of Admission 07/02/24   Date of Discharge 07/05/24   Discharge diagnosis GI hemorrhage   Does the patient have one of the following disease processes/diagnoses(primary or secondary)? Other   Prep survey completed? Yes            Valarie AREVALO - Registered Nurse

## 2024-07-05 NOTE — PROGRESS NOTES
[facility]  Inpatient Progress Note    Reason for Followup: Melena, anemia     Subjective     Interval History:   EGD yesterday with 2 nonbleeding gastric ulcers, otherwise normal.  Denies any abdominal pain, nausea, vomiting this morning.  She did tolerate a p.o. diet.    Current Facility-Administered Medications:     acetaminophen (TYLENOL) tablet 650 mg, 650 mg, Oral, Q4H PRN, 650 mg at 07/04/24 1950 **OR** acetaminophen (TYLENOL) 160 MG/5ML oral solution 650 mg, 650 mg, Oral, Q4H PRN **OR** acetaminophen (TYLENOL) suppository 650 mg, 650 mg, Rectal, Q4H PRN, Daly Mariscal MD    atorvastatin (LIPITOR) tablet 20 mg, 20 mg, Oral, Daily, Daly Mariscal MD, 20 mg at 07/04/24 1142    sennosides-docusate (PERICOLACE) 8.6-50 MG per tablet 2 tablet, 2 tablet, Oral, BID PRN **AND** polyethylene glycol (MIRALAX) packet 17 g, 17 g, Oral, Daily PRN **AND** bisacodyl (DULCOLAX) EC tablet 5 mg, 5 mg, Oral, Daily PRN **AND** bisacodyl (DULCOLAX) suppository 10 mg, 10 mg, Rectal, Daily PRN, Daly Mariscal MD    dextrose (D50W) (25 g/50 mL) IV injection 25 g, 25 g, Intravenous, Q15 Min PRN, Daly Mariscal MD    dextrose (GLUTOSE) oral gel 15 g, 15 g, Oral, Q15 Min PRN, Daly Mariscal MD    dilTIAZem CD (CARDIZEM CD) 24 hr capsule 180 mg, 180 mg, Oral, Daily, Daly Mariscal MD, 180 mg at 07/04/24 1142    glucagon (GLUCAGEN) injection 1 mg, 1 mg, Intramuscular, Q15 Min PRN, Daly Mariscal MD    insulin lispro (HUMALOG/ADMELOG) injection 2-7 Units, 2-7 Units, Subcutaneous, 4x Daily AC & at Bedtime, Daly Mariscal MD, 2 Units at 07/04/24 2126    metoprolol tartrate (LOPRESSOR) tablet 25 mg, 25 mg, Oral, BID, Daly Mariscal MD, 25 mg at 07/04/24 1950    nitroglycerin (NITROSTAT) SL tablet 0.4 mg, 0.4 mg, Sublingual, Q5 Min PRN, Daly Mariscal MD    ondansetron ODT (ZOFRAN-ODT) disintegrating tablet 4 mg, 4 mg, Oral, Q6H PRN **OR** ondansetron (ZOFRAN) injection 4 mg, 4  mg, Intravenous, Q6H PRN, Daly Mariscal MD    pantoprazole (PROTONIX) EC tablet 40 mg, 40 mg, Oral, BID Kit MCCLELLAND Matthew Brett, MD, 40 mg at 07/05/24 0524    sucralfate (CARAFATE) tablet 1 g, 1 g, Oral, TID Loida MCCLELLAND Lauren C., MD, 1 g at 07/05/24 0523    Objective     Vital Signs  Temp:  [98.1 °F (36.7 °C)-99.1 °F (37.3 °C)] 98.1 °F (36.7 °C)  Heart Rate:  [58-97] 92  Resp:  [11-21] 16  BP: (104-135)/(67-96) 135/67  Body mass index is 29.57 kg/m².    Intake/Output Summary (Last 24 hours) at 7/5/2024 0817  Last data filed at 7/5/2024 0725  Gross per 24 hour   Intake 740 ml   Output --   Net 740 ml     I/O this shift:  In: 240 [P.O.:240]  Out: -      Physical Exam:   General: patient awake, alert and cooperative   Abdomen: soft, nontender, nondistended; normal bowel sounds     Results Review:     I reviewed the patient's new clinical results.  I reviewed the patient's new imaging results and agree with the interpretation.    Results from last 7 days   Lab Units 07/05/24  0506 07/04/24  0601 07/04/24  0011 07/03/24  1608 07/03/24  0738   WBC 10*3/mm3 4.05 4.33  --   --  4.23   HEMOGLOBIN g/dL 9.1* 8.8*  8.8* 8.9*   < > 9.8*   HEMATOCRIT % 28.1* 26.8*  26.8* 27.3*   < > 30.2*   PLATELETS 10*3/mm3 190 198  --   --  219    < > = values in this interval not displayed.     Results from last 7 days   Lab Units 07/04/24  0601 07/03/24  0000 07/02/24  1033   SODIUM mmol/L 144 141 139   POTASSIUM mmol/L 3.9 3.8 4.0   CHLORIDE mmol/L 108* 105 102   CO2 mmol/L 22.5 28.7 27.9   BUN mg/dL 12 24* 35*   CREATININE mg/dL 0.70 0.86 0.97   CALCIUM mg/dL 8.9 9.0 9.8   BILIRUBIN mg/dL  --  0.3 0.4   ALK PHOS U/L  --  65 82   ALT (SGPT) U/L  --  13 14   AST (SGOT) U/L  --  25 26   GLUCOSE mg/dL 97 111* 154*     Results from last 7 days   Lab Units 07/04/24  0601 07/03/24  1817 07/03/24  0738   INR  1.63* 2.29* 2.30*     Lab Results   Lab Value Date/Time    LIPASE 26 07/02/2024 1033       Radiology:  CT Angiogram Abdomen  Pelvis   Final Result       1. No active bowel hemorrhage identified.   2. Large urolithiasis seen in the right renal pelvis, without   hydronephrosis.   3. Colonic diverticulosis.   4. Uterine leiomyoma.   5. Small pulmonary nodule in the right lower lobe measuring 3 mm. If low   risk patient, no follow-up recommendations. If high risk patient,   optional 12-month follow-up chest CT to reevaluate.       This report was finalized on 7/2/2024 12:35 PM by Dr. Akash Smith M.D on Workstation: [id number]          XR Abdomen 2+ VW with Chest 1 VW   Final Result            Assessment & Plan   Assessment:   GI bleed with melena - EGD with gastric ulcers   Acute blood loss anemia  Atrial fibrillation on outpatient Coumadin    All problems are new to me.  Plan:   EGD yesterday with 2 nonbleeding gastric ulcers, otherwise normal. Pathology pending   Continue pantoprazole 40 mg twice daily for 8 weeks  Continue sucralfate 1 g twice daily for 4 weeks  Patient will need outpatient follow-up with KATELYN Moraes in 2 weeks with plans for repeat upper endoscopy in 6 weeks to evaluate for healing.  Okay to resume warfarin in 1 week    I discussed the patient's findings and my recommendations with patient.    Dictated utilizing Dragon dictation.        Maria E Cortez PA-C   [facility]  [address] [place] [address]  Office: [phone]

## 2024-07-08 ENCOUNTER — TRANSITIONAL CARE MANAGEMENT TELEPHONE ENCOUNTER (OUTPATIENT)
Dept: CALL CENTER | Facility: HOSPITAL | Age: 84
End: 2024-07-08
Payer: MEDICARE

## 2024-07-08 LAB
LAB AP CASE REPORT: NORMAL
PATH REPORT.FINAL DX SPEC: NORMAL
PATH REPORT.GROSS SPEC: NORMAL

## 2024-07-08 NOTE — OUTREACH NOTE
Call Center TCM Note      Flowsheet Row Responses   [facility] patient discharged from? [place]   Does the patient have one of the following disease processes/diagnoses(primary or secondary)? Other   TCM attempt successful? Yes   Call start time 0925   Call end time 0930   Discharge diagnosis GI hemorrhage   Person spoke with today (if not patient) and relationship pt   Meds reviewed with patient/caregiver? Yes   Is the patient having any side effects they believe may be caused by any medication additions or changes? No   Does the patient have all medications ordered at discharge? Yes   Is the patient taking all medications as directed (includes completed medication regime)? Yes   Comments GI 7/30/24   Does the patient have an appointment with their PCP within 7-14 days of discharge? Yes  [7/11/2024 at 1:15 PM]   Psychosocial issues? No   Did the patient receive a copy of their discharge instructions? Yes   Nursing interventions Reviewed instructions with patient   What is the patient's perception of their health status since discharge? Improving   Is the patient/caregiver able to teach back signs and symptoms related to disease process for when to call PCP? Yes   Is the patient/caregiver able to teach back signs and symptoms related to disease process for when to call 911? Yes   Is the patient/caregiver able to teach back the hierarchy of who to call/visit for symptoms/problems? PCP, Specialist, Home health nurse, Urgent Care, ED, 911 Yes   If the patient is a current smoker, are they able to teach back resources for cessation? Not a smoker   TCM call completed? Yes   Wrap up additional comments Pt denies any abdominal pain/nausea. Pt has not had a BM since 7/5/24, and encouraged to call PCP office to request for stool softener rx, and pt was encouraged to drink prune/apple juice after each meal for BM consistency. Reviewed AVS/meds with pt. Pt verified PCP/GI fu appts.   Call end time 0930   Would this  patient benefit from a Referral to [facility] Social Work? No   Is the patient interested in additional calls from an ambulatory ? No            Calli Paulson RN    7/8/2024, 09:33 EDT

## 2024-07-10 NOTE — PROGRESS NOTES
Mild nonspecific inflammation and erosive change seen on stomach biopsy.    Use Protonix (pantoprazole) 40 mg PO BID for 8 weeks. - Use sucralfate tablets 1 gram PO BID for 4 weeks. - Repeat upper endoscopy in 6 weeks to check healing

## 2024-07-11 ENCOUNTER — OFFICE VISIT (OUTPATIENT)
Dept: FAMILY MEDICINE CLINIC | Facility: CLINIC | Age: 84
End: 2024-07-11
Payer: MEDICARE

## 2024-07-11 ENCOUNTER — TELEPHONE (OUTPATIENT)
Dept: GASTROENTEROLOGY | Facility: CLINIC | Age: 84
End: 2024-07-11
Payer: MEDICARE

## 2024-07-11 VITALS
TEMPERATURE: 96.8 F | HEIGHT: 65 IN | HEART RATE: 75 BPM | SYSTOLIC BLOOD PRESSURE: 110 MMHG | RESPIRATION RATE: 17 BRPM | DIASTOLIC BLOOD PRESSURE: 70 MMHG | BODY MASS INDEX: 28.82 KG/M2 | OXYGEN SATURATION: 98 % | WEIGHT: 173 LBS

## 2024-07-11 DIAGNOSIS — D64.9 ANEMIA, UNSPECIFIED TYPE: Primary | ICD-10-CM

## 2024-07-11 DIAGNOSIS — I10 ESSENTIAL HYPERTENSION: ICD-10-CM

## 2024-07-11 DIAGNOSIS — E11.9 TYPE 2 DIABETES MELLITUS WITHOUT COMPLICATION, WITHOUT LONG-TERM CURRENT USE OF INSULIN: ICD-10-CM

## 2024-07-11 DIAGNOSIS — K25.3 ACUTE GASTRIC ULCER WITHOUT HEMORRHAGE OR PERFORATION: ICD-10-CM

## 2024-07-11 DIAGNOSIS — I48.20 ATRIAL FIBRILLATION, CHRONIC: ICD-10-CM

## 2024-07-11 RX ORDER — WARFARIN SODIUM 2 MG/1
2 TABLET ORAL
COMMUNITY

## 2024-07-11 NOTE — TELEPHONE ENCOUNTER
Pt reviewed results and Dr Mariscal's note and recommendations via [employer].     Pt has f/u appt w/SEBLE Lawler on 7/30.

## 2024-07-11 NOTE — TELEPHONE ENCOUNTER
Released  Seen Back to Top    Mild nonspecific inflammation and erosive change seen on stomach biopsy.    Use Protonix (pantoprazole) 40 mg PO BID for 8 weeks. - Use sucralfate tablets 1 gram PO BID for 4 weeks. - Repeat upper endoscopy in 6 weeks to check healing.   F/u as scheduled in the office   Written by Daly Mariscal MD on 7/10/2024  4:19 PM EDT  Seen by patient Arelis Johnson on 7/10/2024  6:28 PM

## 2024-07-11 NOTE — PROGRESS NOTES
Transitional Care Follow Up Visit  Subjective     Arelis Johnson is a 84 y.o. female who presents for a transitional care management visit.    Within 48 business hours after discharge our office contacted her via telephone to coordinate her care and needs.      I reviewed and discussed the details of that call along with the discharge summary, hospital problems, inpatient lab results, inpatient diagnostic studies, and consultation reports with Arelis.     Current outpatient and discharge medications have been reconciled for the patient.  Reviewed by: CHARMAINE Butts          7/5/2024     5:17 PM   Date of TCM Phone Call   [facility]   Date of Admission 7/2/2024   Date of Discharge 7/5/2024     Risk for Readmission (LACE) No data recorded      History of Present Illness   Course During Hospital Stay:   This is a 85 yo female patient here today for hospitalization. Patient has a history of afib and on coumadin therapy. She also has htn, DM. She presented to the hospital with melena with no other associated symptoms. Hemoccult stool was positive in ER.  A CT angiogram of the abdomen and pelvis was performed showing no active bowel hemorrhage.  There was a large urolithiasis in the right renal pelvis with no hydronephrosis, uterine leiomyoma, as well as a small pulmonary nodule with low risk.  Patient apparently has known about the urolithiasis for many years and is not causing her any current symptoms.  Patient was seen by GI and EGD was performed revealing 2 clean-based cratered gastric ulcers with no active bleeding.  She was started on Carafate along with twice daily Protonix for 3 months.  She was discharged on 7/5/24. She reports doing well with no signs of bleeding. She did restart her coumadin today and has plans for repeat INR with cardiology in 5 days. She has plans for repeat EGD in 8 weeks and has an appt with GI on 7/30/24.      The following portions of the patient's  "history were reviewed and updated as appropriate: allergies, current medications, past family history, past medical history, past social history, past surgical history, and problem list.    Review of Systems   Constitutional:  Negative for fatigue.   Respiratory:  Negative for chest tightness and shortness of breath.    Cardiovascular:  Negative for chest pain and leg swelling.   Gastrointestinal:  Negative for abdominal pain, blood in stool, nausea and vomiting.       Objective   /70   Pulse 75   Temp 96.8 °F (36 °C)   Resp 17   Ht 165.1 cm (65\")   Wt 78.5 kg (173 lb)   SpO2 98%   BMI 28.79 kg/m²   Physical Exam  Vitals and nursing note reviewed.   HENT:      Head: Normocephalic.      Nose: Nose normal.   Eyes:      Pupils: Pupils are equal, round, and reactive to light.   Cardiovascular:      Rate and Rhythm: Normal rate and regular rhythm.      Pulses: Normal pulses.      Heart sounds: Normal heart sounds.   Pulmonary:      Effort: Pulmonary effort is normal. No respiratory distress.      Breath sounds: Normal breath sounds. No wheezing or rales.   Abdominal:      General: Bowel sounds are normal. There is no distension.      Tenderness: There is no abdominal tenderness.   Musculoskeletal:         General: No swelling.      Cervical back: Neck supple.      Right lower leg: No edema.      Left lower leg: No edema.   Skin:     General: Skin is warm and dry.   Neurological:      Mental Status: She is alert and oriented to person, place, and time.   Psychiatric:         Mood and Affect: Mood normal.         Assessment & Plan   Diagnoses and all orders for this visit:    1. Anemia, unspecified type (Primary)  -     CBC w AUTO Differential    2. Type 2 diabetes mellitus without complication, without long-term current use of insulin    3. Essential hypertension    4. Atrial fibrillation, chronic    5. Acute gastric ulcer without hemorrhage or perforation    I will obtain cbc today.  No reports of active " bleeding  She has appt soon with GI and has plans for repeat EGD soon.

## 2024-07-12 LAB
BASOPHILS # BLD AUTO: 0 X10E3/UL (ref 0–0.2)
BASOPHILS NFR BLD AUTO: 1 %
EOSINOPHIL # BLD AUTO: 0.1 X10E3/UL (ref 0–0.4)
EOSINOPHIL NFR BLD AUTO: 1 %
ERYTHROCYTE [DISTWIDTH] IN BLOOD BY AUTOMATED COUNT: 13 % (ref 11.7–15.4)
HCT VFR BLD AUTO: 32.6 % (ref 34–46.6)
HGB BLD-MCNC: 10.2 G/DL (ref 11.1–15.9)
IMM GRANULOCYTES # BLD AUTO: 0 X10E3/UL (ref 0–0.1)
IMM GRANULOCYTES NFR BLD AUTO: 0 %
LYMPHOCYTES # BLD AUTO: 1.1 X10E3/UL (ref 0.7–3.1)
LYMPHOCYTES NFR BLD AUTO: 22 %
MCH RBC QN AUTO: 30.4 PG (ref 26.6–33)
MCHC RBC AUTO-ENTMCNC: 31.3 G/DL (ref 31.5–35.7)
MCV RBC AUTO: 97 FL (ref 79–97)
MONOCYTES # BLD AUTO: 0.5 X10E3/UL (ref 0.1–0.9)
MONOCYTES NFR BLD AUTO: 10 %
NEUTROPHILS # BLD AUTO: 3.3 X10E3/UL (ref 1.4–7)
NEUTROPHILS NFR BLD AUTO: 66 %
PLATELET # BLD AUTO: 284 X10E3/UL (ref 150–450)
RBC # BLD AUTO: 3.36 X10E6/UL (ref 3.77–5.28)
WBC # BLD AUTO: 4.9 X10E3/UL (ref 3.4–10.8)

## 2024-07-15 ENCOUNTER — READMISSION MANAGEMENT (OUTPATIENT)
Dept: CALL CENTER | Facility: HOSPITAL | Age: 84
End: 2024-07-15
Payer: MEDICARE

## 2024-07-15 NOTE — OUTREACH NOTE
Medical Week 2 Survey      Flowsheet Row Responses   [facility] patient discharged from? [place]   Does the patient have one of the following disease processes/diagnoses(primary or secondary)? Other   Week 2 attempt successful? Yes   Call start time 1849   Discharge diagnosis GI hemorrhage   Call end time 1852   Meds reviewed with patient/caregiver? Yes   Is the patient having any side effects they believe may be caused by any medication additions or changes? No   Does the patient have all medications ordered at discharge? Yes   Is the patient taking all medications as directed (includes completed medication regime)? Yes   Does the patient have a primary care provider?  Yes   Has the patient kept scheduled appointments due by today? Yes   Comments Saw PCP and has GI appt on 7/30   Psychosocial issues? No   Did the patient receive a copy of their discharge instructions? Yes   Nursing interventions Reviewed instructions with patient   What is the patient's perception of their health status since discharge? Improving   Is the patient/caregiver able to teach back signs and symptoms related to disease process for when to call PCP? Yes   Is the patient/caregiver able to teach back signs and symptoms related to disease process for when to call 911? Yes   Is the patient/caregiver able to teach back the hierarchy of who to call/visit for symptoms/problems? PCP, Specialist, Home health nurse, Urgent Care, ED, 911 Yes   If the patient is a current smoker, are they able to teach back resources for cessation? Not a smoker   Additional teach back comments Staets she is doing well.  She has restarted her warfarin and will have her INR checked tomorrow. Denies any noticeable bleeding.   Week 2 Call Completed? Yes   Graduated Yes   Graduated/Revoked comments No questions or needs at this time.   Call end time 1852            Nadiya LAINEZ - Licensed Nurse

## 2024-07-16 LAB — INR PPP: 1.32

## 2024-07-17 ENCOUNTER — ANTICOAGULATION VISIT (OUTPATIENT)
Dept: PHARMACY | Facility: HOSPITAL | Age: 84
End: 2024-07-17
Payer: MEDICARE

## 2024-07-17 NOTE — PROGRESS NOTES
Anticoagulation Clinic Progress Note    Anticoagulation Summary  As of 2024      INR goal:  2.0-3.0   TTR:  88.1% (5.6 y)   INR used for dosin.32 (2024)   Warfarin maintenance plan:  4 mg every Tue, Sat; 2 mg all other days   Weekly warfarin total:  18 mg   No change documented:  Savage Wasserman, David   Plan last modified:  Savage Wasserman, PharmD (2024)   Next INR check:  2024   Priority:  Maintenance   Target end date:      Indications    Atrial fibrillation with RVR (Resolved) [I48.91]                 Anticoagulation Episode Summary       INR check location:      Preferred lab:      Send INR reminders to:  [facility]  POOL    Comments:  [facility]  ([place], F: [phone])          Anticoagulation Care Providers       Provider Role Specialty Phone number    Sly Saleh MD Referring Cardiology [phone]            Clinic Interview:  Patient Findings     Positives:  Change in health, Change in medications, Hospital admission,   Other complaints    Negatives:  Signs/symptoms of thrombosis, Signs/symptoms of bleeding,   Laboratory test error suspected, Change in alcohol use, Change in   activity, Upcoming invasive procedure, Emergency department visit,   Upcoming dental procedure, Missed doses, Extra doses, Change in   diet/appetite, Bruising    Comments:  She was admitted 24 - 24 for melena with source   identified as gastric ulcers. INR had been therapeutic leading up to this   event. She was prescribed 4-week course of sucralfate and 8-week course of   pantoprazole. Warfarin was held through 7/10/24, and it was resume on   24 at her usual dose of 4 mg Tues/Sat, 2 mg all other days.       Clinical Outcomes     Negatives:  Major bleeding event, Thromboembolic event,   Anticoagulation-related hospital admission, Anticoagulation-related ED   visit, Anticoagulation-related fatality    Comments:  She was admitted 24 - 24 for melena with source    identified as gastric ulcers. INR had been therapeutic leading up to this   event. She was prescribed 4-week course of sucralfate and 8-week course of   pantoprazole. Warfarin was held through 7/10/24, and it was resume on   7/11/24 at her usual dose of 4 mg Tues/Sat, 2 mg all other days.         INR History:      7/2/2024    11:00 AM 7/3/2024     7:38 AM 7/3/2024     6:17 PM 7/4/2024     6:01 AM 7/16/2024    12:00 AM 7/16/2024     8:30 AM 7/17/2024     9:02 AM   Anticoagulation Monitoring   INR       1.32   INR Date       7/16/2024   INR Goal      2.0-3.0 2.0-3.0   Trend       Same   Last Week Total       16 mg   Next Week Total       18 mg   Sun       2 mg   Mon       2 mg   Tue       -   Wed       2 mg   Thu       2 mg   Fri       2 mg   Sat       4 mg   Historical INR 2.90  2.30  2.29  1.63  1.32             This result is from an external source.       Plan:  1. INR was Subtherapeutic yesterday- see above in Anticoagulation Summary. Pantoprazole may increase INR through interaction with warfarin. In light of recent GI bleed will not advise extra warfarin today.  Will instruct Arelis Johnson to Continue their warfarin regimen at dose of 4 mg Tues/Sat, 2 mg all other days - see above in Anticoagulation Summary.  2. Follow up in 1 week.  3. They have been instructed to call if any changes in medications, doses, concerns, etc. Patient expresses understanding and has no further questions at this time.    Savage Wasserman, PharmD

## 2024-07-22 ENCOUNTER — TELEPHONE (OUTPATIENT)
Dept: GASTROENTEROLOGY | Facility: CLINIC | Age: 84
End: 2024-07-22

## 2024-07-22 NOTE — TELEPHONE ENCOUNTER
Robert pt is asking about her results from scope.  Results from 07/11 call reviewed with pt .  Verb understanding.

## 2024-07-22 NOTE — TELEPHONE ENCOUNTER
Hub staff attempted to follow warm transfer process and was unsuccessful     Caller: Arelis Johnson    Relationship to patient: Self    Best call back number: [phone]    Patient is needing: PT IS CALLING TO SPEAK WITH MA ABOUT LAB RESULTS FROM 07/04/2024. PLEASE GIVE PT A CALL BACK AND IF NOT ABLE TO REACH PT. IT IS OKAY TO LVM. PT SAID IT WAS TO CHECK TO SEE IF THERE WAS ANY INFECTIONS IN THE ULCERS SHE HAD.

## 2024-07-23 LAB — INR PPP: 2.31

## 2024-07-24 ENCOUNTER — ANTICOAGULATION VISIT (OUTPATIENT)
Dept: PHARMACY | Facility: HOSPITAL | Age: 84
End: 2024-07-24
Payer: MEDICARE

## 2024-07-24 NOTE — PROGRESS NOTES
Anticoagulation Clinic Progress Note    Anticoagulation Summary  As of 2024      INR goal:  2.0-3.0   TTR:  87.9% (5.6 y)   INR used for dosin.31 (2024)   Warfarin maintenance plan:  4 mg every Tue, Sat; 2 mg all other days   Weekly warfarin total:  18 mg   No change documented:  Eulalia Reaves RPH   Plan last modified:  Savage Wasserman, PharmD (2024)   Next INR check:  2024   Priority:  Maintenance   Target end date:      Indications    Atrial fibrillation with RVR (Resolved) [I48.91]                 Anticoagulation Episode Summary       INR check location:      Preferred lab:      Send INR reminders to:  [facility] [place]  POOL    Comments:  [facility]  ([place], F: [phone])          Anticoagulation Care Providers       Provider Role Specialty Phone number    Sly Saleh MD Referring Cardiology [phone]            Clinic Interview:  No pertinent clinical findings have been reported.    INR History:      7/3/2024     6:17 PM 2024     6:01 AM 2024    12:00 AM 2024     8:30 AM 2024     9:02 AM 2024    12:00 AM 2024     8:05 AM   Anticoagulation Monitoring   INR     1.32  2.31   INR Date     2024   INR Goal    2.0-3.0 2.0-3.0  2.0-3.0   Trend     Same  Same   Last Week Total     16 mg  18 mg   Next Week Total     18 mg  18 mg   Sun     2 mg  2 mg   Mon     2 mg  2 mg   Tue     -  4 mg   Wed     2 mg  2 mg   Thu     2 mg  2 mg   Fri     2 mg  2 mg   Sat     4 mg  4 mg   Historical INR 2.29  1.63  1.32       2.31            This result is from an external source.       Plan:  1. INR is therapeutic today- see above in Anticoagulation Summary.    Arelis Johnson to continue their warfarin regimen- see above in Anticoagulation Summary.  2. Follow up in 2 weeks  3. They have been instructed to call if any changes in medications, doses, concerns, etc. Patient expresses understanding and has no further questions at this  time.    Eulalia Reaves, [facility]

## 2024-07-25 RX ORDER — FUROSEMIDE 40 MG/1
40 TABLET ORAL DAILY
Qty: 90 TABLET | Refills: 4 | Status: SHIPPED | OUTPATIENT
Start: 2024-07-25

## 2024-07-30 ENCOUNTER — TELEPHONE (OUTPATIENT)
Dept: GASTROENTEROLOGY | Facility: CLINIC | Age: 84
End: 2024-07-30
Payer: MEDICARE

## 2024-07-30 ENCOUNTER — ANTICOAGULATION VISIT (OUTPATIENT)
Dept: PHARMACY | Facility: HOSPITAL | Age: 84
End: 2024-07-30
Payer: MEDICARE

## 2024-07-30 ENCOUNTER — OFFICE VISIT (OUTPATIENT)
Dept: GASTROENTEROLOGY | Facility: CLINIC | Age: 84
End: 2024-07-30
Payer: MEDICARE

## 2024-07-30 ENCOUNTER — TELEPHONE (OUTPATIENT)
Dept: FAMILY MEDICINE CLINIC | Facility: CLINIC | Age: 84
End: 2024-07-30
Payer: MEDICARE

## 2024-07-30 VITALS
SYSTOLIC BLOOD PRESSURE: 115 MMHG | WEIGHT: 170.1 LBS | BODY MASS INDEX: 28.34 KG/M2 | HEART RATE: 73 BPM | DIASTOLIC BLOOD PRESSURE: 76 MMHG | HEIGHT: 65 IN

## 2024-07-30 DIAGNOSIS — K25.0 ACUTE GASTRIC ULCER WITH HEMORRHAGE: Primary | ICD-10-CM

## 2024-07-30 DIAGNOSIS — D64.9 ANEMIA, UNSPECIFIED TYPE: Primary | ICD-10-CM

## 2024-07-30 LAB
ERYTHROCYTE [DISTWIDTH] IN BLOOD BY AUTOMATED COUNT: 12.8 % (ref 12.3–15.4)
HCT VFR BLD AUTO: 31.5 % (ref 34–46.6)
HGB BLD-MCNC: 9.9 G/DL (ref 12–15.9)
MCH RBC QN AUTO: 28.8 PG (ref 26.6–33)
MCHC RBC AUTO-ENTMCNC: 31.4 G/DL (ref 31.5–35.7)
MCV RBC AUTO: 91.6 FL (ref 79–97)
PLATELET # BLD AUTO: 295 10*3/MM3 (ref 140–450)
RBC # BLD AUTO: 3.44 10*6/MM3 (ref 3.77–5.28)
WBC # BLD AUTO: 5.25 10*3/MM3 (ref 3.4–10.8)

## 2024-07-30 RX ORDER — PANTOPRAZOLE SODIUM 40 MG/1
40 TABLET, DELAYED RELEASE ORAL
Qty: 120 TABLET | Refills: 2 | Status: SHIPPED | OUTPATIENT
Start: 2024-07-30

## 2024-07-30 NOTE — PROGRESS NOTES
"Chief Complaint  No chief complaint on file.    Subjective          History of Present Illness    Arelis Johnson is a  84 y.o. female presents for follow-up after recent hospital admission 7/2/2024 through 7/5/2024.    She was seen by GI team 7/2 through 7/5 for melena and anemia in the hospital.  She had 2 episodes of black stool.  Hemoglobin was noted to be 9.8 on arrival to the hospital.  Of note it has been noted to be within normal limits just 2 months prior.  She underwent EGD as below with finding of nonbleeding gastric ulcers.    She does have history of A-fib and is anticoagulated with Coumadin.    EGD 7/4/2024 showed normal esophagus, nonbleeding gastric ulcers with clean base ulcer Shayan class III, normal examined duodenum.    Today patient reports she has been doing well.  She denies any further episodes of black stool.  No abdominal pain, nausea, vomiting or bloody stool.  She has a good appetite and has been tolerating diet well.  She has been taking pantoprazole twice daily before meals and Carafate 3 times daily.  She denies weakness or fatigue.     Objective   Vital Signs:   /76 (BP Location: Left arm, Patient Position: Sitting, Cuff Size: Adult)   Pulse 73   Ht 165.1 cm (65\")   Wt 77.2 kg (170 lb 1.6 oz)   BMI 28.31 kg/m²       Physical Exam  Constitutional:       General: She is not in acute distress.     Appearance: Normal appearance.   Eyes:      General: No scleral icterus.  Cardiovascular:      Rate and Rhythm: Normal rate.   Pulmonary:      Effort: Pulmonary effort is normal.   Abdominal:      General: Abdomen is flat. Bowel sounds are normal. There is no distension.      Tenderness: There is no abdominal tenderness. There is no guarding.   Skin:     Coloration: Skin is not jaundiced.   Neurological:      General: No focal deficit present.      Mental Status: She is alert and oriented to person, place, and time.   Psychiatric:         Mood and Affect: Mood normal.         " Behavior: Behavior normal.          Result Review :   The following data was reviewed by: Nuris Moraes PA-C on 07/30/2024:  CMP          7/2/2024    10:33 7/3/2024    00:00 7/4/2024    06:01   CMP   Glucose 154  111  97    BUN 35  24  12    Creatinine 0.97  0.86  0.70    EGFR 57.4  66.3  84.9    Sodium 139  141  144    Potassium 4.0  3.8  3.9    Chloride 102  105  108    Calcium 9.8  9.0  8.9    Total Protein 6.2  5.4     Albumin 3.9  3.1     Globulin 2.3  2.3     Total Bilirubin 0.4  0.3     Alkaline Phosphatase 82  65     AST (SGOT) 26  25     ALT (SGPT) 14  13     Albumin/Globulin Ratio 1.7  1.3     BUN/Creatinine Ratio 36.1  27.9  17.1    Anion Gap 9.1  7.3  13.5      CBC          7/4/2024    00:11 7/4/2024    06:01 7/5/2024    05:06 7/11/2024    13:37   CBC   WBC  4.33  4.05  4.9    RBC  2.89  2.94  3.36    Hemoglobin 8.9  8.8     8.8  9.1  10.2    Hematocrit 27.3  26.8     26.8  28.1  32.6    MCV  92.7  95.6  97    MCH  30.4  31.0  30.4    MCHC  32.8  32.4  31.3    RDW  12.5  12.8  13.0    Platelets  198  190  284              Assessment:   Diagnoses and all orders for this visit:    1. Acute gastric ulcer with hemorrhage (Primary)  -     CBC (No Diff)  -     pantoprazole (PROTONIX) 40 MG EC tablet; Take 1 tablet by mouth 2 (Two) Times a Day Before Meals.  Dispense: 120 tablet; Refill: 2  -     Case Request; Standing  -     Case Request    Other orders  -     Implement Anesthesia orders day of procedure.; Standing  -     Follow Anesthesia Guidelines / Protocol; Future          Plan:   -Continue PPI 40 mg twice daily for 8 weeks total  -Continue sucralfate 1 tablet twice daily for 4 weeks total  -Repeat EGD 6 weeks from initial EGD to check healing - will need INR less than 1.8 -will coordinate with cardiology      Follow Up   Return After EGD.    Dragon dictation used throughout this note.         Nuris Moraes PA-C  [facility] ([facility]  [address]  [address]  Office: [phone]

## 2024-07-30 NOTE — TELEPHONE ENCOUNTER
----- Message from Nuris Moraes sent at 7/30/2024 10:06 AM EDT -----  Please reach out to patient's cardiologist-we would like to do repeat EGD to check healing of her gastric ulcer.  I saw that she is on Coumadin-from a cardiology standpoint, does she require bridging prior to the procedure?  Ideally we would like INR of less than 1.8.  Thank you!

## 2024-07-30 NOTE — TELEPHONE ENCOUNTER
I have called the pat and advised she is ok to hold her coumadin for 5 days prior to her EGD.  Her last dose will be 8/21, pt verbalized understanding

## 2024-07-30 NOTE — TELEPHONE ENCOUNTER
left message to call OK FOR HUB TO RELAY    Hgb is improving but not quite in good range. Would like to repeat cbc again in 1 month.

## 2024-07-30 NOTE — TELEPHONE ENCOUNTER
Name: Arelis Johnson      Relationship: Self      Best Callback Number:     [phone]         HUB PROVIDED THE RELAY MESSAGE FROM THE OFFICE      PATIENT: SCHEDULED PER NOTE    ADDITIONAL INFORMATION:

## 2024-07-30 NOTE — TELEPHONE ENCOUNTER
Dr Saleh,  Dr Mariscal has this pt scheduled for an EGD to assess healing of a gastric ulcer, is it ok for her to hold her warfarin for 5 days prior? Will she need a lovenox bridge?  Thanks  Kimberly IZAGUIRRE

## 2024-07-30 NOTE — PROGRESS NOTES
Anticoagulation Clinic Progress Note    Anticoagulation Summary  As of 7/30/2024      INR goal:  2.0-3.0   TTR:  87.9% (5.6 y)   INR used for dosing:  No new INR was available at the time of this encounter.   Warfarin maintenance plan:  4 mg every Tue, Sat; 2 mg all other days   Weekly warfarin total:  18 mg   Plan last modified:  Savage Wasserman, PharmD (4/4/2024)   Next INR check:  8/6/2024   Priority:  Maintenance   Target end date:      Indications    Atrial fibrillation with RVR (Resolved) [I48.91]                 Anticoagulation Episode Summary       INR check location:      Preferred lab:      Send INR reminders to:   [facility]  POOL    Comments:  [facility]  ([place], [place]: [phone])          Anticoagulation Care Providers       Provider Role Specialty Phone number    Sly Saleh MD Referring Cardiology [phone]            Clinic Interview:  Patient Findings     Positives:  Upcoming invasive procedure    Negatives:  Signs/symptoms of thrombosis, Signs/symptoms of bleeding,   Laboratory test error suspected, Change in health, Change in alcohol use,   Change in activity, Emergency department visit, Upcoming dental procedure,   Missed doses, Extra doses, Change in medications, Change in diet/appetite,   Hospital admission, Bruising, Other complaints      Clinical Outcomes     Negatives:  Major bleeding event, Thromboembolic event,   Anticoagulation-related hospital admission, Anticoagulation-related ED   visit, Anticoagulation-related fatality        INR History:      7/4/2024     6:01 AM 7/16/2024    12:00 AM 7/16/2024     8:30 AM 7/17/2024     9:02 AM 7/23/2024    12:00 AM 7/24/2024     8:05 AM 7/30/2024     2:56 PM   Anticoagulation Monitoring   INR    1.32  2.31    INR Date    7/16/2024 7/23/2024    INR Goal   2.0-3.0 2.0-3.0  2.0-3.0 2.0-3.0   Trend    Same  Same Same   Last Week Total    16 mg  18 mg 18 mg   Next Week Total    18 mg  18 mg 18 mg   Sun    2 mg  2 mg 2 mg   Mon    2  mg  2 mg 2 mg   Tue    -  4 mg 4 mg   Wed    2 mg  2 mg 2 mg   Thu    2 mg  2 mg 2 mg   Fri    2 mg  2 mg 2 mg   Sat    4 mg  4 mg 4 mg   Historical INR 1.63  1.32       2.31         Visit Report       Report       This result is from an external source.       Plan:  1. INR is  not available  today- see above in Anticoagulation Summary.   Will instruct Arelis Johnson to Continue their warfarin regimen- see above in Anticoagulation Summary.  EGD scheduled for 8/27- holding 5 days without bridge approved by [facility]- see telephone note on 7/30/24 , continue regular dose and check her INR on her scheduled follow up date on 8/6.   2. Follow up in 1 weeks  3. They have been instructed to call if any changes in medications, doses, concerns, etc. Patient expresses understanding and has no further questions at this time.    Eulalia Reaves [facility]

## 2024-07-30 NOTE — TELEPHONE ENCOUNTER
See Dr. Saleh's reply.    If you need anything official in a letter form let me know.    Thanks,  Ann

## 2024-07-31 DIAGNOSIS — K25.0 ACUTE GASTRIC ULCER WITH HEMORRHAGE: Primary | ICD-10-CM

## 2024-07-31 NOTE — PROGRESS NOTES
Please call patient and help her set up a lab appointment in 2 weeks to have her hemoglobin checked.  I will place order.

## 2024-08-01 ENCOUNTER — TELEPHONE (OUTPATIENT)
Dept: GASTROENTEROLOGY | Facility: CLINIC | Age: 84
End: 2024-08-01
Payer: MEDICARE

## 2024-08-01 NOTE — TELEPHONE ENCOUNTER
----- Message from Nuris Moraes sent at 7/31/2024 12:29 PM EDT -----  Please call patient and help her set up a lab appointment in 2 weeks to have her hemoglobin checked.  I will place order.

## 2024-08-01 NOTE — TELEPHONE ENCOUNTER
It is noted that this pt has seen their message from Nuris  Message to the  to call and schedule lab apt

## 2024-08-06 LAB — INR PPP: 2.71

## 2024-08-07 ENCOUNTER — ANTICOAGULATION VISIT (OUTPATIENT)
Dept: PHARMACY | Facility: HOSPITAL | Age: 84
End: 2024-08-07
Payer: MEDICARE

## 2024-08-07 NOTE — PROGRESS NOTES
Anticoagulation Clinic Progress Note    Anticoagulation Summary  As of 2024      INR goal:  2.0-3.0   TTR:  88.0% (5.7 y)   INR used for dosin.71 (2024)   Warfarin maintenance plan:  4 mg every Tue, Sat; 2 mg all other days   Weekly warfarin total:  18 mg   No change documented:  Eulalia Reaves RPH   Plan last modified:  Savage Wasserman, PharmD (2024)   Next INR check:  2024   Priority:  Maintenance   Target end date:      Indications    Atrial fibrillation with RVR (Resolved) [I48.91]                 Anticoagulation Episode Summary       INR check location:      Preferred lab:      Send INR reminders to:  [facility]  POOL    Comments:  [facility]  ([place], F: [phone])          Anticoagulation Care Providers       Provider Role Specialty Phone number    Sly Saleh MD Referring Cardiology [phone]            Clinic Interview:  Patient Findings     Positives:  Upcoming invasive procedure    Negatives:  Signs/symptoms of thrombosis, Signs/symptoms of bleeding,   Laboratory test error suspected, Change in health, Change in alcohol use,   Change in activity, Emergency department visit, Upcoming dental procedure,   Missed doses, Extra doses, Change in medications, Change in diet/appetite,   Hospital admission, Bruising, Other complaints      Clinical Outcomes     Negatives:  Major bleeding event, Thromboembolic event,   Anticoagulation-related hospital admission, Anticoagulation-related ED   visit, Anticoagulation-related fatality        INR History:      2024     8:30 AM 2024     9:02 AM 2024    12:00 AM 2024     8:05 AM 2024     2:56 PM 2024    12:00 AM 2024     8:27 AM   Anticoagulation Monitoring   INR  1.32  2.31   2.71   INR Date  2024   INR Goal 2.0-3.0 2.0-3.0  2.0-3.0 2.0-3.0  2.0-3.0   Trend  Same  Same Same  Same   Last Week Total  16 mg  18 mg 18 mg  18 mg   Next Week Total  18 mg  18 mg 18 mg  18  mg   Sun  2 mg  2 mg 2 mg  2 mg   Mon  2 mg  2 mg 2 mg  2 mg   Tue  -  4 mg 4 mg  4 mg   Wed  2 mg  2 mg 2 mg  2 mg   Thu  2 mg  2 mg 2 mg  2 mg   Fri  2 mg  2 mg 2 mg  2 mg   Sat  4 mg  4 mg 4 mg  4 mg   Historical INR   2.31       2.71        Visit Report     Report         This result is from an external source.       Plan:  1. INR is Therapeutic today- see above in Anticoagulation Summary.   Will instruct Arelis Johnson to Continue their warfarin regimen- see above in Anticoagulation Summary.  EGD scheduled for 8/27- holding 5 days without bridge approved by [facility]- see telephone note on 7/30/24, continue regular dose, rck 8/20 prior to holding for surgery   2. Follow up in 2 weeks  3. They have been instructed to call if any changes in medications, doses, concerns, etc. Patient expresses understanding and has no further questions at this time.    Eulalia Reaves [facility]

## 2024-08-19 ENCOUNTER — LAB (OUTPATIENT)
Dept: GASTROENTEROLOGY | Facility: CLINIC | Age: 84
End: 2024-08-19
Payer: MEDICARE

## 2024-08-20 ENCOUNTER — ANTICOAGULATION VISIT (OUTPATIENT)
Dept: FAMILY MEDICINE CLINIC | Facility: CLINIC | Age: 84
End: 2024-08-20
Payer: MEDICARE

## 2024-08-20 DIAGNOSIS — I48.20 ATRIAL FIBRILLATION, CHRONIC: ICD-10-CM

## 2024-08-20 DIAGNOSIS — D64.9 ANEMIA, UNSPECIFIED TYPE: Primary | ICD-10-CM

## 2024-08-20 LAB
INR PPP: 2.98 (ref 0.9–1.1)
PROTHROMBIN TIME: 31.2 SECONDS (ref 11.7–14.2)

## 2024-08-21 ENCOUNTER — TELEPHONE (OUTPATIENT)
Dept: GASTROENTEROLOGY | Facility: CLINIC | Age: 84
End: 2024-08-21
Payer: MEDICARE

## 2024-08-21 ENCOUNTER — TELEPHONE (OUTPATIENT)
Dept: FAMILY MEDICINE CLINIC | Facility: CLINIC | Age: 84
End: 2024-08-21
Payer: MEDICARE

## 2024-08-21 ENCOUNTER — ANTICOAGULATION VISIT (OUTPATIENT)
Dept: PHARMACY | Facility: HOSPITAL | Age: 84
End: 2024-08-21
Payer: MEDICARE

## 2024-08-21 NOTE — PROGRESS NOTES
Anticoagulation Clinic Progress Note    Anticoagulation Summary  As of 2024      INR goal:  2.0-3.0   TTR:  88.1% (5.7 y)   INR used for dosin.98 (2024)   Warfarin maintenance plan:  4 mg every Tue, Sat; 2 mg all other days   Weekly warfarin total:  18 mg   Plan last modified:  Savage Wasserman, PharmD (2024)   Next INR check:  9/3/2024   Priority:  Maintenance   Target end date:      Indications    Atrial fibrillation with RVR (Resolved) [I48.91]                 Anticoagulation Episode Summary       INR check location:      Preferred lab:      Send INR reminders to:   [facility]  POOL    Comments:  [facility]  ([place], F: [phone])          Anticoagulation Care Providers       Provider Role Specialty Phone number    Sly Saleh MD Referring Cardiology [phone]            Clinic Interview:  Patient Findings     Positives:  Upcoming invasive procedure    Negatives:  Signs/symptoms of thrombosis, Signs/symptoms of bleeding,   Laboratory test error suspected, Change in health, Change in alcohol use,   Change in activity, Emergency department visit, Upcoming dental procedure,   Missed doses, Extra doses, Change in medications, Change in diet/appetite,   Hospital admission, Bruising, Other complaints    Comments:  EGD ; Cardiology cleared her to hold warfarin 5 days prior   (see 24 Telephone Note).       Clinical Outcomes     Negatives:  Major bleeding event, Thromboembolic event,   Anticoagulation-related hospital admission, Anticoagulation-related ED   visit, Anticoagulation-related fatality    Comments:  EGD ; Cardiology cleared her to hold warfarin 5 days prior   (see 24 Telephone Note).         INR History:      2024    12:00 AM 2024     8:05 AM 2024     2:56 PM 2024    12:00 AM 2024     8:27 AM 2024     8:00 AM 2024     1:02 PM   Anticoagulation Monitoring   INR  2.31   2.71  2.98   INR Date  2024   8/20/2024   INR Goal  2.0-3.0 2.0-3.0  2.0-3.0 2.0-3.0 2.0-3.0   Trend  Same Same  Same Same Same   Last Week Total  18 mg 18 mg  18 mg 18 mg 18 mg   Next Week Total  18 mg 18 mg  18 mg 6 mg 6 mg   Sun  2 mg 2 mg  2 mg - Hold (8/25); Otherwise 2 mg   Mon  2 mg 2 mg  2 mg - Hold (8/26); Otherwise 2 mg   Tue  4 mg 4 mg  4 mg 4 mg 4 mg   Wed  2 mg 2 mg  2 mg 2 mg 4 mg (8/28); Otherwise 2 mg   Thu  2 mg 2 mg  2 mg Hold (8/22) Hold (8/22); Otherwise 2 mg   Fri  2 mg 2 mg  2 mg - Hold (8/23); Otherwise 2 mg   Sat  4 mg 4 mg  4 mg - Hold (8/24); Otherwise 4 mg   Historical INR 2.31       2.71          Visit Report   Report           This result is from an external source.       Plan:  1. INR is Therapeutic today- see above in Anticoagulation Summary.   Will instruct Arelis SANDRA Johnson to Change their warfarin regimen (HOLD 5 days prior to procedure; take 4 mg first 2 days post-op, then resume 4 mg Tues/Sat, 2 mg all other days) - see above in Anticoagulation Summary.  2. Follow up in 2 weeks (1 week following EGD).   3. They have been instructed to call if any changes in medications, doses, concerns, etc. Patient expresses understanding and has no further questions at this time.    Savage Wasserman, PharmD

## 2024-08-21 NOTE — TELEPHONE ENCOUNTER
left message to call OK FOR HUB TO RELAY    Blood work continues to show anemia.  Please have patient follow-up with another CBC in 1 week ( can get drawn when she gets her INR)

## 2024-08-21 NOTE — TELEPHONE ENCOUNTER
----- Message from Nuris Moraes sent at 8/21/2024  2:46 PM EDT -----  Hemoglobin has remained stable - recommend proceeding w/ EGD as scheduled

## 2024-08-21 NOTE — TELEPHONE ENCOUNTER
Name: Arelis Johnson      Relationship: Self      Best Callback Number: [phone]       HUB PROVIDED THE RELAY MESSAGE FROM THE OFFICE      PATIENT: VOICED UNDERSTANDING AND HAS NO FURTHER QUESTIONS AT THIS TIME

## 2024-08-27 ENCOUNTER — ANESTHESIA EVENT (OUTPATIENT)
Dept: GASTROENTEROLOGY | Facility: HOSPITAL | Age: 84
End: 2024-08-27
Payer: MEDICARE

## 2024-08-27 ENCOUNTER — HOSPITAL ENCOUNTER (OUTPATIENT)
Facility: HOSPITAL | Age: 84
Setting detail: HOSPITAL OUTPATIENT SURGERY
Discharge: HOME | End: 2024-08-27
Attending: INTERNAL MEDICINE | Admitting: INTERNAL MEDICINE
Payer: MEDICARE

## 2024-08-27 ENCOUNTER — ANESTHESIA (OUTPATIENT)
Dept: GASTROENTEROLOGY | Facility: HOSPITAL | Age: 84
End: 2024-08-27
Payer: MEDICARE

## 2024-08-27 VITALS
DIASTOLIC BLOOD PRESSURE: 78 MMHG | RESPIRATION RATE: 18 BRPM | OXYGEN SATURATION: 99 % | WEIGHT: 163.5 LBS | SYSTOLIC BLOOD PRESSURE: 114 MMHG | HEART RATE: 80 BPM | BODY MASS INDEX: 27.21 KG/M2

## 2024-08-27 DIAGNOSIS — K25.0 ACUTE GASTRIC ULCER WITH HEMORRHAGE: ICD-10-CM

## 2024-08-27 LAB — GLUCOSE BLDC GLUCOMTR-MCNC: 119 MG/DL (ref 70–130)

## 2024-08-27 PROCEDURE — S0260 H&P FOR SURGERY: HCPCS | Performed by: INTERNAL MEDICINE

## 2024-08-27 PROCEDURE — 82948 REAGENT STRIP/BLOOD GLUCOSE: CPT

## 2024-08-27 PROCEDURE — 88305 TISSUE EXAM BY PATHOLOGIST: CPT | Performed by: INTERNAL MEDICINE

## 2024-08-27 PROCEDURE — 25010000002 PROPOFOL 200 MG/20ML EMULSION: Performed by: NURSE ANESTHETIST, CERTIFIED REGISTERED

## 2024-08-27 PROCEDURE — 25810000003 LACTATED RINGERS PER 1000 ML: Performed by: INTERNAL MEDICINE

## 2024-08-27 RX ORDER — PROPOFOL 10 MG/ML
INJECTION, EMULSION INTRAVENOUS CONTINUOUS PRN
Status: DISCONTINUED | OUTPATIENT
Start: 2024-08-27 | End: 2024-08-27 | Stop reason: SURG

## 2024-08-27 RX ORDER — SODIUM CHLORIDE, SODIUM LACTATE, POTASSIUM CHLORIDE, CALCIUM CHLORIDE 600; 310; 30; 20 MG/100ML; MG/100ML; MG/100ML; MG/100ML
1000 INJECTION, SOLUTION INTRAVENOUS CONTINUOUS
Status: DISCONTINUED | OUTPATIENT
Start: 2024-08-27 | End: 2024-08-27 | Stop reason: HOSPADM

## 2024-08-27 RX ORDER — PANTOPRAZOLE SODIUM 40 MG/1
40 TABLET, DELAYED RELEASE ORAL DAILY
Qty: 90 TABLET | Refills: 1 | Status: SHIPPED | OUTPATIENT
Start: 2024-08-27

## 2024-08-27 RX ORDER — LIDOCAINE HYDROCHLORIDE 20 MG/ML
INJECTION, SOLUTION INFILTRATION; PERINEURAL AS NEEDED
Status: DISCONTINUED | OUTPATIENT
Start: 2024-08-27 | End: 2024-08-27 | Stop reason: SURG

## 2024-08-27 RX ADMIN — PROPOFOL 50 MG: 10 INJECTION, EMULSION INTRAVENOUS at 09:33

## 2024-08-27 RX ADMIN — SODIUM CHLORIDE, POTASSIUM CHLORIDE, SODIUM LACTATE AND CALCIUM CHLORIDE 1000 ML: 600; 310; 30; 20 INJECTION, SOLUTION INTRAVENOUS at 08:56

## 2024-08-27 RX ADMIN — LIDOCAINE HYDROCHLORIDE 60 MG: 20 INJECTION, SOLUTION INFILTRATION; PERINEURAL at 09:33

## 2024-08-27 RX ADMIN — PROPOFOL 50 MG: 10 INJECTION, EMULSION INTRAVENOUS at 09:34

## 2024-08-27 RX ADMIN — PROPOFOL 180 MCG/KG/MIN: 10 INJECTION, EMULSION INTRAVENOUS at 09:31

## 2024-08-27 NOTE — ADDENDUM NOTE
Addendum  created 08/27/24 0951 by Tariq Beckwith MD    Attestation recorded in Intraprocedure, Intraprocedure Attestations filed

## 2024-08-27 NOTE — DISCHARGE INSTRUCTIONS
For the next 24 hours patient needs to be with a responsible adult.    For 24 hours DO NOT drive, operate machinery, appliances, drink alcohol, make important decisions or sign legal documents.    Start with a light or bland diet if you are feeling sick to your stomach otherwise advance to regular diet as tolerated.    Follow recommendations on procedure report if provided by your doctor.    Call Dr Mariscal for problems .    Problems may include but not limited to: large amounts of bleeding, trouble breathing, repeated vomiting, severe unrelieved pain, fever or chills.

## 2024-08-27 NOTE — H&P
[facility]  Pre Procedure History & Physical    Chief Complaint:   Follow-up of gastric ulcer    Subjective     HPI:   84-year-old female here today for EGD to follow-up on a gastric ulcer that was found during a hospitalization where she had GI bleeding in early July.  Biopsies at that time were negative for H. pylori.  She denies symptoms or melena.  She has f/u with her pcp Friday to address her anemia - not taking iron currently.    Past Medical History:   Past Medical History:   Diagnosis Date    A-fib     added byRN, pt estimates 5-6 years ago    Arthritis     Cholelithiasis     Diabetes mellitus     GERD (gastroesophageal reflux disease)     Hyperlipidemia     Hypertension     IFG (impaired fasting glucose)     Palpitations     SOB (shortness of breath)        Past Surgical History:  Past Surgical History:   Procedure Laterality Date    ANAL FISTULA REPAIR      APPENDECTOMY      BACK SURGERY      l spine ruptured disk    CHOLECYSTECTOMY      ENDOSCOPY N/A 07/04/2024    Procedure: ESOPHAGOGASTRODUODENOSCOPY with biopsies;  Surgeon: Daly Marsical MD;  Location: [facility] ENDOSCOPY;  Service: Gastroenterology;  Laterality: N/A;  PRE - melana  POST - gastric ulcers    JOINT REPLACEMENT      bilateral knees    SPINE SURGERY  1-1998    UPPER GASTROINTESTINAL ENDOSCOPY         Family History:  Family History   Problem Relation Age of Onset    Hypertension Mother     Arthritis Mother     Heart disease Father     Hypertension Father     Liver disease Brother     Liver disease Sister        Social History:   reports that she has never smoked. She has never used smokeless tobacco. She reports that she does not drink alcohol and does not use drugs.    Medications:   Medications Prior to Admission   Medication Sig Dispense Refill Last Dose    allopurinol (ZYLOPRIM) 100 MG tablet Take 1 tablet by mouth Daily. 30 tablet 5 8/26/2024    Calcium Carb-Cholecalciferol (CALCIUM + D3) 600-200 MG-UNIT  tablet Take 1 tablet by mouth Daily.   8/26/2024    dilTIAZem CD (CARDIZEM CD) 180 MG 24 hr capsule TAKE ONE CAPSULE (BY MOUTH) EACH DAY 90 capsule 2 8/27/2024    furosemide (LASIX) 40 MG tablet TAKE 1 TABLET BY MOUTH DAILY. 90 tablet 4 8/26/2024    metFORMIN ER (GLUCOPHAGE-XR) 500 MG 24 hr tablet Take 1 tablet by mouth Daily With Breakfast. 90 tablet 1 8/26/2024    metoprolol tartrate (LOPRESSOR) 50 MG tablet Take 1/2 tablet by mouth 2 Times a Day. 90 tablet 3 8/27/2024    pantoprazole (PROTONIX) 40 MG EC tablet Take 1 tablet by mouth 2 (Two) Times a Day Before Meals. 120 tablet 2 8/26/2024    simvastatin (ZOCOR) 40 MG tablet Take 1 tablet by mouth Every Night. 90 tablet 1 8/26/2024    sucralfate (CARAFATE) 1 g tablet Take 1 tablet by mouth 3 (Three) Times a Day Before Meals. 90 tablet 0     vitamin C (ASCORBIC ACID) 250 MG tablet Take 1 tablet by mouth Daily.       warfarin (COUMADIN) 2 MG tablet Take 1 tablet by mouth Daily. Patient is taking half tablet daily.   HOLD PER MD FISHER   8/21/2024    Zinc 25 MG tablet Take  by mouth.          Allergies:  Percocet [oxycodone-acetaminophen], Sulfa antibiotics, and Penicillins    ROS:    Pertinent items are noted in HPI     Objective     Blood pressure 126/71, pulse 86, resp. rate 16, weight 74.2 kg (163 lb 8 oz), SpO2 97%, not currently breastfeeding.    Physical Exam   Constitutional: Pt is oriented to person, place, and time and well-developed, well-nourished, and in no distress.   Mouth/Throat: Oropharynx is clear and moist.   Neck: Normal range of motion.   Cardiovascular: Normal rate, regular rhythm    Pulmonary/Chest: Effort normal    Abdominal: Soft. Nontender  Skin: Skin is warm and dry.   Psychiatric: Mood, memory, affect and judgment normal.     Assessment & Plan     Diagnosis:  Follow-up of gastric ulcer    Anticipated Surgical Procedure:  Esophagogastroduodenoscopy    The risks, benefits, and alternatives of this procedure have been discussed with the  patient or the responsible party- the patient understands and agrees to proceed.

## 2024-08-27 NOTE — ANESTHESIA PREPROCEDURE EVALUATION
Anesthesia Evaluation     Patient summary reviewed and Nursing notes reviewed                Airway   Mallampati: II  Dental      Pulmonary    (+) ,shortness of breath  Cardiovascular     ECG reviewed  PT is on anticoagulation therapy  Patient on routine beta blocker  Rhythm: irregular  Rate: normal    (+) hypertension, dysrhythmias Atrial Fib, hyperlipidemia      Neuro/Psych- negative ROS  GI/Hepatic/Renal/Endo    (+) obesity, GERD, PUD, GI bleeding , diabetes mellitus type 2    Musculoskeletal (-) negative ROS    Abdominal    Substance History - negative use     OB/GYN negative ob/gyn ROS         Other                      Anesthesia Plan    ASA 3     MAC     intravenous induction     Anesthetic plan, risks, benefits, and alternatives have been provided, discussed and informed consent has been obtained with: patient.    CODE STATUS:

## 2024-08-27 NOTE — ANESTHESIA POSTPROCEDURE EVALUATION
Patient: Arelis Johnson    Procedure Summary       Date: 08/27/24 Room / Location: [facility] ENDOSCOPY 1 / [facility] ENDOSCOPY    Anesthesia Start: 0929 Anesthesia Stop: 0941    Procedure: ESOPHAGOGASTRODUODENOSCOPY with cold biopsies (Esophagus) Diagnosis:       Acute gastric ulcer with hemorrhage      (Acute gastric ulcer with hemorrhage [K25.0])    Surgeons: Daly Mariscal MD Provider: Tariq Beckwith MD    Anesthesia Type: MAC ASA Status: 3            Anesthesia Type: MAC    Vitals  Vitals Value Taken Time   BP 90/68 08/27/24 0939   Temp     Pulse 73 08/27/24 0944   Resp 20 08/27/24 0939   SpO2 99 % 08/27/24 0944   Vitals shown include unfiled device data.        Post Anesthesia Care and Evaluation    Patient location during evaluation: PACU  Patient participation: complete - patient participated  Level of consciousness: awake and alert  Pain management: adequate    Airway patency: patent  Anesthetic complications: No anesthetic complications    Cardiovascular status: acceptable  Respiratory status: acceptable  Hydration status: acceptable    Comments: --------------------            08/27/24               0939     --------------------   BP:       90/68      Pulse:      71       Resp:       20       SpO2:      99%      --------------------

## 2024-08-27 NOTE — ANESTHESIA POSTPROCEDURE EVALUATION
Patient: Arelis Johnson    Procedure Summary       Date: 08/27/24 Room / Location: [facility] ENDOSCOPY 1 / [facility] ENDOSCOPY    Anesthesia Start: 0929 Anesthesia Stop: 0941    Procedure: ESOPHAGOGASTRODUODENOSCOPY with cold biopsies (Esophagus) Diagnosis:       Acute gastric ulcer with hemorrhage      (Acute gastric ulcer with hemorrhage [K25.0])    Surgeons: Daly Mariscal MD Provider: Tariq Beckwith MD    Anesthesia Type: MAC ASA Status: 3            Anesthesia Type: MAC    Vitals  Vitals Value Taken Time   BP 90/68 08/27/24 0939   Temp     Pulse 73 08/27/24 0944   Resp 20 08/27/24 0939   SpO2 99 % 08/27/24 0944   Vitals shown include unfiled device data.        Post Anesthesia Care and Evaluation    Patient location during evaluation: PACU  Patient participation: complete - patient participated  Level of consciousness: awake and alert  Pain management: adequate    Airway patency: patent  Anesthetic complications: No anesthetic complications    Cardiovascular status: acceptable  Respiratory status: acceptable  Hydration status: acceptable    Comments: --------------------            08/27/24               0939     --------------------   BP:       90/68      Pulse:      71       Resp:       20       SpO2:      99%      --------------------

## 2024-08-28 LAB
CYTO UR: NORMAL
LAB AP CASE REPORT: NORMAL
PATH REPORT.FINAL DX SPEC: NORMAL
PATH REPORT.GROSS SPEC: NORMAL

## 2024-08-30 LAB
BASOPHILS # BLD AUTO: 0.04 10*3/MM3 (ref 0–0.2)
BASOPHILS NFR BLD AUTO: 0.8 % (ref 0–1.5)
EOSINOPHIL # BLD AUTO: 0.05 10*3/MM3 (ref 0–0.4)
EOSINOPHIL NFR BLD AUTO: 1.1 % (ref 0.3–6.2)
ERYTHROCYTE [DISTWIDTH] IN BLOOD BY AUTOMATED COUNT: 13.4 % (ref 12.3–15.4)
HCT VFR BLD AUTO: 33.8 % (ref 34–46.6)
HGB BLD-MCNC: 10.8 G/DL (ref 12–15.9)
IMM GRANULOCYTES # BLD AUTO: 0.01 10*3/MM3 (ref 0–0.05)
IMM GRANULOCYTES NFR BLD AUTO: 0.2 % (ref 0–0.5)
IRON SATN MFR SERPL: 5 % (ref 20–50)
IRON SERPL-MCNC: 23 MCG/DL (ref 37–145)
LYMPHOCYTES # BLD AUTO: 1.1 10*3/MM3 (ref 0.7–3.1)
LYMPHOCYTES NFR BLD AUTO: 23.3 % (ref 19.6–45.3)
MCH RBC QN AUTO: 28.1 PG (ref 26.6–33)
MCHC RBC AUTO-ENTMCNC: 32 G/DL (ref 31.5–35.7)
MCV RBC AUTO: 88 FL (ref 79–97)
MONOCYTES # BLD AUTO: 0.44 10*3/MM3 (ref 0.1–0.9)
MONOCYTES NFR BLD AUTO: 9.3 % (ref 5–12)
NEUTROPHILS # BLD AUTO: 3.08 10*3/MM3 (ref 1.7–7)
NEUTROPHILS NFR BLD AUTO: 65.3 % (ref 42.7–76)
NRBC BLD AUTO-RTO: 0 /100 WBC (ref 0–0.2)
PLATELET # BLD AUTO: 313 10*3/MM3 (ref 140–450)
RBC # BLD AUTO: 3.84 10*6/MM3 (ref 3.77–5.28)
TIBC SERPL-MCNC: 423 MCG/DL
UIBC SERPL-MCNC: 400 MCG/DL (ref 112–346)
WBC # BLD AUTO: 4.72 10*3/MM3 (ref 3.4–10.8)

## 2024-09-03 LAB — INR PPP: 2.19

## 2024-09-04 ENCOUNTER — ANTICOAGULATION VISIT (OUTPATIENT)
Dept: PHARMACY | Facility: HOSPITAL | Age: 84
End: 2024-09-04
Payer: MEDICARE

## 2024-09-04 DIAGNOSIS — D64.9 ANEMIA, UNSPECIFIED TYPE: Primary | ICD-10-CM

## 2024-09-04 NOTE — PROGRESS NOTES
Anticoagulation Clinic Progress Note    Anticoagulation Summary  As of 2024      INR goal:  2.0-3.0   TTR:  88.2% (5.8 y)   INR used for dosin.19 (9/3/2024)   Warfarin maintenance plan:  4 mg every Tue, Sat; 2 mg all other days   Weekly warfarin total:  18 mg   No change documented:  Savage Wasserman, David   Plan last modified:  Savage Wasserman, PharmD (2024)   Next INR check:  2024   Priority:  Maintenance   Target end date:      Indications    Atrial fibrillation with RVR (Resolved) [I48.91]                 Anticoagulation Episode Summary       INR check location:      Preferred lab:      Send INR reminders to:  [facility]  POOL    Comments:  [facility]  ([place], F: [phone])          Anticoagulation Care Providers       Provider Role Specialty Phone number    Sly Saleh MD Referring Cardiology [phone]            Clinic Interview:  Patient Findings     Negatives:  Signs/symptoms of thrombosis, Signs/symptoms of bleeding,   Laboratory test error suspected, Change in health, Change in alcohol use,   Change in activity, Upcoming invasive procedure, Emergency department   visit, Upcoming dental procedure, Missed doses, Extra doses, Change in   medications, Change in diet/appetite, Hospital admission, Bruising, Other   complaints    Comments:  Reports EGD went well, and her ulcer has improved, nearing   resolution.       Clinical Outcomes     Negatives:  Major bleeding event, Thromboembolic event,   Anticoagulation-related hospital admission, Anticoagulation-related ED   visit, Anticoagulation-related fatality    Comments:  Reports EGD went well, and her ulcer has improved, nearing   resolution.         INR History:      2024     2:56 PM 2024    12:00 AM 2024     8:27 AM 2024     8:00 AM 2024     1:02 PM 9/3/2024    12:00 AM 2024     2:11 PM   Anticoagulation Monitoring   INR   2.71  2.98  2.19   INR Date   2024  2024  9/3/2024   INR Goal  2.0-3.0  2.0-3.0 2.0-3.0 2.0-3.0  2.0-3.0   Trend Same  Same Same Same  Same   Last Week Total 18 mg  18 mg 18 mg 18 mg  20 mg   Next Week Total 18 mg  18 mg 6 mg 6 mg  18 mg   Sun 2 mg  2 mg - Hold (8/25); Otherwise 2 mg  2 mg   Mon 2 mg  2 mg - Hold (8/26); Otherwise 2 mg  2 mg   Tue 4 mg  4 mg 4 mg 4 mg  4 mg   Wed 2 mg  2 mg 2 mg 4 mg (8/28); Otherwise 2 mg  2 mg   Thu 2 mg  2 mg Hold (8/22) Hold (8/22); Otherwise 2 mg  2 mg   Fri 2 mg  2 mg - Hold (8/23); Otherwise 2 mg  2 mg   Sat 4 mg  4 mg - Hold (8/24); Otherwise 4 mg  4 mg   Historical INR  2.71        2.19        Visit Report Report             This result is from an external source.       Plan:  1. INR is Therapeutic today- see above in Anticoagulation Summary.   Will instruct Arelis SANDRA Johnson to Continue their warfarin regimen- see above in Anticoagulation Summary.  2. Follow up in 2 weeks.  3. They have been instructed to call if any changes in medications, doses, concerns, etc. Patient expresses understanding and has no further questions at this time.    Savage Wasserman, PharmD

## 2024-09-10 ENCOUNTER — TELEPHONE (OUTPATIENT)
Dept: GASTROENTEROLOGY | Facility: CLINIC | Age: 84
End: 2024-09-10
Payer: MEDICARE

## 2024-09-10 NOTE — TELEPHONE ENCOUNTER
----- Message from Daly Mariscal sent at 9/6/2024  5:04 PM EDT -----  Gastric biopsies show benign inflammation.   Use Protonix (pantoprazole) 40 mg PO daily for 3 months then take once daily.  Avoid NSAIDs

## 2024-09-10 NOTE — TELEPHONE ENCOUNTER
Hub staff attempted to follow warm transfer process and was unsuccessful     Caller: Arelis Johnson    Relationship to patient: Self    Best call back number: [phone]     Patient is needing: PT IS CALLING DILAN HAZEL RN PLEASE ADVISE AND CALL PT BACK

## 2024-09-11 ENCOUNTER — TRANSCRIBE ORDERS (OUTPATIENT)
Dept: ADMINISTRATIVE | Facility: HOSPITAL | Age: 84
End: 2024-09-11
Payer: MEDICARE

## 2024-09-11 DIAGNOSIS — Z13.9 SCREENING DUE: Primary | ICD-10-CM

## 2024-09-13 NOTE — TELEPHONE ENCOUNTER
Gastric biopsies show benign inflammation.   Use Protonix (pantoprazole) 40 mg PO daily for 3 months then take once daily.  Avoid NSAIDs   Written by Daly Mariscal MD on 9/6/2024  5:04 PM EDT  Seen by patient Arelis Johnson on 9/10/2024  3:27 PM

## 2024-09-18 ENCOUNTER — ANTICOAGULATION VISIT (OUTPATIENT)
Dept: PHARMACY | Facility: HOSPITAL | Age: 84
End: 2024-09-18
Payer: MEDICARE

## 2024-09-18 LAB
INR PPP: 3.42 (ref 0.9–1.1)
PROTHROMBIN TIME: 34.8 SECONDS (ref 11.7–14.2)

## 2024-09-27 ENCOUNTER — LAB (OUTPATIENT)
Dept: FAMILY MEDICINE CLINIC | Facility: CLINIC | Age: 84
End: 2024-09-27
Payer: MEDICARE

## 2024-09-27 DIAGNOSIS — D64.9 ANEMIA, UNSPECIFIED TYPE: ICD-10-CM

## 2024-09-28 LAB
BASOPHILS # BLD AUTO: 0.05 10*3/MM3 (ref 0–0.2)
BASOPHILS NFR BLD AUTO: 1.3 % (ref 0–1.5)
EOSINOPHIL # BLD AUTO: 0.06 10*3/MM3 (ref 0–0.4)
EOSINOPHIL NFR BLD AUTO: 1.5 % (ref 0.3–6.2)
ERYTHROCYTE [DISTWIDTH] IN BLOOD BY AUTOMATED COUNT: 14.1 % (ref 12.3–15.4)
HCT VFR BLD AUTO: 33 % (ref 34–46.6)
HGB BLD-MCNC: 10.2 G/DL (ref 12–15.9)
IMM GRANULOCYTES # BLD AUTO: 0.01 10*3/MM3 (ref 0–0.05)
IMM GRANULOCYTES NFR BLD AUTO: 0.3 % (ref 0–0.5)
LYMPHOCYTES # BLD AUTO: 0.92 10*3/MM3 (ref 0.7–3.1)
LYMPHOCYTES NFR BLD AUTO: 23.1 % (ref 19.6–45.3)
MCH RBC QN AUTO: 27.1 PG (ref 26.6–33)
MCHC RBC AUTO-ENTMCNC: 30.9 G/DL (ref 31.5–35.7)
MCV RBC AUTO: 87.5 FL (ref 79–97)
MONOCYTES # BLD AUTO: 0.28 10*3/MM3 (ref 0.1–0.9)
MONOCYTES NFR BLD AUTO: 7 % (ref 5–12)
NEUTROPHILS # BLD AUTO: 2.67 10*3/MM3 (ref 1.7–7)
NEUTROPHILS NFR BLD AUTO: 66.8 % (ref 42.7–76)
NRBC BLD AUTO-RTO: 0 /100 WBC (ref 0–0.2)
PLATELET # BLD AUTO: 269 10*3/MM3 (ref 140–450)
RBC # BLD AUTO: 3.77 10*6/MM3 (ref 3.77–5.28)
WBC # BLD AUTO: 3.99 10*3/MM3 (ref 3.4–10.8)

## 2024-10-01 LAB — INR PPP: 2.9

## 2024-10-02 ENCOUNTER — ANTICOAGULATION VISIT (OUTPATIENT)
Dept: PHARMACY | Facility: HOSPITAL | Age: 84
End: 2024-10-02
Payer: MEDICARE

## 2024-10-02 NOTE — PROGRESS NOTES
Anticoagulation Clinic Progress Note    Anticoagulation Summary  As of 10/2/2024      INR goal:  2.0-3.0   TTR:  87.6% (5.8 y)   INR used for dosin.90 (10/1/2024)   Warfarin maintenance plan:  4 mg every Tue, Sat; 2 mg all other days   Weekly warfarin total:  18 mg   No change documented:  Stephanie Yeager, PharmD   Plan last modified:  Savage Wasserman, PharmD (2024)   Next INR check:  10/16/2024   Priority:  Maintenance   Target end date:      Indications    Atrial fibrillation with RVR (Resolved) [I48.91]                 Anticoagulation Episode Summary       INR check location:      Preferred lab:      Send INR reminders to:  [facility]  POOL    Comments:  [facility]  ([place], F: [phone])          Anticoagulation Care Providers       Provider Role Specialty Phone number    Sly Saleh MD Referring Cardiology [phone]            Clinic Interview:  Patient Findings     Negatives:  Signs/symptoms of thrombosis, Signs/symptoms of bleeding,   Laboratory test error suspected, Change in health, Change in alcohol use,   Change in activity, Upcoming invasive procedure, Emergency department   visit, Upcoming dental procedure, Missed doses, Extra doses, Change in   medications, Change in diet/appetite, Hospital admission, Bruising, Other   complaints      Clinical Outcomes     Negatives:  Major bleeding event, Thromboembolic event,   Anticoagulation-related hospital admission, Anticoagulation-related ED   visit, Anticoagulation-related fatality        INR History:      2024     8:00 AM 2024     1:02 PM 9/3/2024    12:00 AM 2024     2:11 PM 2024     8:43 AM 10/1/2024    12:00 AM 10/2/2024    10:27 AM   Anticoagulation Monitoring   INR  2.98  2.19 3.42  2.90   INR Date  2024  9/3/2024 2024  10/1/2024   INR Goal 2.0-3.0 2.0-3.0  2.0-3.0 2.0-3.0  2.0-3.0   Trend Same Same  Same Same  Same   Last Week Total 18 mg 18 mg  20 mg 18 mg  18 mg   Next Week Total 6 mg 6  mg  18 mg 16 mg  18 mg   Sun - Hold (8/25); Otherwise 2 mg  2 mg 2 mg  2 mg   Mon - Hold (8/26); Otherwise 2 mg  2 mg 2 mg  2 mg   Tue 4 mg 4 mg  4 mg 4 mg  4 mg   Wed 2 mg 4 mg (8/28); Otherwise 2 mg  2 mg 2 mg  2 mg   Thu Hold (8/22) Hold (8/22); Otherwise 2 mg  2 mg 2 mg  2 mg   Fri - Hold (8/23); Otherwise 2 mg  2 mg Hold (9/20); Otherwise 2 mg  2 mg   Sat - Hold (8/24); Otherwise 4 mg  4 mg 4 mg  4 mg   Historical INR   2.19       2.90            This result is from an external source.       Plan:  1. INR is Therapeutic today- see above in Anticoagulation Summary.   Will instruct Arelis SANDRA Johnson to Continue their warfarin regimen- see above in Anticoagulation Summary.  2. Follow up in 2 weeks  3. They have been instructed to call if any changes in medications, doses, concerns, etc. Patient expresses understanding and has no further questions at this time.    Stephanie Yeager, PharmD

## 2024-10-03 RX ORDER — WARFARIN SODIUM 4 MG/1
TABLET ORAL
Qty: 60 TABLET | Refills: 1 | Status: SHIPPED | OUTPATIENT
Start: 2024-10-03

## 2024-10-07 NOTE — PROGRESS NOTES
"Chief Complaint  Gastric ulcer    Subjective          History of Present Illness    Arelis Johnson is a  84 y.o. female presents for follow-up.    She was seen by GI team 7/2 through 7/5 for melena and anemia in the hospital.  She had 2 episodes of black stool.  Hemoglobin was noted to be 9.8 on arrival to the hospital.  Of note it has been noted to be within normal limits just 2 months prior.  She underwent EGD as below with finding of nonbleeding gastric ulcers.     She does have history of A-fib and is anticoagulated with Coumadin.     EGD 7/4/2024 showed normal esophagus, nonbleeding gastric ulcers with clean base ulcer Shayan class III, normal examined duodenum.    Repeat EGD 8/27/2024 showed normal esophagus, congested erythematous and granular mucosa in the antrum, normal examined duodenum.  Gastric biopsy showed benign inflammation.    Today she reports that she has been doing well.  She denies any further episodes of black stool.  No bloody stool.  No abdominal pain, nausea, vomiting.  She has a good appetite and has been tolerating diet well.  She is been taking pantoprazole once daily before dinner.  She denies weakness or fatigue.  Her hemoglobin was checked about a week and a half ago and was stable.  She did have iron studies done back in August which were noted to be worsening from earlier in the year.      Objective   Vital Signs:   /77 (BP Location: Left arm, Patient Position: Sitting, Cuff Size: Adult)   Pulse 54   Ht 162.6 cm (64\")   Wt 74.8 kg (164 lb 14.4 oz)   BMI 28.31 kg/m²       Physical Exam  Constitutional:       General: She is not in acute distress.     Appearance: Normal appearance.   Eyes:      General: No scleral icterus.  Cardiovascular:      Rate and Rhythm: Normal rate.   Pulmonary:      Effort: Pulmonary effort is normal.   Abdominal:      General: Abdomen is flat. Bowel sounds are normal. There is no distension.      Tenderness: There is no abdominal tenderness. " There is no guarding.   Skin:     Coloration: Skin is not jaundiced.   Neurological:      General: No focal deficit present.      Mental Status: She is alert and oriented to person, place, and time.   Psychiatric:         Mood and Affect: Mood normal.         Behavior: Behavior normal.          Result Review :   The following data was reviewed by: Nuris Moraes PA-C on 10/08/2024:  CMP          7/2/2024    10:33 7/3/2024    00:00 7/4/2024    06:01   CMP   Glucose 154  111  97    BUN 35  24  12    Creatinine 0.97  0.86  0.70    EGFR 57.4  66.3  84.9    Sodium 139  141  144    Potassium 4.0  3.8  3.9    Chloride 102  105  108    Calcium 9.8  9.0  8.9    Total Protein 6.2  5.4     Albumin 3.9  3.1     Globulin 2.3  2.3     Total Bilirubin 0.4  0.3     Alkaline Phosphatase 82  65     AST (SGOT) 26  25     ALT (SGPT) 14  13     Albumin/Globulin Ratio 1.7  1.3     BUN/Creatinine Ratio 36.1  27.9  17.1    Anion Gap 9.1  7.3  13.5      CBC          8/19/2024    10:08 8/30/2024    10:01 9/27/2024    09:44   CBC   WBC 5.42  4.72  3.99    RBC 3.55  3.84  3.77    Hemoglobin 10.0  10.8  10.2    Hematocrit 31.6  33.8  33.0    MCV 89.0  88.0  87.5    MCH 28.2  28.1  27.1    MCHC 31.6  32.0  30.9    RDW 13.0  13.4  14.1    Platelets 295  313  269              Assessment:   Diagnoses and all orders for this visit:    1. Acute gastric ulcer with hemorrhage (Primary)    2. Iron deficiency anemia, unspecified iron deficiency anemia type          Plan:   -No further signs of overt GI bleeding.  Recommend she continue on pantoprazole once daily going forward given history of gastric ulcers  -She was noted to have significant iron deficiency back in August.  She has not been taking iron supplementation.  Recommend over-the-counter iron supplement once daily.  She says she is following up with her primary care provider and planning to recheck hemoglobin and iron.  Deficiency is likely due to presence of gastric ulcers and should  resolve as she continues on PPI.  We did discuss that if it persists we may need to discuss colonoscopy in the future      Follow Up   No follow-ups on file.    Dragon dictation used throughout this note.         Nuris Moraes PA-C  [facility]  [address] [place] [address]  Office: [phone]

## 2024-10-08 ENCOUNTER — OFFICE VISIT (OUTPATIENT)
Dept: GASTROENTEROLOGY | Facility: CLINIC | Age: 84
End: 2024-10-08
Payer: MEDICARE

## 2024-10-08 VITALS
HEIGHT: 64 IN | DIASTOLIC BLOOD PRESSURE: 77 MMHG | HEART RATE: 54 BPM | SYSTOLIC BLOOD PRESSURE: 128 MMHG | WEIGHT: 164.9 LBS | BODY MASS INDEX: 28.15 KG/M2

## 2024-10-08 DIAGNOSIS — K25.0 ACUTE GASTRIC ULCER WITH HEMORRHAGE: Primary | ICD-10-CM

## 2024-10-08 DIAGNOSIS — D50.9 IRON DEFICIENCY ANEMIA, UNSPECIFIED IRON DEFICIENCY ANEMIA TYPE: ICD-10-CM

## 2024-10-11 ENCOUNTER — ANTICOAGULATION VISIT (OUTPATIENT)
Dept: PHARMACY | Facility: HOSPITAL | Age: 84
End: 2024-10-11
Payer: MEDICARE

## 2024-10-11 ENCOUNTER — OFFICE VISIT (OUTPATIENT)
Dept: SURGERY | Facility: CLINIC | Age: 84
End: 2024-10-11
Payer: MEDICARE

## 2024-10-11 VITALS
HEART RATE: 60 BPM | SYSTOLIC BLOOD PRESSURE: 118 MMHG | WEIGHT: 164 LBS | DIASTOLIC BLOOD PRESSURE: 76 MMHG | HEIGHT: 64 IN | BODY MASS INDEX: 28 KG/M2 | RESPIRATION RATE: 16 BRPM

## 2024-10-11 DIAGNOSIS — L98.8 DYSPLASTIC SKIN LESION: Primary | ICD-10-CM

## 2024-10-11 DIAGNOSIS — Z79.01 CHRONIC ANTICOAGULATION: ICD-10-CM

## 2024-10-11 NOTE — PROGRESS NOTES
Anticoagulation Clinic Progress Note    Anticoagulation Summary  As of 10/11/2024      INR goal:  2.0-3.0   TTR:  87.6% (5.8 y)   INR used for dosing:  No new INR was available at the time of this encounter.   Warfarin maintenance plan:  4 mg every Tue, Sat; 2 mg all other days   Weekly warfarin total:  18 mg   Plan last modified:  Savage Wasserman, PharmD (4/4/2024)   Next INR check:  10/24/2024   Priority:  Maintenance   Target end date:      Indications    Atrial fibrillation with RVR (Resolved) [I48.91]                 Anticoagulation Episode Summary       INR check location:      Preferred lab:      Send INR reminders to:   [facility]    Comments:  [facility]  ([place], [place]: [phone])          Anticoagulation Care Providers       Provider Role Specialty Phone number    Sly Saleh MD Referring Cardiology [phone]            Clinic Interview:  Patient Findings     Positives:  Upcoming invasive procedure    Negatives:  Signs/symptoms of thrombosis, Signs/symptoms of bleeding,   Laboratory test error suspected, Change in health, Change in alcohol use,   Change in activity, Emergency department visit, Upcoming dental procedure,   Missed doses, Extra doses, Change in medications, Change in diet/appetite,   Hospital admission, Bruising, Other complaints      Clinical Outcomes     Negatives:  Major bleeding event, Thromboembolic event,   Anticoagulation-related hospital admission, Anticoagulation-related ED   visit, Anticoagulation-related fatality        INR History:      8/21/2024     1:02 PM 9/3/2024    12:00 AM 9/4/2024     2:11 PM 9/18/2024     8:43 AM 10/1/2024    12:00 AM 10/2/2024    10:27 AM 10/11/2024    11:09 AM   Anticoagulation Monitoring   INR 2.98  2.19 3.42  2.90    INR Date 8/20/2024  9/3/2024 9/17/2024  10/1/2024    INR Goal 2.0-3.0  2.0-3.0 2.0-3.0  2.0-3.0 2.0-3.0   Trend Same  Same Same  Same Same   Last Week Total 18 mg  20 mg 18 mg  18 mg 18 mg   Next Week Total 6 mg   18 mg 16 mg  18 mg 6 mg   Sun Hold (8/25); Otherwise 2 mg  2 mg 2 mg  2 mg Hold (10/13), 4 mg (10/20)   Mon Hold (8/26); Otherwise 2 mg  2 mg 2 mg  2 mg Hold (10/14); Otherwise 2 mg   Tue 4 mg  4 mg 4 mg  4 mg Hold (10/15); Otherwise 4 mg   Wed 4 mg (8/28); Otherwise 2 mg  2 mg 2 mg  2 mg Hold (10/16); Otherwise 2 mg   Thu Hold (8/22); Otherwise 2 mg  2 mg 2 mg  2 mg Hold (10/17)   Fri Hold (8/23); Otherwise 2 mg  2 mg Hold (9/20); Otherwise 2 mg  2 mg 2 mg   Sat Hold (8/24); Otherwise 4 mg  4 mg 4 mg  4 mg 6 mg (10/19); Otherwise 4 mg   Historical INR  2.19       2.90         Visit Report       Report       This result is from an external source.       Plan:  1. INR is  not available  today- see above in Anticoagulation Summary.   Will instruct Arelis Johnson to Change their warfarin regimen- see above in Anticoagulation Summary.  Patient has to start holding warfarin on Sunday for a skin lesion removal on 10/18 in which she is required to hold for 5 days. Boost two days post op to 6 mg on Saturday and 4 mg on Sunday and recheck INR between 23-25th.   2. Follow up in 2 weeks  3. They have been instructed to call if any changes in medications, doses, concerns, etc. Patient expresses understanding and has no further questions at this time.    Eulalia Reaves [facility]
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
## 2024-10-14 NOTE — LETTER
Arelis Johnson    1940          10/14/24           Dear      Arelis Johnson is scheduled for a procedure.  Dr. Jaquez is seeking permission to hold COUMADIN  prior to procedure.  Please respond to this request as soon as possible.  If you have any questions, please feel free to contact this office.    Sincerely,  Annabelle Rodas Rep       _____ YES, pt may hold COUMADIN for ______ days prior to procedure.    _____ NO, pt may NOT hold COUMADIN  .      __________________________  __________________  SIGNATURE      DATE

## 2024-10-18 ENCOUNTER — PROCEDURE VISIT (OUTPATIENT)
Dept: SURGERY | Facility: CLINIC | Age: 84
End: 2024-10-18
Payer: MEDICARE

## 2024-10-18 VITALS
BODY MASS INDEX: 28 KG/M2 | SYSTOLIC BLOOD PRESSURE: 112 MMHG | WEIGHT: 164 LBS | RESPIRATION RATE: 16 BRPM | HEIGHT: 64 IN | HEART RATE: 72 BPM | DIASTOLIC BLOOD PRESSURE: 72 MMHG

## 2024-10-18 DIAGNOSIS — L98.8 DYSPLASTIC SKIN LESION: Primary | ICD-10-CM

## 2024-10-18 NOTE — PROGRESS NOTES
Arelis Johnson 84 y.o. female presents for IN OFFICE PROC = EXC SKIN LESION LLE.       HPI   Above noted and agree.      Review of Systems        Past Medical History:   Diagnosis Date    A-fib     added byRWENDY pt estimates 5-6 years ago    Arthritis     Cholelithiasis     Diabetes mellitus     GERD (gastroesophageal reflux disease)     Hyperlipidemia     Hypertension     IFG (impaired fasting glucose)     Palpitations     SOB (shortness of breath)            Past Surgical History:   Procedure Laterality Date    ANAL FISTULA REPAIR      APPENDECTOMY      BACK SURGERY      l spine ruptured disk    CHOLECYSTECTOMY      ENDOSCOPY N/A 07/04/2024    Procedure: ESOPHAGOGASTRODUODENOSCOPY with biopsies;  Surgeon: Daly Mariscal MD;  Location: [facility] ENDOSCOPY;  Service: Gastroenterology;  Laterality: N/A;  PRE - melana  POST - gastric ulcers    ENDOSCOPY N/A 8/27/2024    Procedure: ESOPHAGOGASTRODUODENOSCOPY with cold biopsies;  Surgeon: Daly Mariscal MD;  Location: [facility] ENDOSCOPY;  Service: Gastroenterology;  Laterality: N/A;  Pre - F/U gastric ulcer.  Post - gastric ulcer    JOINT REPLACEMENT      bilateral knees    SPINE SURGERY  1-1998    UPPER GASTROINTESTINAL ENDOSCOPY             Physical Exam    I discussed with the patient the benefits and risks of excising this lesion.   Risks not limited to but including bleeding, infection, having to excise more if the lesion turns out to be cancer.  The patient appeared to understand and signed informed consent.  The area was prepped and draped in the usual sterile fashion.  Local was injected into the area to be excised.  The lesion was then excised.  Hemostasis was perfected.  The wound was then closed using simple sutures.  Sterile dressings were applied.  The patient tolerated the procedure well without complication.  Wound care instructions were then given to the patient.  The lesion measured 2 cm x 1 cm x 1 cm was located on the left shin    /72    "Pulse 72   Resp 16   Ht 162.6 cm (64\")   Wt 74.4 kg (164 lb)   LMP  (LMP Unknown)   BMI 28.15 kg/m²         Diagnoses and all orders for this visit:    1. Dysplastic skin lesion (Primary)    We will leave the sutures on for 2 weeks since the area has more tension than usual.    Thank you for allowing me to participate in the care of this interesting patient.     "

## 2024-10-18 NOTE — LETTER
October 18, 2024     CHARMAINE Butts  [address] [address]    Patient: Arelis Johnson   YOB: 1940   Date of Visit: 10/18/2024     Dear CHARMAINE Butts:       Thank you for referring Arelis Johnson to me for evaluation. Below are the relevant portions of my assessment and plan of care.    If you have questions, please do not hesitate to call me. I look forward to following Arelis along with you.         Sincerely,        Kayleen Jaquez DO        CC: No Recipients    Kayleen Jaquez DO  10/18/24 1119  Sign when Signing Visit  Arelis Johnson 84 y.o. female presents for IN OFFICE PROC = EXC SKIN LESION LLE.       HPI   Above noted and agree.      Review of Systems        Past Medical History:   Diagnosis Date   • A-fib     added byRN, pt estimates 5-6 years ago   • Arthritis    • Cholelithiasis    • Diabetes mellitus    • GERD (gastroesophageal reflux disease)    • Hyperlipidemia    • Hypertension    • IFG (impaired fasting glucose)    • Palpitations    • SOB (shortness of breath)            Past Surgical History:   Procedure Laterality Date   • ANAL FISTULA REPAIR     • APPENDECTOMY     • BACK SURGERY      l spine ruptured disk   • CHOLECYSTECTOMY     • ENDOSCOPY N/A 07/04/2024    Procedure: ESOPHAGOGASTRODUODENOSCOPY with biopsies;  Surgeon: Daly Mariscal MD;  Location: [facility] ENDOSCOPY;  Service: Gastroenterology;  Laterality: N/A;  PRE - melana  POST - gastric ulcers   • ENDOSCOPY N/A 8/27/2024    Procedure: ESOPHAGOGASTRODUODENOSCOPY with cold biopsies;  Surgeon: Daly Mariscal MD;  Location: [facility] ENDOSCOPY;  Service: Gastroenterology;  Laterality: N/A;  Pre - F/U gastric ulcer.  Post - gastric ulcer   • JOINT REPLACEMENT      bilateral knees   • SPINE SURGERY  1-1998   • UPPER GASTROINTESTINAL ENDOSCOPY             Physical Exam    I discussed with the patient the benefits and risks of excising this lesion.   Risks not limited to  "but including bleeding, infection, having to excise more if the lesion turns out to be cancer.  The patient appeared to understand and signed informed consent.  The area was prepped and draped in the usual sterile fashion.  Local was injected into the area to be excised.  The lesion was then excised.  Hemostasis was perfected.  The wound was then closed using simple sutures.  Sterile dressings were applied.  The patient tolerated the procedure well without complication.  Wound care instructions were then given to the patient.  The lesion measured 2 cm x 1 cm x 1 cm was located on the left shin    /72   Pulse 72   Resp 16   Ht 162.6 cm (64\")   Wt 74.4 kg (164 lb)   LMP  (LMP Unknown)   BMI 28.15 kg/m²         Diagnoses and all orders for this visit:    1. Dysplastic skin lesion (Primary)    We will leave the sutures on for 2 weeks since the area has more tension than usual.    Thank you for allowing me to participate in the care of this interesting patient.     "

## 2024-10-22 ENCOUNTER — OFFICE VISIT (OUTPATIENT)
Dept: FAMILY MEDICINE CLINIC | Facility: CLINIC | Age: 84
End: 2024-10-22
Payer: MEDICARE

## 2024-10-22 ENCOUNTER — PATIENT ROUNDING (BHMG ONLY) (OUTPATIENT)
Dept: FAMILY MEDICINE CLINIC | Facility: CLINIC | Age: 84
End: 2024-10-22
Payer: MEDICARE

## 2024-10-22 VITALS
TEMPERATURE: 97.7 F | SYSTOLIC BLOOD PRESSURE: 118 MMHG | HEIGHT: 64 IN | BODY MASS INDEX: 28.1 KG/M2 | DIASTOLIC BLOOD PRESSURE: 62 MMHG | WEIGHT: 164.6 LBS | OXYGEN SATURATION: 98 % | HEART RATE: 87 BPM | RESPIRATION RATE: 17 BRPM

## 2024-10-22 DIAGNOSIS — Z00.00 MEDICARE ANNUAL WELLNESS VISIT, SUBSEQUENT: Primary | Chronic | ICD-10-CM

## 2024-10-22 DIAGNOSIS — I48.20 ATRIAL FIBRILLATION, CHRONIC: ICD-10-CM

## 2024-10-22 DIAGNOSIS — E78.2 MIXED HYPERLIPIDEMIA: ICD-10-CM

## 2024-10-22 DIAGNOSIS — I10 ESSENTIAL HYPERTENSION: ICD-10-CM

## 2024-10-22 DIAGNOSIS — E11.9 TYPE 2 DIABETES MELLITUS WITHOUT COMPLICATION, WITHOUT LONG-TERM CURRENT USE OF INSULIN: ICD-10-CM

## 2024-10-22 DIAGNOSIS — D64.9 ANEMIA, UNSPECIFIED TYPE: ICD-10-CM

## 2024-10-22 LAB
EXPIRATION DATE: ABNORMAL
HBA1C MFR BLD: 6.8 % (ref 4.5–5.7)
Lab: ABNORMAL

## 2024-10-22 PROCEDURE — 1170F FXNL STATUS ASSESSED: CPT | Performed by: NURSE PRACTITIONER

## 2024-10-22 PROCEDURE — 3074F SYST BP LT 130 MM HG: CPT | Performed by: NURSE PRACTITIONER

## 2024-10-22 PROCEDURE — G0439 PPPS, SUBSEQ VISIT: HCPCS | Performed by: NURSE PRACTITIONER

## 2024-10-22 PROCEDURE — 3078F DIAST BP <80 MM HG: CPT | Performed by: NURSE PRACTITIONER

## 2024-10-22 PROCEDURE — 99214 OFFICE O/P EST MOD 30 MIN: CPT | Performed by: NURSE PRACTITIONER

## 2024-10-22 PROCEDURE — 3044F HG A1C LEVEL LT 7.0%: CPT | Performed by: NURSE PRACTITIONER

## 2024-10-22 PROCEDURE — 83036 HEMOGLOBIN GLYCOSYLATED A1C: CPT | Performed by: NURSE PRACTITIONER

## 2024-10-22 PROCEDURE — 1126F AMNT PAIN NOTED NONE PRSNT: CPT | Performed by: NURSE PRACTITIONER

## 2024-10-22 NOTE — PROGRESS NOTES
Subjective   The ABCs of the Annual Wellness Visit  Medicare Wellness Visit      Arelis Johnson is a 84 y.o. patient who presents for a Medicare Wellness Visit.    The following portions of the patient's history were reviewed and   updated as appropriate: allergies, current medications, past family history, past medical history, past social history, past surgical history, and problem list.    Compared to one year ago, the patient's physical   health is worse.  Compared to one year ago, the patient's mental   health is the same.    Recent Hospitalizations:  This patient has had a [facility] admission record on file within the last 365 days.  Current Medical Providers:  Patient Care Team:  Amanda Felix APRN as PCP - General (Family Medicine)  Heriberto Singer MD as Consulting Physician (Orthopedic Surgery)  Sly Saleh MD as Consulting Physician (Cardiology)  Shashank Mckeon MD as Consulting Physician (Cardiology)    Outpatient Medications Prior to Visit   Medication Sig Dispense Refill    allopurinol (ZYLOPRIM) 100 MG tablet Take 1 tablet by mouth Daily. 30 tablet 5    Calcium Carb-Cholecalciferol (CALCIUM + D3) 600-200 MG-UNIT tablet Take 1 tablet by mouth Daily.      dilTIAZem CD (CARDIZEM CD) 180 MG 24 hr capsule TAKE ONE CAPSULE (BY MOUTH) EACH DAY 90 capsule 2    furosemide (LASIX) 40 MG tablet TAKE 1 TABLET BY MOUTH DAILY. 90 tablet 4    metFORMIN ER (GLUCOPHAGE-XR) 500 MG 24 hr tablet Take 1 tablet by mouth Daily With Breakfast. 90 tablet 1    metoprolol tartrate (LOPRESSOR) 50 MG tablet Take 1/2 tablet by mouth 2 Times a Day. 90 tablet 3    pantoprazole (PROTONIX) 40 MG EC tablet Take 1 tablet by mouth Daily. 90 tablet 1    simvastatin (ZOCOR) 40 MG tablet Take 1 tablet by mouth Every Night. 90 tablet 1    warfarin (COUMADIN) 4 MG tablet TAKE ONE TABLET BY MOUTH ON TUESDAY AND SATURDAY AND THEN TAKE 1/2 TABLET BY MOUTH ON ALL OTHER DAYS OR AS DIRECTED 60 tablet 1     No  "facility-administered medications prior to visit.     No opioid medication identified on active medication list. I have reviewed chart for other potential  high risk medication/s and harmful drug interactions in the elderly.      Aspirin is not on active medication list.  Aspirin use is not indicated based on review of current medical condition/s. Risk of harm outweighs potential benefits.  .    Patient Active Problem List   Diagnosis    IFG (impaired fasting glucose)    Hypertension    Hyperlipidemia    Diabetes mellitus    Hearing loss of right ear due to cerumen impaction    Chronic anticoagulation    Atrial fibrillation, chronic    GI hemorrhage    Gastric ulcer     Advance Care Planning Advance Directive is not on file.  ACP discussion was held with the patient during this visit. Patient does not have an advance directive, declines further assistance.            Objective   Vitals:    10/22/24 0747   BP: 118/62   Pulse: 87   Resp: 17   Temp: 97.7 °F (36.5 °C)   TempSrc: Temporal   SpO2: 98%   Weight: 74.7 kg (164 lb 9.6 oz)   Height: 162.6 cm (64\")   PainSc: 0-No pain       Estimated body mass index is 28.25 kg/m² as calculated from the following:    Height as of this encounter: 162.6 cm (64\").    Weight as of this encounter: 74.7 kg (164 lb 9.6 oz).            Does the patient have evidence of cognitive impairment? No  Lab Results   Component Value Date    HGBA1C 6.8 (A) 10/22/2024                                                                                                Health  Risk Assessment    Smoking Status:  Social History     Tobacco Use   Smoking Status Never   Smokeless Tobacco Never     Alcohol Consumption:  Social History     Substance and Sexual Activity   Alcohol Use Never       Fall Risk Screen  STEADI Fall Risk Assessment was completed, and patient is at LOW risk for falls.Assessment completed on:10/22/2024    Depression Screening:      10/22/2024     7:53 AM   PHQ-2/PHQ-9 Depression " Screening   Little interest or pleasure in doing things Not at all   Feeling down, depressed, or hopeless Not at all     Health Habits and Functional and Cognitive Screening:      10/22/2024     7:53 AM   Functional & Cognitive Status   Do you have difficulty preparing food and eating? No   Do you have difficulty bathing yourself, getting dressed or grooming yourself? No   Do you have difficulty using the toilet? No   Do you have difficulty moving around from place to place? No   Do you have trouble with steps or getting out of a bed or a chair? No   Current Diet Diabetic Diet   Dental Exam Not up to date   Eye Exam Up to date   Exercise (times per week) 6 times per week   Current Exercises Include No Regular Exercise   Do you need help using the phone?  No   Are you deaf or do you have serious difficulty hearing?  Yes   Do you need help to go to places out of walking distance? No   Do you need help shopping? No   Do you need help preparing meals?  No   Do you need help with housework?  No   Do you need help with laundry? No   Do you need help taking your medications? No   Do you need help managing money? No   Do you ever drive or ride in a car without wearing a seat belt? No   Have you felt unusual stress, anger or loneliness in the last month? No   Who do you live with? Alone   If you need help, do you have trouble finding someone available to you? No   Have you been bothered in the last four weeks by sexual problems? No   Do you have difficulty concentrating, remembering or making decisions? No           Age-appropriate Screening Schedule:  Refer to the list below for future screening recommendations based on patient's age, sex and/or medical conditions. Orders for these recommended tests are listed in the plan section. The patient has been provided with a written plan.    Health Maintenance List  Health Maintenance   Topic Date Due    TDAP/TD VACCINES (1 - Tdap) Never done    URINE MICROALBUMIN  10/17/2024     "COVID-19 Vaccine (6 - 2023-24 season) 01/11/2025 (Originally 9/1/2024)    RSV Vaccine - Adults (1 - 1-dose 75+ series) 04/18/2025 (Originally 1/14/2014)    LIPID PANEL  04/18/2025    HEMOGLOBIN A1C  04/22/2025    DIABETIC EYE EXAM  09/16/2025    BMI FOLLOWUP  10/11/2025    ANNUAL WELLNESS VISIT  10/22/2025    MAMMOGRAM  10/30/2025    DXA SCAN  10/30/2025    INFLUENZA VACCINE  Completed    Pneumococcal Vaccine 65+  Completed    ZOSTER VACCINE  Completed                                                                                                                                                CMS Preventative Services Quick Reference  Risk Factors Identified During Encounter  Fall Risk-High or Moderate: Discussed Fall Prevention in the home    The above risks/problems have been discussed with the patient.  Pertinent information has been shared with the patient in the After Visit Summary.  An After Visit Summary and PPPS were made available to the patient.    Follow Up:   Next Medicare Wellness visit to be scheduled in 1 year.         Additional E&M Note during same encounter follows:  Patient has additional, significant, and separately identifiable condition(s)/problem(s) that require work above and beyond the Medicare Wellness Visit     Chief Complaint  Medicare Wellness-subsequent    Subjective   HPI          Here for f/u and AWV  Recent uC visit due to lesion on left shin, saw [facility] surgery had removed, 4 stiches still there, f/u next week, doing well  No chest pain of shortness of breath. Seeing cardiology for INR  Excercising on bike daily  Hgb alc 7.2 last vist decreasing sweets, wt  down 9 lbs. Last eye exam last month with [facility] [place], Dr zavala  Saw GI anemia, scope negative still on pantoprazole, taking iron daily        Objective   Vital Signs:  /62   Pulse 87   Temp 97.7 °F (36.5 °C) (Temporal)   Resp 17   Ht 162.6 cm (64\")   Wt 74.7 kg (164 lb 9.6 oz)   SpO2 98%   BMI 28.25 kg/m² "   Physical Exam  Vitals and nursing note reviewed.   HENT:      Head: Normocephalic.      Nose: Nose normal.   Eyes:      Pupils: Pupils are equal, round, and reactive to light.   Cardiovascular:      Rate and Rhythm: Normal rate and regular rhythm.      Pulses: Normal pulses.      Heart sounds: Normal heart sounds.   Pulmonary:      Effort: Pulmonary effort is normal. No respiratory distress.      Breath sounds: Normal breath sounds. No wheezing or rales.   Abdominal:      General: Bowel sounds are normal. There is no distension.      Tenderness: There is no abdominal tenderness.   Musculoskeletal:         General: No swelling.      Cervical back: Neck supple.      Right lower leg: No edema.      Left lower leg: No edema.   Skin:     General: Skin is warm and dry.   Neurological:      Mental Status: She is alert and oriented to person, place, and time.   Psychiatric:         Mood and Affect: Mood normal.                 Assessment and Plan               Type 2 diabetes mellitus without complication, without long-term current use of insulin    Anemia, unspecified type    Atrial fibrillation, chronic    Essential hypertension    Medicare annual wellness visit, subsequent    Mixed hyperlipidemia       Orders Placed This Encounter   Procedures    Comprehensive Metabolic Panel     Order Specific Question:   Release to patient     Answer:   Routine Release [[id number]]    Lipid Panel     Order Specific Question:   Release to patient     Answer:   Routine Release [[id number]]    Urinalysis With Culture If Indicated - Urine, Clean Catch     Order Specific Question:   Release to patient     Answer:   Routine Release [[id number]]    MicroAlbumin, Urine, Random - Urine, Clean Catch     Order Specific Question:   Release to patient     Answer:   Routine Release [[id number]]    Iron Profile     Order Specific Question:   Release to patient     Answer:   Routine Release [[id number]]    POCT glycated hemoglobin, total     Order  Specific Question:   Release to patient     Answer:   Routine Release [[id number]]    CBC & Differential     Order Specific Question:   Manual Differential     Answer:   No     Order Specific Question:   Release to patient     Answer:   Routine Release [[id number]]   Hbg alc 6.8 today. She will continue his current regimen and continue to follow specialist  Will obtain labs today  Return in 6 months          Follow Up   Return in about 6 months (around 4/22/2025).  Patient was given instructions and counseling regarding her condition or for health maintenance advice. Please see specific information pulled into the AVS if appropriate.

## 2024-10-22 NOTE — PROGRESS NOTES
"My name is Cheikh Kendall     I am the Practice Manager with   [facility] [address] [address] [phone]      I am messaging to ask you about our practice and your recent visit.     Tell me about your visit with us. What things went well?         We're always looking for ways to make our patients' experiences even better. Do you have recommendations on ways we may improve?       Overall were you satisfied with your first visit to our practice?        Is there anything else I can do for you?     Also, please note that you may receive a survey from \"[employer]\" please take time to fill that out, as it provides important feedback for our office.       Thank you, and have a great day.   "

## 2024-10-23 LAB
ALBUMIN SERPL-MCNC: 4.1 G/DL (ref 3.5–5.2)
ALBUMIN/GLOB SERPL: 1.5 G/DL
ALP SERPL-CCNC: 115 U/L (ref 39–117)
ALT SERPL-CCNC: 16 U/L (ref 1–33)
APPEARANCE UR: CLEAR
AST SERPL-CCNC: 35 U/L (ref 1–32)
BACTERIA #/AREA URNS HPF: NORMAL /[HPF]
BASOPHILS # BLD AUTO: 0.03 10*3/MM3 (ref 0–0.2)
BASOPHILS NFR BLD AUTO: 0.6 % (ref 0–1.5)
BILIRUB SERPL-MCNC: 0.7 MG/DL (ref 0–1.2)
BILIRUB UR QL STRIP: NEGATIVE
BUN SERPL-MCNC: 17 MG/DL (ref 8–23)
BUN/CREAT SERPL: 15.9 (ref 7–25)
CALCIUM SERPL-MCNC: 9.6 MG/DL (ref 8.6–10.5)
CASTS URNS QL MICRO: NORMAL /LPF
CHLORIDE SERPL-SCNC: 98 MMOL/L (ref 98–107)
CHOLEST SERPL-MCNC: 107 MG/DL (ref 0–200)
CO2 SERPL-SCNC: 31.8 MMOL/L (ref 22–29)
COLOR UR: YELLOW
CREAT SERPL-MCNC: 1.07 MG/DL (ref 0.57–1)
DX ICD CODE: NORMAL
EGFRCR SERPLBLD CKD-EPI 2021: 51 ML/MIN/1.73
EOSINOPHIL # BLD AUTO: 0.05 10*3/MM3 (ref 0–0.4)
EOSINOPHIL NFR BLD AUTO: 0.9 % (ref 0.3–6.2)
EPI CELLS #/AREA URNS HPF: NORMAL /HPF (ref 0–10)
ERYTHROCYTE [DISTWIDTH] IN BLOOD BY AUTOMATED COUNT: 16.6 % (ref 12.3–15.4)
GLOBULIN SER CALC-MCNC: 2.8 GM/DL
GLUCOSE SERPL-MCNC: 126 MG/DL (ref 65–99)
GLUCOSE UR QL STRIP: NEGATIVE
HCT VFR BLD AUTO: 37.5 % (ref 34–46.6)
HDLC SERPL-MCNC: 53 MG/DL (ref 40–60)
HGB BLD-MCNC: 11.4 G/DL (ref 12–15.9)
HGB UR QL STRIP: NEGATIVE
IMM GRANULOCYTES # BLD AUTO: 0.01 10*3/MM3 (ref 0–0.05)
IMM GRANULOCYTES NFR BLD AUTO: 0.2 % (ref 0–0.5)
IRON SATN MFR SERPL: 15 % (ref 20–50)
IRON SERPL-MCNC: 70 MCG/DL (ref 37–145)
KETONES UR QL STRIP: NEGATIVE
LDLC SERPL CALC-MCNC: 34 MG/DL (ref 0–100)
LEUKOCYTE ESTERASE UR QL STRIP: NEGATIVE
LYMPHOCYTES # BLD AUTO: 0.63 10*3/MM3 (ref 0.7–3.1)
LYMPHOCYTES NFR BLD AUTO: 11.8 % (ref 19.6–45.3)
MCH RBC QN AUTO: 26.8 PG (ref 26.6–33)
MCHC RBC AUTO-ENTMCNC: 30.4 G/DL (ref 31.5–35.7)
MCV RBC AUTO: 88.2 FL (ref 79–97)
MICRO URNS: NORMAL
MICRO URNS: NORMAL
MICROALBUMIN UR-MCNC: 6.5 UG/ML
MONOCYTES # BLD AUTO: 0.33 10*3/MM3 (ref 0.1–0.9)
MONOCYTES NFR BLD AUTO: 6.2 % (ref 5–12)
NEUTROPHILS # BLD AUTO: 4.27 10*3/MM3 (ref 1.7–7)
NEUTROPHILS NFR BLD AUTO: 80.3 % (ref 42.7–76)
NITRITE UR QL STRIP: NEGATIVE
NRBC BLD AUTO-RTO: 0 /100 WBC (ref 0–0.2)
PATH REPORT.FINAL DX SPEC: NORMAL
PATH REPORT.GROSS SPEC: NORMAL
PATH REPORT.SITE OF ORIGIN SPEC: NORMAL
PATHOLOGIST NAME: NORMAL
PAYMENT PROCEDURE: NORMAL
PH UR STRIP: 7 [PH] (ref 5–7.5)
PLATELET # BLD AUTO: 257 10*3/MM3 (ref 140–450)
POTASSIUM SERPL-SCNC: 3.6 MMOL/L (ref 3.5–5.2)
PROT SERPL-MCNC: 6.9 G/DL (ref 6–8.5)
PROT UR QL STRIP: NEGATIVE
RBC # BLD AUTO: 4.25 10*6/MM3 (ref 3.77–5.28)
RBC #/AREA URNS HPF: NORMAL /HPF (ref 0–2)
SODIUM SERPL-SCNC: 139 MMOL/L (ref 136–145)
SP GR UR STRIP: 1.01 (ref 1–1.03)
TIBC SERPL-MCNC: 462 MCG/DL
TRIGL SERPL-MCNC: 110 MG/DL (ref 0–150)
UIBC SERPL-MCNC: 392 MCG/DL (ref 112–346)
URINALYSIS REFLEX: NORMAL
UROBILINOGEN UR STRIP-MCNC: 0.2 MG/DL (ref 0.2–1)
VLDLC SERPL CALC-MCNC: 20 MG/DL (ref 5–40)
WBC # BLD AUTO: 5.32 10*3/MM3 (ref 3.4–10.8)
WBC #/AREA URNS HPF: NORMAL /HPF (ref 0–5)

## 2024-10-25 ENCOUNTER — ANTICOAGULATION VISIT (OUTPATIENT)
Dept: PHARMACY | Facility: HOSPITAL | Age: 84
End: 2024-10-25
Payer: MEDICARE

## 2024-10-25 LAB
INR PPP: 2.48 (ref 0.9–1.1)
PROTHROMBIN TIME: 27.1 SECONDS (ref 11.7–14.2)

## 2024-10-25 NOTE — PROGRESS NOTES
Anticoagulation Clinic Progress Note    Anticoagulation Summary  As of 10/25/2024      INR goal:  2.0-3.0   TTR:  87.7% (5.9 y)   INR used for dosin.48 (10/24/2024)   Warfarin maintenance plan:  4 mg every Tue, Sat; 2 mg all other days   Weekly warfarin total:  18 mg   No change documented:  Eulalia Reaves RPH   Plan last modified:  Savage Wasserman, PharmD (2024)   Next INR check:  2024   Priority:  Maintenance   Target end date:  --    Indications    Atrial fibrillation with RVR (Resolved) [I48.91]                 Anticoagulation Episode Summary       INR check location:  --    Preferred lab:  --    Send INR reminders to:  [facility]  POOL    Comments:  [facility]  ([place], F: [phone])          Anticoagulation Care Providers       Provider Role Specialty Phone number    Sly Saleh MD Referring Cardiology [phone]            Clinic Interview:  Patient Findings     Negatives:  Signs/symptoms of thrombosis, Signs/symptoms of bleeding,   Laboratory test error suspected, Change in health, Change in alcohol use,   Change in activity, Upcoming invasive procedure, Emergency department   visit, Upcoming dental procedure, Missed doses, Extra doses, Change in   medications, Change in diet/appetite, Hospital admission, Bruising, Other   complaints      Clinical Outcomes     Negatives:  Major bleeding event, Thromboembolic event,   Anticoagulation-related hospital admission, Anticoagulation-related ED   visit, Anticoagulation-related fatality        INR History:      9/3/2024    12:00 AM 2024     2:11 PM 2024     8:43 AM 10/1/2024    12:00 AM 10/2/2024    10:27 AM 10/11/2024    11:09 AM 10/25/2024     9:00 AM   Anticoagulation Monitoring   INR  2.19 3.42  2.90 -- 2.48   INR Date  9/3/2024 2024  10/1/2024  10/24/2024   INR Goal  2.0-3.0 2.0-3.0  2.0-3.0 2.0-3.0 2.0-3.0   Trend  Same Same  Same Same Same   Last Week Total  20 mg 18 mg  18 mg 18 mg 22 mg   Next Week  Total  18 mg 16 mg  18 mg 6 mg 18 mg   Sun  2 mg 2 mg  2 mg Hold (10/13), 4 mg (10/20) 2 mg   Mon  2 mg 2 mg  2 mg Hold (10/14); Otherwise 2 mg 2 mg   Tue  4 mg 4 mg  4 mg Hold (10/15); Otherwise 4 mg 4 mg   Wed  2 mg 2 mg  2 mg Hold (10/16); Otherwise 2 mg 2 mg   Thu  2 mg 2 mg  2 mg Hold (10/17) 2 mg   Fri  2 mg Hold (9/20); Otherwise 2 mg  2 mg 2 mg 2 mg   Sat  4 mg 4 mg  4 mg 6 mg (10/19); Otherwise 4 mg 4 mg   Historical INR 2.19       2.90          Visit Report      Report        This result is from an external source.       Plan:  1. INR is Therapeutic today- see above in Anticoagulation Summary.   Will instruct Arelis Johnson to Continue their warfarin regimen- see above in Anticoagulation Summary.  2. Follow up in 4 weeks  3. They have been instructed to call if any changes in medications, doses, concerns, etc. Patient expresses understanding and has no further questions at this time.    Eulalia Reaves [facility]

## 2024-11-01 ENCOUNTER — OFFICE VISIT (OUTPATIENT)
Dept: SURGERY | Facility: CLINIC | Age: 84
End: 2024-11-01
Payer: MEDICARE

## 2024-11-01 DIAGNOSIS — Z98.890 STATUS POST EXCISIONAL BIOPSY: Primary | ICD-10-CM

## 2024-11-01 NOTE — LETTER
November 1, 2024     CHARMAINE Butts  [address] [address]    Patient: Arelis Johnson   YOB: 1940   Date of Visit: 11/1/2024     Dear CHARMAINE Butts:       Thank you for referring Arelis Johnson to me for evaluation. Below are the relevant portions of my assessment and plan of care.    If you have questions, please do not hesitate to call me. I look forward to following Arelis along with you.         Sincerely,        Kayleen Jaquez DO        CC: No Recipients    Kayleen Jaquez DO  11/01/24 0826  Sign when Signing Visit  Arelis Johnson 84 y.o. female presents for PO FU/suture removal.        HPI     Above noted and agree.  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  She is doing well.    Review of Systems        Past Medical History:   Diagnosis Date   • A-fib     added byRN, pt estimates 5-6 years ago   • Arthritis    • Cataract    • Cholelithiasis    • Diabetes mellitus    • GERD (gastroesophageal reflux disease)    • Hyperlipidemia    • Hypertension    • IFG (impaired fasting glucose)    • Palpitations    • Peptic ulceration    • SOB (shortness of breath)            Past Surgical History:   Procedure Laterality Date   • ANAL FISTULA REPAIR     • APPENDECTOMY     • BACK SURGERY      l spine ruptured disk   • CHOLECYSTECTOMY     • ENDOSCOPY N/A 07/04/2024    Procedure: ESOPHAGOGASTRODUODENOSCOPY with biopsies;  Surgeon: Daly Mariscal MD;  Location: [facility] ENDOSCOPY;  Service: Gastroenterology;  Laterality: N/A;  PRE - melana  POST - gastric ulcers   • ENDOSCOPY N/A 8/27/2024    Procedure: ESOPHAGOGASTRODUODENOSCOPY with cold biopsies;  Surgeon: Daly Mariscal MD;  Location: [facility] ENDOSCOPY;  Service: Gastroenterology;  Laterality: N/A;  Pre - F/U gastric ulcer.  Post - gastric ulcer   • JOINT REPLACEMENT      bilateral knees   • SPINE SURGERY  1-1998   • UPPER GASTROINTESTINAL ENDOSCOPY             Physical  Exam    The wound is healed.  There are no signs of infection.  The sutures were removed.    LMP  (LMP Unknown)         Diagnoses and all orders for this visit:    1. Status post excisional biopsy (Primary)    Arelis may return anytime as needed.    Thank you for allowing me to participate in the care of this interesting patient.

## 2024-11-01 NOTE — PROGRESS NOTES
Arelis Johnson 84 y.o. female presents for PO FU/suture removal.        HPI     Above noted and agree.  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  She is doing well.    Review of Systems        Past Medical History:   Diagnosis Date    A-fib     added byRN, pt estimates 5-6 years ago    Arthritis     Cataract     Cholelithiasis     Diabetes mellitus     GERD (gastroesophageal reflux disease)     Hyperlipidemia     Hypertension     IFG (impaired fasting glucose)     Palpitations     Peptic ulceration     SOB (shortness of breath)            Past Surgical History:   Procedure Laterality Date    ANAL FISTULA REPAIR      APPENDECTOMY      BACK SURGERY      l spine ruptured disk    CHOLECYSTECTOMY      ENDOSCOPY N/A 07/04/2024    Procedure: ESOPHAGOGASTRODUODENOSCOPY with biopsies;  Surgeon: Daly Mariscal MD;  Location: [facility] ENDOSCOPY;  Service: Gastroenterology;  Laterality: N/A;  PRE - melana  POST - gastric ulcers    ENDOSCOPY N/A 8/27/2024    Procedure: ESOPHAGOGASTRODUODENOSCOPY with cold biopsies;  Surgeon: Daly Mariscal MD;  Location: [facility] ENDOSCOPY;  Service: Gastroenterology;  Laterality: N/A;  Pre - F/U gastric ulcer.  Post - gastric ulcer    JOINT REPLACEMENT      bilateral knees    SPINE SURGERY  1-1998    UPPER GASTROINTESTINAL ENDOSCOPY             Physical Exam    The wound is healed.  There are no signs of infection.  The sutures were removed.    LMP  (LMP Unknown)         Diagnoses and all orders for this visit:    1. Status post excisional biopsy (Primary)    Arelis may return anytime as needed.    Thank you for allowing me to participate in the care of this interesting patient.

## 2024-11-06 RX ORDER — SIMVASTATIN 40 MG
40 TABLET ORAL NIGHTLY
Qty: 90 TABLET | Refills: 1 | Status: SHIPPED | OUTPATIENT
Start: 2024-11-06

## 2024-11-14 LAB — INR PPP: 2.67

## 2024-11-15 ENCOUNTER — ANTICOAGULATION VISIT (OUTPATIENT)
Dept: PHARMACY | Facility: HOSPITAL | Age: 84
End: 2024-11-15
Payer: MEDICARE

## 2024-11-15 ENCOUNTER — OFFICE VISIT (OUTPATIENT)
Dept: SURGERY | Facility: CLINIC | Age: 84
End: 2024-11-15
Payer: MEDICARE

## 2024-11-15 DIAGNOSIS — I48.20 ATRIAL FIBRILLATION, CHRONIC: Primary | ICD-10-CM

## 2024-11-15 DIAGNOSIS — Z48.02 VISIT FOR SUTURE REMOVAL: Primary | ICD-10-CM

## 2024-11-15 NOTE — PROGRESS NOTES
Anticoagulation Clinic Progress Note    Anticoagulation Summary  As of 11/15/2024      INR goal:  2.0-3.0   TTR:  87.8% (6 y)   INR used for dosin.67 (2024)   Warfarin maintenance plan:  4 mg every Tue, Sat; 2 mg all other days   Weekly warfarin total:  18 mg   No change documented:  Stephanie Yeager, PharmD   Plan last modified:  Savage Wasserman, PharmD (2024)   Next INR check:  2024   Priority:  Maintenance   Target end date:  --    Indications    Atrial fibrillation with RVR (Resolved) [I48.91]                 Anticoagulation Episode Summary       INR check location:  --    Preferred lab:  --    Send INR reminders to:  [facility]  POOL    Comments:  [facility]  ([place], F: [phone])          Anticoagulation Care Providers       Provider Role Specialty Phone number    Sly Saleh MD Referring Cardiology [phone]            Clinic Interview:  Patient Findings     Negatives:  Signs/symptoms of thrombosis, Signs/symptoms of bleeding,   Laboratory test error suspected, Change in health, Change in alcohol use,   Change in activity, Upcoming invasive procedure, Emergency department   visit, Upcoming dental procedure, Missed doses, Extra doses, Change in   medications, Change in diet/appetite, Hospital admission, Bruising, Other   complaints      Clinical Outcomes     Negatives:  Major bleeding event, Thromboembolic event,   Anticoagulation-related hospital admission, Anticoagulation-related ED   visit, Anticoagulation-related fatality        INR History:      2024     8:43 AM 10/1/2024    12:00 AM 10/2/2024    10:27 AM 10/11/2024    11:09 AM 10/25/2024     9:00 AM 2024    12:00 AM 11/15/2024    10:21 AM   Anticoagulation Monitoring   INR 3.42  2.90 -- 2.48  2.67   INR Date 2024  10/1/2024  10/24/2024  2024   INR Goal 2.0-3.0  2.0-3.0 2.0-3.0 2.0-3.0  2.0-3.0   Trend Same  Same Same Same  Same   Last Week Total 18 mg  18 mg 18 mg 22 mg  18 mg   Next  Week Total 16 mg  18 mg 6 mg 18 mg  18 mg   Sun 2 mg  2 mg Hold (10/13), 4 mg (10/20) 2 mg  2 mg   Mon 2 mg  2 mg Hold (10/14); Otherwise 2 mg 2 mg  2 mg   Tue 4 mg  4 mg Hold (10/15); Otherwise 4 mg 4 mg  4 mg   Wed 2 mg  2 mg Hold (10/16); Otherwise 2 mg 2 mg  2 mg   Thu 2 mg  2 mg Hold (10/17) 2 mg  2 mg   Fri Hold (9/20); Otherwise 2 mg  2 mg 2 mg 2 mg  2 mg   Sat 4 mg  4 mg 6 mg (10/19); Otherwise 4 mg 4 mg  4 mg   Historical INR  2.90        2.67        Visit Report    Report   Report       This result is from an external source.       Plan:  1. INR is Therapeutic today- see above in Anticoagulation Summary.   Will instruct Arelis Johnson to Continue their warfarin regimen- see above in Anticoagulation Summary.  2. Follow up in 4 weeks  3. Secure voicemail with instructions and follow up plan. They have been instructed to call if any changes in medications, doses, concerns, etc. Patient expresses understanding and has no further questions at this time.    Stephanie Yeager, PharmD

## 2024-11-15 NOTE — PROGRESS NOTES
Arelis FLORES Alex 84 y.o. female presents for suture removal LLE.       HPI     Above noted and agree.    Review of Systems        Past Medical History:   Diagnosis Date    A-fib     added byRN, pt estimates 5-6 years ago    Arthritis     Cataract     Cholelithiasis     Diabetes mellitus     GERD (gastroesophageal reflux disease)     Hyperlipidemia     Hypertension     IFG (impaired fasting glucose)     Palpitations     Peptic ulceration     SOB (shortness of breath)            Past Surgical History:   Procedure Laterality Date    ANAL FISTULA REPAIR      APPENDECTOMY      BACK SURGERY      l spine ruptured disk    CHOLECYSTECTOMY      ENDOSCOPY N/A 07/04/2024    Procedure: ESOPHAGOGASTRODUODENOSCOPY with biopsies;  Surgeon: Daly Mariscal MD;  Location: [facility] ENDOSCOPY;  Service: Gastroenterology;  Laterality: N/A;  PRE - melana  POST - gastric ulcers    ENDOSCOPY N/A 8/27/2024    Procedure: ESOPHAGOGASTRODUODENOSCOPY with cold biopsies;  Surgeon: Daly Mariscal MD;  Location: [facility] ENDOSCOPY;  Service: Gastroenterology;  Laterality: N/A;  Pre - F/U gastric ulcer.  Post - gastric ulcer    JOINT REPLACEMENT      bilateral knees    SPINE SURGERY  1-1998    UPPER GASTROINTESTINAL ENDOSCOPY             Physical Exam    The suture was removed without difficulty    LMP  (LMP Unknown)         Diagnoses and all orders for this visit:    1. Visit for suture removal (Primary)    Arelis may return anytime as needed.    Thank you for allowing me to participate in the care of this interesting patient.

## 2024-11-19 ENCOUNTER — OFFICE VISIT (OUTPATIENT)
Age: 84
End: 2024-11-19
Payer: MEDICARE

## 2024-11-19 VITALS
SYSTOLIC BLOOD PRESSURE: 138 MMHG | DIASTOLIC BLOOD PRESSURE: 86 MMHG | HEART RATE: 68 BPM | BODY MASS INDEX: 28 KG/M2 | WEIGHT: 164 LBS | HEIGHT: 64 IN

## 2024-11-19 DIAGNOSIS — I48.20 ATRIAL FIBRILLATION, CHRONIC: Primary | ICD-10-CM

## 2024-11-19 DIAGNOSIS — Z79.01 CHRONIC ANTICOAGULATION: ICD-10-CM

## 2024-11-19 PROCEDURE — 3079F DIAST BP 80-89 MM HG: CPT | Performed by: INTERNAL MEDICINE

## 2024-11-19 PROCEDURE — 93000 ELECTROCARDIOGRAM COMPLETE: CPT | Performed by: INTERNAL MEDICINE

## 2024-11-19 PROCEDURE — 99214 OFFICE O/P EST MOD 30 MIN: CPT | Performed by: INTERNAL MEDICINE

## 2024-11-19 PROCEDURE — 3075F SYST BP GE 130 - 139MM HG: CPT | Performed by: INTERNAL MEDICINE

## 2024-11-19 RX ORDER — FERROUS SULFATE 324(65)MG
324 TABLET, DELAYED RELEASE (ENTERIC COATED) ORAL
COMMUNITY

## 2024-11-19 NOTE — PROGRESS NOTES
Date of Office Visit: 2024  Encounter Provider: Shashank Mckeon MD  Place of Service: [facility] CARDIOLOGY  Patient Name: Arelis Johnson  :1940    Chief Complaint   Patient presents with    Atrial Fibrillation   :     HPI: Arelis Johnson is a 84 y.o. female who presents today for permanent atrial fibrillation.    Patient has a history of permanent atrial fibrillation treated with a rate control strategy.  She is anticoagulated with warfarin.    She has done well in the interval since her last visit.  No issues.  She occasionally checks her heart rate at home she reports values of high 50s to 60s at rest up to 100.    She has no complaints.    She walks on the treadmill most days.    She has not had any issues with bleeding.          Past Medical History:   Diagnosis Date    A-fib     added byRN, pt estimates 5-6 years ago    Arthritis     Cataract     Cholelithiasis     Diabetes mellitus     GERD (gastroesophageal reflux disease)     Hyperlipidemia     Hypertension     IFG (impaired fasting glucose)     Palpitations     Peptic ulceration     SOB (shortness of breath)        Past Surgical History:   Procedure Laterality Date    ANAL FISTULA REPAIR      APPENDECTOMY      BACK SURGERY      l spine ruptured disk    CHOLECYSTECTOMY      ENDOSCOPY N/A 2024    Procedure: ESOPHAGOGASTRODUODENOSCOPY with biopsies;  Surgeon: Daly Mariscal MD;  Location: [facility] ENDOSCOPY;  Service: Gastroenterology;  Laterality: N/A;  PRE - melana  POST - gastric ulcers    ENDOSCOPY N/A 2024    Procedure: ESOPHAGOGASTRODUODENOSCOPY with cold biopsies;  Surgeon: Daly Mariscal MD;  Location: [facility] ENDOSCOPY;  Service: Gastroenterology;  Laterality: N/A;  Pre - F/U gastric ulcer.  Post - gastric ulcer    JOINT REPLACEMENT      bilateral knees    SPINE SURGERY  -    UPPER GASTROINTESTINAL ENDOSCOPY         Social History     Socioeconomic History    Marital status:     Tobacco Use    Smoking status: Never    Smokeless tobacco: Never   Vaping Use    Vaping status: Never Used   Substance and Sexual Activity    Alcohol use: Never    Drug use: Never    Sexual activity: Not Currently       Family History   Problem Relation Age of Onset    Hypertension Mother     Arthritis Mother     Heart disease Father     Hypertension Father     Liver disease Brother     Liver disease Sister        Review of Systems   Constitutional: Negative.   Cardiovascular: Negative.    Respiratory: Negative.     Gastrointestinal: Negative.        Allergies   Allergen Reactions    Percocet [Oxycodone-Acetaminophen] GI Intolerance    Sulfa Antibiotics Nausea And Vomiting    Penicillins Rash         Current Outpatient Medications:     allopurinol (ZYLOPRIM) 100 MG tablet, Take 1 tablet by mouth Daily., Disp: 30 tablet, Rfl: 5    Calcium Carb-Cholecalciferol (CALCIUM + D3) 600-200 MG-UNIT tablet, Take 1 tablet by mouth Daily., Disp: , Rfl:     dilTIAZem CD (CARDIZEM CD) 180 MG 24 hr capsule, TAKE ONE CAPSULE (BY MOUTH) EACH DAY, Disp: 90 capsule, Rfl: 2    ferrous sulfate 324 (65 Fe) MG tablet delayed-release EC tablet, Take 1 tablet by mouth Daily With Breakfast., Disp: , Rfl:     furosemide (LASIX) 40 MG tablet, TAKE 1 TABLET BY MOUTH DAILY., Disp: 90 tablet, Rfl: 4    metFORMIN ER (GLUCOPHAGE-XR) 500 MG 24 hr tablet, Take 1 tablet by mouth Daily With Breakfast., Disp: 90 tablet, Rfl: 1    metoprolol tartrate (LOPRESSOR) 50 MG tablet, Take 1/2 tablet by mouth 2 Times a Day., Disp: 90 tablet, Rfl: 3    pantoprazole (PROTONIX) 40 MG EC tablet, Take 1 tablet by mouth Daily., Disp: 90 tablet, Rfl: 1    simvastatin (ZOCOR) 40 MG tablet, Take 1 tablet by mouth Every Night., Disp: 90 tablet, Rfl: 1    warfarin (COUMADIN) 4 MG tablet, TAKE ONE TABLET BY MOUTH ON TUESDAY AND SATURDAY AND THEN TAKE 1/2 TABLET BY MOUTH ON ALL OTHER DAYS OR AS DIRECTED, Disp: 60 tablet, Rfl: 1      Objective:     Vitals:    11/19/24  "1059   BP: 138/86   BP Location: Right arm   Patient Position: Sitting   Pulse: 68   Weight: 74.4 kg (164 lb)   Height: 162.6 cm (64\")     Body mass index is 28.15 kg/m².    PHYSICAL EXAM:    Vitals and nursing note reviewed.   Constitutional:       General: Not in acute distress.  Pulmonary:      Effort: Pulmonary effort is normal. No respiratory distress.   Cardiovascular:      Normal rate. Regular rhythm.   Edema:     Peripheral edema absent.   Skin:     General: Skin is warm and dry.   Neurological:      Mental Status: Alert and oriented to person, place, and time.   Psychiatric:         Behavior: Behavior normal.         Thought Content: Thought content normal.         Judgment: Judgment normal.             ECG 12 Lead    Date/Time: 11/19/2024 11:16 AM  Performed by: Shashank Mckeon MD    Authorized by: Shashank Mckeon MD  Comparison: compared with previous ECG from 7/2/2024  Similar to previous ECG  Rhythm: atrial fibrillation            Assessment:       Diagnosis Plan   1. Atrial fibrillation, chronic        2. Chronic anticoagulation               Plan:       Permanent atrial fibrillation with stated the full rhythm control strategy.  I think her heart rate may tend a little to the low side, I consider decreasing the diltiazem but she has had no issues left at the same.  We can continue to follow and if we note lower heart rates, particularly if we have symptoms we can consider reducing to maybe 120 mg.  She is doing well with warfarin and anticoagulation.    She will follow-up in 1 year    As always, it has been a pleasure to participate in your patient's care.      Sincerely,         Shashank Mckeon MD  "

## 2024-12-12 LAB — INR PPP: 2.73

## 2024-12-13 ENCOUNTER — HOSPITAL ENCOUNTER (OUTPATIENT)
Dept: MAMMOGRAPHY | Facility: HOSPITAL | Age: 84
Discharge: HOME | End: 2024-12-13
Payer: MEDICARE

## 2024-12-13 ENCOUNTER — ANTICOAGULATION VISIT (OUTPATIENT)
Dept: PHARMACY | Facility: HOSPITAL | Age: 84
End: 2024-12-13
Payer: MEDICARE

## 2024-12-13 DIAGNOSIS — Z13.9 SCREENING DUE: ICD-10-CM

## 2024-12-13 PROCEDURE — 77063 BREAST TOMOSYNTHESIS BI: CPT

## 2024-12-13 PROCEDURE — 77067 SCR MAMMO BI INCL CAD: CPT

## 2024-12-13 PROCEDURE — G0463 HOSPITAL OUTPT CLINIC VISIT: HCPCS

## 2024-12-13 NOTE — PROGRESS NOTES
Anticoagulation Clinic Progress Note    Anticoagulation Summary  As of 2024      INR goal:  2.0-3.0   TTR:  88.0% (6 y)   INR used for dosin.73 (2024)   Warfarin maintenance plan:  4 mg every Tue, Sat; 2 mg all other days   Weekly warfarin total:  18 mg   No change documented:  Savage Wasserman PharmD   Plan last modified:  Savage Wasserman PharmD (2024)   Next INR check:  2025   Priority:  Maintenance   Target end date:  --    Indications    Atrial fibrillation with RVR (Resolved) [I48.91]                 Anticoagulation Episode Summary       INR check location:  --    Preferred lab:  --    Send INR reminders to:  [facility]  POOL    Comments:  [facility]  ([place], [place]: [phone])          Anticoagulation Care Providers       Provider Role Specialty Phone number    Sly Saleh MD Referring Cardiology [phone]            Clinic Interview:  Patient Findings     Negatives:  Signs/symptoms of thrombosis, Signs/symptoms of bleeding,   Laboratory test error suspected, Change in health, Change in alcohol use,   Change in activity, Upcoming invasive procedure, Emergency department   visit, Upcoming dental procedure, Missed doses, Extra doses, Change in   medications, Change in diet/appetite, Hospital admission, Bruising, Other   complaints      Clinical Outcomes     Negatives:  Major bleeding event, Thromboembolic event,   Anticoagulation-related hospital admission, Anticoagulation-related ED   visit, Anticoagulation-related fatality        INR History:      10/2/2024    10:27 AM 10/11/2024    11:09 AM 10/25/2024     9:00 AM 2024    12:00 AM 11/15/2024    10:21 AM 2024    12:00 AM 2024     8:12 AM   Anticoagulation Monitoring   INR 2.90 -- 2.48  2.67  2.73   INR Date 10/1/2024  10/24/2024  2024  2024   INR Goal 2.0-3.0 2.0-3.0 2.0-3.0  2.0-3.0  2.0-3.0   Trend Same Same Same  Same  Same   Last Week Total 18 mg 18 mg 22 mg  18 mg  18 mg   Next  Week Total 18 mg 6 mg 18 mg  18 mg  18 mg   Sun 2 mg Hold (10/13), 4 mg (10/20) 2 mg  2 mg  2 mg   Mon 2 mg Hold (10/14); Otherwise 2 mg 2 mg  2 mg  2 mg   Tue 4 mg Hold (10/15); Otherwise 4 mg 4 mg  4 mg  4 mg   Wed 2 mg Hold (10/16); Otherwise 2 mg 2 mg  2 mg  2 mg   Thu 2 mg Hold (10/17) 2 mg  2 mg  2 mg   Fri 2 mg 2 mg 2 mg  2 mg  2 mg   Sat 4 mg 6 mg (10/19); Otherwise 4 mg 4 mg  4 mg  4 mg   Historical INR    2.67      2.73        Visit Report  Report   Report         This result is from an external source.       Plan:  1. INR was Therapeutic yesterday- see above in Anticoagulation Summary.   Will instruct Arelis SANDRA Johnson to Continue their warfarin regimen- see above in Anticoagulation Summary.  2. Follow up in 6 weeks.  3. They have been instructed to call if any changes in medications, doses, concerns, etc. Patient expresses understanding and has no further questions at this time.    Savage Wasserman, PharmD

## 2024-12-26 RX ORDER — DILTIAZEM HYDROCHLORIDE 180 MG/1
CAPSULE, COATED, EXTENDED RELEASE ORAL
Qty: 90 CAPSULE | Refills: 2 | Status: SHIPPED | OUTPATIENT
Start: 2024-12-26

## 2024-12-31 ENCOUNTER — OFFICE VISIT (OUTPATIENT)
Dept: GASTROENTEROLOGY | Facility: CLINIC | Age: 84
End: 2024-12-31
Payer: MEDICARE

## 2024-12-31 VITALS
SYSTOLIC BLOOD PRESSURE: 144 MMHG | WEIGHT: 165.1 LBS | TEMPERATURE: 97.6 F | HEIGHT: 64 IN | OXYGEN SATURATION: 95 % | HEART RATE: 72 BPM | DIASTOLIC BLOOD PRESSURE: 82 MMHG | BODY MASS INDEX: 28.19 KG/M2

## 2024-12-31 DIAGNOSIS — D50.9 IRON DEFICIENCY ANEMIA, UNSPECIFIED IRON DEFICIENCY ANEMIA TYPE: ICD-10-CM

## 2024-12-31 DIAGNOSIS — K25.0 ACUTE GASTRIC ULCER WITH HEMORRHAGE: Primary | ICD-10-CM

## 2024-12-31 LAB
FERRITIN SERPL-MCNC: 58.5 NG/ML (ref 13–150)
IRON SATN MFR SERPL: 29 % (ref 20–50)
IRON SERPL-MCNC: 121 MCG/DL (ref 37–145)
TIBC SERPL-MCNC: 416 MCG/DL
UIBC SERPL-MCNC: 295 MCG/DL (ref 112–346)

## 2024-12-31 PROCEDURE — 1160F RVW MEDS BY RX/DR IN RCRD: CPT

## 2024-12-31 PROCEDURE — 3077F SYST BP >= 140 MM HG: CPT

## 2024-12-31 PROCEDURE — 99214 OFFICE O/P EST MOD 30 MIN: CPT

## 2024-12-31 PROCEDURE — 3079F DIAST BP 80-89 MM HG: CPT

## 2024-12-31 PROCEDURE — 1159F MED LIST DOCD IN RCRD: CPT

## 2024-12-31 NOTE — PROGRESS NOTES
"Chief Complaint  Acute gastric ulcer with hemorrhage    Subjective          History of Present Illness    Arelis Johnson is a  84 y.o. female presents for follow-up.    She was seen by GI team 7/2 through 7/5 for melena and anemia in the hospital.  She had 2 episodes of black stool.  Hemoglobin was noted to be 9.8 on arrival to the hospital.  Of note it has been noted to be within normal limits just 2 months prior.  She underwent EGD as below with finding of nonbleeding gastric ulcers.     She does have history of A-fib and is anticoagulated with Coumadin.    EGD 7/4/2024 showed normal esophagus, nonbleeding gastric ulcers with clean base ulcer Shayan class III, normal examined duodenum.     Repeat EGD 8/27/2024 showed normal esophagus, congested erythematous and granular mucosa in the antrum, normal examined duodenum.  Gastric biopsy showed benign inflammation.    At last visit she had not been taking iron supplementation-she started over-the-counter iron supplements after her office visit 10/8. Iron profile checked 10/22/2024 and iron was noted to be improving.  Today she reports that she has been doing really well.  No further signs of black stool.  No bloody stool.  No abdominal pain, nausea, vomiting.  She does report that since starting the iron supplements she has quite a bit of gas and occasionally feels more constipated.    Objective   Vital Signs:   /82 (Patient Position: Sitting, Cuff Size: Adult)   Pulse 72   Temp 97.6 °F (36.4 °C)   Ht 162.6 cm (64.02\")   Wt 74.9 kg (165 lb 1.6 oz)   SpO2 95%   BMI 28.33 kg/m²       Physical Exam  Constitutional:       General: She is not in acute distress.     Appearance: Normal appearance.   Eyes:      General: No scleral icterus.  Cardiovascular:      Rate and Rhythm: Normal rate.   Pulmonary:      Effort: Pulmonary effort is normal.   Abdominal:      General: Abdomen is flat. Bowel sounds are normal. There is no distension.      Tenderness: There is " no abdominal tenderness. There is no guarding.   Skin:     Coloration: Skin is not jaundiced.   Neurological:      General: No focal deficit present.      Mental Status: She is alert and oriented to person, place, and time.   Psychiatric:         Mood and Affect: Mood normal.         Behavior: Behavior normal.          Result Review :   The following data was reviewed by: Nuris Moraes PA-C on 12/31/2024:  CMP          7/3/2024    00:00 7/4/2024    06:01 10/22/2024    08:23   CMP   Glucose 111  97  126    BUN 24  12  17    Creatinine 0.86  0.70  1.07    EGFR 66.3  84.9     Sodium 141  144  139    Potassium 3.8  3.9  3.6    Chloride 105  108  98    Calcium 9.0  8.9  9.6    Total Protein   6.9    Total Protein 5.4      Albumin 3.1   4.1    Globulin   2.8    Globulin 2.3      Total Bilirubin 0.3   0.7    Alkaline Phosphatase 65   115    AST (SGOT) 25   35    ALT (SGPT) 13   16    Albumin/Globulin Ratio 1.3      BUN/Creatinine Ratio 27.9  17.1  15.9    Anion Gap 7.3  13.5       CBC          8/30/2024    10:01 9/27/2024    09:44 10/22/2024    08:23   CBC   WBC 4.72  3.99  5.32    RBC 3.84  3.77  4.25    Hemoglobin 10.8  10.2  11.4    Hematocrit 33.8  33.0  37.5    MCV 88.0  87.5  88.2    MCH 28.1  27.1  26.8    MCHC 32.0  30.9  30.4    RDW 13.4  14.1  16.6    Platelets 313  269  257              Assessment:   Diagnoses and all orders for this visit:    1. Acute gastric ulcer with hemorrhage (Primary)  -     Iron Profile  -     Ferritin    2. Iron deficiency anemia, unspecified iron deficiency anemia type  -     Iron Profile  -     Ferritin          Plan:   -No further signs of overt GI bleeding.  She has been compliant with iron supplementation.  Will check iron studies once more to make sure that they are moving the right direction.  -Recommend she continue on pantoprazole indefinitely once daily going forward given history of gastric ulcers      Follow Up   Return if symptoms worsen or fail to improve.    Dragon  dictation used throughout this note.         Nuris Moraes PA-C  [facility]  [facility] [place] [address]  Office: [phone]

## 2025-01-23 LAB — INR PPP: 3.23

## 2025-01-24 ENCOUNTER — ANTICOAGULATION VISIT (OUTPATIENT)
Dept: PHARMACY | Facility: HOSPITAL | Age: 85
End: 2025-01-24
Payer: MEDICARE

## 2025-01-24 NOTE — PROGRESS NOTES
Anticoagulation Clinic Progress Note    Anticoagulation Summary  As of 1/24/2025      INR goal:  2.0-3.0   TTR:  87.3% (6.2 y)   INR used for dosing:  3.23 (1/23/2025)   Warfarin maintenance plan:  4 mg every Tue, Sat; 2 mg all other days   Weekly warfarin total:  18 mg   Plan last modified:  Savage Wasserman, PharmD (4/4/2024)   Next INR check:  2/6/2025   Priority:  Maintenance   Target end date:  --    Indications    Atrial fibrillation with RVR (Resolved) [I48.91]                 Anticoagulation Episode Summary       INR check location:  --    Preferred lab:  --    Send INR reminders to:   [facility]  POOL    Comments:  [facility]  ([place], F: [phone])          Anticoagulation Care Providers       Provider Role Specialty Phone number    Sly Saleh MD Referring Cardiology [phone]            Clinic Interview:  Patient Findings     Positives:  Change in medications    Negatives:  Signs/symptoms of thrombosis, Signs/symptoms of bleeding,   Laboratory test error suspected, Change in health, Change in alcohol use,   Change in activity, Upcoming invasive procedure, Emergency department   visit, Upcoming dental procedure, Missed doses, Extra doses, Change in   diet/appetite, Hospital admission, Bruising, Other complaints    Comments:  Reports she received a cortisone injection in shoulder 2 days   ago.       Clinical Outcomes     Negatives:  Major bleeding event, Thromboembolic event,   Anticoagulation-related hospital admission, Anticoagulation-related ED   visit, Anticoagulation-related fatality    Comments:  Reports she received a cortisone injection in shoulder 2 days   ago.         INR History:      10/25/2024     9:00 AM 11/14/2024    12:00 AM 11/15/2024    10:21 AM 12/12/2024    12:00 AM 12/13/2024     8:12 AM 1/23/2025    12:00 AM 1/24/2025    10:13 AM   Anticoagulation Monitoring   INR 2.48  2.67  2.73  3.23   INR Date 10/24/2024  11/14/2024  12/12/2024  1/23/2025   INR Goal 2.0-3.0   2.0-3.0  2.0-3.0  2.0-3.0   Trend Same  Same  Same  Same   Last Week Total 22 mg  18 mg  18 mg  18 mg   Next Week Total 18 mg  18 mg  18 mg  16 mg   Sun 2 mg  2 mg  2 mg  2 mg   Mon 2 mg  2 mg  2 mg  2 mg   Tue 4 mg  4 mg  4 mg  4 mg   Wed 2 mg  2 mg  2 mg  2 mg   Thu 2 mg  2 mg  2 mg  2 mg   Fri 2 mg  2 mg  2 mg  2 mg   Sat 4 mg  4 mg  4 mg  2 mg (1/25); Otherwise 4 mg   Historical INR  2.67      2.73      3.23        Visit Report   Report           This result is from an external source.       Plan:  1. INR is Supratherapeutic today- see above in Anticoagulation Summary.   Will instruct Arelis SANDRA Johnson to Change their warfarin regimen (2 mg 1/25/25; otherwise continue 4 mg Tues/Sat, 2 mg all other days) - see above in Anticoagulation Summary.  2. Follow up in 2 weeks.  3. They have been instructed to call if any changes in medications, doses, concerns, etc. Patient expresses understanding and has no further questions at this time.    Savage Wasserman, PharmD

## 2025-02-06 LAB — INR PPP: 1.96

## 2025-02-07 ENCOUNTER — ANTICOAGULATION VISIT (OUTPATIENT)
Dept: PHARMACY | Facility: HOSPITAL | Age: 85
End: 2025-02-07
Payer: MEDICARE

## 2025-02-07 NOTE — PROGRESS NOTES
Anticoagulation Clinic Progress Note    Anticoagulation Summary  As of 2025      INR goal:  2.0-3.0   TTR:  87.3% (6.2 y)   INR used for dosin.96 (2025)   Warfarin maintenance plan:  4 mg every e, Sat; 2 mg all other days   Weekly warfarin total:  18 mg   No change documented:  Eulalia Reaves, SARAH   Plan last modified:  Savage Wasserman, PharmD (2024)   Next INR check:  2025   Priority:  Maintenance   Target end date:  --    Indications    Atrial fibrillation with RVR (Resolved) [I48.91]                 Anticoagulation Episode Summary       INR check location:  --    Preferred lab:  --    [facility]    Comments:  [facility]  ([place], F: [phone])          Anticoagulation Care Providers       Provider Role Specialty Phone number    Sly Saleh MD Referring Cardiology [phone]            Clinic Interview:  Patient Findings     Positives:  Missed doses    Negatives:  Signs/symptoms of thrombosis, Signs/symptoms of bleeding,   Laboratory test error suspected, Change in health, Change in alcohol use,   Change in activity, Upcoming invasive procedure, Emergency department   visit, Upcoming dental procedure, Extra doses, Change in medications,   Change in diet/appetite, Hospital admission, Bruising, Other complaints      Clinical Outcomes     Negatives:  Major bleeding event, Thromboembolic event,   Anticoagulation-related hospital admission, Anticoagulation-related ED   visit, Anticoagulation-related fatality        INR History:      11/15/2024    10:21 AM 2024    12:00 AM 2024     8:12 AM 2025    12:00 AM 2025    10:13 AM 2025    12:00 AM 2025     8:12 AM   Anticoagulation Monitoring   INR 2.67  2.73  3.23  1.96   INR Date 2024   INR Goal 2.0-3.0  2.0-3.0  2.0-3.0  2.0-3.0   Trend Same  Same  Same  Same   Last Week Total 18 mg  18 mg  18 mg  16 mg   Next Week Total 18 mg   18 mg  16 mg  18 mg   Sun 2 mg  2 mg  2 mg  2 mg   Mon 2 mg  2 mg  2 mg  2 mg   Tue 4 mg  4 mg  4 mg  4 mg   Wed 2 mg  2 mg  2 mg  2 mg   Thu 2 mg  2 mg  2 mg  2 mg   Fri 2 mg  2 mg  2 mg  2 mg   Sat 4 mg  4 mg  2 mg (1/25); Otherwise 4 mg  4 mg   Historical INR  2.73      3.23      1.96        Visit Report Report             This result is from an external source.       Plan:  1. INR is Subtherapeutic today- see above in Anticoagulation Summary.   Will instruct Arelis Johnson to Continue their warfarin regimen- see above in Anticoagulation Summary.  missed 1 dose, Continue, rck 3 weeks (normally very stable)   2. Follow up in 3 weeks  3. They have been instructed to call if any changes in medications, doses, concerns, etc. Patient expresses understanding and has no further questions at this time.    Eulalia Reaves [facility]

## 2025-02-25 ENCOUNTER — OFFICE VISIT (OUTPATIENT)
Dept: FAMILY MEDICINE CLINIC | Facility: CLINIC | Age: 85
End: 2025-02-25
Payer: MEDICARE

## 2025-02-25 VITALS
DIASTOLIC BLOOD PRESSURE: 66 MMHG | OXYGEN SATURATION: 97 % | HEIGHT: 64 IN | BODY MASS INDEX: 28.33 KG/M2 | SYSTOLIC BLOOD PRESSURE: 122 MMHG | HEART RATE: 64 BPM | TEMPERATURE: 98 F | RESPIRATION RATE: 20 BRPM

## 2025-02-25 DIAGNOSIS — M54.50 LUMBAR PAIN: Primary | ICD-10-CM

## 2025-02-25 PROCEDURE — 1126F AMNT PAIN NOTED NONE PRSNT: CPT | Performed by: NURSE PRACTITIONER

## 2025-02-25 PROCEDURE — 99213 OFFICE O/P EST LOW 20 MIN: CPT | Performed by: NURSE PRACTITIONER

## 2025-02-25 NOTE — PROGRESS NOTES
"Chief Complaint  Hip Pain and Back Pain    Subjective        Arelis Johnson presents to [facility]  History of Present Illness  This is a 84yo female patient here for evaluation of hip and back pain. Patient is currently receiving physical therapy for her shoulder. While having therpay she has developed pain her her left buttuck. She also has burning sensation down the side of her leg. She denies any problems with bowel or bladder. She rates her pain at 2-3. No problems sleeping at night.   Objective   Vital Signs:  /66   Pulse 64   Temp 98 °F (36.7 °C) (Temporal)   Resp 20   Ht 162.6 cm (64.02\")   SpO2 97%   BMI 28.33 kg/m²   Estimated body mass index is 28.33 kg/m² as calculated from the following:    Height as of this encounter: 162.6 cm (64.02\").    Weight as of 12/31/24: 74.9 kg (165 lb 1.6 oz).            Physical Exam  Vitals and nursing note reviewed.   HENT:      Head: Normocephalic.      Nose: Nose normal.   Eyes:      Pupils: Pupils are equal, round, and reactive to light.   Cardiovascular:      Rate and Rhythm: Normal rate and regular rhythm.      Pulses: Normal pulses.      Heart sounds: Normal heart sounds.   Pulmonary:      Effort: Pulmonary effort is normal. No respiratory distress.      Breath sounds: Normal breath sounds. No wheezing or rales.   Abdominal:      General: Bowel sounds are normal. There is no distension.      Tenderness: There is no abdominal tenderness. There is no right CVA tenderness or left CVA tenderness.   Musculoskeletal:         General: No swelling. Normal range of motion.      Cervical back: Neck supple.      Right lower leg: No edema.      Left lower leg: No edema.   Skin:     General: Skin is warm and dry.   Neurological:      Mental Status: She is alert and oriented to person, place, and time.   Psychiatric:         Mood and Affect: Mood normal.        Result Review :                Assessment and Plan   Diagnoses and all " orders for this visit:    1. Lumbar pain (Primary)  -     Ambulatory Referral to Physical Therapy for Evaluation & Treatment      Take tylenol as needed for pain   Order for kort therapy for lumbar pain   She will return if pain worsens       Follow Up   No follow-ups on file.  Patient was given instructions and counseling regarding her condition or for health maintenance advice. Please see specific information pulled into the AVS if appropriate.

## 2025-02-27 LAB — INR PPP: 2.85

## 2025-02-28 ENCOUNTER — ANTICOAGULATION VISIT (OUTPATIENT)
Dept: PHARMACY | Facility: HOSPITAL | Age: 85
End: 2025-02-28
Payer: MEDICARE

## 2025-02-28 NOTE — PROGRESS NOTES
Anticoagulation Clinic Progress Note    Anticoagulation Summary  As of 2025      INR goal:  2.0-3.0   TTR:  87.4% (6.2 y)   INR used for dosin.85 (2025)   Warfarin maintenance plan:  4 mg every Tue, Sat; 2 mg all other days   Weekly warfarin total:  18 mg   No change documented:  Eulalia Reaves RPH   Plan last modified:  Savage Wasserman, PharmD (2024)   Next INR check:  4/10/2025   Priority:  Maintenance   Target end date:  --    Indications    Atrial fibrillation with RVR (Resolved) [I48.91]                 Anticoagulation Episode Summary       INR check location:  --    Preferred lab:  --    Send INR reminders to:  [facility]  POOL    Comments:  [facility]  ([place], F: [phone])          Anticoagulation Care Providers       Provider Role Specialty Phone number    Sly Saleh MD Referring Cardiology [phone]            Clinic Interview:  Patient Findings     Negatives:  Signs/symptoms of thrombosis, Signs/symptoms of bleeding,   Laboratory test error suspected, Change in health, Change in alcohol use,   Change in activity, Upcoming invasive procedure, Emergency department   visit, Upcoming dental procedure, Missed doses, Extra doses, Change in   medications, Change in diet/appetite, Hospital admission, Bruising, Other   complaints      Clinical Outcomes     Negatives:  Major bleeding event, Thromboembolic event,   Anticoagulation-related hospital admission, Anticoagulation-related ED   visit, Anticoagulation-related fatality        INR History:      2024     8:12 AM 2025    12:00 AM 2025    10:13 AM 2025    12:00 AM 2025     8:12 AM 2025    12:00 AM 2025     8:41 AM   Anticoagulation Monitoring   INR 2.73  3.23  1.96  2.85   INR Date 2024   INR Goal 2.0-3.0  2.0-3.0  2.0-3.0  2.0-3.0   Trend Same  Same  Same  Same   Last Week Total 18 mg  18 mg  16 mg  18 mg   Next Week Total 18 mg  16 mg  18 mg   18 mg   Sun 2 mg  2 mg  2 mg  2 mg   Mon 2 mg  2 mg  2 mg  2 mg   Tue 4 mg  4 mg  4 mg  4 mg   Wed 2 mg  2 mg  2 mg  2 mg   Thu 2 mg  2 mg  2 mg  2 mg   Fri 2 mg  2 mg  2 mg  2 mg   Sat 4 mg  2 mg (1/25); Otherwise 4 mg  4 mg  4 mg   Historical INR  3.23      1.96      2.85            This result is from an external source.       Plan:  1. INR is Therapeutic today- see above in Anticoagulation Summary.   Will instruct Arelis SANDRA Johnson to Continue their warfarin regimen- see above in Anticoagulation Summary.  2. Follow up in 6 weeks  3. They have been instructed to call if any changes in medications, doses, concerns, etc. Patient expresses understanding and has no further questions at this time.    Eulalia Reaves [facility]

## 2025-03-06 ENCOUNTER — TELEPHONE (OUTPATIENT)
Dept: FAMILY MEDICINE CLINIC | Facility: CLINIC | Age: 85
End: 2025-03-06

## 2025-03-06 NOTE — TELEPHONE ENCOUNTER
Provider: HUNTER SANTILLAN    Caller: Arelis Johnson    Relationship to Patient: Self        Phone Number: [phone]    Reason for Call: PATIENT IS CALLING TO [facility] [place] [facility].    PLEASE ADVISE.

## 2025-03-27 DIAGNOSIS — M10.9 ACUTE GOUT, UNSPECIFIED CAUSE, UNSPECIFIED SITE: ICD-10-CM

## 2025-03-27 RX ORDER — ALLOPURINOL 100 MG/1
100 TABLET ORAL DAILY
Qty: 30 TABLET | Refills: 5 | Status: SHIPPED | OUTPATIENT
Start: 2025-03-27

## 2025-04-09 ENCOUNTER — LAB (OUTPATIENT)
Dept: FAMILY MEDICINE CLINIC | Facility: CLINIC | Age: 85
End: 2025-04-09
Payer: MEDICARE

## 2025-04-09 DIAGNOSIS — I48.20 ATRIAL FIBRILLATION, CHRONIC: ICD-10-CM

## 2025-04-10 ENCOUNTER — ANTICOAGULATION VISIT (OUTPATIENT)
Dept: PHARMACY | Facility: HOSPITAL | Age: 85
End: 2025-04-10
Payer: MEDICARE

## 2025-04-10 LAB
INR PPP: 2.67 (ref 0.9–1.1)
PROTHROMBIN TIME: 28.7 SECONDS (ref 11.7–14.2)

## 2025-04-10 NOTE — PROGRESS NOTES
Anticoagulation Clinic Progress Note    Anticoagulation Summary  As of 4/10/2025      INR goal:  2.0-3.0   TTR:  87.6% (6.4 y)   INR used for dosin.67 (2025)   Warfarin maintenance plan:  4 mg every Tue, Sat; 2 mg all other days   Weekly warfarin total:  18 mg   No change documented:  Savage Wasserman, David   Plan last modified:  Savage Wasserman PharmD (2024)   Next INR check:  2025   Priority:  Maintenance   Target end date:  --    Indications    Atrial fibrillation with RVR (Resolved) [I48.91]                 Anticoagulation Episode Summary       INR check location:  --    Preferred lab:  --    [facility]   [facility]  [facility]    Comments:  [facility]  ([place], [place]: [phone])          Anticoagulation Care Providers       Provider Role Specialty Phone number    Sly Saleh MD Referring Cardiology [phone]            Clinic Interview:  Patient Findings     Negatives:  Signs/symptoms of thrombosis, Signs/symptoms of bleeding,   Laboratory test error suspected, Change in health, Change in alcohol use,   Change in activity, Upcoming invasive procedure, Emergency department   visit, Upcoming dental procedure, Missed doses, Extra doses, Change in   medications, Change in diet/appetite, Hospital admission, Bruising, Other   complaints      Clinical Outcomes     Negatives:  Major bleeding event, Thromboembolic event,   Anticoagulation-related hospital admission, Anticoagulation-related ED   visit, Anticoagulation-related fatality        INR History:      2025    12:00 AM 2025    10:13 AM 2025    12:00 AM 2025     8:12 AM 2025    12:00 AM 2025     8:41 AM 4/10/2025     9:38 AM   Anticoagulation Monitoring   INR  3.23  1.96  2.85 2.67   INR Date  2025   INR Goal  2.0-3.0  2.0-3.0  2.0-3.0 2.0-3.0   Trend  Same  Same  Same Same   Last Week Total  18 mg  16 mg  18 mg 18 mg   Next Week Total  16 mg  18 mg  18 mg 18  mg   Sun  2 mg  2 mg  2 mg 2 mg   Mon  2 mg  2 mg  2 mg 2 mg   Tue  4 mg  4 mg  4 mg 4 mg   Wed  2 mg  2 mg  2 mg 2 mg   Thu  2 mg  2 mg  2 mg 2 mg   Fri  2 mg  2 mg  2 mg 2 mg   Sat  2 mg (1/25); Otherwise 4 mg  4 mg  4 mg 4 mg   Historical INR 3.23      1.96      2.85             This result is from an external source.       Plan:  1. INR is Therapeutic today- see above in Anticoagulation Summary.   Will instruct Arelis SANDRA Johnson to Continue their warfarin regimen- see above in Anticoagulation Summary.  2. Follow up in 6 weeks.  3. They have been instructed to call if any changes in medications, doses, concerns, etc. Patient expresses understanding and has no further questions at this time.    Savage Wasserman, PharmD

## 2025-04-11 DIAGNOSIS — E11.9 TYPE 2 DIABETES MELLITUS WITHOUT COMPLICATION, WITHOUT LONG-TERM CURRENT USE OF INSULIN: ICD-10-CM

## 2025-04-11 RX ORDER — METFORMIN HYDROCHLORIDE 500 MG/1
500 TABLET, EXTENDED RELEASE ORAL
Qty: 90 TABLET | Refills: 1 | Status: SHIPPED | OUTPATIENT
Start: 2025-04-11

## 2025-04-11 RX ORDER — WARFARIN SODIUM 4 MG/1
TABLET ORAL
Qty: 60 TABLET | Refills: 1 | Status: SHIPPED | OUTPATIENT
Start: 2025-04-11

## 2025-04-11 RX ORDER — METOPROLOL TARTRATE 50 MG
25 TABLET ORAL 2 TIMES DAILY
Qty: 90 TABLET | Refills: 3 | Status: SHIPPED | OUTPATIENT
Start: 2025-04-11

## 2025-04-24 ENCOUNTER — OFFICE VISIT (OUTPATIENT)
Dept: FAMILY MEDICINE CLINIC | Facility: CLINIC | Age: 85
End: 2025-04-24
Payer: MEDICARE

## 2025-04-24 VITALS
TEMPERATURE: 97.7 F | WEIGHT: 164 LBS | HEART RATE: 77 BPM | SYSTOLIC BLOOD PRESSURE: 138 MMHG | RESPIRATION RATE: 16 BRPM | HEIGHT: 64 IN | OXYGEN SATURATION: 97 % | DIASTOLIC BLOOD PRESSURE: 72 MMHG | BODY MASS INDEX: 28 KG/M2

## 2025-04-24 DIAGNOSIS — D64.9 ANEMIA, UNSPECIFIED TYPE: ICD-10-CM

## 2025-04-24 DIAGNOSIS — I10 ESSENTIAL HYPERTENSION: ICD-10-CM

## 2025-04-24 DIAGNOSIS — M54.50 LUMBAR PAIN: Primary | ICD-10-CM

## 2025-04-24 DIAGNOSIS — E11.9 TYPE 2 DIABETES MELLITUS WITHOUT COMPLICATION, WITHOUT LONG-TERM CURRENT USE OF INSULIN: ICD-10-CM

## 2025-04-24 DIAGNOSIS — E78.2 MIXED HYPERLIPIDEMIA: ICD-10-CM

## 2025-04-24 DIAGNOSIS — M25.552 PAIN OF LEFT HIP: ICD-10-CM

## 2025-04-24 DIAGNOSIS — I48.20 ATRIAL FIBRILLATION, CHRONIC: ICD-10-CM

## 2025-04-24 LAB
EXPIRATION DATE: ABNORMAL
HBA1C MFR BLD: 7.1 % (ref 4.5–5.7)
Lab: ABNORMAL

## 2025-04-24 NOTE — PROGRESS NOTES
Chief Complaint  Anemia (Patient is here for a 6 month follow up )    Subjective        Arelis Johnson presents to [facility]  History of Present Illness  This is a 85-year-old female patient here today for follow-up.  She has a history of diabetes, A-fib, GERD, hyperlipidemia.  She is being followed by cardiology for her INR.  Hemoglobin A1c has been stable with metformin.  She was following PT for shoulder repair where at that time she also mentioned left hip pain where they were working with that as well.  She states she continues to have left hip and possibly lumbar pain.  She has completed physical therapy.  She reports that did help some but continues to have pain.  She reports the pain is in her left buttocks and hip and will go down to the side of her leg.  She does have some burning sensation at times but has a sharp pain in left buttocks.  She does have pain with bearing weight.  She denies any falls.  She reports Tylenol does help but during the end of the day her pain is worse and at a scale of 8.  She also has increased pain with sitting in a car for a long period of time.  She does report having back surgery in 1999 due to ruptured disc.  She has a history of anemia that has improved with iron.  She is on Coumadin for A-fib and being followed by cardiology.    Current Outpatient Medications   Medication Instructions    allopurinol (ZYLOPRIM) 100 mg, Oral, Daily    Calcium Carb-Cholecalciferol (CALCIUM + D3) 600-200 MG-UNIT tablet 1 tablet, Daily    dilTIAZem CD (CARDIZEM CD) 180 MG 24 hr capsule TAKE ONE CAPSULE (BY MOUTH) EACH DAY    furosemide (LASIX) 40 mg, Oral, Daily    metFORMIN ER (GLUCOPHAGE-XR) 500 mg, Oral, Daily With Breakfast    metoprolol tartrate (LOPRESSOR) 25 mg, Oral, 2 Times Daily    NON FORMULARY FLORESTOR 1 time daily    pantoprazole (PROTONIX) 40 mg, Oral, Daily    simvastatin (ZOCOR) 40 mg, Oral, Nightly    warfarin (COUMADIN) 4 MG tablet TAKE  "ONE TABLET BY MOUTH ON TUESDAY AND SATURDAY AND THEN TAKE 1/2 TABLET BY MOUTH ON ALL OTHER DAYS OR AS DIRECTED      Objective   Vital Signs:  /72   Pulse 77   Temp 97.7 °F (36.5 °C) (Infrared)   Resp 16   Ht 162.6 cm (64.02\")   Wt 74.4 kg (164 lb)   SpO2 97%   BMI 28.13 kg/m²   Estimated body mass index is 28.13 kg/m² as calculated from the following:    Height as of this encounter: 162.6 cm (64.02\").    Weight as of this encounter: 74.4 kg (164 lb).            Physical Exam  Vitals and nursing note reviewed.   HENT:      Head: Normocephalic.      Nose: Nose normal.   Eyes:      Pupils: Pupils are equal, round, and reactive to light.   Cardiovascular:      Rate and Rhythm: Normal rate and regular rhythm.      Pulses: Normal pulses.      Heart sounds: Normal heart sounds.   Pulmonary:      Effort: Pulmonary effort is normal. No respiratory distress.      Breath sounds: Normal breath sounds. No wheezing or rales.   Abdominal:      General: Bowel sounds are normal. There is no distension.      Tenderness: There is no abdominal tenderness.   Musculoskeletal:         General: No swelling. Normal range of motion.      Cervical back: Neck supple.      Right lower leg: No edema.      Left lower leg: No edema.   Skin:     General: Skin is warm and dry.   Neurological:      Mental Status: She is alert and oriented to person, place, and time.   Psychiatric:         Mood and Affect: Mood normal.        Result Review :                Assessment and Plan   Diagnoses and all orders for this visit:    1. Lumbar pain (Primary)  -     XR Spine Lumbar 4+ View (In Office)    2. Type 2 diabetes mellitus without complication, without long-term current use of insulin  -     ORDER: POC Glycosylated Hemoglobin (Hb A1C)  -     Microalbumin / Creatinine Urine Ratio - Urine, Clean Catch    3. Pain of left hip  -     XR Hip With or Without Pelvis 2 - 3 View Left    4. Atrial fibrillation, chronic    5. Essential hypertension  -     " Urinalysis With Culture If Indicated - Urine, Clean Catch    6. Anemia, unspecified type  -     CBC & Differential    7. Mixed hyperlipidemia  -     Comprehensive Metabolic Panel  -     Lipid Panel    Other orders  -     Microscopic Examination -      Hemoglobin A1c is 7.1 today which is slightly higher than previous but stable for her.  She will continue her current regimen.  I have ordered x-ray of lumbar spine and left hip.  I do see some space narrowing and will send for radiologist to review.  We may consider referral.  She will continue Tylenol for pain for now  We will obtain fasting labs today.       Follow Up   Return in about 6 months (around 10/24/2025).  Patient was given instructions and counseling regarding her condition or for health maintenance advice. Please see specific information pulled into the AVS if appropriate.

## 2025-04-25 LAB
ALBUMIN SERPL-MCNC: 4.2 G/DL (ref 3.5–5.2)
ALBUMIN/CREAT UR: ABNORMAL MG/G CREAT (ref 0–29)
ALBUMIN/GLOB SERPL: 1.6 G/DL
ALP SERPL-CCNC: 120 U/L (ref 39–117)
ALT SERPL-CCNC: 14 U/L (ref 1–33)
APPEARANCE UR: CLEAR
AST SERPL-CCNC: 25 U/L (ref 1–32)
BACTERIA #/AREA URNS HPF: NORMAL /[HPF]
BASOPHILS # BLD AUTO: 0.05 10*3/MM3 (ref 0–0.2)
BASOPHILS NFR BLD AUTO: 0.9 % (ref 0–1.5)
BILIRUB SERPL-MCNC: 0.6 MG/DL (ref 0–1.2)
BILIRUB UR QL STRIP: NEGATIVE
BUN SERPL-MCNC: 17 MG/DL (ref 8–23)
BUN/CREAT SERPL: 17.7 (ref 7–25)
CALCIUM SERPL-MCNC: 9.8 MG/DL (ref 8.6–10.5)
CASTS URNS QL MICRO: NORMAL /LPF
CHLORIDE SERPL-SCNC: 98 MMOL/L (ref 98–107)
CHOLEST SERPL-MCNC: 130 MG/DL (ref 0–200)
CO2 SERPL-SCNC: 33.4 MMOL/L (ref 22–29)
COLOR UR: YELLOW
CREAT SERPL-MCNC: 0.96 MG/DL (ref 0.57–1)
CREAT UR-MCNC: 7.7 MG/DL
EGFRCR SERPLBLD CKD-EPI 2021: 57.7 ML/MIN/1.73
EOSINOPHIL # BLD AUTO: 0.06 10*3/MM3 (ref 0–0.4)
EOSINOPHIL NFR BLD AUTO: 1.1 % (ref 0.3–6.2)
EPI CELLS #/AREA URNS HPF: NORMAL /HPF (ref 0–10)
ERYTHROCYTE [DISTWIDTH] IN BLOOD BY AUTOMATED COUNT: 12.8 % (ref 12.3–15.4)
GLOBULIN SER CALC-MCNC: 2.6 GM/DL
GLUCOSE SERPL-MCNC: 119 MG/DL (ref 65–99)
GLUCOSE UR QL STRIP: NEGATIVE
HCT VFR BLD AUTO: 39.8 % (ref 34–46.6)
HDLC SERPL-MCNC: 52 MG/DL (ref 40–60)
HGB BLD-MCNC: 13.2 G/DL (ref 12–15.9)
HGB UR QL STRIP: NEGATIVE
IMM GRANULOCYTES # BLD AUTO: 0.01 10*3/MM3 (ref 0–0.05)
IMM GRANULOCYTES NFR BLD AUTO: 0.2 % (ref 0–0.5)
KETONES UR QL STRIP: NEGATIVE
LDLC SERPL CALC-MCNC: 57 MG/DL (ref 0–100)
LEUKOCYTE ESTERASE UR QL STRIP: NEGATIVE
LYMPHOCYTES # BLD AUTO: 1.22 10*3/MM3 (ref 0.7–3.1)
LYMPHOCYTES NFR BLD AUTO: 23 % (ref 19.6–45.3)
MCH RBC QN AUTO: 30.8 PG (ref 26.6–33)
MCHC RBC AUTO-ENTMCNC: 33.2 G/DL (ref 31.5–35.7)
MCV RBC AUTO: 93 FL (ref 79–97)
MICRO URNS: NORMAL
MICRO URNS: NORMAL
MICROALBUMIN UR-MCNC: <3 UG/ML
MONOCYTES # BLD AUTO: 0.39 10*3/MM3 (ref 0.1–0.9)
MONOCYTES NFR BLD AUTO: 7.4 % (ref 5–12)
NEUTROPHILS # BLD AUTO: 3.57 10*3/MM3 (ref 1.7–7)
NEUTROPHILS NFR BLD AUTO: 67.4 % (ref 42.7–76)
NITRITE UR QL STRIP: NEGATIVE
NRBC BLD AUTO-RTO: 0 /100 WBC (ref 0–0.2)
PH UR STRIP: 7.5 [PH] (ref 5–7.5)
PLATELET # BLD AUTO: 299 10*3/MM3 (ref 140–450)
POTASSIUM SERPL-SCNC: 3.6 MMOL/L (ref 3.5–5.2)
PROT SERPL-MCNC: 6.8 G/DL (ref 6–8.5)
PROT UR QL STRIP: NEGATIVE
RBC # BLD AUTO: 4.28 10*6/MM3 (ref 3.77–5.28)
RBC #/AREA URNS HPF: NORMAL /HPF (ref 0–2)
SODIUM SERPL-SCNC: 142 MMOL/L (ref 136–145)
SP GR UR STRIP: 1.01 (ref 1–1.03)
TRIGL SERPL-MCNC: 119 MG/DL (ref 0–150)
URINALYSIS REFLEX: NORMAL
UROBILINOGEN UR STRIP-MCNC: 0.2 MG/DL (ref 0.2–1)
VLDLC SERPL CALC-MCNC: 21 MG/DL (ref 5–40)
WBC # BLD AUTO: 5.3 10*3/MM3 (ref 3.4–10.8)
WBC #/AREA URNS HPF: NORMAL /HPF (ref 0–5)

## 2025-04-28 DIAGNOSIS — M54.50 LUMBAR PAIN: Primary | ICD-10-CM

## 2025-05-19 DIAGNOSIS — K25.0 ACUTE GASTRIC ULCER WITH HEMORRHAGE: ICD-10-CM

## 2025-05-20 RX ORDER — PANTOPRAZOLE SODIUM 40 MG/1
40 TABLET, DELAYED RELEASE ORAL DAILY
Qty: 90 TABLET | Refills: 1 | Status: SHIPPED | OUTPATIENT
Start: 2025-05-20

## 2025-05-20 RX ORDER — SIMVASTATIN 40 MG
40 TABLET ORAL NIGHTLY
Qty: 90 TABLET | Refills: 1 | Status: SHIPPED | OUTPATIENT
Start: 2025-05-20

## 2025-05-20 NOTE — PROGRESS NOTES
Patient Name: Arelis Johnson   YOB: 1940  Referring Primary Care Physician: Amanda Felix APRN      Chief Complaint:    Chief Complaint   Patient presents with    Lumbar Spine - Initial Evaluation, Pain        Previous Treatment:     MRI of L-Spine? Yrs ago     Neurosurgery:   08/13/2009 - [facility] - Juvenal Sanchez MD     Spine Surgery:   1998 - Lumbar spine surgery        Back Pain  Chronicity:  Chronic  Onset:  More than 1 year ago  Frequency:  Intermittently  Progression since onset:  Improving  Pain location:  Lumbar spine  Pain quality:  Burning  Radiates to:  Left buttock, left thigh and left knee (Referring down the posterior/lateral aspect of the LLE)  Pain-numeric:  8/10  Pain severity:  Severe  Pain is:  Worse during the night  Aggravated by:  Lying down  Stiffness is present:  In the morning  Associated symptoms: numbness (LLE) and tingling (LLE)    Associated symptoms: no leg pain and no weakness    Treatments tried:  Physical therapy and heat  Improvement on treatment:  Significant  Additional Information:  NO NSAID'S        HPI:  Arelis Johnson is a 85 y.o. female who presents to [facility] for evaluation of above complaints.  Symptoms started without injury over 2 months ago.  Initially the pain was in the left lateral hip and buttock then referred down into the thigh and eventually up into the low back.  Currently symptoms are not significant.  She works out almost daily at the gym walking on a treadmill and riding a stationary bike.  She has been able to maintain that activity.  Denies any pain rating down the lower extremity at this point.  This is a new patient to the practice and new to me.  She is unaccompanied today.  Prior pertinent records were reviewed.    PFSH:  See attached    ROS: As per HPI, otherwise negative    Objective:      85 y.o. female  Body mass index is 28.25 kg/m²., 74.7 kg (164 lb 9.6 oz),  @HT@  Vitals:    05/21/25 0949   Temp: 97.3 °F (36.3 °C)     Pain Score    05/21/25 0949   PainSc: 8    PainLoc: Back            Spine Musculoskeletal Exam    Gait    Gait is normal.    Inspection    Coronal balance: no imbalance    Sagittal balance: no imbalance    Thoracolumbar    Edema (right lower extremity): moderate    Edema (left lower extremity): mild    Palpation    Thoracolumbar    Right      Muscle tone: normal    Left      Muscle tone: normal    Strength    Thoracolumbar    Thoracolumbar motor exam is normal.       Sensory    Thoracolumbar    Thoracolumbar sensation is normal.    Reflexes    Right      Quadriceps: hyporeflexic      Achilles: hyporeflexic     Left      Quadriceps: hyporeflexic      Achilles: hyporeflexic    Special Tests    Thoracolumbar      Right      SLR: no back or leg pain      Left      SLR: no back or leg pain        IMAGING:     No imaging in office today.  She did have lumbar plain films 04/24/2025 that revealed degenerative facet changes and osteophyte formation and mild anterolisthesis L3 on L4, large right renal stone that patient says has been chronic for decades.  She also had left hip x-rays that showed mild joint space narrowing and moderate spurring of the acetabulum, mild degenerative change bilateral SI joints.    Assessment:           Diagnoses and all orders for this visit:    1. Degeneration of intervertebral disc of lumbar region with discogenic back pain (Primary)    2. Greater trochanteric bursitis of left hip             Plan:  Symptoms have nearly resolved at this point.  Sounds like she could have had a flareup of left hip greater trochanteric bursitis versus sacroiliitis.  We discussed referral to physical therapy but she is already active and working out at the gym daily so at this point she is satisfied leaving well enough alone.  If symptoms return she was advised to utilize ice 15 to 20 minutes 3-4 times daily and Voltaren cream over-the-counter as needed  and notify the office if symptoms persist at that point we can get lumbar MRI with and without contrast with an eye toward injection therapy.  She has history of remote lumbar decompression surgery for herniated disc.  Could also have her evaluated by one of our hip specialist for a bursa injection as a diagnostic tool and treatment.  She understands and agrees with the plan.  Return if symptoms worsen or fail to improve.    EMR Dragon/Transcription Disclaimer:   Much of this encounter note is an electronic transcription/translation of spoken language to printed text utilizing Dragon dictation.  Red flags have been discussed at this or previous visits to include but not limited weakness in extremities, worsening pain that does not respond to conservative treatment and bowel or bladder dysfunction. These are reasons to present to ER and patient has been informed.    The diagnosis(es), natural history, pathophysiology and treatment for diagnosis(es) were discussed. Opportunity given and questions answered. Biomechanics of pertinent body areas discussed.    EXERCISES:  Advice on benefits of, and types of regular/moderate exercise pertaining to diagnosis.  Continue HEP. For back or neck pain, recommend pilates and or yoga as tolerated. Generally it is best to start any new exercise under the guidance of a  or therapist.   MEDICATIONS:  When prescribe, the risks, benefits, warnings,side effects and alternatives of medications discussed. Advised against long term use of narcotics.   PAIN CONTROL:  Cold, heat, OTC lidocaine patches and/or ointment as needed. Avoid direct skin contact with ice. Ice 15-20 minutes 3-4 times daily as needed. For SI joint pain, recommend ice bath in water about 50 degrees for 5 consecutive days, add ice slowly to help with adjustment and may cover with warm towel or robe to help with cold tolerance. If using lidocaine, do not apply heat in conjunction as this can cause a burn.   MEDICAL  RECORDS reviewed from other provider(s) for past and current medical history pertinent to this visit..

## 2025-05-21 ENCOUNTER — OFFICE VISIT (OUTPATIENT)
Dept: ORTHOPEDIC SURGERY | Facility: CLINIC | Age: 85
End: 2025-05-21
Payer: MEDICARE

## 2025-05-21 VITALS — BODY MASS INDEX: 28.1 KG/M2 | TEMPERATURE: 97.3 F | HEIGHT: 64 IN | WEIGHT: 164.6 LBS

## 2025-05-21 DIAGNOSIS — M51.360 DEGENERATION OF INTERVERTEBRAL DISC OF LUMBAR REGION WITH DISCOGENIC BACK PAIN: Primary | ICD-10-CM

## 2025-05-21 DIAGNOSIS — M70.62 GREATER TROCHANTERIC BURSITIS OF LEFT HIP: ICD-10-CM

## 2025-05-21 LAB — INR PPP: 3.29

## 2025-05-21 PROCEDURE — 1159F MED LIST DOCD IN RCRD: CPT | Performed by: NURSE PRACTITIONER

## 2025-05-21 PROCEDURE — 99213 OFFICE O/P EST LOW 20 MIN: CPT | Performed by: NURSE PRACTITIONER

## 2025-05-21 PROCEDURE — 1160F RVW MEDS BY RX/DR IN RCRD: CPT | Performed by: NURSE PRACTITIONER

## 2025-05-22 ENCOUNTER — ANTICOAGULATION VISIT (OUTPATIENT)
Dept: PHARMACY | Facility: HOSPITAL | Age: 85
End: 2025-05-22
Payer: MEDICARE

## 2025-05-22 NOTE — PROGRESS NOTES
Anticoagulation Clinic Progress Note    Anticoagulation Summary  As of 5/22/2025      INR goal:  2.0-3.0   TTR:  87.0% (6.5 y)   INR used for dosing:  3.29 (5/21/2025)   Warfarin maintenance plan:  4 mg every Tue, Sat; 2 mg all other days   Weekly warfarin total:  18 mg   Plan last modified:  Savage Wasserman, PharmD (4/4/2024)   Next INR check:  6/4/2025   Priority:  Maintenance   Target end date:  --    Indications    Atrial fibrillation with RVR (Resolved) [I48.91]                 Anticoagulation Episode Summary       INR check location:  --    Preferred lab:  --    Send INR reminders to:   [facility]  POOL    Comments:  [facility]  ([place], F: [phone])          Anticoagulation Care Providers       Provider Role Specialty Phone number    Sly Saleh MD Referring Cardiology [phone]            Clinic Interview:  Patient Findings     Negatives:  Signs/symptoms of thrombosis, Signs/symptoms of bleeding,   Laboratory test error suspected, Change in health, Change in alcohol use,   Change in activity, Upcoming invasive procedure, Emergency department   visit, Upcoming dental procedure, Missed doses, Extra doses, Change in   medications, Change in diet/appetite, Hospital admission, Bruising, Other   complaints      Clinical Outcomes     Negatives:  Major bleeding event, Thromboembolic event,   Anticoagulation-related hospital admission, Anticoagulation-related ED   visit, Anticoagulation-related fatality        INR History:      2/6/2025    12:00 AM 2/7/2025     8:12 AM 2/27/2025    12:00 AM 2/28/2025     8:41 AM 4/10/2025     9:38 AM 5/21/2025    12:00 AM 5/22/2025     8:59 AM   Anticoagulation Monitoring   INR  1.96  2.85 2.67  3.29   INR Date  2/6/2025 2/27/2025 4/9/2025 5/21/2025   INR Goal  2.0-3.0  2.0-3.0 2.0-3.0  2.0-3.0   Trend  Same  Same Same  Same   Last Week Total  16 mg  18 mg 18 mg  18 mg   Next Week Total  18 mg  18 mg 18 mg  16 mg   Sun  2 mg  2 mg 2 mg  2 mg   Mon  2 mg  2  mg 2 mg  2 mg   Tue  4 mg  4 mg 4 mg  4 mg   Wed  2 mg  2 mg 2 mg  2 mg   Thu  2 mg  2 mg 2 mg  2 mg   Fri  2 mg  2 mg 2 mg  Hold (5/23); Otherwise 2 mg   Sat  4 mg  4 mg 4 mg  4 mg   Historical INR 1.96      2.85       3.29        Visit Report      Report        This result is from an external source.       Plan:  1. INR is Supratherapeutic today- see above in Anticoagulation Summary.   Will instruct Arelis Johnson to Change their warfarin regimen- see above in Anticoagulation Summary.  Patient has been having more hip and back pain. She already took warfarin today and is only due for 1/2 tablet tomorrow. Hold and then resume, rck 2 weeks   2. Follow up in 2 weeks  3. They have been instructed to call if any changes in medications, doses, concerns, etc. Patient expresses understanding and has no further questions at this time.    Eulalia Reaves [facility]

## 2025-06-04 LAB — INR PPP: 3.25

## 2025-06-05 ENCOUNTER — ANTICOAGULATION VISIT (OUTPATIENT)
Dept: PHARMACY | Facility: HOSPITAL | Age: 85
End: 2025-06-05
Payer: MEDICARE

## 2025-06-05 NOTE — PROGRESS NOTES
Anticoagulation Clinic Progress Note    Anticoagulation Summary  As of 6/5/2025      INR goal:  2.0-3.0   TTR:  86.5% (6.5 y)   INR used for dosing:  3.25 (6/4/2025)   Warfarin maintenance plan:  4 mg every Tue; 2 mg all other days   Weekly warfarin total:  16 mg   Plan last modified:  Eulalia Reaves RPH (6/5/2025)   Next INR check:  6/18/2025   Priority:  Maintenance   Target end date:  --    Indications    Atrial fibrillation with RVR (Resolved) [I48.91]                 Anticoagulation Episode Summary       INR check location:  --    Preferred lab:  --    Send INR reminders to:   [facility]  POOL    Comments:  [facility]  ([place], F: [phone])          Anticoagulation Care Providers       Provider Role Specialty Phone number    Sly Saleh MD Referring Cardiology [phone]            Clinic Interview:  Patient Findings     Negatives:  Signs/symptoms of thrombosis, Signs/symptoms of bleeding,   Laboratory test error suspected, Change in health, Change in alcohol use,   Change in activity, Upcoming invasive procedure, Emergency department   visit, Upcoming dental procedure, Missed doses, Extra doses, Change in   medications, Change in diet/appetite, Hospital admission, Bruising, Other   complaints      Clinical Outcomes     Negatives:  Major bleeding event, Thromboembolic event,   Anticoagulation-related hospital admission, Anticoagulation-related ED   visit, Anticoagulation-related fatality        INR History:      2/27/2025    12:00 AM 2/28/2025     8:41 AM 4/10/2025     9:38 AM 5/21/2025    12:00 AM 5/22/2025     8:59 AM 6/4/2025    12:00 AM 6/5/2025     8:27 AM   Anticoagulation Monitoring   INR  2.85 2.67  3.29  3.25   INR Date  2/27/2025 4/9/2025 5/21/2025 6/4/2025   INR Goal  2.0-3.0 2.0-3.0  2.0-3.0  2.0-3.0   Trend  Same Same  Same  Down   Last Week Total  18 mg 18 mg  18 mg  18 mg   Next Week Total  18 mg 18 mg  16 mg  16 mg   Sun  2 mg 2 mg  2 mg  2 mg   Mon  2 mg 2 mg  2  mg  2 mg   Tue  4 mg 4 mg  4 mg  4 mg   Wed  2 mg 2 mg  2 mg  2 mg   Thu  2 mg 2 mg  2 mg  2 mg   Fri  2 mg 2 mg  Hold (5/23); Otherwise 2 mg  2 mg   Sat  4 mg 4 mg  4 mg  2 mg   Historical INR 2.85       3.29      3.25        Visit Report    Report          This result is from an external source.       Plan:  1. INR is Supratherapeutic today- see above in Anticoagulation Summary.   Will instruct Arelis SANDRA Johnson to Change their warfarin regimen- see above in Anticoagulation Summary.  decrease to 4 mg on tues, 2 mg AOIRINA, kelseak 2 weeks  2. Follow up in 2 weeks  3. They have been instructed to call if any changes in medications, doses, concerns, etc. Patient expresses understanding and has no further questions at this time.    Eulalia Reaves [facility]

## 2025-06-19 ENCOUNTER — ANTICOAGULATION VISIT (OUTPATIENT)
Dept: PHARMACY | Facility: HOSPITAL | Age: 85
End: 2025-06-19
Payer: MEDICARE

## 2025-06-19 LAB — INR PPP: 2.36

## 2025-06-19 NOTE — PROGRESS NOTES
Anticoagulation Clinic Progress Note    Anticoagulation Summary  As of 2025      INR goal:  2.0-3.0   TTR:  86.4% (6.6 y)   INR used for dosin.36 (2025)   Warfarin maintenance plan:  4 mg every Tue; 2 mg all other days   Weekly warfarin total:  16 mg   No change documented:  Eulalia Reaves RPH   Plan last modified:  Eulalia Reaves RPH (2025)   Next INR check:  7/3/2025   Priority:  Maintenance   Target end date:  --    Indications    Atrial fibrillation with RVR (Resolved) [I48.91]                 Anticoagulation Episode Summary       INR check location:  --    Preferred lab:  --    Send INR reminders to:  [facility]  POOL    Comments:  [facility]  ([place], [place]: [phone])          Anticoagulation Care Providers       Provider Role Specialty Phone number    Sly Saleh MD Referring Cardiology [phone]            Clinic Interview:  No pertinent clinical findings have been reported.    INR History:      4/10/2025     9:38 AM 2025    12:00 AM 2025     8:59 AM 2025    12:00 AM 2025     8:27 AM 2025    12:00 AM 2025     8:52 AM   Anticoagulation Monitoring   INR 2.67  3.29  3.25  2.36   INR Date 2025   INR Goal 2.0-3.0  2.0-3.0  2.0-3.0  2.0-3.0   Trend Same  Same  Down  Same   Last Week Total 18 mg  18 mg  18 mg  16 mg   Next Week Total 18 mg  16 mg  16 mg  16 mg   Sun 2 mg  2 mg  2 mg  2 mg   Mon 2 mg  2 mg  2 mg  2 mg   Tue 4 mg  4 mg  4 mg  2 mg   Wed 2 mg  2 mg  2 mg  2 mg   Thu 2 mg  2 mg  2 mg  4 mg   Fri 2 mg  Hold (); Otherwise 2 mg  2 mg  2 mg   Sat 4 mg  4 mg  2 mg  2 mg   Historical INR  3.29      3.25      2.36        Visit Report  Report            This result is from an external source.       Plan:  1. INR is therapeutic today- see above in Anticoagulation Summary.    Arelis Johnson to continue their warfarin regimen- see above in Anticoagulation Summary.  2. Follow up in 2 weeks  3.  Pt has agreed to only be called if INR out of range. They have been instructed to call if any changes in medications, doses, concerns, etc. Patient expresses understanding and has no further questions at this time.    Eulalia Reaves [facility]

## 2025-07-03 ENCOUNTER — ANTICOAGULATION VISIT (OUTPATIENT)
Dept: PHARMACY | Facility: HOSPITAL | Age: 85
End: 2025-07-03
Payer: MEDICARE

## 2025-07-03 LAB
INR PPP: 2.88 (ref 0.9–1.1)
PROTHROMBIN TIME: 30.5 SECONDS (ref 11.7–14.2)

## 2025-07-03 NOTE — PROGRESS NOTES
Anticoagulation Clinic Progress Note    Anticoagulation Summary  As of 7/3/2025      INR goal:  2.0-3.0   TTR:  86.5% (6.6 y)   INR used for dosin.88 (2025)   Warfarin maintenance plan:  4 mg every Thu; 2 mg all other days   Weekly warfarin total:  16 mg   No change documented:  Eulalia Reaves RPH   Plan last modified:  Eulalia Reaves RPH (2025)   Next INR check:  2025   Priority:  Maintenance   Target end date:  --    Indications    Atrial fibrillation with RVR (Resolved) [I48.91]                 Anticoagulation Episode Summary       INR check location:  --    Preferred lab:  --    Send INR reminders to:  [facility]  POOL    Comments:  [facility]  ([place], F: [phone])          Anticoagulation Care Providers       Provider Role Specialty Phone number    Sly Saleh MD Referring Cardiology [phone]            Clinic Interview:  Patient Findings     Negatives:  Signs/symptoms of thrombosis, Signs/symptoms of bleeding,   Laboratory test error suspected, Change in health, Change in alcohol use,   Change in activity, Upcoming invasive procedure, Emergency department   visit, Upcoming dental procedure, Missed doses, Extra doses, Change in   medications, Change in diet/appetite, Hospital admission, Bruising, Other   complaints      Clinical Outcomes     Negatives:  Major bleeding event, Thromboembolic event,   Anticoagulation-related hospital admission, Anticoagulation-related ED   visit, Anticoagulation-related fatality        INR History:      2025    12:00 AM 2025     8:59 AM 2025    12:00 AM 2025     8:27 AM 2025    12:00 AM 2025     8:52 AM 7/3/2025     9:17 AM   Anticoagulation Monitoring   INR  3.29  3.25  2.36 2.88   INR Date  2025   INR Goal  2.0-3.0  2.0-3.0  2.0-3.0 2.0-3.0   Trend  Same  Down  Same Same   Last Week Total  18 mg  18 mg  16 mg 16 mg   Next Week Total  16 mg  16 mg  16 mg 16 mg    Sun  2 mg  2 mg  2 mg 2 mg   Mon  2 mg  2 mg  2 mg 2 mg   Tue  4 mg  4 mg  2 mg 2 mg   Wed  2 mg  2 mg  2 mg 2 mg   Thu  2 mg  2 mg  4 mg 4 mg   Fri  Hold (5/23); Otherwise 2 mg  2 mg  2 mg 2 mg   Sat  4 mg  2 mg  2 mg 2 mg   Historical INR 3.29      3.25      2.36         Visit Report Report             This result is from an external source.       Plan:  1. INR is Therapeutic today- see above in Anticoagulation Summary.   Will instruct Arelis SANDRA Johnson to Continue their warfarin regimen- see above in Anticoagulation Summary.  2. Follow up in 4 weeks  3. They have been instructed to call if any changes in medications, doses, concerns, etc. Patient expresses understanding and has no further questions at this time.    Eulalia Reaves [facility]

## 2025-07-30 LAB
INR PPP: 2.7 (ref 0.9–1.1)
PROTHROMBIN TIME: 29 SECONDS (ref 11.7–14.2)

## 2025-07-31 ENCOUNTER — ANTICOAGULATION VISIT (OUTPATIENT)
Dept: PHARMACY | Facility: HOSPITAL | Age: 85
End: 2025-07-31
Payer: MEDICARE

## 2025-07-31 DIAGNOSIS — I48.20 ATRIAL FIBRILLATION, CHRONIC: Primary | ICD-10-CM

## 2025-07-31 NOTE — PROGRESS NOTES
Anticoagulation Clinic Progress Note    Anticoagulation Summary  As of 2025      INR goal:  2.0-3.0   TTR:  86.6% (6.7 y)   INR used for dosin.70 (2025)   Warfarin maintenance plan:  4 mg every Thu; 2 mg all other days   Weekly warfarin total:  16 mg   No change documented:  Savage Wasserman, PharmD   Plan last modified:  Eulalia Reaves RPH (2025)   Next INR check:  2025   Priority:  Maintenance   Target end date:  --    Indications    Atrial fibrillation with RVR (Resolved) [I48.91]                 Anticoagulation Episode Summary       INR check location:  --    Preferred lab:  --    Send INR reminders to:  [facility]  POOL    Comments:  [facility]  ([email or web address], [place]: [phone])          Anticoagulation Care Providers       Provider Role Specialty Phone number    Sly Saleh MD Referring Cardiology [phone]            Clinic Interview:  Patient Findings     Negatives:  Signs/symptoms of thrombosis, Signs/symptoms of bleeding,   Laboratory test error suspected, Change in health, Change in alcohol use,   Change in activity, Upcoming invasive procedure, Emergency department   visit, Upcoming dental procedure, Missed doses, Extra doses, Change in   medications, Change in diet/appetite, Hospital admission, Bruising, Other   complaints      Clinical Outcomes     Negatives:  Major bleeding event, Thromboembolic event,   Anticoagulation-related hospital admission, Anticoagulation-related ED   visit, Anticoagulation-related fatality        INR History:      2025     8:59 AM 2025    12:00 AM 2025     8:27 AM 2025    12:00 AM 2025     8:52 AM 7/3/2025     9:17 AM 2025     9:05 AM   Anticoagulation Monitoring   INR 3.29  3.25  2.36 2.88 2.70   INR Date 2025   INR Goal 2.0-3.0  2.0-3.0  2.0-3.0 2.0-3.0 2.0-3.0   Trend Same  Down  Same Same Same   Last Week Total 18 mg  18 mg  16 mg 16 mg 16 mg   Next Week Total  16 mg  16 mg  16 mg 16 mg 16 mg   Sun 2 mg  2 mg  2 mg 2 mg 2 mg   Mon 2 mg  2 mg  2 mg 2 mg 2 mg   Tue 4 mg  4 mg  2 mg 2 mg 2 mg   Wed 2 mg  2 mg  2 mg 2 mg 2 mg   Thu 2 mg  2 mg  4 mg 4 mg 4 mg   Fri Hold (5/23); Otherwise 2 mg  2 mg  2 mg 2 mg 2 mg   Sat 4 mg  2 mg  2 mg 2 mg 2 mg   Historical INR  3.25      2.36              This result is from an external source.       Plan:  1. INR is Therapeutic today- see above in Anticoagulation Summary.   Will instruct Arelis Johnson to Continue their warfarin regimen- see above in Anticoagulation Summary.  2. Follow up in 4 weeks.  3. They have been instructed to call if any changes in medications, doses, concerns, etc. Patient expresses understanding and has no further questions at this time.    Savage Wasserman, PharmD

## 2025-08-27 LAB — INR PPP: 2.44

## 2025-08-28 ENCOUNTER — ANTICOAGULATION VISIT (OUTPATIENT)
Dept: PHARMACY | Facility: HOSPITAL | Age: 85
End: 2025-08-28
Payer: MEDICARE

## (undated) DEVICE — TUBING, SUCTION, 1/4" X 10', STRAIGHT: Brand: MEDLINE

## (undated) DEVICE — CANN O2 ETCO2 FITS ALL CONN CO2 SMPL A/ 7IN DISP LF

## (undated) DEVICE — FRCP BX RADJAW4 NDL 2.8 240CM LG OG BX40

## (undated) DEVICE — LN SMPL CO2 SHTRM SD STREAM W/M LUER

## (undated) DEVICE — KT ORCA ORCAPOD DISP STRL

## (undated) DEVICE — SENSR O2 OXIMAX FNGR A/ 18IN NONSTR

## (undated) DEVICE — ADAPT CLN BIOGUARD AIR/H2O DISP

## (undated) DEVICE — MSK PROC CURAPLEX O2 2/ADAPT 7FT

## (undated) DEVICE — BLCK/BITE BLOX W/DENTL/RIM W/STRAP 54F

## (undated) DEVICE — MSK ENDO PORT O2 POM ELITE CURAPLEX A/